# Patient Record
Sex: MALE | Race: OTHER | HISPANIC OR LATINO | Employment: OTHER | URBAN - METROPOLITAN AREA
[De-identification: names, ages, dates, MRNs, and addresses within clinical notes are randomized per-mention and may not be internally consistent; named-entity substitution may affect disease eponyms.]

---

## 2019-09-03 ENCOUNTER — HOSPITAL ENCOUNTER (OUTPATIENT)
Dept: RADIATION ONCOLOGY | Facility: MEDICAL CENTER | Age: 54
End: 2019-09-30
Attending: RADIOLOGY
Payer: COMMERCIAL

## 2019-09-03 VITALS
DIASTOLIC BLOOD PRESSURE: 60 MMHG | TEMPERATURE: 98.5 F | WEIGHT: 159.94 LBS | SYSTOLIC BLOOD PRESSURE: 116 MMHG | BODY MASS INDEX: 22.95 KG/M2 | OXYGEN SATURATION: 97 % | HEART RATE: 125 BPM

## 2019-09-03 DIAGNOSIS — C20 MALIGNANT NEOPLASM OF RECTUM (HCC): ICD-10-CM

## 2019-09-03 PROCEDURE — 99205 OFFICE O/P NEW HI 60 MIN: CPT | Performed by: RADIOLOGY

## 2019-09-03 PROCEDURE — 99214 OFFICE O/P EST MOD 30 MIN: CPT | Performed by: RADIOLOGY

## 2019-09-03 ASSESSMENT — PAIN SCALES - GENERAL: PAINLEVEL: NO PAIN

## 2019-09-04 NOTE — CONSULTS
RADIATION ONCOLOGY CONSULT    DATE OF SERVICE: 9/3/2019    IDENTIFICATION: A 54 y.o. male with locally advanced rectal cancer.  He is here at the kind request of Dr. Joaquín Trent.      HISTORY OF PRESENT ILLNESS: Patient's history dates back to 2013 when he presented with rectal bleeding and underwent a biopsy on 10/14/2013 revealing moderately differentiated adenocarcinoma.  He was Kincaid syndrome unlikely based on testing.  At that time he had a large ulcerated mass that was palpable up to 10 cm.  He elected to forego standard conventional treatment.  He was seen at [facility] in 2017 and underwent a diverting colostomy because of the pain with bowel movements.  He again decided to forego conventional therapy.  More recently he saw Dr. Arellano and underwent homeopathic therapy for 8 weeks but the tumor continued to grow and he was not feeling good under the treatment.  Now the tumor is massive and causing discomfort he is unable to sit.  PET CT scan was performed 2/6/2019 this revealed a large rectal/presacral mass with soft tissue extension bilateral inguinal masses.  Multiple surgeons last of which was Dr. Joaquín Trent who talked at length about options.  Mainly being neoadjuvant chemotherapy and radiation therapy followed by a large resection.  He is here to discuss the role of radiation therapy in his overall treatment plan.    PAST MEDICAL HISTORY:   Past Medical History:   Diagnosis Date   • Hemorrhoid        PAST SURGICAL HISTORY:  Past Surgical History:   Procedure Laterality Date   • COLONOSCOPY WITH BIOPSY  10/14/2013    Performed by Julio Cesar Sosa M.D. at ENDOSCOPY [facility]         CURRENT MEDICATIONS:  Current Outpatient Medications   Medication Sig Dispense Refill   • HOMEOPATHIC PRODUCTS PO Take  by mouth.     • cyanocobalamin (VITAMIN B-12) 500 MCG Tab Take 500 mcg by mouth every day.     • vitamin D (CHOLECALCIFEROL) 1000 UNIT Tab Take 1,000 Units by mouth every day. Pt takes 3,000 units  every other day     • Ascorbic Acid (VITAMIN C PO) Take 1,000 mg by mouth every day. Powder form       No current facility-administered medications for this encounter.        ALLERGIES:    Patient has no known allergies.    FAMILY HISTORY:    Father with unknown type of cancer.  Patient was adopted and knows no other family history of cancer.  Or other family history.        SOCIAL HISTORY:     reports that he has never smoked. He has never used smokeless tobacco. He reports that he drinks alcohol. He reports that he does not use drugs.  He lives with his wife and 2 children    REVIEW OF SYSTEMS: Pertinent positives consist of rectal discomfort, fatigue  The rest of the review of systems is negative and has been reviewed by me. It is in the nursing note dated 9/3/2019 in [facility]    Patient denies pain he has just discomfort in the rectum    PHYSICAL EXAM:    0= Fully active, able to carry on all pre-disease performance without restriction.  Vitals:    09/03/19 1521   BP: 116/60   BP Location: Left arm   Patient Position: Lying right side   BP Cuff Size: Adult   Pulse: (!) 125   Temp: 36.9 °C (98.5 °F)   TempSrc: Temporal   SpO2: 97%   Weight: 72.5 kg (159 lb 15 oz)   Pain Score: No pain        GENERAL: Well-appearing alert and oriented x3 standing because he is unable to lie flat.  Rectal exam: Unable to actually do a rectal exam because there was a huge mass extruding from the rectum that is exophytic oozing and malodorous.  There are also groin nodes left greater than right but no skin breakdown in the groins  HEENT:  Pupils are equal, round, and reactive to light.  Extraocular muscles   are intact. Sclerae nonicteric.  Conjunctivae pink.  Oral cavity, tongue   protrudes midline.   NECK:   No peripheral adenopathy of the neck, supraclavicular fossa or axillae   bilaterally.  LUNGS:  Clear to ascultation   HEART:  Regular rate and rhythm.  No murmur appreciated  ABDOMEN:  Soft. No evidence of hepatosplenomegaly.     EXTREMITIES:  Without Edema.  NEUROLOGIC:  Cranial nerves II through XII were intact. Normal stance and gait motor and sensory grossly within normal limits        IMPRESSION:    A 54 y.o. with locally advanced rectal carcinoma unresectable.      RECOMMENDATIONS:   I had a long discussion with the patient regarding treatment.  I said the only way to try and shrink this down to make him more comfortable would be a combination of radiation therapy and chemotherapy.  At this point I do not think he would be able to tolerate radiation therapy as he has such a large tumor I do not think he would be able to lie still for the treatment.  I would recommend that he do neoadjuvant chemotherapy followed by concurrent chemoradiation in a few months.  He has an appointment to see Dr Werner in a few weeks.  I have discussed with the patient and he is in agreement with the plan.  I've described the details of radiation along with the side effects both acute and chronic, including but not exclusive to fatigue, skin reaction, local soreness, swelling, damage to the bowel possible urinary symptoms ample time was allowed for questions, and patient understands.    Patient understands when he has completed some of the chemotherapy then he can proceed with concurrent chemoradiation.  He will give us a call once he has completed his chemotherapy.    Thank you for the opportunity to participate in his care.  If any questions or comments, please do not hesitate in calling.    Please note that this dictation was created using voice recognition software. I have made every reasonable attempt to correct obvious errors, but I expect that there are errors of grammar and possibly content that I did not discover before finalizing the note.

## 2019-09-09 ENCOUNTER — HOSPITAL ENCOUNTER (OUTPATIENT)
Dept: LAB | Facility: MEDICAL CENTER | Age: 54
End: 2019-09-09
Attending: INTERNAL MEDICINE
Payer: COMMERCIAL

## 2019-09-09 LAB
FORWARD REASON: SPWHY: NORMAL
FORWARDED TO LAB: SPWHR: NORMAL
SPECIMEN SENT: SPWT1: NORMAL

## 2019-09-27 ENCOUNTER — HOSPITAL ENCOUNTER (OUTPATIENT)
Dept: LAB | Facility: MEDICAL CENTER | Age: 54
End: 2019-09-27
Attending: INTERNAL MEDICINE
Payer: COMMERCIAL

## 2019-09-27 LAB
FORWARD REASON: SPWHY: NORMAL
FORWARDED TO LAB: SPWHR: NORMAL
SPECIMEN SENT: SPWT1: NORMAL

## 2019-10-16 ENCOUNTER — HOSPITAL ENCOUNTER (OUTPATIENT)
Dept: LAB | Facility: MEDICAL CENTER | Age: 54
End: 2019-10-16
Attending: INTERNAL MEDICINE
Payer: COMMERCIAL

## 2019-10-16 LAB
FORWARD REASON: SPWHY: NORMAL
FORWARDED TO LAB: SPWHR: NORMAL
SPECIMEN SENT: SPWT1: NORMAL

## 2020-02-14 ENCOUNTER — HOSPITAL ENCOUNTER (OUTPATIENT)
Facility: MEDICAL CENTER | Age: 55
End: 2020-02-14
Attending: INTERNAL MEDICINE
Payer: MEDICAID

## 2020-02-14 LAB
FORWARD REASON: SPWHY: NORMAL
FORWARDED TO LAB: SPWHR: NORMAL
SPECIMEN SENT: SPWT1: NORMAL

## 2020-02-19 ENCOUNTER — HOSPITAL ENCOUNTER (OUTPATIENT)
Dept: RADIOLOGY | Facility: MEDICAL CENTER | Age: 55
End: 2020-02-19
Payer: MEDICAID

## 2020-02-19 DIAGNOSIS — C20 MALIGNANT NEOPLASM OF RECTUM (HCC): ICD-10-CM

## 2020-02-24 ENCOUNTER — HOSPITAL ENCOUNTER (OUTPATIENT)
Dept: RADIATION ONCOLOGY | Facility: MEDICAL CENTER | Age: 55
End: 2020-02-29
Attending: RADIOLOGY
Payer: MEDICAID

## 2020-02-24 VITALS
SYSTOLIC BLOOD PRESSURE: 147 MMHG | HEART RATE: 82 BPM | OXYGEN SATURATION: 97 % | DIASTOLIC BLOOD PRESSURE: 98 MMHG | BODY MASS INDEX: 24.73 KG/M2 | TEMPERATURE: 98.2 F | HEIGHT: 70 IN | WEIGHT: 172.7 LBS

## 2020-02-24 PROCEDURE — 99215 OFFICE O/P EST HI 40 MIN: CPT | Performed by: RADIOLOGY

## 2020-02-24 PROCEDURE — 99212 OFFICE O/P EST SF 10 MIN: CPT | Performed by: RADIOLOGY

## 2020-02-24 ASSESSMENT — PAIN SCALES - GENERAL: PAINLEVEL: 3=SLIGHT PAIN

## 2020-02-24 NOTE — NON-PROVIDER
"Patient was seen today in clinic with Dr. Strange for follow up.  Vitals signs and weight were obtained and pain assessment was completed.  Allergies and medications were reviewed with the patient.  Review of systems completed.     Vitals/Pain:  Vitals:    02/24/20 1356   BP: 147/98   BP Location: Right arm   Patient Position: Sitting   BP Cuff Size: Adult   Pulse: 82   Temp: 36.8 °C (98.2 °F)   TempSrc: Temporal   SpO2: 97%   Weight: 78.3 kg (172 lb 11.2 oz)   Height: 1.778 m (5' 10\")   Pain Score: 3=Slight Pain        Allergies:   Patient has no known allergies.    Current Medications:  Current Outpatient Medications   Medication Sig Dispense Refill   • HOMEOPATHIC PRODUCTS PO Take  by mouth.     • cyanocobalamin (VITAMIN B-12) 500 MCG Tab Take 500 mcg by mouth every day.     • vitamin D (CHOLECALCIFEROL) 1000 UNIT Tab Take 1,000 Units by mouth every day. Pt takes 3,000 units every other day     • Ascorbic Acid (VITAMIN C PO) Take 1,000 mg by mouth every day. Powder form       No current facility-administered medications for this encounter.          PCP:  Adan Ball, Med Ass't    "

## 2020-02-24 NOTE — PROGRESS NOTES
RADIATION ONCOLOGY FOLLOW-UP    DATE OF SERVICE: 2/24/2020    IDENTIFICATION:   A 54 y.o. male with locally advanced rectal cancer now status post neoadjuvant Cape ox plus bevacizumab with an excellent response.      HISTORY OF PRESENT ILLNESS:   Patient's history dates back to 2013 when he presented with rectal bleeding underwent biopsy 10/14/2013 revealing moderately differentiated adenocarcinoma.  He was Kincaid syndrome unlikely.  At that time he had a large ulcerated mass that was palpable up to 10 cm.  He decided to forego conventional therapy.  He did undergo diverting colostomy in 2017 at [facility].  He continued with homeopathic therapy but the tumor continued to progress and he was unable to sit.  PET CT scan 2/6/2019 showed a large rectal presacral mouth with soft tissue extension of bilateral inguinal masses.  He then underwent neoadjuvant chemotherapy as mentioned consisted of Cape ox plus bevacizumab.  He has had an excellent response and is here to discuss concurrent chemotherapy and radiation therapy for further tumor response.  PET CT 1/22/2020 showed continued regression of disease.    CURRENT MEDICATIONS:  Current Outpatient Medications   Medication Sig Dispense Refill   • HOMEOPATHIC PRODUCTS PO Take  by mouth.     • cyanocobalamin (VITAMIN B-12) 500 MCG Tab Take 500 mcg by mouth every day.     • vitamin D (CHOLECALCIFEROL) 1000 UNIT Tab Take 1,000 Units by mouth every day. Pt takes 3,000 units every other day     • Ascorbic Acid (VITAMIN C PO) Take 1,000 mg by mouth every day. Powder form       No current facility-administered medications for this encounter.        ALLERGIES:  Patient has no known allergies.    FAMILY HISTORY:    History reviewed. No pertinent family history.[unfilled]        SOCIAL HISTORY:     reports that he has never smoked. He has never used smokeless tobacco. He reports current alcohol use. He reports that he does not use drugs.      REVIEW OF SYSTEMS: Is significant for  "weight gain of about 20 pounds improved appetite but still with fatigue.  He has some diarrhea and nausea from the chemotherapy.  He does also have some neuropathy.  The rest of the review of systems has been reviewed by me and is documented in the nursing note in [facility] dated 2/24/2020    PHYSICAL EXAM:     ECOG PERFORMANCE STATUS:  1= Restricted in physically strenuous activity, but ambulatory and able to carry out work of a light sedentary nature, e.g., light housework, office work.       Vitals:    02/24/20 1356   BP: 147/98   BP Location: Right arm   Patient Position: Sitting   BP Cuff Size: Adult   Pulse: 82   Temp: 36.8 °C (98.2 °F)   TempSrc: Temporal   SpO2: 97%   Weight: 78.3 kg (172 lb 11.2 oz)   Height: 1.778 m (5' 10\")   Pain Score: 3=Slight Pain        GENERAL: Well-appearing alert and oriented x3 in no apparent distress  Rectal: He has substantially regressed tumor compared to my previous examination.  On the right buttock he has scar tissue from where the original tumor was.  Actual rectal exam was not performed as the tumor is still around the anus.  He also has bilateral groin adenopathy left greater than right smaller than previously.  HEENT:  Pupils are equal, round, and reactive to light.  Extraocular muscles   are intact. Sclerae nonicteric.  Conjunctivae pink.  Oral cavity, tongue   protrudes midline.   NECK: No palpable nodes in the neck supraclavicular axillary regions  LUNGS:  Clear to ascultation   HEART:  Regular rate and rhythm.  No murmur appreciated  ABDOMEN:  Soft. No evidence of hepatosplenomegaly.  Positive bowel sounds.  EXTREMITIES:  Without Edema.  NEUROLOGIC:  Cranial nerves II through XII were intact. Normal stance and gait motor and sensory grossly within normal limits          IMPRESSION:    A 54 y.o. male with locally advanced rectal cancer now status post Cape ox and Avastin with excellent response but still persistent tumor.      RECOMMENDATIONS:   Again I am recommending " concurrent chemo rads to decrease the chance of local recurrence.  I discussed the details of the radiation along with the side effects both acute and chronic including but not exclusive to generalized fatigue damage to tissues within the treatment field including abdominal discomfort urinary discomfort possible bleeding because of the prior Avastin use.  And severe sunburn type of reaction in the perianal region.    Patient understands and we have him tentatively scheduled for simulation on Wednesday after he has an MRI scan tomorrow.      Thank you for the opportunity to participate in his care.  If any questions or comments, please do not hesitate in calling.      Please note that this dictation was created using voice recognition software. I have made every reasonable attempt to correct obvious errors, but I expect that there are errors of grammar and possibly content that I did not discover before finalizing the note.

## 2020-02-25 ENCOUNTER — HOSPITAL ENCOUNTER (OUTPATIENT)
Dept: RADIOLOGY | Facility: MEDICAL CENTER | Age: 55
End: 2020-02-25
Attending: RADIOLOGY
Payer: MEDICAID

## 2020-02-25 DIAGNOSIS — C20 MALIGNANT NEOPLASM OF RECTUM (HCC): ICD-10-CM

## 2020-02-25 PROCEDURE — A9576 INJ PROHANCE MULTIPACK: HCPCS | Performed by: RADIOLOGY

## 2020-02-25 PROCEDURE — 700111 HCHG RX REV CODE 636 W/ 250 OVERRIDE (IP): Performed by: RADIOLOGY

## 2020-02-25 PROCEDURE — 700117 HCHG RX CONTRAST REV CODE 255: Performed by: RADIOLOGY

## 2020-02-25 PROCEDURE — 72197 MRI PELVIS W/O & W/DYE: CPT

## 2020-02-25 RX ADMIN — GADOTERIDOL 15 ML: 279.3 INJECTION, SOLUTION INTRAVENOUS at 16:00

## 2020-02-25 RX ADMIN — GLUCAGON HYDROCHLORIDE 1 MG: KIT at 12:25

## 2020-02-25 RX ADMIN — HEPARIN 500 UNITS: 100 SYRINGE at 13:25

## 2020-03-03 ENCOUNTER — HOSPITAL ENCOUNTER (OUTPATIENT)
Dept: RADIATION ONCOLOGY | Facility: MEDICAL CENTER | Age: 55
End: 2020-03-31
Attending: RADIOLOGY

## 2020-03-10 ENCOUNTER — HOSPITAL ENCOUNTER (OUTPATIENT)
Dept: LAB | Facility: MEDICAL CENTER | Age: 55
End: 2020-03-10
Attending: NURSE PRACTITIONER
Payer: MEDICAID

## 2020-03-10 LAB
FORWARD REASON: SPWHY: NORMAL
FORWARDED TO LAB: SPWHR: NORMAL
SPECIMEN SENT: SPWT1: NORMAL

## 2020-03-30 ENCOUNTER — HOSPITAL ENCOUNTER (OUTPATIENT)
Dept: LAB | Facility: MEDICAL CENTER | Age: 55
End: 2020-03-30
Attending: INTERNAL MEDICINE
Payer: MEDICAID

## 2020-03-30 ENCOUNTER — HOSPITAL ENCOUNTER (OUTPATIENT)
Dept: RADIATION ONCOLOGY | Facility: MEDICAL CENTER | Age: 55
End: 2020-03-30
Payer: MEDICAID

## 2020-03-30 PROCEDURE — 77290 THER RAD SIMULAJ FIELD CPLX: CPT | Performed by: RADIOLOGY

## 2020-03-30 PROCEDURE — 77334 RADIATION TREATMENT AID(S): CPT | Performed by: RADIOLOGY

## 2020-03-30 PROCEDURE — 77263 THER RADIOLOGY TX PLNG CPLX: CPT | Performed by: RADIOLOGY

## 2020-03-30 PROCEDURE — 77470 SPECIAL RADIATION TREATMENT: CPT | Mod: 26 | Performed by: RADIOLOGY

## 2020-03-30 PROCEDURE — 77470 SPECIAL RADIATION TREATMENT: CPT | Performed by: RADIOLOGY

## 2020-03-30 PROCEDURE — 80053 COMPREHEN METABOLIC PANEL: CPT

## 2020-03-30 PROCEDURE — 77334 RADIATION TREATMENT AID(S): CPT | Mod: 26 | Performed by: RADIOLOGY

## 2020-03-31 LAB
ALBUMIN SERPL BCP-MCNC: 4 G/DL (ref 3.2–4.9)
ALBUMIN/GLOB SERPL: 1.7 G/DL
ALP SERPL-CCNC: 81 U/L (ref 30–99)
ALT SERPL-CCNC: 41 U/L (ref 2–50)
ANION GAP SERPL CALC-SCNC: 15 MMOL/L (ref 7–16)
AST SERPL-CCNC: 47 U/L (ref 12–45)
BILIRUB SERPL-MCNC: 0.7 MG/DL (ref 0.1–1.5)
BUN SERPL-MCNC: 15 MG/DL (ref 8–22)
CALCIUM SERPL-MCNC: 8.9 MG/DL (ref 8.5–10.5)
CHLORIDE SERPL-SCNC: 101 MMOL/L (ref 96–112)
CO2 SERPL-SCNC: 23 MMOL/L (ref 20–33)
CREAT SERPL-MCNC: 0.52 MG/DL (ref 0.5–1.4)
GLOBULIN SER CALC-MCNC: 2.4 G/DL (ref 1.9–3.5)
GLUCOSE SERPL-MCNC: 81 MG/DL (ref 65–99)
POTASSIUM SERPL-SCNC: 3.6 MMOL/L (ref 3.6–5.5)
PROT SERPL-MCNC: 6.4 G/DL (ref 6–8.2)
SODIUM SERPL-SCNC: 139 MMOL/L (ref 135–145)

## 2020-04-01 ENCOUNTER — HOSPITAL ENCOUNTER (OUTPATIENT)
Dept: RADIATION ONCOLOGY | Facility: MEDICAL CENTER | Age: 55
End: 2020-04-30
Attending: RADIOLOGY
Payer: MEDICAID

## 2020-04-03 PROCEDURE — 77300 RADIATION THERAPY DOSE PLAN: CPT | Performed by: RADIOLOGY

## 2020-04-03 PROCEDURE — 77338 DESIGN MLC DEVICE FOR IMRT: CPT | Performed by: RADIOLOGY

## 2020-04-03 PROCEDURE — 77300 RADIATION THERAPY DOSE PLAN: CPT | Mod: 26 | Performed by: RADIOLOGY

## 2020-04-03 PROCEDURE — 77301 RADIOTHERAPY DOSE PLAN IMRT: CPT | Mod: 26 | Performed by: RADIOLOGY

## 2020-04-03 PROCEDURE — 77301 RADIOTHERAPY DOSE PLAN IMRT: CPT | Performed by: RADIOLOGY

## 2020-04-03 PROCEDURE — 77338 DESIGN MLC DEVICE FOR IMRT: CPT | Mod: 26 | Performed by: RADIOLOGY

## 2020-04-06 ENCOUNTER — HOSPITAL ENCOUNTER (OUTPATIENT)
Dept: RADIATION ONCOLOGY | Facility: MEDICAL CENTER | Age: 55
End: 2020-04-06
Payer: MEDICAID

## 2020-04-06 ENCOUNTER — DOCUMENTATION (OUTPATIENT)
Dept: NUTRITION | Facility: MEDICAL CENTER | Age: 55
End: 2020-04-06

## 2020-04-06 LAB
CHEMOTHERAPY INFUSION START DATE: NORMAL
CHEMOTHERAPY RECORDS: 1.8
CHEMOTHERAPY RECORDS: 4500
CHEMOTHERAPY RECORDS: NORMAL
CHEMOTHERAPY RX CANCER: NORMAL
DATE 1ST CHEMO CANCER: NORMAL
RAD ONC ARIA COURSE LAST TREATMENT DATE: NORMAL
RAD ONC ARIA COURSE TREATMENT ELAPSED DAYS: NORMAL
RAD ONC ARIA REFERENCE POINT DOSAGE GIVEN TO DATE: 1.8
RAD ONC ARIA REFERENCE POINT DOSAGE GIVEN TO DATE: 1.84
RAD ONC ARIA REFERENCE POINT ID: NORMAL
RAD ONC ARIA REFERENCE POINT ID: NORMAL
RAD ONC ARIA REFERENCE POINT SESSION DOSAGE GIVEN: 1.8
RAD ONC ARIA REFERENCE POINT SESSION DOSAGE GIVEN: 1.84

## 2020-04-06 PROCEDURE — 77280 THER RAD SIMULAJ FIELD SMPL: CPT | Performed by: RADIOLOGY

## 2020-04-06 PROCEDURE — 77331 SPECIAL RADIATION DOSIMETRY: CPT | Mod: 26 | Performed by: RADIOLOGY

## 2020-04-06 PROCEDURE — 77331 SPECIAL RADIATION DOSIMETRY: CPT | Performed by: RADIOLOGY

## 2020-04-06 PROCEDURE — 77386 HCHG IMRT DELIVERY COMPLEX: CPT | Performed by: RADIOLOGY

## 2020-04-06 NOTE — PROGRESS NOTES
"Nutrition Services: RD Consultation/ New Radiation Start  54 year old male with diagnosis of rectal.       Past Medical History:   Diagnosis Date   • Hemorrhoid        RD met with pt to assess current intake, appetite, and nutritional status.  Pt presents to appointment alone.  Pt appears well nourished    Pt states typically eats 3 meals daily at home with an evening snacks of fruit or an occasional dessert when family makes treats. Pt reports usual weight of 168 lbs and is trying to maintain weight. Pt denies N/V/D/C and has had oral and infusion chemo treatment for past 6-7 months. Reports chemo causes tingling in fingers and toes while feeling cold. Wife makes smoothies for pt at home which include fruits, vegetables, and sometimes protein powder.     We discussed nutrition related goals during treatment as well and RD role. We discussed the potential side effects of treatment and their impacts on nutrition. We discussed the benefit of snacks focusing on high calorie/protein items as well as the benefits of maintaining weight and lean muscle mass throughout treatment. Provided and went over handouts/food lists regarding a general healthy diet, healthy snack ideas, foods high in protein and the benefits of a plant based diet limiting red and processed meats.  Pt did not express any further nutrition-related questions or concerns at this time.     Assessment:  • Pertinent Labs 3/30: AST 47  • Pertinent Meds: unknown at this time  • Weight: 172 lbs/78.2 kg on 2/24/20  • Height: 5'10\"  • BMI: 24.78    Weight History from Chart: Weight fluctuates from 220 lbs in 2013 to 189 lbs on 1/7/19, to lowest weight of 160 lbs on 9/3/2019 to current weight of 168 lbs per pt.    Weight Change/Malnutrition Risk: Pt reports weight loss when starting chemo and now has gained weight back to 168 lbs, which is 4 lbs (2%) down in ~6 weeks, which is insignificant.    Plan/Recommendations:  • RD introduced self and role as a resource " during treatment.  • Provided and discussed handout regarding general nutrition guidelines as well as nutritional recommendations for patient's with rectal cancer, including tips/tricks should side effects of tx occur.   • Focus on high calorie and protein foods, fruits and vegetables with all meals/snacks - handout provided.  • Continue protein smoothies made at home as tolerated.    Pt reports understanding and was receptive to information provided.   RD provided contact information. Encouraged pt to reach out as questions/concerns arise.   RD following and to make further recommendations as indicated.  [phone]

## 2020-04-07 ENCOUNTER — HOSPITAL ENCOUNTER (OUTPATIENT)
Dept: RADIATION ONCOLOGY | Facility: MEDICAL CENTER | Age: 55
End: 2020-04-07
Payer: MEDICAID

## 2020-04-07 LAB
CHEMOTHERAPY INFUSION START DATE: NORMAL
CHEMOTHERAPY RECORDS: 1.8
CHEMOTHERAPY RECORDS: 4500
CHEMOTHERAPY RECORDS: NORMAL
CHEMOTHERAPY RX CANCER: NORMAL
DATE 1ST CHEMO CANCER: NORMAL
RAD ONC ARIA COURSE LAST TREATMENT DATE: NORMAL
RAD ONC ARIA COURSE TREATMENT ELAPSED DAYS: NORMAL
RAD ONC ARIA REFERENCE POINT DOSAGE GIVEN TO DATE: 3.6
RAD ONC ARIA REFERENCE POINT DOSAGE GIVEN TO DATE: 3.68
RAD ONC ARIA REFERENCE POINT ID: NORMAL
RAD ONC ARIA REFERENCE POINT ID: NORMAL
RAD ONC ARIA REFERENCE POINT SESSION DOSAGE GIVEN: 1.8
RAD ONC ARIA REFERENCE POINT SESSION DOSAGE GIVEN: 1.84

## 2020-04-07 PROCEDURE — 77386 HCHG IMRT DELIVERY COMPLEX: CPT | Performed by: RADIOLOGY

## 2020-04-07 PROCEDURE — 77014 PR CT GUIDANCE PLACEMENT RAD THERAPY FIELDS: CPT | Mod: 26 | Performed by: RADIOLOGY

## 2020-04-08 ENCOUNTER — APPOINTMENT (OUTPATIENT)
Dept: RADIATION ONCOLOGY | Facility: MEDICAL CENTER | Age: 55
End: 2020-04-08
Payer: MEDICAID

## 2020-04-08 VITALS
SYSTOLIC BLOOD PRESSURE: 125 MMHG | OXYGEN SATURATION: 96 % | DIASTOLIC BLOOD PRESSURE: 69 MMHG | HEART RATE: 65 BPM | BODY MASS INDEX: 25.1 KG/M2 | TEMPERATURE: 97.8 F | WEIGHT: 174.9 LBS

## 2020-04-08 DIAGNOSIS — C20 RECTAL CARCINOMA (HCC): ICD-10-CM

## 2020-04-08 LAB
CHEMOTHERAPY INFUSION START DATE: NORMAL
CHEMOTHERAPY RECORDS: 1.8
CHEMOTHERAPY RECORDS: 4500
CHEMOTHERAPY RECORDS: NORMAL
CHEMOTHERAPY RX CANCER: NORMAL
DATE 1ST CHEMO CANCER: NORMAL
RAD ONC ARIA COURSE LAST TREATMENT DATE: NORMAL
RAD ONC ARIA COURSE TREATMENT ELAPSED DAYS: NORMAL
RAD ONC ARIA REFERENCE POINT DOSAGE GIVEN TO DATE: 5.4
RAD ONC ARIA REFERENCE POINT DOSAGE GIVEN TO DATE: 5.52
RAD ONC ARIA REFERENCE POINT ID: NORMAL
RAD ONC ARIA REFERENCE POINT ID: NORMAL
RAD ONC ARIA REFERENCE POINT SESSION DOSAGE GIVEN: 1.8
RAD ONC ARIA REFERENCE POINT SESSION DOSAGE GIVEN: 1.84

## 2020-04-08 PROCEDURE — 77336 RADIATION PHYSICS CONSULT: CPT | Performed by: RADIOLOGY

## 2020-04-08 PROCEDURE — 77386 HCHG IMRT DELIVERY COMPLEX: CPT | Performed by: RADIOLOGY

## 2020-04-08 PROCEDURE — 77014 PR CT GUIDANCE PLACEMENT RAD THERAPY FIELDS: CPT | Mod: 26 | Performed by: RADIOLOGY

## 2020-04-08 ASSESSMENT — FIBROSIS 4 INDEX: FIB4 SCORE: 0.52

## 2020-04-08 ASSESSMENT — PAIN SCALES - GENERAL: PAINLEVEL: NO PAIN

## 2020-04-08 NOTE — ON TREATMENT VISIT
ON TREATMENT  NOTE  RADIATION ONCOLOGY DEPARTMENT    Patient name:  Dre Eaton    Primary Physician:  Kirt Arellano M.D. MRN: [id number]  CSN: [id number]   Referring physician:  Joaquín Trent M.D. : 1965, 54 y.o.     ENCOUNTER DATE:  20    DIAGNOSIS:    Rectal carcinoma (HCC)  Staging form: Colon and Rectum, AJCC 8th Edition  - Clinical: Stage IIIC (cT4b, cN2, cM0) - Signed by Patti Strange M.D. on 2020  Laterality: Not applicable      TREATMENT SUMMARY:  [facility] Treatment Information        Some values may be hidden. Unless noted otherwise, only the newest values recorded on each date are displayed.         [facility] Treatment Summary 20   Course First Treatment Date 2020   Course Last Treatment Date 2020   Pelvis Plan from Course C1_rectum   Fraction 3 of 25   Elapsed Course Days 2 @    Prescribed Fraction Dose 180 cGy   Prescribed Total Dose 4,500 cGy   Pelvis Reference Point from Course C1_rectum   Elapsed Course Days 2 @    Session Dose 180 cGy   Total Dose 540 cGy   Pelvis CP Reference Point from Course C1_rectum   Elapsed Course Days 2 @    Session Dose 184 cGy   Total Dose 552 cGy             SUBJECTIVE:  Tolerated first few treatments without event        VITAL SIGNS:  KPS: 100, Fully active, able to carry on all pre-disease performed without restriction (ECOG equivalent 0)  Encounter Vitals  Temperature: 36.6 °C (97.8 °F)  Temp src: Temporal  Blood Pressure: 125/69  Pulse: 65  Pulse Oximetry: 96 %  Weight: 79.3 kg (174 lb 14.4 oz)  Pain Score: No pain  Pain Assessment 2020   Pain Assessment Denies Pain -   Pain Score 0 3   Pain Loc - Rectum   Some recent data might be hidden          PHYSICAL EXAM:  No change      Toxicity Assessment 2020 9/3/2019   Toxicity Assessment Male Pelvis Male Pelvis   Fatigue (lethargy, malaise, asthenia) None Severe (e.g., decrease in performance status by 2 or more ECOG levels or  40% Karnofsky) or loss of ability to perform some activities   Radiation Dermatitis None None   Anorexia None Oral intake significantly decreased   Colitis None Abdominal pain with mucus and/or blood in stool   Constipation None None   Dehydration None None   Diarrhea w/o Colostomy None -   Flatulence None None   Nausea None -   Proctitis None -   Vomiting None None   RT - Pain due to RT None None   Tumor Pain (onset or exacerbation of tumor pain due to treatment) None -   Dysuria (painful urination) None -   Urinary Frequency Normal -   Urinary Urgency None -   Bladder Spasms Absent -   Incontinence None -   Urinary Retention Normal -           IMPRESSION:  Cancer Staging  No matching staging information was found for the patient.    PLAN:  No change in treatment plan    Disposition:  Treatment plan reviewed. Questions answered. Continue therapy outlined.     Patti Straneg M.D.    Orders Placed This Encounter   • Rad Onc [facility] Session Summary

## 2020-04-09 ENCOUNTER — HOSPITAL ENCOUNTER (OUTPATIENT)
Dept: RADIATION ONCOLOGY | Facility: MEDICAL CENTER | Age: 55
End: 2020-04-09
Payer: MEDICAID

## 2020-04-09 LAB
CHEMOTHERAPY INFUSION START DATE: NORMAL
CHEMOTHERAPY RECORDS: 1.8
CHEMOTHERAPY RECORDS: 4500
CHEMOTHERAPY RECORDS: NORMAL
CHEMOTHERAPY RX CANCER: NORMAL
DATE 1ST CHEMO CANCER: NORMAL
RAD ONC ARIA COURSE LAST TREATMENT DATE: NORMAL
RAD ONC ARIA COURSE TREATMENT ELAPSED DAYS: NORMAL
RAD ONC ARIA REFERENCE POINT DOSAGE GIVEN TO DATE: 7.2
RAD ONC ARIA REFERENCE POINT DOSAGE GIVEN TO DATE: 7.37
RAD ONC ARIA REFERENCE POINT ID: NORMAL
RAD ONC ARIA REFERENCE POINT ID: NORMAL
RAD ONC ARIA REFERENCE POINT SESSION DOSAGE GIVEN: 1.8
RAD ONC ARIA REFERENCE POINT SESSION DOSAGE GIVEN: 1.84

## 2020-04-09 PROCEDURE — 77386 HCHG IMRT DELIVERY COMPLEX: CPT | Performed by: RADIOLOGY

## 2020-04-09 PROCEDURE — 77014 PR CT GUIDANCE PLACEMENT RAD THERAPY FIELDS: CPT | Mod: 26 | Performed by: RADIOLOGY

## 2020-04-10 ENCOUNTER — HOSPITAL ENCOUNTER (OUTPATIENT)
Dept: RADIATION ONCOLOGY | Facility: MEDICAL CENTER | Age: 55
End: 2020-04-10
Payer: MEDICAID

## 2020-04-10 LAB
CHEMOTHERAPY INFUSION START DATE: NORMAL
CHEMOTHERAPY RECORDS: 1.8
CHEMOTHERAPY RECORDS: 4500
CHEMOTHERAPY RECORDS: NORMAL
CHEMOTHERAPY RX CANCER: NORMAL
DATE 1ST CHEMO CANCER: NORMAL
RAD ONC ARIA COURSE LAST TREATMENT DATE: NORMAL
RAD ONC ARIA COURSE TREATMENT ELAPSED DAYS: NORMAL
RAD ONC ARIA REFERENCE POINT DOSAGE GIVEN TO DATE: 9
RAD ONC ARIA REFERENCE POINT DOSAGE GIVEN TO DATE: 9.21
RAD ONC ARIA REFERENCE POINT ID: NORMAL
RAD ONC ARIA REFERENCE POINT ID: NORMAL
RAD ONC ARIA REFERENCE POINT SESSION DOSAGE GIVEN: 1.8
RAD ONC ARIA REFERENCE POINT SESSION DOSAGE GIVEN: 1.84

## 2020-04-10 PROCEDURE — 77427 RADIATION TX MANAGEMENT X5: CPT | Performed by: RADIOLOGY

## 2020-04-10 PROCEDURE — 77014 PR CT GUIDANCE PLACEMENT RAD THERAPY FIELDS: CPT | Mod: 26 | Performed by: RADIOLOGY

## 2020-04-10 PROCEDURE — 77386 HCHG IMRT DELIVERY COMPLEX: CPT | Performed by: RADIOLOGY

## 2020-04-13 ENCOUNTER — HOSPITAL ENCOUNTER (OUTPATIENT)
Dept: RADIATION ONCOLOGY | Facility: MEDICAL CENTER | Age: 55
End: 2020-04-13
Payer: MEDICAID

## 2020-04-13 LAB
CHEMOTHERAPY INFUSION START DATE: NORMAL
CHEMOTHERAPY RECORDS: 1.8
CHEMOTHERAPY RECORDS: 4500
CHEMOTHERAPY RECORDS: NORMAL
CHEMOTHERAPY RX CANCER: NORMAL
DATE 1ST CHEMO CANCER: NORMAL
RAD ONC ARIA COURSE LAST TREATMENT DATE: NORMAL
RAD ONC ARIA COURSE TREATMENT ELAPSED DAYS: NORMAL
RAD ONC ARIA REFERENCE POINT DOSAGE GIVEN TO DATE: 10.8
RAD ONC ARIA REFERENCE POINT DOSAGE GIVEN TO DATE: 11.05
RAD ONC ARIA REFERENCE POINT ID: NORMAL
RAD ONC ARIA REFERENCE POINT ID: NORMAL
RAD ONC ARIA REFERENCE POINT SESSION DOSAGE GIVEN: 1.8
RAD ONC ARIA REFERENCE POINT SESSION DOSAGE GIVEN: 1.84

## 2020-04-13 PROCEDURE — 77386 HCHG IMRT DELIVERY COMPLEX: CPT | Performed by: RADIOLOGY

## 2020-04-13 PROCEDURE — 77014 PR CT GUIDANCE PLACEMENT RAD THERAPY FIELDS: CPT | Mod: 26 | Performed by: RADIOLOGY

## 2020-04-14 ENCOUNTER — HOSPITAL ENCOUNTER (OUTPATIENT)
Dept: RADIATION ONCOLOGY | Facility: MEDICAL CENTER | Age: 55
End: 2020-04-14
Payer: MEDICAID

## 2020-04-14 LAB
CHEMOTHERAPY INFUSION START DATE: NORMAL
CHEMOTHERAPY RECORDS: 1.8
CHEMOTHERAPY RECORDS: 4500
CHEMOTHERAPY RECORDS: NORMAL
CHEMOTHERAPY RX CANCER: NORMAL
DATE 1ST CHEMO CANCER: NORMAL
RAD ONC ARIA COURSE LAST TREATMENT DATE: NORMAL
RAD ONC ARIA COURSE TREATMENT ELAPSED DAYS: NORMAL
RAD ONC ARIA REFERENCE POINT DOSAGE GIVEN TO DATE: 12.6
RAD ONC ARIA REFERENCE POINT DOSAGE GIVEN TO DATE: 12.89
RAD ONC ARIA REFERENCE POINT ID: NORMAL
RAD ONC ARIA REFERENCE POINT ID: NORMAL
RAD ONC ARIA REFERENCE POINT SESSION DOSAGE GIVEN: 1.8
RAD ONC ARIA REFERENCE POINT SESSION DOSAGE GIVEN: 1.84

## 2020-04-14 PROCEDURE — 77014 PR CT GUIDANCE PLACEMENT RAD THERAPY FIELDS: CPT | Mod: 26 | Performed by: RADIOLOGY

## 2020-04-14 PROCEDURE — 77386 HCHG IMRT DELIVERY COMPLEX: CPT | Performed by: RADIOLOGY

## 2020-04-15 ENCOUNTER — APPOINTMENT (OUTPATIENT)
Dept: RADIATION ONCOLOGY | Facility: MEDICAL CENTER | Age: 55
End: 2020-04-15
Payer: MEDICAID

## 2020-04-15 VITALS
SYSTOLIC BLOOD PRESSURE: 119 MMHG | BODY MASS INDEX: 25.1 KG/M2 | DIASTOLIC BLOOD PRESSURE: 84 MMHG | OXYGEN SATURATION: 97 % | HEART RATE: 71 BPM | WEIGHT: 174.9 LBS | TEMPERATURE: 98.4 F

## 2020-04-15 LAB
CHEMOTHERAPY INFUSION START DATE: NORMAL
CHEMOTHERAPY RECORDS: 1.8
CHEMOTHERAPY RECORDS: 4500
CHEMOTHERAPY RECORDS: NORMAL
CHEMOTHERAPY RX CANCER: NORMAL
DATE 1ST CHEMO CANCER: NORMAL
RAD ONC ARIA COURSE LAST TREATMENT DATE: NORMAL
RAD ONC ARIA COURSE TREATMENT ELAPSED DAYS: NORMAL
RAD ONC ARIA REFERENCE POINT DOSAGE GIVEN TO DATE: 14.4
RAD ONC ARIA REFERENCE POINT DOSAGE GIVEN TO DATE: 14.73
RAD ONC ARIA REFERENCE POINT ID: NORMAL
RAD ONC ARIA REFERENCE POINT ID: NORMAL
RAD ONC ARIA REFERENCE POINT SESSION DOSAGE GIVEN: 1.8
RAD ONC ARIA REFERENCE POINT SESSION DOSAGE GIVEN: 1.84

## 2020-04-15 PROCEDURE — 77336 RADIATION PHYSICS CONSULT: CPT | Performed by: RADIOLOGY

## 2020-04-15 PROCEDURE — 77014 PR CT GUIDANCE PLACEMENT RAD THERAPY FIELDS: CPT | Mod: 26 | Performed by: RADIOLOGY

## 2020-04-15 PROCEDURE — 77386 HCHG IMRT DELIVERY COMPLEX: CPT | Performed by: RADIOLOGY

## 2020-04-15 ASSESSMENT — PAIN SCALES - GENERAL: PAINLEVEL: NO PAIN

## 2020-04-15 ASSESSMENT — FIBROSIS 4 INDEX: FIB4 SCORE: 0.52

## 2020-04-15 NOTE — ON TREATMENT VISIT
ON TREATMENT  NOTE  RADIATION ONCOLOGY DEPARTMENT    Patient name:  Dre Eaton    Primary Physician:  Kirt Arellano M.D. MRN: [id number]  CSN: [id number]   Referring physician:  Joaquín Trent M.D. : 1965, 54 y.o.     ENCOUNTER DATE:  04/15/20    DIAGNOSIS:    Rectal carcinoma (HCC)  Staging form: Colon and Rectum, AJCC 8th Edition  - Clinical: Stage IIIC (cT4b, cN2, cM0) - Signed by Patti Strange M.D. on 2020  Laterality: Not applicable      TREATMENT SUMMARY:  [facility] Treatment Information        Some values may be hidden. Unless noted otherwise, only the newest values recorded on each date are displayed.         [facility] Treatment Summary 4/15/20   Course First Treatment Date 2020   Course Last Treatment Date 04/15/2020   Pelvis Plan from Course C1_rectum   Fraction 8 of 25   Elapsed Course Days 9 @ [id number]   Prescribed Fraction Dose 180 cGy   Prescribed Total Dose 4,500 cGy   Pelvis Reference Point from Course C1_rectum   Elapsed Course Days 9 @ [id number]   Session Dose 180 cGy   Total Dose 1,440 cGy   Pelvis CP Reference Point from Course C1_rectum   Elapsed Course Days 9 @ [id number]   Session Dose 184 cGy   Total Dose 1,473 cGy             SUBJECTIVE:  Some rectal bleeding and mucus discharge a little more than he had previously        VITAL SIGNS:  KPS: 100, Fully active, able to carry on all pre-disease performed without restriction (ECOG equivalent 0)  Encounter Vitals  Temperature: 36.9 °C (98.4 °F)  Temp src: Temporal  Blood Pressure: 119/84  Pulse: 71  Pulse Oximetry: 97 %  Weight: 79.3 kg (174 lb 14.4 oz)  Pain Score: No pain  Pain Assessment 4/15/2020 2020 2020   Pain Assessment Denies Pain Denies Pain -   Pain Score 0 0 3   Pain Loc - - Rectum   Some recent data might be hidden          PHYSICAL EXAM:  No change      Toxicity Assessment 4/15/2020 2020 9/3/2019   Toxicity Assessment Male Pelvis Male Pelvis Male Pelvis   Fatigue (lethargy, malaise,  asthenia) None None Severe (e.g., decrease in performance status by 2 or more ECOG levels or 40% Karnofsky) or loss of ability to perform some activities   Radiation Dermatitis None None None   Anorexia None None Oral intake significantly decreased   Colitis Abdominal pain with mucus and/or blood in stool None Abdominal pain with mucus and/or blood in stool   Constipation None None None   Dehydration None None None   Diarrhea w/o Colostomy None None -   Flatulence None None None   Nausea None None -   Proctitis None None -   Vomiting None None None   RT - Pain due to RT None None None   Tumor Pain (onset or exacerbation of tumor pain due to treatment) None None -   Dysuria (painful urination) None None -   Urinary Frequency Normal Normal -   Urinary Urgency None None -   Bladder Spasms Absent Absent -   Incontinence None None -   Urinary Retention Normal Normal -           IMPRESSION:  Cancer Staging  Rectal carcinoma (HCC)  Staging form: Colon and Rectum, AJCC 8th Edition  - Clinical: Stage IIIC (cT4b, cN2, cM0) - Signed by Patti Strange M.D. on 4/8/2020      PLAN:  No change in treatment plan    Disposition:  Treatment plan reviewed. Questions answered. Continue therapy outlined.     Patti Strange M.D.    Orders Placed This Encounter   • Rad Onc [facility] Session Summary

## 2020-04-16 ENCOUNTER — HOSPITAL ENCOUNTER (OUTPATIENT)
Dept: RADIATION ONCOLOGY | Facility: MEDICAL CENTER | Age: 55
End: 2020-04-16
Payer: MEDICAID

## 2020-04-16 LAB
CHEMOTHERAPY INFUSION START DATE: NORMAL
CHEMOTHERAPY RECORDS: 1.8
CHEMOTHERAPY RECORDS: 4500
CHEMOTHERAPY RECORDS: NORMAL
CHEMOTHERAPY RX CANCER: NORMAL
DATE 1ST CHEMO CANCER: NORMAL
RAD ONC ARIA COURSE LAST TREATMENT DATE: NORMAL
RAD ONC ARIA COURSE TREATMENT ELAPSED DAYS: NORMAL
RAD ONC ARIA REFERENCE POINT DOSAGE GIVEN TO DATE: 16.2
RAD ONC ARIA REFERENCE POINT DOSAGE GIVEN TO DATE: 16.57
RAD ONC ARIA REFERENCE POINT ID: NORMAL
RAD ONC ARIA REFERENCE POINT ID: NORMAL
RAD ONC ARIA REFERENCE POINT SESSION DOSAGE GIVEN: 1.8
RAD ONC ARIA REFERENCE POINT SESSION DOSAGE GIVEN: 1.84

## 2020-04-16 PROCEDURE — 77014 PR CT GUIDANCE PLACEMENT RAD THERAPY FIELDS: CPT | Mod: 26 | Performed by: RADIOLOGY

## 2020-04-16 PROCEDURE — 77386 HCHG IMRT DELIVERY COMPLEX: CPT | Performed by: RADIOLOGY

## 2020-04-17 ENCOUNTER — HOSPITAL ENCOUNTER (OUTPATIENT)
Dept: RADIATION ONCOLOGY | Facility: MEDICAL CENTER | Age: 55
End: 2020-04-17
Payer: MEDICAID

## 2020-04-17 LAB
CHEMOTHERAPY INFUSION START DATE: NORMAL
CHEMOTHERAPY RECORDS: 1.8
CHEMOTHERAPY RECORDS: 4500
CHEMOTHERAPY RECORDS: NORMAL
CHEMOTHERAPY RX CANCER: NORMAL
DATE 1ST CHEMO CANCER: NORMAL
RAD ONC ARIA COURSE LAST TREATMENT DATE: NORMAL
RAD ONC ARIA COURSE TREATMENT ELAPSED DAYS: NORMAL
RAD ONC ARIA REFERENCE POINT DOSAGE GIVEN TO DATE: 18
RAD ONC ARIA REFERENCE POINT DOSAGE GIVEN TO DATE: 18.42
RAD ONC ARIA REFERENCE POINT ID: NORMAL
RAD ONC ARIA REFERENCE POINT ID: NORMAL
RAD ONC ARIA REFERENCE POINT SESSION DOSAGE GIVEN: 1.8
RAD ONC ARIA REFERENCE POINT SESSION DOSAGE GIVEN: 1.84

## 2020-04-17 PROCEDURE — 77427 RADIATION TX MANAGEMENT X5: CPT | Performed by: RADIOLOGY

## 2020-04-17 PROCEDURE — 77014 PR CT GUIDANCE PLACEMENT RAD THERAPY FIELDS: CPT | Mod: 26 | Performed by: RADIOLOGY

## 2020-04-17 PROCEDURE — 77386 HCHG IMRT DELIVERY COMPLEX: CPT | Performed by: RADIOLOGY

## 2020-04-20 ENCOUNTER — HOSPITAL ENCOUNTER (OUTPATIENT)
Dept: RADIATION ONCOLOGY | Facility: MEDICAL CENTER | Age: 55
End: 2020-04-20
Payer: MEDICAID

## 2020-04-20 LAB
CHEMOTHERAPY INFUSION START DATE: NORMAL
CHEMOTHERAPY RECORDS: 1.8
CHEMOTHERAPY RECORDS: 4500
CHEMOTHERAPY RECORDS: NORMAL
CHEMOTHERAPY RX CANCER: NORMAL
DATE 1ST CHEMO CANCER: NORMAL
RAD ONC ARIA COURSE LAST TREATMENT DATE: NORMAL
RAD ONC ARIA COURSE TREATMENT ELAPSED DAYS: NORMAL
RAD ONC ARIA REFERENCE POINT DOSAGE GIVEN TO DATE: 19.8
RAD ONC ARIA REFERENCE POINT DOSAGE GIVEN TO DATE: 20.26
RAD ONC ARIA REFERENCE POINT ID: NORMAL
RAD ONC ARIA REFERENCE POINT ID: NORMAL
RAD ONC ARIA REFERENCE POINT SESSION DOSAGE GIVEN: 1.8
RAD ONC ARIA REFERENCE POINT SESSION DOSAGE GIVEN: 1.84

## 2020-04-20 PROCEDURE — 77386 HCHG IMRT DELIVERY COMPLEX: CPT | Performed by: RADIOLOGY

## 2020-04-20 PROCEDURE — 77014 PR CT GUIDANCE PLACEMENT RAD THERAPY FIELDS: CPT | Mod: 26 | Performed by: RADIOLOGY

## 2020-04-21 ENCOUNTER — HOSPITAL ENCOUNTER (OUTPATIENT)
Dept: RADIATION ONCOLOGY | Facility: MEDICAL CENTER | Age: 55
End: 2020-04-21
Payer: MEDICAID

## 2020-04-21 LAB
CHEMOTHERAPY INFUSION START DATE: NORMAL
CHEMOTHERAPY RECORDS: 1.8
CHEMOTHERAPY RECORDS: 4500
CHEMOTHERAPY RECORDS: NORMAL
CHEMOTHERAPY RX CANCER: NORMAL
DATE 1ST CHEMO CANCER: NORMAL
RAD ONC ARIA COURSE LAST TREATMENT DATE: NORMAL
RAD ONC ARIA COURSE TREATMENT ELAPSED DAYS: NORMAL
RAD ONC ARIA REFERENCE POINT DOSAGE GIVEN TO DATE: 21.6
RAD ONC ARIA REFERENCE POINT DOSAGE GIVEN TO DATE: 22.1
RAD ONC ARIA REFERENCE POINT ID: NORMAL
RAD ONC ARIA REFERENCE POINT ID: NORMAL
RAD ONC ARIA REFERENCE POINT SESSION DOSAGE GIVEN: 1.8
RAD ONC ARIA REFERENCE POINT SESSION DOSAGE GIVEN: 1.84

## 2020-04-21 PROCEDURE — 77014 PR CT GUIDANCE PLACEMENT RAD THERAPY FIELDS: CPT | Mod: 26 | Performed by: RADIOLOGY

## 2020-04-21 PROCEDURE — 77386 HCHG IMRT DELIVERY COMPLEX: CPT | Performed by: RADIOLOGY

## 2020-04-22 ENCOUNTER — APPOINTMENT (OUTPATIENT)
Dept: RADIATION ONCOLOGY | Facility: MEDICAL CENTER | Age: 55
End: 2020-04-22
Payer: MEDICAID

## 2020-04-22 VITALS
BODY MASS INDEX: 25 KG/M2 | SYSTOLIC BLOOD PRESSURE: 117 MMHG | WEIGHT: 174.2 LBS | HEART RATE: 64 BPM | TEMPERATURE: 97.8 F | OXYGEN SATURATION: 98 % | DIASTOLIC BLOOD PRESSURE: 72 MMHG

## 2020-04-22 LAB
CHEMOTHERAPY INFUSION START DATE: NORMAL
CHEMOTHERAPY RECORDS: 1.8
CHEMOTHERAPY RECORDS: 4500
CHEMOTHERAPY RECORDS: NORMAL
CHEMOTHERAPY RX CANCER: NORMAL
DATE 1ST CHEMO CANCER: NORMAL
RAD ONC ARIA COURSE LAST TREATMENT DATE: NORMAL
RAD ONC ARIA COURSE TREATMENT ELAPSED DAYS: NORMAL
RAD ONC ARIA REFERENCE POINT DOSAGE GIVEN TO DATE: 23.4
RAD ONC ARIA REFERENCE POINT DOSAGE GIVEN TO DATE: 23.94
RAD ONC ARIA REFERENCE POINT ID: NORMAL
RAD ONC ARIA REFERENCE POINT ID: NORMAL
RAD ONC ARIA REFERENCE POINT SESSION DOSAGE GIVEN: 1.8
RAD ONC ARIA REFERENCE POINT SESSION DOSAGE GIVEN: 1.84

## 2020-04-22 PROCEDURE — 77336 RADIATION PHYSICS CONSULT: CPT | Performed by: RADIOLOGY

## 2020-04-22 PROCEDURE — 77386 HCHG IMRT DELIVERY COMPLEX: CPT | Performed by: RADIOLOGY

## 2020-04-22 PROCEDURE — 77014 PR CT GUIDANCE PLACEMENT RAD THERAPY FIELDS: CPT | Mod: 26 | Performed by: RADIOLOGY

## 2020-04-22 ASSESSMENT — PAIN SCALES - GENERAL: PAINLEVEL: NO PAIN

## 2020-04-22 ASSESSMENT — FIBROSIS 4 INDEX: FIB4 SCORE: 0.52

## 2020-04-22 NOTE — ON TREATMENT VISIT
ON TREATMENT  NOTE  RADIATION ONCOLOGY DEPARTMENT    Patient name:  Dre Eaton    Primary Physician:  Kirt Arellano M.D. MRN: [id number]  CSN: [id number]   Referring physician:  Joaquín Trent M.D. : 1965, 54 y.o.     ENCOUNTER DATE:  20    DIAGNOSIS:    Rectal carcinoma (HCC)  Staging form: Colon and Rectum, AJCC 8th Edition  - Clinical: Stage IIIC (cT4b, cN2, cM0) - Signed by Patti Strange M.D. on 2020  Laterality: Not applicable      TREATMENT SUMMARY:  [facility] Treatment Information        Some values may be hidden. Unless noted otherwise, only the newest values recorded on each date are displayed.         [facility] Treatment Summary 20   Course First Treatment Date 2020   Course Last Treatment Date 2020   Pelvis Plan from Course C1_rectum   Fraction 13 of 25   Elapsed Course Days 16 @    Prescribed Fraction Dose 180 cGy   Prescribed Total Dose 4,500 cGy   Pelvis Reference Point from Course C1_rectum   Elapsed Course Days  @ [id number]   Session Dose 180 cGy   Total Dose 2,340 cGy   Pelvis CP Reference Point from Course C1_rectum   Elapsed Course Days  @ [id number]   Session Dose 184 cGy   Total Dose 2,394 cGy             SUBJECTIVE:  Doing well no new complaints        VITAL SIGNS:  KPS: 100, Fully active, able to carry on all pre-disease performed without restriction (ECOG equivalent 0)  Encounter Vitals  Temperature: 36.6 °C (97.8 °F)  Temp src: Temporal  Blood Pressure: 117/72  Pulse: 64  Pulse Oximetry: 98 %  Weight: 79 kg (174 lb 3.2 oz)  Pain Score: No pain  Pain Assessment 2020 4/15/2020 2020   Pain Assessment Denies Pain Denies Pain Denies Pain   Pain Score 0 0 0   Some recent data might be hidden          PHYSICAL EXAM:  No change      Toxicity Assessment 2020 4/15/2020 2020 9/3/2019   Toxicity Assessment Male Pelvis Male Pelvis Male Pelvis Male Pelvis   Fatigue (lethargy, malaise, asthenia) Increased fatigue over  baseline, but not altering normal activities None None Severe (e.g., decrease in performance status by 2 or more ECOG levels or 40% Karnofsky) or loss of ability to perform some activities   Radiation Dermatitis Faint erythema or dry desquamation None None None   Anorexia None None None Oral intake significantly decreased   Colitis None Abdominal pain with mucus and/or blood in stool None Abdominal pain with mucus and/or blood in stool   Constipation None None None None   Dehydration None None None None   Diarrhea w/o Colostomy None None None -   Flatulence Mild None None None   Nausea None None None -   Proctitis None None None -   Vomiting None None None None   RT - Pain due to RT None None None None   Tumor Pain (onset or exacerbation of tumor pain due to treatment) None None None -   Dysuria (painful urination) - None None -   Urinary Frequency - Normal Normal -   Urinary Urgency - None None -   Bladder Spasms - Absent Absent -   Incontinence - None None -   Urinary Retention - Normal Normal -           IMPRESSION:  Cancer Staging  Rectal carcinoma (HCC)  Staging form: Colon and Rectum, AJCC 8th Edition  - Clinical: Stage IIIC (cT4b, cN2, cM0) - Signed by Patti Strange M.D. on 4/8/2020      PLAN:  No change in treatment plan    Disposition:  Treatment plan reviewed. Questions answered. Continue therapy outlined.     Patti Strange M.D.    Orders Placed This Encounter   • Rad Onc [facility] Session Summary

## 2020-04-23 ENCOUNTER — HOSPITAL ENCOUNTER (OUTPATIENT)
Dept: RADIATION ONCOLOGY | Facility: MEDICAL CENTER | Age: 55
End: 2020-04-23
Payer: MEDICAID

## 2020-04-23 ENCOUNTER — DOCUMENTATION (OUTPATIENT)
Dept: NUTRITION | Facility: MEDICAL CENTER | Age: 55
End: 2020-04-23

## 2020-04-23 LAB
CHEMOTHERAPY INFUSION START DATE: NORMAL
CHEMOTHERAPY RECORDS: 1.8
CHEMOTHERAPY RECORDS: 4500
CHEMOTHERAPY RECORDS: NORMAL
CHEMOTHERAPY RX CANCER: NORMAL
DATE 1ST CHEMO CANCER: NORMAL
RAD ONC ARIA COURSE LAST TREATMENT DATE: NORMAL
RAD ONC ARIA COURSE TREATMENT ELAPSED DAYS: NORMAL
RAD ONC ARIA REFERENCE POINT DOSAGE GIVEN TO DATE: 25.2
RAD ONC ARIA REFERENCE POINT DOSAGE GIVEN TO DATE: 25.78
RAD ONC ARIA REFERENCE POINT ID: NORMAL
RAD ONC ARIA REFERENCE POINT ID: NORMAL
RAD ONC ARIA REFERENCE POINT SESSION DOSAGE GIVEN: 1.8
RAD ONC ARIA REFERENCE POINT SESSION DOSAGE GIVEN: 1.84

## 2020-04-23 PROCEDURE — 77014 PR CT GUIDANCE PLACEMENT RAD THERAPY FIELDS: CPT | Mod: 26 | Performed by: RADIOLOGY

## 2020-04-23 PROCEDURE — 77386 HCHG IMRT DELIVERY COMPLEX: CPT | Performed by: RADIOLOGY

## 2020-04-23 NOTE — PROGRESS NOTES
Nutrition Services: Brief Update  Weight: 174 lbs yesterday at [facility]  Weight history:  174 lbs on 4/15/20  174 lbs on 4/8/20  172 lbs on 4/6/20    Weight Change: Weight is stable and has increased by 2 lbs over 2 weeks.    Pt seen at [facility]. Pt reports still has a good appetite and is eating a good-sized lunch and dinner and has a smaller breakfast before XRT. Pt reports is still drinking protein smoothies and cooking at home quite a bit. States has had a little constipation/diarrhea but resolves itself quickly. Pt continues with stable weight and had no further questions at this time.    Plan/Recommend:  • Continue with current eating pattern as able and include protein smoothies.  • Encouraged to discuss with MD if constipation or diarrhea goes on for more than a day or two.  • Encouraged adequate water intake for hydration.    RD to continue to monitor throughout treatment.  Please contact [phone]

## 2020-04-24 ENCOUNTER — HOSPITAL ENCOUNTER (OUTPATIENT)
Dept: RADIATION ONCOLOGY | Facility: MEDICAL CENTER | Age: 55
End: 2020-04-24
Payer: MEDICAID

## 2020-04-24 LAB
CHEMOTHERAPY INFUSION START DATE: NORMAL
CHEMOTHERAPY RECORDS: 1.8
CHEMOTHERAPY RECORDS: 4500
CHEMOTHERAPY RECORDS: NORMAL
CHEMOTHERAPY RX CANCER: NORMAL
DATE 1ST CHEMO CANCER: NORMAL
RAD ONC ARIA COURSE LAST TREATMENT DATE: NORMAL
RAD ONC ARIA COURSE TREATMENT ELAPSED DAYS: NORMAL
RAD ONC ARIA REFERENCE POINT DOSAGE GIVEN TO DATE: 27
RAD ONC ARIA REFERENCE POINT DOSAGE GIVEN TO DATE: 27.62
RAD ONC ARIA REFERENCE POINT ID: NORMAL
RAD ONC ARIA REFERENCE POINT ID: NORMAL
RAD ONC ARIA REFERENCE POINT SESSION DOSAGE GIVEN: 1.8
RAD ONC ARIA REFERENCE POINT SESSION DOSAGE GIVEN: 1.84

## 2020-04-24 PROCEDURE — 77014 PR CT GUIDANCE PLACEMENT RAD THERAPY FIELDS: CPT | Mod: 26 | Performed by: RADIOLOGY

## 2020-04-24 PROCEDURE — 77386 HCHG IMRT DELIVERY COMPLEX: CPT | Performed by: RADIOLOGY

## 2020-04-24 PROCEDURE — 77427 RADIATION TX MANAGEMENT X5: CPT | Performed by: RADIOLOGY

## 2020-04-27 ENCOUNTER — HOSPITAL ENCOUNTER (OUTPATIENT)
Dept: RADIATION ONCOLOGY | Facility: MEDICAL CENTER | Age: 55
End: 2020-04-27
Payer: MEDICAID

## 2020-04-27 LAB
CHEMOTHERAPY INFUSION START DATE: NORMAL
CHEMOTHERAPY RECORDS: 1.8
CHEMOTHERAPY RECORDS: 4500
CHEMOTHERAPY RECORDS: NORMAL
CHEMOTHERAPY RX CANCER: NORMAL
DATE 1ST CHEMO CANCER: NORMAL
RAD ONC ARIA COURSE LAST TREATMENT DATE: NORMAL
RAD ONC ARIA COURSE TREATMENT ELAPSED DAYS: NORMAL
RAD ONC ARIA REFERENCE POINT DOSAGE GIVEN TO DATE: 28.8
RAD ONC ARIA REFERENCE POINT DOSAGE GIVEN TO DATE: 29.47
RAD ONC ARIA REFERENCE POINT ID: NORMAL
RAD ONC ARIA REFERENCE POINT ID: NORMAL
RAD ONC ARIA REFERENCE POINT SESSION DOSAGE GIVEN: 1.8
RAD ONC ARIA REFERENCE POINT SESSION DOSAGE GIVEN: 1.84

## 2020-04-27 PROCEDURE — 77014 PR CT GUIDANCE PLACEMENT RAD THERAPY FIELDS: CPT | Mod: 26 | Performed by: RADIOLOGY

## 2020-04-27 PROCEDURE — 77386 HCHG IMRT DELIVERY COMPLEX: CPT | Performed by: RADIOLOGY

## 2020-04-28 ENCOUNTER — HOSPITAL ENCOUNTER (OUTPATIENT)
Dept: RADIATION ONCOLOGY | Facility: MEDICAL CENTER | Age: 55
End: 2020-04-28
Payer: MEDICAID

## 2020-04-28 LAB
CHEMOTHERAPY INFUSION START DATE: NORMAL
CHEMOTHERAPY RECORDS: 1.8
CHEMOTHERAPY RECORDS: 4500
CHEMOTHERAPY RECORDS: NORMAL
CHEMOTHERAPY RX CANCER: NORMAL
DATE 1ST CHEMO CANCER: NORMAL
RAD ONC ARIA COURSE LAST TREATMENT DATE: NORMAL
RAD ONC ARIA COURSE TREATMENT ELAPSED DAYS: NORMAL
RAD ONC ARIA REFERENCE POINT DOSAGE GIVEN TO DATE: 30.6
RAD ONC ARIA REFERENCE POINT DOSAGE GIVEN TO DATE: 31.31
RAD ONC ARIA REFERENCE POINT ID: NORMAL
RAD ONC ARIA REFERENCE POINT ID: NORMAL
RAD ONC ARIA REFERENCE POINT SESSION DOSAGE GIVEN: 1.8
RAD ONC ARIA REFERENCE POINT SESSION DOSAGE GIVEN: 1.84

## 2020-04-28 PROCEDURE — 77386 HCHG IMRT DELIVERY COMPLEX: CPT | Performed by: RADIOLOGY

## 2020-04-28 PROCEDURE — 77014 PR CT GUIDANCE PLACEMENT RAD THERAPY FIELDS: CPT | Mod: 26 | Performed by: RADIOLOGY

## 2020-04-29 ENCOUNTER — APPOINTMENT (OUTPATIENT)
Dept: RADIATION ONCOLOGY | Facility: MEDICAL CENTER | Age: 55
End: 2020-04-29
Payer: MEDICAID

## 2020-04-29 VITALS
TEMPERATURE: 98.2 F | SYSTOLIC BLOOD PRESSURE: 123 MMHG | WEIGHT: 172.7 LBS | HEART RATE: 62 BPM | OXYGEN SATURATION: 95 % | BODY MASS INDEX: 24.78 KG/M2 | DIASTOLIC BLOOD PRESSURE: 81 MMHG

## 2020-04-29 LAB
CHEMOTHERAPY INFUSION START DATE: NORMAL
CHEMOTHERAPY RECORDS: 1.8
CHEMOTHERAPY RECORDS: 4500
CHEMOTHERAPY RECORDS: NORMAL
CHEMOTHERAPY RX CANCER: NORMAL
DATE 1ST CHEMO CANCER: NORMAL
RAD ONC ARIA COURSE LAST TREATMENT DATE: NORMAL
RAD ONC ARIA COURSE TREATMENT ELAPSED DAYS: NORMAL
RAD ONC ARIA REFERENCE POINT DOSAGE GIVEN TO DATE: 32.4
RAD ONC ARIA REFERENCE POINT DOSAGE GIVEN TO DATE: 33.15
RAD ONC ARIA REFERENCE POINT ID: NORMAL
RAD ONC ARIA REFERENCE POINT ID: NORMAL
RAD ONC ARIA REFERENCE POINT SESSION DOSAGE GIVEN: 1.8
RAD ONC ARIA REFERENCE POINT SESSION DOSAGE GIVEN: 1.84

## 2020-04-29 PROCEDURE — 77014 PR CT GUIDANCE PLACEMENT RAD THERAPY FIELDS: CPT | Mod: 26 | Performed by: RADIOLOGY

## 2020-04-29 PROCEDURE — 77386 HCHG IMRT DELIVERY COMPLEX: CPT | Performed by: RADIOLOGY

## 2020-04-29 PROCEDURE — 77336 RADIATION PHYSICS CONSULT: CPT | Performed by: RADIOLOGY

## 2020-04-29 ASSESSMENT — FIBROSIS 4 INDEX: FIB4 SCORE: 0.52

## 2020-04-29 ASSESSMENT — PAIN SCALES - GENERAL: PAINLEVEL: 1=MINIMAL PAIN

## 2020-04-29 NOTE — ON TREATMENT VISIT
ON TREATMENT  NOTE  RADIATION ONCOLOGY DEPARTMENT    Patient name:  Dre Eaton    Primary Physician:  Kirt Arellano M.D. MRN: [id number]  CSN: [id number]   Referring physician:  Joaquín Trent M.D. : 1965, 54 y.o.     ENCOUNTER DATE:  20    DIAGNOSIS:    Rectal carcinoma (HCC)  Staging form: Colon and Rectum, AJCC 8th Edition  - Clinical: Stage IIIC (cT4b, cN2, cM0) - Signed by Patti Strange M.D. on 2020  Laterality: Not applicable      TREATMENT SUMMARY:  [facility] Treatment Information        Some values may be hidden. Unless noted otherwise, only the newest values recorded on each date are displayed.         [facility] Treatment Summary 20   Course First Treatment Date 2020   Course Last Treatment Date 2020   Pelvis Plan from Course C1_rectum   Fraction 18 of 25   Elapsed Course Days  @ [id number]   Prescribed Fraction Dose 180 cGy   Prescribed Total Dose 4,500 cGy   Pelvis Reference Point from Course C1_rectum   Elapsed Course Days  @ [id number]   Session Dose 180 cGy   Total Dose 3,240 cGy   Pelvis CP Reference Point from Course C1_rectum   Elapsed Course Days  @ [id number]   Session Dose 184 cGy   Total Dose 3,315 cGy             SUBJECTIVE:  Doing well without complaints noticing a little bit of discomfort in the treatment field.        VITAL SIGNS:  KPS: 100, Fully active, able to carry on all pre-disease performed without restriction (ECOG equivalent 0)  Encounter Vitals  Temperature: 36.8 °C (98.2 °F)  Temp src: Temporal  Blood Pressure: 123/81  Pulse: 62  Pulse Oximetry: 95 %  Weight: 78.3 kg (172 lb 11.2 oz)  Pain Score: 1=Minimal Pain  Pain Assessment 2020 2020 4/15/2020   Pain Assessment Acute Pain Denies Pain Denies Pain   Pain Score 1 0 0   Pain Loc Rectum - -   Some recent data might be hidden          PHYSICAL EXAM:  Slight erythema and skin changes in the treatment field      Toxicity Assessment 2020 2020 4/15/2020 2020  9/3/2019   Toxicity Assessment Male Pelvis Male Pelvis Male Pelvis Male Pelvis Male Pelvis   Fatigue (lethargy, malaise, asthenia) Increased fatigue over baseline, but not altering normal activities Increased fatigue over baseline, but not altering normal activities None None Severe (e.g., decrease in performance status by 2 or more ECOG levels or 40% Karnofsky) or loss of ability to perform some activities   Radiation Dermatitis Faint erythema or dry desquamation Faint erythema or dry desquamation None None None   Anorexia None None None None Oral intake significantly decreased   Colitis None None Abdominal pain with mucus and/or blood in stool None Abdominal pain with mucus and/or blood in stool   Constipation None None None None None   Dehydration None None None None None   Diarrhea w/o Colostomy None None None None -   Flatulence - Mild None None None   Nausea - None None None -   Proctitis - None None None -   Vomiting None None None None None   RT - Pain due to RT Mild pain not interfering with function None None None None   Tumor Pain (onset or exacerbation of tumor pain due to treatment) Mild pain not interfering with function None None None -   Dysuria (painful urination) None - None None -   Urinary Frequency Normal - Normal Normal -   Urinary Urgency None - None None -   Bladder Spasms Absent - Absent Absent -   Incontinence None - None None -   Urinary Retention Normal - Normal Normal -           IMPRESSION:  Cancer Staging  Rectal carcinoma (HCC)  Staging form: Colon and Rectum, AJCC 8th Edition  - Clinical: Stage IIIC (cT4b, cN2, cM0) - Signed by Patti Strange M.D. on 4/8/2020      PLAN:  No change in treatment plan    Disposition:  Treatment plan reviewed. Questions answered. Continue therapy outlined.     Patti Strange M.D.    Orders Placed This Encounter   • Rad Onc [facility] Session Summary

## 2020-04-30 ENCOUNTER — HOSPITAL ENCOUNTER (OUTPATIENT)
Dept: RADIATION ONCOLOGY | Facility: MEDICAL CENTER | Age: 55
End: 2020-04-30
Payer: MEDICAID

## 2020-04-30 LAB
CHEMOTHERAPY INFUSION START DATE: NORMAL
CHEMOTHERAPY RECORDS: 1.8
CHEMOTHERAPY RECORDS: 4500
CHEMOTHERAPY RECORDS: NORMAL
CHEMOTHERAPY RX CANCER: NORMAL
DATE 1ST CHEMO CANCER: NORMAL
RAD ONC ARIA COURSE LAST TREATMENT DATE: NORMAL
RAD ONC ARIA COURSE TREATMENT ELAPSED DAYS: NORMAL
RAD ONC ARIA REFERENCE POINT DOSAGE GIVEN TO DATE: 34.2
RAD ONC ARIA REFERENCE POINT DOSAGE GIVEN TO DATE: 34.99
RAD ONC ARIA REFERENCE POINT ID: NORMAL
RAD ONC ARIA REFERENCE POINT ID: NORMAL
RAD ONC ARIA REFERENCE POINT SESSION DOSAGE GIVEN: 1.8
RAD ONC ARIA REFERENCE POINT SESSION DOSAGE GIVEN: 1.84

## 2020-04-30 PROCEDURE — 77014 PR CT GUIDANCE PLACEMENT RAD THERAPY FIELDS: CPT | Mod: 26 | Performed by: RADIOLOGY

## 2020-04-30 PROCEDURE — 77386 HCHG IMRT DELIVERY COMPLEX: CPT | Performed by: RADIOLOGY

## 2020-05-01 ENCOUNTER — HOSPITAL ENCOUNTER (OUTPATIENT)
Dept: RADIATION ONCOLOGY | Facility: MEDICAL CENTER | Age: 55
End: 2020-05-31
Attending: RADIOLOGY
Payer: MEDICAID

## 2020-05-01 ENCOUNTER — HOSPITAL ENCOUNTER (OUTPATIENT)
Dept: RADIATION ONCOLOGY | Facility: MEDICAL CENTER | Age: 55
End: 2020-05-01
Payer: MEDICAID

## 2020-05-01 LAB
CHEMOTHERAPY INFUSION START DATE: NORMAL
CHEMOTHERAPY RECORDS: 1.8
CHEMOTHERAPY RECORDS: 4500
CHEMOTHERAPY RECORDS: NORMAL
CHEMOTHERAPY RX CANCER: NORMAL
DATE 1ST CHEMO CANCER: NORMAL
RAD ONC ARIA COURSE LAST TREATMENT DATE: NORMAL
RAD ONC ARIA COURSE TREATMENT ELAPSED DAYS: NORMAL
RAD ONC ARIA REFERENCE POINT DOSAGE GIVEN TO DATE: 36
RAD ONC ARIA REFERENCE POINT DOSAGE GIVEN TO DATE: 36.83
RAD ONC ARIA REFERENCE POINT ID: NORMAL
RAD ONC ARIA REFERENCE POINT ID: NORMAL
RAD ONC ARIA REFERENCE POINT SESSION DOSAGE GIVEN: 1.8
RAD ONC ARIA REFERENCE POINT SESSION DOSAGE GIVEN: 1.84

## 2020-05-01 PROCEDURE — 77427 RADIATION TX MANAGEMENT X5: CPT | Performed by: RADIOLOGY

## 2020-05-01 PROCEDURE — 77386 HCHG IMRT DELIVERY COMPLEX: CPT | Performed by: RADIOLOGY

## 2020-05-01 PROCEDURE — 77014 PR CT GUIDANCE PLACEMENT RAD THERAPY FIELDS: CPT | Mod: 26 | Performed by: RADIOLOGY

## 2020-05-04 ENCOUNTER — HOSPITAL ENCOUNTER (OUTPATIENT)
Dept: RADIATION ONCOLOGY | Facility: MEDICAL CENTER | Age: 55
End: 2020-05-04
Payer: MEDICAID

## 2020-05-04 LAB
CHEMOTHERAPY INFUSION START DATE: NORMAL
CHEMOTHERAPY RECORDS: 1.8
CHEMOTHERAPY RECORDS: 4500
CHEMOTHERAPY RECORDS: NORMAL
CHEMOTHERAPY RX CANCER: NORMAL
DATE 1ST CHEMO CANCER: NORMAL
RAD ONC ARIA COURSE LAST TREATMENT DATE: NORMAL
RAD ONC ARIA COURSE TREATMENT ELAPSED DAYS: NORMAL
RAD ONC ARIA REFERENCE POINT DOSAGE GIVEN TO DATE: 37.8
RAD ONC ARIA REFERENCE POINT DOSAGE GIVEN TO DATE: 38.67
RAD ONC ARIA REFERENCE POINT ID: NORMAL
RAD ONC ARIA REFERENCE POINT ID: NORMAL
RAD ONC ARIA REFERENCE POINT SESSION DOSAGE GIVEN: 1.8
RAD ONC ARIA REFERENCE POINT SESSION DOSAGE GIVEN: 1.84

## 2020-05-04 PROCEDURE — 77014 PR CT GUIDANCE PLACEMENT RAD THERAPY FIELDS: CPT | Mod: 26 | Performed by: RADIOLOGY

## 2020-05-04 PROCEDURE — 77386 HCHG IMRT DELIVERY COMPLEX: CPT | Performed by: RADIOLOGY

## 2020-05-05 ENCOUNTER — HOSPITAL ENCOUNTER (OUTPATIENT)
Dept: RADIATION ONCOLOGY | Facility: MEDICAL CENTER | Age: 55
End: 2020-05-05
Payer: MEDICAID

## 2020-05-05 LAB
CHEMOTHERAPY INFUSION START DATE: NORMAL
CHEMOTHERAPY RECORDS: 1.8
CHEMOTHERAPY RECORDS: 4500
CHEMOTHERAPY RECORDS: NORMAL
CHEMOTHERAPY RX CANCER: NORMAL
DATE 1ST CHEMO CANCER: NORMAL
RAD ONC ARIA COURSE LAST TREATMENT DATE: NORMAL
RAD ONC ARIA COURSE TREATMENT ELAPSED DAYS: NORMAL
RAD ONC ARIA REFERENCE POINT DOSAGE GIVEN TO DATE: 39.6
RAD ONC ARIA REFERENCE POINT DOSAGE GIVEN TO DATE: 40.51
RAD ONC ARIA REFERENCE POINT ID: NORMAL
RAD ONC ARIA REFERENCE POINT ID: NORMAL
RAD ONC ARIA REFERENCE POINT SESSION DOSAGE GIVEN: 1.8
RAD ONC ARIA REFERENCE POINT SESSION DOSAGE GIVEN: 1.84

## 2020-05-05 PROCEDURE — 77014 PR CT GUIDANCE PLACEMENT RAD THERAPY FIELDS: CPT | Mod: 26 | Performed by: RADIOLOGY

## 2020-05-05 PROCEDURE — 77386 HCHG IMRT DELIVERY COMPLEX: CPT | Performed by: RADIOLOGY

## 2020-05-06 ENCOUNTER — APPOINTMENT (OUTPATIENT)
Dept: RADIATION ONCOLOGY | Facility: MEDICAL CENTER | Age: 55
End: 2020-05-06
Payer: MEDICAID

## 2020-05-06 VITALS
BODY MASS INDEX: 24.71 KG/M2 | HEART RATE: 72 BPM | OXYGEN SATURATION: 98 % | SYSTOLIC BLOOD PRESSURE: 127 MMHG | DIASTOLIC BLOOD PRESSURE: 69 MMHG | TEMPERATURE: 98.3 F | WEIGHT: 172.2 LBS

## 2020-05-06 LAB
CHEMOTHERAPY INFUSION START DATE: NORMAL
CHEMOTHERAPY RECORDS: 1.8
CHEMOTHERAPY RECORDS: 4500
CHEMOTHERAPY RECORDS: NORMAL
CHEMOTHERAPY RX CANCER: NORMAL
DATE 1ST CHEMO CANCER: NORMAL
RAD ONC ARIA COURSE LAST TREATMENT DATE: NORMAL
RAD ONC ARIA COURSE TREATMENT ELAPSED DAYS: NORMAL
RAD ONC ARIA REFERENCE POINT DOSAGE GIVEN TO DATE: 41.4
RAD ONC ARIA REFERENCE POINT DOSAGE GIVEN TO DATE: 42.36
RAD ONC ARIA REFERENCE POINT ID: NORMAL
RAD ONC ARIA REFERENCE POINT ID: NORMAL
RAD ONC ARIA REFERENCE POINT SESSION DOSAGE GIVEN: 1.8
RAD ONC ARIA REFERENCE POINT SESSION DOSAGE GIVEN: 1.84

## 2020-05-06 PROCEDURE — 77334 RADIATION TREATMENT AID(S): CPT | Mod: 26 | Performed by: RADIOLOGY

## 2020-05-06 PROCEDURE — 77333 RADIATION TREATMENT AID(S): CPT | Mod: XU | Performed by: RADIOLOGY

## 2020-05-06 PROCEDURE — 77386 HCHG IMRT DELIVERY COMPLEX: CPT | Performed by: RADIOLOGY

## 2020-05-06 PROCEDURE — 77336 RADIATION PHYSICS CONSULT: CPT | Performed by: RADIOLOGY

## 2020-05-06 PROCEDURE — 77290 THER RAD SIMULAJ FIELD CPLX: CPT | Performed by: RADIOLOGY

## 2020-05-06 ASSESSMENT — FIBROSIS 4 INDEX: FIB4 SCORE: 0.52

## 2020-05-06 ASSESSMENT — PAIN SCALES - GENERAL: PAINLEVEL: 1=MINIMAL PAIN

## 2020-05-06 NOTE — ON TREATMENT VISIT
ON TREATMENT  NOTE  RADIATION ONCOLOGY DEPARTMENT    Patient name:  Dre Eaton    Primary Physician:  Kirt Arellano M.D. MRN: [id number]  CSN: [id number]   Referring physician:  Joaquín Trent M.D. : 1965, 54 y.o.     ENCOUNTER DATE:  20    DIAGNOSIS:    Rectal carcinoma (HCC)  Staging form: Colon and Rectum, AJCC 8th Edition  - Clinical: Stage IIIC (cT4b, cN2, cM0) - Signed by Patti Strange M.D. on 2020  Laterality: Not applicable      TREATMENT SUMMARY:  [facility] Treatment Information        Some values may be hidden. Unless noted otherwise, only the newest values recorded on each date are displayed.         [facility] Treatment Summary 20   Course First Treatment Date 2020   Course Last Treatment Date 2020   Pelvis Plan from Course C1_rectum   Fraction    Elapsed Course Days  @    Prescribed Fraction Dose 180 cGy   Prescribed Total Dose 4,500 cGy   Pelvis Reference Point from Course C1_rectum   Elapsed Course Days    Session Dose 180 cGy   Total Dose 3,960 cGy   Pelvis CP Reference Point from Course C1_rectum   Elapsed Course Days  @    Session Dose 184 cGy   Total Dose 4,051 cGy             SUBJECTIVE:  Doing fairly well medical oncologist stopped Xeloda.  He is got some changes on his hands and feet that are bothersome.  From our standpoint he is doing well just a little tired and a little bit of soreness in the perianal region        VITAL SIGNS:  KPS: 80, Normal activity with effort; some signs or symptoms of disease (ECOG equivalent 1)  Encounter Vitals  Temperature: 36.8 °C (98.3 °F)  Temp src: Temporal  Blood Pressure: 127/69  Pulse: 72  Pulse Oximetry: 98 %  Weight: 78.1 kg (172 lb 3.2 oz)  Pain Score: 1=Minimal Pain  Pain Assessment 2020   Pain Assessment Acute Pain Acute Pain   Pain Score 1 1   Pain Loc Rectum Rectum   Some recent data might be hidden          PHYSICAL EXAM:  Moist desquamation  in the treatment field with continued regression of tumor.  Large left groin lymph node unchanged in size      Toxicity Assessment 5/6/2020 4/29/2020 4/22/2020 4/15/2020 4/8/2020 9/3/2019   Toxicity Assessment Male Pelvis Male Pelvis Male Pelvis Male Pelvis Male Pelvis Male Pelvis   Fatigue (lethargy, malaise, asthenia) Increased fatigue over baseline, but not altering normal activities Increased fatigue over baseline, but not altering normal activities Increased fatigue over baseline, but not altering normal activities None None Severe (e.g., decrease in performance status by 2 or more ECOG levels or 40% Karnofsky) or loss of ability to perform some activities   Radiation Dermatitis Faint erythema or dry desquamation Faint erythema or dry desquamation Faint erythema or dry desquamation None None None   Anorexia None None None None None Oral intake significantly decreased   Colitis None None None Abdominal pain with mucus and/or blood in stool None Abdominal pain with mucus and/or blood in stool   Constipation None None None None None None   Dehydration None None None None None None   Diarrhea w/o Colostomy None None None None None -   Flatulence None - Mild None None None   Nausea None - None None None -   Proctitis None - None None None -   Vomiting None None None None None None   RT - Pain due to RT Mild pain not interfering with function Mild pain not interfering with function None None None None   Tumor Pain (onset or exacerbation of tumor pain due to treatment) Mild pain not interfering with function Mild pain not interfering with function None None None -   Dysuria (painful urination) None None - None None -   Urinary Frequency Normal Normal - Normal Normal -   Urinary Urgency None None - None None -   Bladder Spasms Absent Absent - Absent Absent -   Incontinence None None - None None -   Urinary Retention Normal Normal - Normal Normal -           IMPRESSION:  Cancer Staging  Rectal carcinoma (HCC)  Staging  form: Colon and Rectum, AJCC 8th Edition  - Clinical: Stage IIIC (cT4b, cN2, cM0) - Signed by Patti Strange M.D. on 4/8/2020      PLAN:  Change in treatment plan.  I decided to do an extra boost and so organ to simulate him today to treat just the areas of gross disease.    Disposition:  Treatment plan reviewed. Questions answered. Continue therapy outlined.     Patti Strange M.D.    No orders of the defined types were placed in this encounter.

## 2020-05-07 ENCOUNTER — DOCUMENTATION (OUTPATIENT)
Dept: NUTRITION | Facility: MEDICAL CENTER | Age: 55
End: 2020-05-07

## 2020-05-07 ENCOUNTER — HOSPITAL ENCOUNTER (OUTPATIENT)
Dept: RADIATION ONCOLOGY | Facility: MEDICAL CENTER | Age: 55
End: 2020-05-07
Payer: MEDICAID

## 2020-05-07 LAB
CHEMOTHERAPY INFUSION START DATE: NORMAL
CHEMOTHERAPY RECORDS: 1.8
CHEMOTHERAPY RECORDS: 4500
CHEMOTHERAPY RECORDS: NORMAL
CHEMOTHERAPY RX CANCER: NORMAL
DATE 1ST CHEMO CANCER: NORMAL
RAD ONC ARIA COURSE LAST TREATMENT DATE: NORMAL
RAD ONC ARIA COURSE TREATMENT ELAPSED DAYS: NORMAL
RAD ONC ARIA REFERENCE POINT DOSAGE GIVEN TO DATE: 43.2
RAD ONC ARIA REFERENCE POINT DOSAGE GIVEN TO DATE: 44.2
RAD ONC ARIA REFERENCE POINT ID: NORMAL
RAD ONC ARIA REFERENCE POINT ID: NORMAL
RAD ONC ARIA REFERENCE POINT SESSION DOSAGE GIVEN: 1.8
RAD ONC ARIA REFERENCE POINT SESSION DOSAGE GIVEN: 1.84

## 2020-05-07 PROCEDURE — 77014 PR CT GUIDANCE PLACEMENT RAD THERAPY FIELDS: CPT | Mod: 26 | Performed by: RADIOLOGY

## 2020-05-07 PROCEDURE — 77386 HCHG IMRT DELIVERY COMPLEX: CPT | Performed by: RADIOLOGY

## 2020-05-08 ENCOUNTER — HOSPITAL ENCOUNTER (OUTPATIENT)
Dept: RADIATION ONCOLOGY | Facility: MEDICAL CENTER | Age: 55
End: 2020-05-08
Payer: MEDICAID

## 2020-05-08 LAB
CHEMOTHERAPY INFUSION START DATE: NORMAL
CHEMOTHERAPY RECORDS: 1.8
CHEMOTHERAPY RECORDS: 4500
CHEMOTHERAPY RECORDS: NORMAL
CHEMOTHERAPY RX CANCER: NORMAL
DATE 1ST CHEMO CANCER: NORMAL
RAD ONC ARIA COURSE LAST TREATMENT DATE: NORMAL
RAD ONC ARIA COURSE TREATMENT ELAPSED DAYS: NORMAL
RAD ONC ARIA REFERENCE POINT DOSAGE GIVEN TO DATE: 45
RAD ONC ARIA REFERENCE POINT DOSAGE GIVEN TO DATE: 46.04
RAD ONC ARIA REFERENCE POINT ID: NORMAL
RAD ONC ARIA REFERENCE POINT ID: NORMAL
RAD ONC ARIA REFERENCE POINT SESSION DOSAGE GIVEN: 1.8
RAD ONC ARIA REFERENCE POINT SESSION DOSAGE GIVEN: 1.84

## 2020-05-08 PROCEDURE — 77300 RADIATION THERAPY DOSE PLAN: CPT | Performed by: RADIOLOGY

## 2020-05-08 PROCEDURE — 77386 HCHG IMRT DELIVERY COMPLEX: CPT | Performed by: RADIOLOGY

## 2020-05-08 PROCEDURE — 77300 RADIATION THERAPY DOSE PLAN: CPT | Mod: 26 | Performed by: RADIOLOGY

## 2020-05-08 PROCEDURE — 77338 DESIGN MLC DEVICE FOR IMRT: CPT | Mod: 26 | Performed by: RADIOLOGY

## 2020-05-08 PROCEDURE — 77427 RADIATION TX MANAGEMENT X5: CPT | Performed by: RADIOLOGY

## 2020-05-08 PROCEDURE — 77014 PR CT GUIDANCE PLACEMENT RAD THERAPY FIELDS: CPT | Mod: 26 | Performed by: RADIOLOGY

## 2020-05-08 PROCEDURE — 77338 DESIGN MLC DEVICE FOR IMRT: CPT | Mod: XU | Performed by: RADIOLOGY

## 2020-05-11 ENCOUNTER — HOSPITAL ENCOUNTER (OUTPATIENT)
Dept: RADIATION ONCOLOGY | Facility: MEDICAL CENTER | Age: 55
End: 2020-05-11
Payer: MEDICAID

## 2020-05-11 LAB
CHEMOTHERAPY INFUSION START DATE: NORMAL
CHEMOTHERAPY RECORDS: 1.8
CHEMOTHERAPY RECORDS: 540
CHEMOTHERAPY RECORDS: NORMAL
CHEMOTHERAPY RX CANCER: NORMAL
DATE 1ST CHEMO CANCER: NORMAL
RAD ONC ARIA COURSE LAST TREATMENT DATE: NORMAL
RAD ONC ARIA COURSE TREATMENT ELAPSED DAYS: NORMAL
RAD ONC ARIA REFERENCE POINT DOSAGE GIVEN TO DATE: 1.8
RAD ONC ARIA REFERENCE POINT DOSAGE GIVEN TO DATE: 1.85
RAD ONC ARIA REFERENCE POINT ID: NORMAL
RAD ONC ARIA REFERENCE POINT ID: NORMAL
RAD ONC ARIA REFERENCE POINT SESSION DOSAGE GIVEN: 1.8
RAD ONC ARIA REFERENCE POINT SESSION DOSAGE GIVEN: 1.85

## 2020-05-11 PROCEDURE — 77280 THER RAD SIMULAJ FIELD SMPL: CPT | Performed by: RADIOLOGY

## 2020-05-11 PROCEDURE — 77386 HCHG IMRT DELIVERY COMPLEX: CPT | Performed by: RADIOLOGY

## 2020-05-12 ENCOUNTER — HOSPITAL ENCOUNTER (OUTPATIENT)
Dept: RADIATION ONCOLOGY | Facility: MEDICAL CENTER | Age: 55
End: 2020-05-12
Payer: MEDICAID

## 2020-05-12 LAB
CHEMOTHERAPY INFUSION START DATE: NORMAL
CHEMOTHERAPY RECORDS: 1.8
CHEMOTHERAPY RECORDS: 540
CHEMOTHERAPY RECORDS: NORMAL
CHEMOTHERAPY RX CANCER: NORMAL
DATE 1ST CHEMO CANCER: NORMAL
RAD ONC ARIA COURSE LAST TREATMENT DATE: NORMAL
RAD ONC ARIA COURSE TREATMENT ELAPSED DAYS: NORMAL
RAD ONC ARIA REFERENCE POINT DOSAGE GIVEN TO DATE: 3.6
RAD ONC ARIA REFERENCE POINT DOSAGE GIVEN TO DATE: 3.7
RAD ONC ARIA REFERENCE POINT ID: NORMAL
RAD ONC ARIA REFERENCE POINT ID: NORMAL
RAD ONC ARIA REFERENCE POINT SESSION DOSAGE GIVEN: 1.8
RAD ONC ARIA REFERENCE POINT SESSION DOSAGE GIVEN: 1.85

## 2020-05-12 PROCEDURE — 77338 DESIGN MLC DEVICE FOR IMRT: CPT | Mod: 26 | Performed by: RADIOLOGY

## 2020-05-12 PROCEDURE — 77014 PR CT GUIDANCE PLACEMENT RAD THERAPY FIELDS: CPT | Mod: 26 | Performed by: RADIOLOGY

## 2020-05-12 PROCEDURE — 77386 HCHG IMRT DELIVERY COMPLEX: CPT | Performed by: RADIOLOGY

## 2020-05-12 PROCEDURE — 77338 DESIGN MLC DEVICE FOR IMRT: CPT | Mod: XU | Performed by: RADIOLOGY

## 2020-05-12 PROCEDURE — 77300 RADIATION THERAPY DOSE PLAN: CPT | Mod: 26 | Performed by: RADIOLOGY

## 2020-05-12 PROCEDURE — 77300 RADIATION THERAPY DOSE PLAN: CPT | Performed by: RADIOLOGY

## 2020-05-13 ENCOUNTER — HOSPITAL ENCOUNTER (OUTPATIENT)
Dept: RADIATION ONCOLOGY | Facility: MEDICAL CENTER | Age: 55
End: 2020-05-13
Payer: MEDICAID

## 2020-05-13 VITALS
WEIGHT: 172.9 LBS | BODY MASS INDEX: 24.81 KG/M2 | OXYGEN SATURATION: 98 % | SYSTOLIC BLOOD PRESSURE: 129 MMHG | HEART RATE: 69 BPM | TEMPERATURE: 98.3 F | DIASTOLIC BLOOD PRESSURE: 86 MMHG

## 2020-05-13 DIAGNOSIS — C20 RECTAL CARCINOMA (HCC): ICD-10-CM

## 2020-05-13 LAB
CHEMOTHERAPY INFUSION START DATE: NORMAL
CHEMOTHERAPY RECORDS: 1.8
CHEMOTHERAPY RECORDS: 540
CHEMOTHERAPY RECORDS: NORMAL
CHEMOTHERAPY RX CANCER: NORMAL
DATE 1ST CHEMO CANCER: NORMAL
RAD ONC ARIA COURSE LAST TREATMENT DATE: NORMAL
RAD ONC ARIA COURSE TREATMENT ELAPSED DAYS: NORMAL
RAD ONC ARIA REFERENCE POINT DOSAGE GIVEN TO DATE: 5.4
RAD ONC ARIA REFERENCE POINT DOSAGE GIVEN TO DATE: 5.55
RAD ONC ARIA REFERENCE POINT ID: NORMAL
RAD ONC ARIA REFERENCE POINT ID: NORMAL
RAD ONC ARIA REFERENCE POINT SESSION DOSAGE GIVEN: 1.8
RAD ONC ARIA REFERENCE POINT SESSION DOSAGE GIVEN: 1.85

## 2020-05-13 PROCEDURE — 77386 HCHG IMRT DELIVERY COMPLEX: CPT | Performed by: RADIOLOGY

## 2020-05-13 PROCEDURE — 77336 RADIATION PHYSICS CONSULT: CPT | Performed by: RADIOLOGY

## 2020-05-13 PROCEDURE — 77014 PR CT GUIDANCE PLACEMENT RAD THERAPY FIELDS: CPT | Mod: 26 | Performed by: RADIOLOGY

## 2020-05-13 ASSESSMENT — PAIN SCALES - GENERAL: PAINLEVEL: 1=MINIMAL PAIN

## 2020-05-13 ASSESSMENT — FIBROSIS 4 INDEX: FIB4 SCORE: 0.52

## 2020-05-13 NOTE — ON TREATMENT VISIT
ON TREATMENT  NOTE  RADIATION ONCOLOGY DEPARTMENT    Patient name:  Dre Eaton    Primary Physician:  Kirt Arellano M.D. MRN: [id number]  CSN: [id number]   Referring physician:  Joaquín Trent M.D. : 1965, 54 y.o.     ENCOUNTER DATE:  20    DIAGNOSIS:    Rectal carcinoma (HCC)  Staging form: Colon and Rectum, AJCC 8th Edition  - Clinical: Stage IIIC (cT4b, cN2, cM0) - Signed by Patti Strange M.D. on 2020  Laterality: Not applicable      TREATMENT SUMMARY:  [facility] Treatment Information        Some values may be hidden. Unless noted otherwise, only the newest values recorded on each date are displayed.         [facility] Treatment Summary 20   Course First Treatment Date 2020   Course Last Treatment Date 2020   Pelvis Plan from Course C1_rectum   Rectum_Bst1 Plan from Course C1_rectum   Fraction 3 of 3   Elapsed Course Days 37 @    Prescribed Fraction Dose 180 cGy   Prescribed Total Dose 540 cGy   Pelvis Reference Point from Course C1_rectum   Pelvis CP Reference Point from Course C1_rectum   Rectum_Bst1 Reference Point from Course C1_rectum   Elapsed Course Days 37 @    Session Dose 180 cGy   Total Dose 540 cGy   Rectum_Bst1 CP Reference Point from Course C1_rectum   Elapsed Course Days 37 @    Session Dose 185 cGy   Total Dose 555 cGy             SUBJECTIVE:  Doing well is developing a little bit of dry desquamation in the groin and some moist desquamation in the perianal region.        VITAL SIGNS:  KPS: 90, Able to carry on normal activity; minor signs or symptoms of disease (ECOG equivalent 0)  Encounter Vitals  Temperature: 36.8 °C (98.3 °F)  Temp src: Temporal  Blood Pressure: 129/86  Pulse: 69  Pulse Oximetry: 98 %  Weight: 78.4 kg (172 lb 14.4 oz)  Pain Score: 1=Minimal Pain  Pain Assessment 2020   Pain Assessment Acute Pain Acute Pain   Pain Score 1 1   Pain Loc - Rectum   Describe pain Burning -   Some recent data  might be hidden          PHYSICAL EXAM:  Moist desquamation in the perianal region, continuing flattening of tumor.  Some dry desquamation bilaterally in the groin area.      Toxicity Assessment 5/13/2020 5/6/2020 4/29/2020 4/22/2020 4/15/2020 4/8/2020 9/3/2019   Toxicity Assessment Male Pelvis Male Pelvis Male Pelvis Male Pelvis Male Pelvis Male Pelvis Male Pelvis   Fatigue (lethargy, malaise, asthenia) Increased fatigue over baseline, but not altering normal activities Increased fatigue over baseline, but not altering normal activities Increased fatigue over baseline, but not altering normal activities Increased fatigue over baseline, but not altering normal activities None None Severe (e.g., decrease in performance status by 2 or more ECOG levels or 40% Karnofsky) or loss of ability to perform some activities   Radiation Dermatitis Moderate to brisk erythema or a patchy moist desquamation, mostly confined to skin folds and creases, with/without moderate edema Faint erythema or dry desquamation Faint erythema or dry desquamation Faint erythema or dry desquamation None None None   Anorexia None None None None None None Oral intake significantly decreased   Colitis - None None None Abdominal pain with mucus and/or blood in stool None Abdominal pain with mucus and/or blood in stool   Constipation None None None None None None None   Dehydration None None None None None None None   Diarrhea w/o Colostomy None None None None None None -   Flatulence None None - Mild None None None   Nausea None None - None None None -   Proctitis None None - None None None -   Vomiting None None None None None None None   RT - Pain due to RT Mild pain not interfering with function Mild pain not interfering with function Mild pain not interfering with function None None None None   Tumor Pain (onset or exacerbation of tumor pain due to treatment) Mild pain not interfering with function Mild pain not interfering with function Mild pain  not interfering with function None None None -   Dysuria (painful urination) None None None - None None -   Urinary Frequency Normal Normal Normal - Normal Normal -   Urinary Urgency None None None - None None -   Bladder Spasms Absent Absent Absent - Absent Absent -   Incontinence None None None - None None -   Urinary Retention Normal Normal Normal - Normal Normal -           IMPRESSION:  Cancer Staging  Rectal carcinoma (HCC)  Staging form: Colon and Rectum, AJCC 8th Edition  - Clinical: Stage IIIC (cT4b, cN2, cM0) - Signed by Patti Strange M.D. on 4/8/2020      PLAN:  No change in treatment plan .  Have prescribed Silvadene cream.    Disposition:  Treatment plan reviewed. Questions answered. Continue therapy outlined.     Patti Strange M.D.    Orders Placed This Encounter   • silver sulfADIAZINE (SILVADENE) 1 % Cream

## 2020-05-14 ENCOUNTER — HOSPITAL ENCOUNTER (OUTPATIENT)
Dept: RADIATION ONCOLOGY | Facility: MEDICAL CENTER | Age: 55
End: 2020-05-14
Payer: MEDICAID

## 2020-05-14 LAB
CHEMOTHERAPY INFUSION START DATE: NORMAL
CHEMOTHERAPY RECORDS: 1.8
CHEMOTHERAPY RECORDS: 900
CHEMOTHERAPY RECORDS: NORMAL
CHEMOTHERAPY RX CANCER: NORMAL
DATE 1ST CHEMO CANCER: NORMAL
RAD ONC ARIA COURSE LAST TREATMENT DATE: NORMAL
RAD ONC ARIA COURSE TREATMENT ELAPSED DAYS: NORMAL
RAD ONC ARIA REFERENCE POINT DOSAGE GIVEN TO DATE: 1.8
RAD ONC ARIA REFERENCE POINT DOSAGE GIVEN TO DATE: 1.86
RAD ONC ARIA REFERENCE POINT ID: NORMAL
RAD ONC ARIA REFERENCE POINT ID: NORMAL
RAD ONC ARIA REFERENCE POINT SESSION DOSAGE GIVEN: 1.8
RAD ONC ARIA REFERENCE POINT SESSION DOSAGE GIVEN: 1.86

## 2020-05-14 PROCEDURE — 77014 PR CT GUIDANCE PLACEMENT RAD THERAPY FIELDS: CPT | Mod: 26 | Performed by: RADIOLOGY

## 2020-05-14 PROCEDURE — 77386 HCHG IMRT DELIVERY COMPLEX: CPT | Performed by: RADIOLOGY

## 2020-05-15 ENCOUNTER — HOSPITAL ENCOUNTER (OUTPATIENT)
Dept: RADIATION ONCOLOGY | Facility: MEDICAL CENTER | Age: 55
End: 2020-05-15
Payer: MEDICAID

## 2020-05-15 LAB
CHEMOTHERAPY INFUSION START DATE: NORMAL
CHEMOTHERAPY RECORDS: 1.8
CHEMOTHERAPY RECORDS: 900
CHEMOTHERAPY RECORDS: NORMAL
CHEMOTHERAPY RX CANCER: NORMAL
DATE 1ST CHEMO CANCER: NORMAL
RAD ONC ARIA COURSE LAST TREATMENT DATE: NORMAL
RAD ONC ARIA COURSE TREATMENT ELAPSED DAYS: NORMAL
RAD ONC ARIA REFERENCE POINT DOSAGE GIVEN TO DATE: 3.6
RAD ONC ARIA REFERENCE POINT DOSAGE GIVEN TO DATE: 3.72
RAD ONC ARIA REFERENCE POINT ID: NORMAL
RAD ONC ARIA REFERENCE POINT ID: NORMAL
RAD ONC ARIA REFERENCE POINT SESSION DOSAGE GIVEN: 1.8
RAD ONC ARIA REFERENCE POINT SESSION DOSAGE GIVEN: 1.86

## 2020-05-15 PROCEDURE — 77427 RADIATION TX MANAGEMENT X5: CPT | Performed by: RADIOLOGY

## 2020-05-15 PROCEDURE — 77386 HCHG IMRT DELIVERY COMPLEX: CPT | Performed by: RADIOLOGY

## 2020-05-15 PROCEDURE — 77014 PR CT GUIDANCE PLACEMENT RAD THERAPY FIELDS: CPT | Mod: 26 | Performed by: RADIOLOGY

## 2020-05-18 ENCOUNTER — HOSPITAL ENCOUNTER (OUTPATIENT)
Dept: RADIATION ONCOLOGY | Facility: MEDICAL CENTER | Age: 55
End: 2020-05-18
Payer: MEDICAID

## 2020-05-18 LAB
CHEMOTHERAPY INFUSION START DATE: NORMAL
CHEMOTHERAPY RECORDS: 1.8
CHEMOTHERAPY RECORDS: 900
CHEMOTHERAPY RECORDS: NORMAL
CHEMOTHERAPY RX CANCER: NORMAL
DATE 1ST CHEMO CANCER: NORMAL
RAD ONC ARIA COURSE LAST TREATMENT DATE: NORMAL
RAD ONC ARIA COURSE TREATMENT ELAPSED DAYS: NORMAL
RAD ONC ARIA REFERENCE POINT DOSAGE GIVEN TO DATE: 5.4
RAD ONC ARIA REFERENCE POINT DOSAGE GIVEN TO DATE: 5.58
RAD ONC ARIA REFERENCE POINT ID: NORMAL
RAD ONC ARIA REFERENCE POINT ID: NORMAL
RAD ONC ARIA REFERENCE POINT SESSION DOSAGE GIVEN: 1.8
RAD ONC ARIA REFERENCE POINT SESSION DOSAGE GIVEN: 1.86

## 2020-05-18 PROCEDURE — 77386 HCHG IMRT DELIVERY COMPLEX: CPT | Performed by: RADIOLOGY

## 2020-05-18 PROCEDURE — 77014 PR CT GUIDANCE PLACEMENT RAD THERAPY FIELDS: CPT | Mod: 26 | Performed by: RADIOLOGY

## 2020-05-19 ENCOUNTER — HOSPITAL ENCOUNTER (OUTPATIENT)
Dept: RADIATION ONCOLOGY | Facility: MEDICAL CENTER | Age: 55
End: 2020-05-19
Payer: MEDICAID

## 2020-05-19 ENCOUNTER — DOCUMENTATION (OUTPATIENT)
Dept: NUTRITION | Facility: MEDICAL CENTER | Age: 55
End: 2020-05-19

## 2020-05-19 LAB
CHEMOTHERAPY INFUSION START DATE: NORMAL
CHEMOTHERAPY RECORDS: 1.8
CHEMOTHERAPY RECORDS: 900
CHEMOTHERAPY RECORDS: NORMAL
CHEMOTHERAPY RX CANCER: NORMAL
DATE 1ST CHEMO CANCER: NORMAL
RAD ONC ARIA COURSE LAST TREATMENT DATE: NORMAL
RAD ONC ARIA COURSE TREATMENT ELAPSED DAYS: NORMAL
RAD ONC ARIA REFERENCE POINT DOSAGE GIVEN TO DATE: 7.2
RAD ONC ARIA REFERENCE POINT DOSAGE GIVEN TO DATE: 7.43
RAD ONC ARIA REFERENCE POINT ID: NORMAL
RAD ONC ARIA REFERENCE POINT ID: NORMAL
RAD ONC ARIA REFERENCE POINT SESSION DOSAGE GIVEN: 1.8
RAD ONC ARIA REFERENCE POINT SESSION DOSAGE GIVEN: 1.86

## 2020-05-19 PROCEDURE — 77014 PR CT GUIDANCE PLACEMENT RAD THERAPY FIELDS: CPT | Mod: 26 | Performed by: RADIOLOGY

## 2020-05-19 PROCEDURE — 77386 HCHG IMRT DELIVERY COMPLEX: CPT | Performed by: RADIOLOGY

## 2020-05-19 NOTE — PROGRESS NOTES
Nutrition Services: Brief Update  Weight: 172 lbs, weighed on 5/13/20  Weight History:  172 lbs on 5/7/20  174 lbs on 4/22/20  174 lbs on 4/15/20  174 lbs on 4/8/20  172 lbs on 4/6/20    Weight Change: Wt has remained stable throughout treatment.     RD briefly visited pt following [facility]. Pt states is managing well. Denies any questions or concerns for RD, relays doing fine and happy to be finished tomorrow. Confirms has RD contact information and will reach out as needed.    RD to sign off at this time. Please consult as needed.  [phone]

## 2020-05-20 ENCOUNTER — APPOINTMENT (OUTPATIENT)
Dept: RADIATION ONCOLOGY | Facility: MEDICAL CENTER | Age: 55
End: 2020-05-20
Payer: MEDICAID

## 2020-05-20 VITALS
BODY MASS INDEX: 24.89 KG/M2 | OXYGEN SATURATION: 97 % | TEMPERATURE: 98.3 F | WEIGHT: 173.5 LBS | DIASTOLIC BLOOD PRESSURE: 94 MMHG | HEART RATE: 72 BPM | SYSTOLIC BLOOD PRESSURE: 138 MMHG

## 2020-05-20 LAB
CHEMOTHERAPY INFUSION START DATE: NORMAL
CHEMOTHERAPY RECORDS: 1.8
CHEMOTHERAPY RECORDS: 900
CHEMOTHERAPY RECORDS: NORMAL
CHEMOTHERAPY RX CANCER: NORMAL
DATE 1ST CHEMO CANCER: NORMAL
RAD ONC ARIA COURSE LAST TREATMENT DATE: NORMAL
RAD ONC ARIA COURSE TREATMENT ELAPSED DAYS: NORMAL
RAD ONC ARIA REFERENCE POINT DOSAGE GIVEN TO DATE: 9
RAD ONC ARIA REFERENCE POINT DOSAGE GIVEN TO DATE: 9.29
RAD ONC ARIA REFERENCE POINT ID: NORMAL
RAD ONC ARIA REFERENCE POINT ID: NORMAL
RAD ONC ARIA REFERENCE POINT SESSION DOSAGE GIVEN: 1.8
RAD ONC ARIA REFERENCE POINT SESSION DOSAGE GIVEN: 1.86

## 2020-05-20 PROCEDURE — 77336 RADIATION PHYSICS CONSULT: CPT | Performed by: RADIOLOGY

## 2020-05-20 PROCEDURE — 77386 HCHG IMRT DELIVERY COMPLEX: CPT | Performed by: RADIOLOGY

## 2020-05-20 PROCEDURE — 77014 PR CT GUIDANCE PLACEMENT RAD THERAPY FIELDS: CPT | Mod: 26 | Performed by: RADIOLOGY

## 2020-05-20 ASSESSMENT — FIBROSIS 4 INDEX: FIB4 SCORE: 0.52

## 2020-05-20 ASSESSMENT — PAIN SCALES - GENERAL: PAINLEVEL: 1=MINIMAL PAIN

## 2020-05-20 NOTE — ON TREATMENT VISIT
ON TREATMENT  NOTE  RADIATION ONCOLOGY DEPARTMENT    Patient name:  Dre Eaton    Primary Physician:  Kirt Arellano M.D. MRN: [id number]  CSN: [id number]   Referring physician:  Joaquín Trent M.D. : 1965, 54 y.o.     ENCOUNTER DATE:  20    DIAGNOSIS:    Rectal carcinoma (HCC)  Staging form: Colon and Rectum, AJCC 8th Edition  - Clinical: Stage IIIC (cT4b, cN2, cM0) - Signed by Patti Strange M.D. on 2020  Laterality: Not applicable      TREATMENT SUMMARY:  [facility] Treatment Information        Some values may be hidden. Unless noted otherwise, only the newest values recorded on each date are displayed.         [facility] Treatment Summary 20   Course First Treatment Date 2020   Course Last Treatment Date 2020   Pelvis Plan from Course C1_rectum   Rectum_Bst1 Plan from Course C1_rectum   Rectum_Bst2 Plan from Course C1_rectum   Fraction 5 of 5   Elapsed Course Days 44 @    Prescribed Fraction Dose 180 cGy   Prescribed Total Dose 900 cGy   Pelvis Reference Point from Course C1_rectum   Pelvis CP Reference Point from Course C1_rectum   Rectum_Bst1 Reference Point from Course C1_rectum   Rectum_Bst1 CP Reference Point from Course C1_rectum   Rectum_Bst2 Reference Point from Course C1_rectum   Elapsed Course Days 44 @    Session Dose 180 cGy   Total Dose 900 cGy   Rectum_Bst2 CP Reference Point from Course C1_rectum   Elapsed Course Days 44 @    Session Dose 186 cGy   Total Dose 929 cGy             SUBJECTIVE:  Doing well sitting more comfortably.  No new complaints.        VITAL SIGNS:  KPS: 90, Able to carry on normal activity; minor signs or symptoms of disease (ECOG equivalent 0)  Encounter Vitals  Temperature: 36.8 °C (98.3 °F)  Temp src: Temporal  Blood Pressure: 138/94  Pulse: 72  Pulse Oximetry: 97 %  Weight: 78.7 kg (173 lb 8 oz)  Pain Score: 1=Minimal Pain  Pain Assessment 2020   Pain Assessment Acute Pain Acute Pain    Pain Score 1 1   Pain Loc Rectum -   Describe pain - Burning   Some recent data might be hidden          PHYSICAL EXAM:  Continued regression of tumor still moist desquamation.      Toxicity Assessment 5/20/2020 5/13/2020 5/6/2020 4/29/2020 4/22/2020 4/15/2020 4/8/2020   Toxicity Assessment Male Pelvis Male Pelvis Male Pelvis Male Pelvis Male Pelvis Male Pelvis Male Pelvis   Fatigue (lethargy, malaise, asthenia) None Increased fatigue over baseline, but not altering normal activities Increased fatigue over baseline, but not altering normal activities Increased fatigue over baseline, but not altering normal activities Increased fatigue over baseline, but not altering normal activities None None   Radiation Dermatitis Moderate to brisk erythema or a patchy moist desquamation, mostly confined to skin folds and creases, with/without moderate edema Moderate to brisk erythema or a patchy moist desquamation, mostly confined to skin folds and creases, with/without moderate edema Faint erythema or dry desquamation Faint erythema or dry desquamation Faint erythema or dry desquamation None None   Anorexia None None None None None None None   Colitis None - None None None Abdominal pain with mucus and/or blood in stool None   Constipation None None None None None None None   Dehydration None None None None None None None   Diarrhea w/o Colostomy None None None None None None None   Flatulence None None None - Mild None None   Nausea None None None - None None None   Proctitis None None None - None None None   Vomiting None None None None None None None   RT - Pain due to RT Mild pain not interfering with function Mild pain not interfering with function Mild pain not interfering with function Mild pain not interfering with function None None None   Tumor Pain (onset or exacerbation of tumor pain due to treatment) - Mild pain not interfering with function Mild pain not interfering with function Mild pain not interfering with  function None None None   Dysuria (painful urination) None None None None - None None   Urinary Frequency Normal Normal Normal Normal - Normal Normal   Urinary Urgency None None None None - None None   Bladder Spasms Absent Absent Absent Absent - Absent Absent   Incontinence None None None None - None None   Urinary Retention Normal Normal Normal Normal - Normal Normal           IMPRESSION:  Cancer Staging  Rectal carcinoma (HCC)  Staging form: Colon and Rectum, AJCC 8th Edition  - Clinical: Stage IIIC (cT4b, cN2, cM0) - Signed by Patti Strange M.D. on 4/8/2020      PLAN:  No change in treatment plan    Disposition:  Treatment plan reviewed. Questions answered. Continue therapy outlined.     Patti Strange M.D.    Orders Placed This Encounter   • Rad Onc [facility] Session Summary

## 2020-05-21 LAB
CHEMOTHERAPY INFUSION START DATE: NORMAL
CHEMOTHERAPY INFUSION STOP DATE: NORMAL
CHEMOTHERAPY RECORDS: 1.8
CHEMOTHERAPY RECORDS: 4500
CHEMOTHERAPY RECORDS: 540
CHEMOTHERAPY RECORDS: 900
CHEMOTHERAPY RECORDS: NORMAL
CHEMOTHERAPY RX CANCER: NORMAL
DATE 1ST CHEMO CANCER: NORMAL
RAD ONC ARIA COURSE LAST TREATMENT DATE: NORMAL
RAD ONC ARIA COURSE TREATMENT ELAPSED DAYS: NORMAL
RAD ONC ARIA REFERENCE POINT DOSAGE GIVEN TO DATE: 45
RAD ONC ARIA REFERENCE POINT DOSAGE GIVEN TO DATE: 46.04
RAD ONC ARIA REFERENCE POINT DOSAGE GIVEN TO DATE: 5.4
RAD ONC ARIA REFERENCE POINT DOSAGE GIVEN TO DATE: 5.55
RAD ONC ARIA REFERENCE POINT DOSAGE GIVEN TO DATE: 9
RAD ONC ARIA REFERENCE POINT DOSAGE GIVEN TO DATE: 9.29
RAD ONC ARIA REFERENCE POINT ID: NORMAL

## 2020-07-20 ENCOUNTER — HOSPITAL ENCOUNTER (OUTPATIENT)
Dept: RADIATION ONCOLOGY | Facility: MEDICAL CENTER | Age: 55
End: 2020-07-31
Attending: RADIOLOGY
Payer: MEDICAID

## 2020-07-20 VITALS
OXYGEN SATURATION: 97 % | SYSTOLIC BLOOD PRESSURE: 120 MMHG | HEART RATE: 58 BPM | DIASTOLIC BLOOD PRESSURE: 70 MMHG | WEIGHT: 177.69 LBS | BODY MASS INDEX: 25.5 KG/M2 | TEMPERATURE: 97.8 F

## 2020-07-20 PROCEDURE — 99212 OFFICE O/P EST SF 10 MIN: CPT | Performed by: RADIOLOGY

## 2020-07-20 ASSESSMENT — FIBROSIS 4 INDEX: FIB4 SCORE: 0.52

## 2020-07-20 ASSESSMENT — PAIN SCALES - GENERAL: PAINLEVEL: 1=MINIMAL PAIN

## 2020-07-20 NOTE — PROGRESS NOTES
RADIATION ONCOLOGY FOLLOW-UP    DATE OF SERVICE: 7/20/2020    IDENTIFICATION:   A 54 y.o. male with locally advanced rectal cancer now status post neoadjuvant Cape ox plus bevacizumab with an excellent response.  Now status post Concurrent radiation therapy and Xeloda 5/20/2020.    HISTORY OF PRESENT ILLNESS:   Since last seen patient has been doing well he is been able to sit down he is recovered he is has less neuropathy he says he has no soreness in the rectal area and he is able to sit.  He says he has had complete healing in the back and has no use for the Silvadene cream at this time.    CURRENT MEDICATIONS:  Current Outpatient Medications   Medication Sig Dispense Refill   • HOMEOPATHIC PRODUCTS PO Take  by mouth.     • cyanocobalamin (VITAMIN B-12) 500 MCG Tab Take 500 mcg by mouth every day.     • vitamin D (CHOLECALCIFEROL) 1000 UNIT Tab Take 1,000 Units by mouth every day. Pt takes 3,000 units every other day     • Ascorbic Acid (VITAMIN C PO) Take 1,000 mg by mouth every day. Powder form     • silver sulfADIAZINE (SILVADENE) 1 % Cream Apply 1/8 inch layer to affected area BID (Patient not taking: Reported on 7/20/2020) 400 g 2     No current facility-administered medications for this encounter.        ALLERGIES:  Patient has no known allergies.    FAMILY HISTORY:    No family history on file.[unfilled]        SOCIAL HISTORY:     reports that he has never smoked. He has never used smokeless tobacco. He reports current alcohol use. He reports that he does not use drugs.      REVIEW OF SYSTEMS: Is significant for Nothing new  The rest of the review of systems has been reviewed by me and is documented in the nursing note in [facility] dated 7/20/2020    PHYSICAL EXAM:     ECOG PERFORMANCE STATUS:  0= Fully active, able to carry on all pre-disease performance without restriction.       Vitals:    07/20/20 1028   BP: 120/70   BP Location: Left arm   Patient Position: Sitting   BP Cuff Size: Adult   Pulse: (!) 58    Temp: 36.6 °C (97.8 °F)   TempSrc: Temporal   SpO2: 97%   Weight: 80.6 kg (177 lb 11.1 oz)   Pain Score: 1=Minimal Pain        GENERAL: Appearing alert and oriented x3 in no apparent distress  Rectal area: Continued regression of tumor there still is a hard nodule on the outside of the anus in the buttock subcutaneous tissue.  Rectal exam was not performed.  He also still has a palpable groin nodein the left  HEENT:  Pupils are equal, round, and reactive to light.  Extraocular muscles   are intact. Sclerae nonicteric.  Conjunctivae pink.  Oral cavity, tongue   protrudes midline.   EXTREMITIES:  Without Edema.  NEUROLOGIC:  Cranial nerves II through XII were intact. Normal stance and gait motor and sensory grossly within normal limits          IMPRESSION:    A 54 y.o. male with locally advanced rectal cancer now status post neoadjuvant Cape ox plus bevacizumab with an excellent response.  Status post radiation therapy and Xeloda complete 5/20/2020 with continued regression of tumor.    RECOMMENDATIONS:   Patient is going to be evaluated by a surgeon about surgical resection.  I am not sure that he is a candidate at this point.  He may be a candidate for local control issues however.  In any event he will continue his follow-up with Dr. Aguila happy to see him on an as-needed basis.      Thank you for the opportunity to participate in his care.  If any questions or comments, please do not hesitate in calling.      Please note that this dictation was created using voice recognition software. I have made every reasonable attempt to correct obvious errors, but I expect that there are errors of grammar and possibly content that I did not discover before finalizing the note.

## 2020-07-20 NOTE — NON-PROVIDER
Patient was seen today in clinic with Dr. Strange for follow up.  Vitals signs and weight were obtained and pain assessment was completed.  Allergies and medications were reviewed with the patient.  Review of systems completed.     Vitals/Pain:  Vitals:    07/20/20 1028   BP: 120/70   BP Location: Left arm   Patient Position: Sitting   BP Cuff Size: Adult   Pulse: (!) 58   Temp: 36.6 °C (97.8 °F)   TempSrc: Temporal   SpO2: 97%   Weight: 80.6 kg (177 lb 11.1 oz)   Pain Score: 1=Minimal Pain        Allergies:   Patient has no known allergies.    Current Medications:  Current Outpatient Medications   Medication Sig Dispense Refill   • HOMEOPATHIC PRODUCTS PO Take  by mouth.     • cyanocobalamin (VITAMIN B-12) 500 MCG Tab Take 500 mcg by mouth every day.     • vitamin D (CHOLECALCIFEROL) 1000 UNIT Tab Take 1,000 Units by mouth every day. Pt takes 3,000 units every other day     • Ascorbic Acid (VITAMIN C PO) Take 1,000 mg by mouth every day. Powder form     • silver sulfADIAZINE (SILVADENE) 1 % Cream Apply 1/8 inch layer to affected area BID (Patient not taking: Reported on 7/20/2020) 400 g 2     No current facility-administered medications for this encounter.          PCP:  Adan Ball, Med Ass't

## 2020-09-02 ENCOUNTER — APPOINTMENT (OUTPATIENT)
Dept: ONCOLOGY | Facility: MEDICAL CENTER | Age: 55
End: 2020-09-02
Attending: INTERNAL MEDICINE
Payer: MEDICAID

## 2020-09-15 ENCOUNTER — OUTPATIENT INFUSION SERVICES (OUTPATIENT)
Dept: ONCOLOGY | Facility: MEDICAL CENTER | Age: 55
End: 2020-09-15
Attending: INTERNAL MEDICINE
Payer: MEDICAID

## 2020-09-15 VITALS
HEIGHT: 69 IN | OXYGEN SATURATION: 100 % | RESPIRATION RATE: 18 BRPM | SYSTOLIC BLOOD PRESSURE: 135 MMHG | HEART RATE: 69 BPM | DIASTOLIC BLOOD PRESSURE: 75 MMHG | BODY MASS INDEX: 27.4 KG/M2 | WEIGHT: 184.97 LBS | TEMPERATURE: 97.8 F

## 2020-09-15 DIAGNOSIS — C20 RECTAL CARCINOMA (HCC): ICD-10-CM

## 2020-09-15 LAB
ALBUMIN SERPL BCP-MCNC: 4 G/DL (ref 3.2–4.9)
ALBUMIN/GLOB SERPL: 1.5 G/DL
ALP SERPL-CCNC: 67 U/L (ref 30–99)
ALT SERPL-CCNC: 24 U/L (ref 2–50)
ANION GAP SERPL CALC-SCNC: 12 MMOL/L (ref 7–16)
APPEARANCE UR: CLEAR
AST SERPL-CCNC: 17 U/L (ref 12–45)
BILIRUB SERPL-MCNC: 0.7 MG/DL (ref 0.1–1.5)
BUN SERPL-MCNC: 18 MG/DL (ref 8–22)
CALCIUM SERPL-MCNC: 9.1 MG/DL (ref 8.5–10.5)
CHLORIDE SERPL-SCNC: 100 MMOL/L (ref 96–112)
CO2 SERPL-SCNC: 26 MMOL/L (ref 20–33)
COLOR UR AUTO: ABNORMAL
CREAT SERPL-MCNC: 0.63 MG/DL (ref 0.5–1.4)
GLOBULIN SER CALC-MCNC: 2.7 G/DL (ref 1.9–3.5)
GLUCOSE SERPL-MCNC: 95 MG/DL (ref 65–99)
GLUCOSE UR QL STRIP.AUTO: NEGATIVE MG/DL
KETONES UR QL STRIP.AUTO: NEGATIVE MG/DL
LEUKOCYTE ESTERASE UR QL STRIP.AUTO: NEGATIVE
NITRITE UR QL STRIP.AUTO: NEGATIVE
PH UR STRIP.AUTO: 6 [PH] (ref 5–8)
POTASSIUM SERPL-SCNC: 3.9 MMOL/L (ref 3.6–5.5)
PROT SERPL-MCNC: 6.7 G/DL (ref 6–8.2)
PROT UR QL STRIP: NEGATIVE MG/DL
RBC UR QL AUTO: ABNORMAL
SODIUM SERPL-SCNC: 138 MMOL/L (ref 135–145)
SP GR UR STRIP.AUTO: 1.02 (ref 1–1.03)

## 2020-09-15 PROCEDURE — 96413 CHEMO IV INFUSION 1 HR: CPT

## 2020-09-15 PROCEDURE — 700105 HCHG RX REV CODE 258: Performed by: INTERNAL MEDICINE

## 2020-09-15 PROCEDURE — A4212 NON CORING NEEDLE OR STYLET: HCPCS

## 2020-09-15 PROCEDURE — 80053 COMPREHEN METABOLIC PANEL: CPT

## 2020-09-15 PROCEDURE — 700111 HCHG RX REV CODE 636 W/ 250 OVERRIDE (IP): Performed by: INTERNAL MEDICINE

## 2020-09-15 PROCEDURE — 81002 URINALYSIS NONAUTO W/O SCOPE: CPT

## 2020-09-15 PROCEDURE — 700111 HCHG RX REV CODE 636 W/ 250 OVERRIDE (IP)

## 2020-09-15 RX ORDER — HEPARIN SODIUM (PORCINE) LOCK FLUSH IV SOLN 100 UNIT/ML 100 UNIT/ML
500 SOLUTION INTRAVENOUS PRN
Status: CANCELLED | OUTPATIENT
Start: 2020-09-15

## 2020-09-15 RX ORDER — METHYLPREDNISOLONE SODIUM SUCCINATE 125 MG/2ML
125 INJECTION, POWDER, LYOPHILIZED, FOR SOLUTION INTRAMUSCULAR; INTRAVENOUS PRN
Status: CANCELLED | OUTPATIENT
Start: 2020-09-15

## 2020-09-15 RX ORDER — EPINEPHRINE 1 MG/ML(1)
0.5 AMPUL (ML) INJECTION PRN
Status: CANCELLED | OUTPATIENT
Start: 2020-09-15

## 2020-09-15 RX ORDER — ONDANSETRON 8 MG/1
8 TABLET, ORALLY DISINTEGRATING ORAL PRN
Status: CANCELLED | OUTPATIENT
Start: 2020-09-15

## 2020-09-15 RX ORDER — ONDANSETRON 2 MG/ML
4 INJECTION INTRAMUSCULAR; INTRAVENOUS PRN
Status: CANCELLED | OUTPATIENT
Start: 2020-09-15

## 2020-09-15 RX ORDER — 0.9 % SODIUM CHLORIDE 0.9 %
3 VIAL (ML) INJECTION PRN
Status: CANCELLED | OUTPATIENT
Start: 2020-09-15

## 2020-09-15 RX ORDER — 0.9 % SODIUM CHLORIDE 0.9 %
10 VIAL (ML) INJECTION PRN
Status: CANCELLED | OUTPATIENT
Start: 2020-09-15

## 2020-09-15 RX ORDER — CAPECITABINE 500 MG/1
500 TABLET, FILM COATED ORAL 2 TIMES DAILY
COMMUNITY
Start: 2020-08-28 | End: 2022-03-22

## 2020-09-15 RX ORDER — 0.9 % SODIUM CHLORIDE 0.9 %
VIAL (ML) INJECTION PRN
Status: CANCELLED | OUTPATIENT
Start: 2020-09-15

## 2020-09-15 RX ORDER — PROCHLORPERAZINE MALEATE 10 MG
10 TABLET ORAL EVERY 6 HOURS PRN
Status: CANCELLED | OUTPATIENT
Start: 2020-09-15

## 2020-09-15 RX ORDER — SODIUM CHLORIDE 9 MG/ML
INJECTION, SOLUTION INTRAVENOUS CONTINUOUS
Status: CANCELLED | OUTPATIENT
Start: 2020-09-15

## 2020-09-15 RX ORDER — HEPARIN SODIUM (PORCINE) LOCK FLUSH IV SOLN 100 UNIT/ML 100 UNIT/ML
SOLUTION INTRAVENOUS
Status: COMPLETED
Start: 2020-09-15 | End: 2020-09-15

## 2020-09-15 RX ORDER — DIPHENHYDRAMINE HYDROCHLORIDE 50 MG/ML
50 INJECTION INTRAMUSCULAR; INTRAVENOUS PRN
Status: CANCELLED | OUTPATIENT
Start: 2020-09-15

## 2020-09-15 RX ORDER — DEXTROSE MONOHYDRATE 50 MG/ML
INJECTION, SOLUTION INTRAVENOUS CONTINUOUS
Status: CANCELLED | OUTPATIENT
Start: 2020-09-15

## 2020-09-15 RX ADMIN — HEPARIN 500 UNITS: 100 SYRINGE at 16:11

## 2020-09-15 RX ADMIN — BEVACIZUMAB 600 MG: 400 INJECTION, SOLUTION INTRAVENOUS at 14:30

## 2020-09-15 ASSESSMENT — FIBROSIS 4 INDEX: FIB4 SCORE: 0.53

## 2020-09-15 NOTE — PROGRESS NOTES
"Pharmacy Chemotherapy Calculation    Patient Name: Dre Etaon   Dx:Metastatic Rectal Adenocarcinoa   Protocol: Capecitabine + Bevacizumab     *Dosing Reference*  Bevacizumab 7.5 mg/kg IV on Day 1  Capecitabine 850 - 1250 mg/m2 PO twice daily on Days 1-14   21 day cycle until disease progression or unacceptable toxicity   NCCN Guidelines for Rectal Cancer V.2.2020  Herman EVANS et al. Lancet Oncol. 2013;14(11):2088-4095  Van Emiliano E, et al. Briseida Oncol. 2009;20(11):1842-7    Allergies:  Patient has no known allergies.     /75   Pulse 69   Temp 36.6 °C (97.8 °F) (Temporal)   Resp 18   Ht 1.75 m (5' 8.9\")   Wt 83.9 kg (184 lb 15.5 oz)   SpO2 100%   BMI 27.40 kg/m²  Body surface area is 2.02 meters squared.  Vitals 9/15/20  BP = 135/75  Labs 9/14/20 ([facility]) :  ANC~ 4400 Plt = 205k   Hgb = 13.5     Labs 9/15/20 ([facility]):  SCr = 0.63mg/dL CrCl > 125 mL/min   LFT's = WNL TBili = 0.7   Urine Protein = negative     Drug Order   (Drug name, dose, route, IV Fluid & volume, frequency, number of doses) Cycle 1      Previous treatment: s/p 7 cycles of CAPOX with Avastin 3/10/2020     Medication = Bevacizumab (Avastin)  Base Dose= 7.5 mg/kg  Calc Dose: Base Dose x 83.9 kg = 629.25 mg  Final Dose = 600 mg  Route = IV  Fluid & Volume =   mL  Admin Duration = Over 90 minutes          Rounded to nearest vial size (within 10%) per rounding protocol, okay to treat with final dose     By my signature below, I confirm this process was performed independently with the BSA and all final chemotherapy dosing calculations congruent. I have reviewed the above chemotherapy order and that my calculation of the final dose and BSA (when applicable) corroborate those calculations of the  pharmacist. Discrepancies of 10% or greater in the written dose have been addressed and documented within the EPIC Progress notes.    Angelina Garay, PharmD      "

## 2020-09-15 NOTE — PROGRESS NOTES
Pt arrives to [facility] for C1D1 of Avastin for rectal cancer.  Pt reports he was previously on Avastin at [facility] office and last dose was in March 2020.  Discussed plan of care with pt.  Pt reports he started Capecitabine this morning.  CBC was drawn yesterday at [facility].  [facility] port accessed with sterile technique, flushed with NS and brisk blood return observed.  CMP drawn via port.  UA was collected.  All labs reviewed and pt meets parameters for treatment.  BP is WNL.  Avastin infused over 90 minutes.  Pt tolerated infusion well.  Port flushed with NS and heparin locked.  Joaquin needle removed intact.  Site covered with gauze.  Gave pt a copy of schedule.  Pt dc home to self care.

## 2020-09-15 NOTE — PROGRESS NOTES
"Pharmacy Chemotherapy calculation:    DX: colorectal cancer    Cycle 1  Previous treatment:  -CapeOx/Bevacizumab At [facility] Sept/2019- March/2020  - Xrt + capecitabine - 4/2020    Regimen: Capecitabine + bevacizumab  bevacizumab 7.5mg/kg IV on day 1  Capecitabine 850-1250mg/m2 PO BID on days 1-14- home prescription  Q21 days  Until DP/UT  NCCN Guidelines for Colon Cancer V.2.2020  Van Ayaz E, et al - Briseida Oncol 2009 Nov;20(11):1842-7.   Tra EVANS et al - Lancet Oncol 2013 Oct;14(11):4906-7338.        /75   Pulse 69   Temp 36.6 °C (97.8 °F) (Temporal)   Resp 18   Ht 1.75 m (5' 8.9\")   Wt 83.9 kg (184 lb 15.5 oz)   SpO2 100%   BMI 27.40 kg/m²   Body surface area is 2.02 meters squared.     All lab results, BP and urine protein 9/15/20 within treatment parameters.       bevacizumab 7.5mg/kg x 83.9kg = 629.3mg    Rounded to vial size(within 10%) per dose rounding protocol.    ok to treat with final dose = 600mg IV        Natalia MirelesD.      "

## 2020-09-15 NOTE — PROGRESS NOTES
Chemotherapy Verification - PRIMARY RN    C1D1    Height = 175 cm  Weight = 83.9 kg  BSA = 2.02 m2       Medication: Avastin  Dose: 7.5 mg/kg  Calculated Dose: 629.25 mg                             (In mg/m2, AUC, mg/kg)     I confirm this process was performed independently with the BSA and all final chemotherapy dosing calculations congruent.  Any discrepancies of 10% or greater have been addressed with the chemotherapy pharmacist. The resolution of the discrepancy has been documented in the EPIC progress notes.

## 2020-09-15 NOTE — PROGRESS NOTES
Chemotherapy Verification - SECONDARY RN       Height = 175 cm  Weight = 83.9 kg  BSA = 2.02 m2       Medication: Avastin  Dose: 7.5 mg/kg  Calculated Dose: 629.25 mg                             (In mg/m2, AUC, mg/kg)     I confirm that this process was performed independently.

## 2020-09-16 ENCOUNTER — PATIENT OUTREACH (OUTPATIENT)
Dept: OTHER | Facility: MEDICAL CENTER | Age: 55
End: 2020-09-16

## 2020-09-16 NOTE — PROGRESS NOTES
On 9/16/2020 at 0959 this ONN called patient. No answer. This ONN left voicemail explaining this ONN was calling in follow-up to patient's reported distress of 3/10. Left call back number.

## 2020-10-06 ENCOUNTER — OUTPATIENT INFUSION SERVICES (OUTPATIENT)
Dept: ONCOLOGY | Facility: MEDICAL CENTER | Age: 55
End: 2020-10-06
Attending: INTERNAL MEDICINE
Payer: MEDICAID

## 2020-10-06 VITALS
BODY MASS INDEX: 26.94 KG/M2 | TEMPERATURE: 97.2 F | HEART RATE: 58 BPM | OXYGEN SATURATION: 98 % | WEIGHT: 181.88 LBS | HEIGHT: 69 IN | RESPIRATION RATE: 18 BRPM | SYSTOLIC BLOOD PRESSURE: 143 MMHG | DIASTOLIC BLOOD PRESSURE: 77 MMHG

## 2020-10-06 DIAGNOSIS — C20 RECTAL CARCINOMA (HCC): ICD-10-CM

## 2020-10-06 LAB
APPEARANCE UR: CLEAR
COLOR UR AUTO: ABNORMAL
GLUCOSE UR QL STRIP.AUTO: NEGATIVE MG/DL
KETONES UR QL STRIP.AUTO: NEGATIVE MG/DL
LEUKOCYTE ESTERASE UR QL STRIP.AUTO: NEGATIVE
NITRITE UR QL STRIP.AUTO: NEGATIVE
PH UR STRIP.AUTO: 6 [PH] (ref 5–8)
PROT UR QL STRIP: ABNORMAL MG/DL
RBC UR QL AUTO: ABNORMAL
SP GR UR STRIP.AUTO: >=1.03 (ref 1–1.03)

## 2020-10-06 PROCEDURE — A4212 NON CORING NEEDLE OR STYLET: HCPCS

## 2020-10-06 PROCEDURE — 96413 CHEMO IV INFUSION 1 HR: CPT

## 2020-10-06 PROCEDURE — 700105 HCHG RX REV CODE 258: Performed by: INTERNAL MEDICINE

## 2020-10-06 PROCEDURE — 700111 HCHG RX REV CODE 636 W/ 250 OVERRIDE (IP): Performed by: INTERNAL MEDICINE

## 2020-10-06 PROCEDURE — 81002 URINALYSIS NONAUTO W/O SCOPE: CPT

## 2020-10-06 PROCEDURE — 700101 HCHG RX REV CODE 250: Performed by: INTERNAL MEDICINE

## 2020-10-06 RX ORDER — 0.9 % SODIUM CHLORIDE 0.9 %
VIAL (ML) INJECTION PRN
Status: DISCONTINUED | OUTPATIENT
Start: 2020-10-06 | End: 2020-10-06 | Stop reason: HOSPADM

## 2020-10-06 RX ORDER — 0.9 % SODIUM CHLORIDE 0.9 %
3 VIAL (ML) INJECTION PRN
Status: CANCELLED | OUTPATIENT
Start: 2020-10-27

## 2020-10-06 RX ORDER — HEPARIN SODIUM (PORCINE) LOCK FLUSH IV SOLN 100 UNIT/ML 100 UNIT/ML
500 SOLUTION INTRAVENOUS PRN
Status: CANCELLED | OUTPATIENT
Start: 2020-10-26

## 2020-10-06 RX ORDER — ONDANSETRON 2 MG/ML
4 INJECTION INTRAMUSCULAR; INTRAVENOUS PRN
Status: CANCELLED | OUTPATIENT
Start: 2020-10-06

## 2020-10-06 RX ORDER — 0.9 % SODIUM CHLORIDE 0.9 %
VIAL (ML) INJECTION PRN
Status: CANCELLED | OUTPATIENT
Start: 2020-10-27

## 2020-10-06 RX ORDER — 0.9 % SODIUM CHLORIDE 0.9 %
3 VIAL (ML) INJECTION PRN
Status: CANCELLED | OUTPATIENT
Start: 2020-10-06

## 2020-10-06 RX ORDER — 0.9 % SODIUM CHLORIDE 0.9 %
10 VIAL (ML) INJECTION PRN
Status: CANCELLED | OUTPATIENT
Start: 2020-10-06

## 2020-10-06 RX ORDER — HEPARIN SODIUM (PORCINE) LOCK FLUSH IV SOLN 100 UNIT/ML 100 UNIT/ML
500 SOLUTION INTRAVENOUS PRN
Status: CANCELLED | OUTPATIENT
Start: 2020-10-27

## 2020-10-06 RX ORDER — ONDANSETRON 2 MG/ML
4 INJECTION INTRAMUSCULAR; INTRAVENOUS PRN
Status: CANCELLED | OUTPATIENT
Start: 2020-10-27

## 2020-10-06 RX ORDER — 0.9 % SODIUM CHLORIDE 0.9 %
10 VIAL (ML) INJECTION PRN
Status: CANCELLED | OUTPATIENT
Start: 2020-10-27

## 2020-10-06 RX ORDER — DIPHENHYDRAMINE HYDROCHLORIDE 50 MG/ML
50 INJECTION INTRAMUSCULAR; INTRAVENOUS PRN
Status: CANCELLED | OUTPATIENT
Start: 2020-10-06

## 2020-10-06 RX ORDER — HEPARIN SODIUM (PORCINE) LOCK FLUSH IV SOLN 100 UNIT/ML 100 UNIT/ML
500 SOLUTION INTRAVENOUS PRN
Status: CANCELLED | OUTPATIENT
Start: 2020-10-06

## 2020-10-06 RX ORDER — METHYLPREDNISOLONE SODIUM SUCCINATE 125 MG/2ML
125 INJECTION, POWDER, LYOPHILIZED, FOR SOLUTION INTRAMUSCULAR; INTRAVENOUS PRN
Status: CANCELLED | OUTPATIENT
Start: 2020-10-06

## 2020-10-06 RX ORDER — HEPARIN SODIUM (PORCINE) LOCK FLUSH IV SOLN 100 UNIT/ML 100 UNIT/ML
500 SOLUTION INTRAVENOUS PRN
Status: DISCONTINUED | OUTPATIENT
Start: 2020-10-06 | End: 2020-10-06 | Stop reason: HOSPADM

## 2020-10-06 RX ORDER — PROCHLORPERAZINE MALEATE 10 MG
10 TABLET ORAL EVERY 6 HOURS PRN
Status: CANCELLED | OUTPATIENT
Start: 2020-10-06

## 2020-10-06 RX ORDER — METHYLPREDNISOLONE SODIUM SUCCINATE 125 MG/2ML
125 INJECTION, POWDER, LYOPHILIZED, FOR SOLUTION INTRAMUSCULAR; INTRAVENOUS PRN
Status: CANCELLED | OUTPATIENT
Start: 2020-10-27

## 2020-10-06 RX ORDER — 0.9 % SODIUM CHLORIDE 0.9 %
3 VIAL (ML) INJECTION PRN
Status: CANCELLED | OUTPATIENT
Start: 2020-10-26

## 2020-10-06 RX ORDER — SODIUM CHLORIDE 9 MG/ML
INJECTION, SOLUTION INTRAVENOUS CONTINUOUS
Status: CANCELLED | OUTPATIENT
Start: 2020-10-06

## 2020-10-06 RX ORDER — SODIUM CHLORIDE 9 MG/ML
INJECTION, SOLUTION INTRAVENOUS CONTINUOUS
Status: CANCELLED | OUTPATIENT
Start: 2020-10-27

## 2020-10-06 RX ORDER — 0.9 % SODIUM CHLORIDE 0.9 %
VIAL (ML) INJECTION PRN
Status: CANCELLED | OUTPATIENT
Start: 2020-10-06

## 2020-10-06 RX ORDER — EPINEPHRINE 1 MG/ML(1)
0.5 AMPUL (ML) INJECTION PRN
Status: CANCELLED | OUTPATIENT
Start: 2020-10-27

## 2020-10-06 RX ORDER — 0.9 % SODIUM CHLORIDE 0.9 %
10 VIAL (ML) INJECTION PRN
Status: CANCELLED | OUTPATIENT
Start: 2020-10-26

## 2020-10-06 RX ORDER — ONDANSETRON 8 MG/1
8 TABLET, ORALLY DISINTEGRATING ORAL PRN
Status: CANCELLED | OUTPATIENT
Start: 2020-10-06

## 2020-10-06 RX ORDER — ONDANSETRON 8 MG/1
8 TABLET, ORALLY DISINTEGRATING ORAL PRN
Status: CANCELLED | OUTPATIENT
Start: 2020-10-27

## 2020-10-06 RX ORDER — PROCHLORPERAZINE MALEATE 10 MG
10 TABLET ORAL EVERY 6 HOURS PRN
Status: CANCELLED | OUTPATIENT
Start: 2020-10-27

## 2020-10-06 RX ORDER — DIPHENHYDRAMINE HYDROCHLORIDE 50 MG/ML
50 INJECTION INTRAMUSCULAR; INTRAVENOUS PRN
Status: CANCELLED | OUTPATIENT
Start: 2020-10-27

## 2020-10-06 RX ORDER — 0.9 % SODIUM CHLORIDE 0.9 %
VIAL (ML) INJECTION PRN
Status: CANCELLED | OUTPATIENT
Start: 2020-10-26

## 2020-10-06 RX ORDER — EPINEPHRINE 1 MG/ML(1)
0.5 AMPUL (ML) INJECTION PRN
Status: CANCELLED | OUTPATIENT
Start: 2020-10-06

## 2020-10-06 RX ADMIN — HEPARIN 500 UNITS: 100 SYRINGE at 17:30

## 2020-10-06 RX ADMIN — Medication 20 ML: at 17:29

## 2020-10-06 RX ADMIN — BEVACIZUMAB 600 MG: 400 INJECTION, SOLUTION INTRAVENOUS at 16:19

## 2020-10-06 ASSESSMENT — FIBROSIS 4 INDEX: FIB4 SCORE: 0.25

## 2020-10-06 ASSESSMENT — PAIN SCALES - GENERAL: PAINLEVEL: NO PAIN

## 2020-10-06 NOTE — PROGRESS NOTES
Chemotherapy Verification - PRIMARY RN      Height = 175 cm  Weight = 82.5 kg  BSA = 2 m^2       Medication: Bevacizumab (AVASTIN)  Dose: 7.5 mg/kg  Calculated Dose: 618.75 mg                             (In mg/m2, AUC, mg/kg)     I confirm this process was performed independently with the BSA and all final chemotherapy dosing calculations congruent.  Any discrepancies of 10% or greater have been addressed with the chemotherapy pharmacist. The resolution of the discrepancy has been documented in the EPIC progress notes.

## 2020-10-06 NOTE — PROGRESS NOTES
Patient here for here for becacizumab (AVASTIN). Right upper chest port accessed using sterile technique; brisk blood return noted. Confirmed that patient has prescription filled for capecitabine. UA collected. UA results and blood pressure within parameters. Bevacizumab (AVASTIN) given per MAR over 60 minutes. Heparin instilled prior to de-accessing port. Port de-accessed; gauze/coban applied to site.

## 2020-10-06 NOTE — PROGRESS NOTES
"Pharmacy Chemotherapy Calculation    Patient Name: Dre Eaton   Dx:Metastatic Rectal Adenocarcinoa   Protocol: Capecitabine + Bevacizumab     *Dosing Reference*  Bevacizumab 7.5 mg/kg IV on Day 1  Capecitabine 850 - 1250 mg/m2 PO twice daily on Days 1-14   21 day cycle until disease progression or unacceptable toxicity   NCCN Guidelines for Rectal Cancer V.2.2020  Herman EVANS et al. Lancet Oncol. 2013;14(11):6762-3583  Van Emiliano E, et al. Briseida Oncol. 2009;20(11):1842-7    Allergies:  Patient has no known allergies.     /77   Pulse (!) 58   Temp 36.2 °C (97.2 °F) (Temporal)   Resp 18   Ht 1.75 m (5' 8.9\")   Wt 82.5 kg (181 lb 14.1 oz)   SpO2 98%   BMI 26.94 kg/m²  Body surface area is 2 meters squared.  Vitals 10/6/20:  BP = 143/77  Labs 10/6/20:  Urine Protein = trace     Drug Order   (Drug name, dose, route, IV Fluid & volume, frequency, number of doses) Cycle 2  Previous treatment: C1 on 9/15/20   Medication = Bevacizumab (Avastin)  Base Dose= 7.5 mg/kg  Calc Dose: Base Dose x 82.5 kg = 618.75 mg  Final Dose = 600 mg  Route = IV  Fluid & Volume =   mL  Admin Duration = Over 60 minutes          Rounded to nearest vial size (within 10%) per rounding protocol, okay to treat with final dose     By my signature below, I confirm this process was performed independently with the BSA and all final chemotherapy dosing calculations congruent. I have reviewed the above chemotherapy order and that my calculation of the final dose and BSA (when applicable) corroborate those calculations of the  pharmacist. Discrepancies of 10% or greater in the written dose have been addressed and documented within the [facility] Progress notes.    Angelina Garay, PharmD      "

## 2020-10-06 NOTE — PROGRESS NOTES
Chemotherapy Verification - SECONDARY RN       Height = 175 cm  Weight = 82.5 kg  BSA = 2 m2       Medication: Avastin  Dose: 7.5 mg/lg  Calculated Dose: 618.75 mg                             (In mg/m2, AUC, mg/kg)     I confirm that this process was performed independently.

## 2020-10-27 ENCOUNTER — OUTPATIENT INFUSION SERVICES (OUTPATIENT)
Dept: ONCOLOGY | Facility: MEDICAL CENTER | Age: 55
End: 2020-10-27
Attending: INTERNAL MEDICINE
Payer: MEDICAID

## 2020-10-27 VITALS
HEIGHT: 69 IN | WEIGHT: 182.32 LBS | HEART RATE: 68 BPM | BODY MASS INDEX: 27 KG/M2 | TEMPERATURE: 97.1 F | DIASTOLIC BLOOD PRESSURE: 82 MMHG | OXYGEN SATURATION: 96 % | RESPIRATION RATE: 18 BRPM | SYSTOLIC BLOOD PRESSURE: 124 MMHG

## 2020-10-27 DIAGNOSIS — C20 RECTAL CARCINOMA (HCC): ICD-10-CM

## 2020-10-27 LAB
APPEARANCE UR: CLEAR
COLOR UR AUTO: YELLOW
GLUCOSE UR QL STRIP.AUTO: NEGATIVE MG/DL
KETONES UR QL STRIP.AUTO: NEGATIVE MG/DL
LEUKOCYTE ESTERASE UR QL STRIP.AUTO: NEGATIVE
NITRITE UR QL STRIP.AUTO: NEGATIVE
PH UR STRIP.AUTO: 7 [PH] (ref 5–8)
PROT UR QL STRIP: ABNORMAL MG/DL
RBC UR QL AUTO: ABNORMAL
SP GR UR STRIP.AUTO: 1.02 (ref 1–1.03)

## 2020-10-27 PROCEDURE — 700105 HCHG RX REV CODE 258: Performed by: INTERNAL MEDICINE

## 2020-10-27 PROCEDURE — 96413 CHEMO IV INFUSION 1 HR: CPT

## 2020-10-27 PROCEDURE — 700111 HCHG RX REV CODE 636 W/ 250 OVERRIDE (IP): Performed by: INTERNAL MEDICINE

## 2020-10-27 PROCEDURE — 81002 URINALYSIS NONAUTO W/O SCOPE: CPT

## 2020-10-27 PROCEDURE — A4212 NON CORING NEEDLE OR STYLET: HCPCS

## 2020-10-27 RX ORDER — HEPARIN SODIUM (PORCINE) LOCK FLUSH IV SOLN 100 UNIT/ML 100 UNIT/ML
500 SOLUTION INTRAVENOUS PRN
Status: DISCONTINUED | OUTPATIENT
Start: 2020-10-27 | End: 2020-10-27 | Stop reason: HOSPADM

## 2020-10-27 RX ADMIN — HEPARIN 500 UNITS: 100 SYRINGE at 13:20

## 2020-10-27 RX ADMIN — BEVACIZUMAB 600 MG: 400 INJECTION, SOLUTION INTRAVENOUS at 12:37

## 2020-10-27 ASSESSMENT — FIBROSIS 4 INDEX: FIB4 SCORE: 0.25

## 2020-10-27 NOTE — PROGRESS NOTES
Patient arrived ambulatory to [facility] for C3 Avastin.  He denies any open wounds, s/s of infection, or fevers.  Port accessed using sterile technique with brisk blood return noted.  Urine collected and within parameters to treat.  Avastin infused per order, pt tolerated well.  Port flushed, heparin instilled, and de-accessed per protocol.  Gauze/ tape applied.  Confirmed next appointment and he ambulated out of clinic in no apparent distress.

## 2020-10-27 NOTE — PROGRESS NOTES
"Pharmacy Chemotherapy Calculation    Patient Name: Dre Eaton   Dx:Metastatic Rectal Adenocarcinoa   Protocol: Capecitabine + Bevacizumab     *Dosing Reference*  Bevacizumab 7.5 mg/kg IV on Day 1  Capecitabine 850 - 1250 mg/m2 PO twice daily on Days 1-14   21 day cycle until disease progression or unacceptable toxicity   NCCN Guidelines for Rectal Cancer V.2.2020  Herman EVANS et al. Lancet Oncol. 2013;14(11):6180-2452  Van Emiliano E, et al. Briseida Oncol. 2009;20(11):1842-7    Allergies:  Patient has no known allergies.     /82   Pulse 68   Temp 36.2 °C (97.1 °F) (Temporal)   Resp 18   Ht 1.745 m (5' 8.7\")   Wt 82.7 kg (182 lb 5.1 oz)   SpO2 96%   BMI 27.16 kg/m²  Body surface area is 2 meters squared.    BP and urine protein 10/27/20 within treatment parameters.        Drug Order   (Drug name, dose, route, IV Fluid & volume, frequency, number of doses) Cycle 3  Previous treatment: C2 on 10/6/20   Medication = Bevacizumab (Avastin)  Base Dose= 7.5 mg/kg  Calc Dose: Base Dose x 82.7kg = 620.2mg  Final Dose = 600 mg  Route = IV  Fluid & Volume =   mL  Admin Duration = Over 60 minutes          Rounded to nearest vial size (within 10%) per rounding protocol, okay to treat with final dose     By my signature below, I confirm this process was performed independently with the BSA and all final chemotherapy dosing calculations congruent. I have reviewed the above chemotherapy order and that my calculation of the final dose and BSA (when applicable) corroborate those calculations of the  pharmacist. Discrepancies of 10% or greater in the written dose have been addressed and documented within the [facility] Progress notes.    Bal Grande, PharmD      "

## 2020-10-27 NOTE — PROGRESS NOTES
Chemotherapy Verification - PRIMARY RN      Height = 174.5 cm  Weight = 82.7 kg  BSA = 2 m2       Medication: Avastin  Dose: 7.5 mg/kg    Calculated Dose: 620 mg                                I confirm this process was performed independently with the BSA and all final chemotherapy dosing calculations congruent.  Any discrepancies of 10% or greater have been addressed with the chemotherapy pharmacist. The resolution of the discrepancy has been documented in the EPIC progress notes.

## 2020-10-27 NOTE — PROGRESS NOTES
"Pharmacy Chemotherapy Verification    DX: Colorectal cancer    Cycle 3  Previous treatment: 10/6/20  -CapeOx/Bevacizumab At [facility] Sept/2019- March/2020  -XRT + capecitabine - 4/2020    Regimen: Capecitabine + bevacizumab  Bevacizumab 7.5 mg/kg IV on day 1  Capecitabine 850-1250 mg/m2 PO BID on days 1-14- home prescription  Q21 days until progression / toxicity  NCCN Guidelines for Colon Cancer V.2.2020  Van Ayaz E, et al - Briseida Oncol 2009 Nov;20(11):1842-7.   Tra EVANS et al - Lancet Oncol 2013 Oct;14(11):8229-5658.     Allergies:Patient has no known allergies.  /82   Pulse 68   Temp 36.2 °C (97.1 °F) (Temporal)   Resp 18   Ht 1.745 m (5' 8.7\")   Wt 82.7 kg (182 lb 5.1 oz)   SpO2 96%   BMI 27.16 kg/m²   Body surface area is 2 meters squared.     Labs 10/26/20 ([facility])  ANC 4300 Hgb 13.9 Plt 210k     10/27/2020 11:50   POC Protein Trace (A)     Bevacizumab 7.5 mg/kg x 82.7 kg = 620.25 mg    Rounded to vial size (within 10%) per dose rounding protocol.    ok to treat with final dose = 600 mg IV    Jessy Solorio, PharmD, BCOP          "

## 2020-10-27 NOTE — PROGRESS NOTES
Chemotherapy Verification - SECONDARY RN       Height = 68.7 inches  Weight = 82.7 kg  BSA = 2 m2       Medication: Bevacizumab (Avastin)  Dose: 7.5 mg/kg  Calculated Dose: 620.25 mg                             (In mg/m2, AUC, mg/kg)         I confirm that this process was performed independently.

## 2020-11-17 ENCOUNTER — OUTPATIENT INFUSION SERVICES (OUTPATIENT)
Dept: ONCOLOGY | Facility: MEDICAL CENTER | Age: 55
End: 2020-11-17
Attending: INTERNAL MEDICINE
Payer: MEDICAID

## 2020-11-17 VITALS
RESPIRATION RATE: 18 BRPM | TEMPERATURE: 98.6 F | SYSTOLIC BLOOD PRESSURE: 135 MMHG | OXYGEN SATURATION: 98 % | HEART RATE: 77 BPM | HEIGHT: 69 IN | BODY MASS INDEX: 26.81 KG/M2 | WEIGHT: 181 LBS | DIASTOLIC BLOOD PRESSURE: 80 MMHG

## 2020-11-17 DIAGNOSIS — C20 RECTAL CARCINOMA (HCC): ICD-10-CM

## 2020-11-17 LAB
APPEARANCE UR: CLEAR
COLOR UR AUTO: YELLOW
GLUCOSE UR QL STRIP.AUTO: NEGATIVE MG/DL
KETONES UR QL STRIP.AUTO: NEGATIVE MG/DL
LEUKOCYTE ESTERASE UR QL STRIP.AUTO: NEGATIVE
NITRITE UR QL STRIP.AUTO: NEGATIVE
PH UR STRIP.AUTO: 6 [PH] (ref 5–8)
PROT UR QL STRIP: 30 MG/DL
RBC UR QL AUTO: ABNORMAL
SP GR UR STRIP.AUTO: >=1.03 (ref 1–1.03)

## 2020-11-17 PROCEDURE — 96413 CHEMO IV INFUSION 1 HR: CPT

## 2020-11-17 PROCEDURE — 700111 HCHG RX REV CODE 636 W/ 250 OVERRIDE (IP): Performed by: INTERNAL MEDICINE

## 2020-11-17 PROCEDURE — 700105 HCHG RX REV CODE 258: Performed by: INTERNAL MEDICINE

## 2020-11-17 PROCEDURE — A4212 NON CORING NEEDLE OR STYLET: HCPCS

## 2020-11-17 PROCEDURE — 81002 URINALYSIS NONAUTO W/O SCOPE: CPT

## 2020-11-17 RX ORDER — HEPARIN SODIUM (PORCINE) LOCK FLUSH IV SOLN 100 UNIT/ML 100 UNIT/ML
500 SOLUTION INTRAVENOUS PRN
Status: CANCELLED | OUTPATIENT
Start: 2020-11-17

## 2020-11-17 RX ORDER — 0.9 % SODIUM CHLORIDE 0.9 %
VIAL (ML) INJECTION PRN
Status: CANCELLED | OUTPATIENT
Start: 2020-11-17

## 2020-11-17 RX ORDER — 0.9 % SODIUM CHLORIDE 0.9 %
10 VIAL (ML) INJECTION PRN
Status: CANCELLED | OUTPATIENT
Start: 2020-11-17

## 2020-11-17 RX ORDER — ONDANSETRON 8 MG/1
8 TABLET, ORALLY DISINTEGRATING ORAL PRN
Status: CANCELLED | OUTPATIENT
Start: 2020-11-17

## 2020-11-17 RX ORDER — HEPARIN SODIUM (PORCINE) LOCK FLUSH IV SOLN 100 UNIT/ML 100 UNIT/ML
500 SOLUTION INTRAVENOUS PRN
Status: DISCONTINUED | OUTPATIENT
Start: 2020-11-17 | End: 2020-11-17 | Stop reason: HOSPADM

## 2020-11-17 RX ORDER — PROCHLORPERAZINE MALEATE 10 MG
10 TABLET ORAL EVERY 6 HOURS PRN
Status: CANCELLED | OUTPATIENT
Start: 2020-11-17

## 2020-11-17 RX ORDER — DIPHENHYDRAMINE HYDROCHLORIDE 50 MG/ML
50 INJECTION INTRAMUSCULAR; INTRAVENOUS PRN
Status: CANCELLED | OUTPATIENT
Start: 2020-11-17

## 2020-11-17 RX ORDER — SODIUM CHLORIDE 9 MG/ML
INJECTION, SOLUTION INTRAVENOUS CONTINUOUS
Status: CANCELLED | OUTPATIENT
Start: 2020-11-17

## 2020-11-17 RX ORDER — ONDANSETRON 2 MG/ML
4 INJECTION INTRAMUSCULAR; INTRAVENOUS PRN
Status: CANCELLED | OUTPATIENT
Start: 2020-11-17

## 2020-11-17 RX ORDER — EPINEPHRINE 1 MG/ML(1)
0.5 AMPUL (ML) INJECTION PRN
Status: CANCELLED | OUTPATIENT
Start: 2020-11-17

## 2020-11-17 RX ORDER — 0.9 % SODIUM CHLORIDE 0.9 %
3 VIAL (ML) INJECTION PRN
Status: CANCELLED | OUTPATIENT
Start: 2020-11-17

## 2020-11-17 RX ORDER — METHYLPREDNISOLONE SODIUM SUCCINATE 125 MG/2ML
125 INJECTION, POWDER, LYOPHILIZED, FOR SOLUTION INTRAMUSCULAR; INTRAVENOUS PRN
Status: CANCELLED | OUTPATIENT
Start: 2020-11-17

## 2020-11-17 RX ADMIN — BEVACIZUMAB 600 MG: 400 INJECTION, SOLUTION INTRAVENOUS at 12:26

## 2020-11-17 RX ADMIN — HEPARIN 500 UNITS: 100 SYRINGE at 13:04

## 2020-11-17 ASSESSMENT — FIBROSIS 4 INDEX: FIB4 SCORE: 0.25

## 2020-11-17 NOTE — PROGRESS NOTES
"Pharmacy Chemotherapy Verification    DX: Colorectal cancer    Cycle 4  Previous treatment: 10/27/20  -CapeOx/Bevacizumab At [facility] Sept/2019- March/2020  -XRT + capecitabine - 4/2020    Regimen: Capecitabine + bevacizumab  Bevacizumab 7.5 mg/kg IV on day 1  Capecitabine 850-1250 mg/m2 PO BID on days 1-14- home prescription  Q21 days until progression / toxicity  NCCN Guidelines for Colon Cancer V.2.2020  Van Ayaz E, et al - Briseida Oncol 2009 Nov;20(11):1842-7.   Tra EVANS et al - Lancet Oncol 2013 Oct;14(11):2913-5164.     Allergies:Patient has no known allergies.  /80   Pulse 77   Temp 37 °C (98.6 °F) (Temporal) Comment: Simultaneous filing. User may not have seen previous data. Comment (Src): Simultaneous filing. User may not have seen previous data.  Resp 18 Comment: Simultaneous filing. User may not have seen previous data.  Ht 1.75 m (5' 8.9\") Comment: Simultaneous filing. User may not have seen previous data.  Wt 82.1 kg (181 lb) Comment: Simultaneous filing. User may not have seen previous data.  SpO2 98%   BMI 26.81 kg/m²   Body surface area is 2 meters squared.     UA from 11/17/20 and BP within parameters.    Bevacizumab 7.5 mg/kg x 82.1 kg = 615.75 mg    Rounded to vial size (within 10%) per dose rounding protocol.    ok to treat with final dose = 600 mg IV    Thuy Ch, PharmD, BCOP          "

## 2020-11-17 NOTE — PROGRESS NOTES
"Pharmacy Chemotherapy Calculation    Patient Name: Dre Eaton   Dx:Metastatic Rectal Adenocarcinoa   Protocol: Capecitabine + Bevacizumab     *Dosing Reference*  Bevacizumab 7.5 mg/kg IV on Day 1  Capecitabine 850 - 1250 mg/m2 PO twice daily on Days 1-14   21 day cycle until disease progression or unacceptable toxicity   NCCN Guidelines for Rectal Cancer V.2.2020  Herman EVANS et al. Lancet Oncol. 2013;14(11):9989-7377  Van Emiliano E, et al. Briseida Oncol. 2009;20(11):1842-7    Allergies:  Patient has no known allergies.     /80   Pulse 77   Temp 37 °C (98.6 °F) (Temporal) Comment: Simultaneous filing. User may not have seen previous data. Comment (Src): Simultaneous filing. User may not have seen previous data.  Resp 18 Comment: Simultaneous filing. User may not have seen previous data.  Ht 1.75 m (5' 8.9\") Comment: Simultaneous filing. User may not have seen previous data.  Wt 82.1 kg (181 lb) Comment: Simultaneous filing. User may not have seen previous data.  SpO2 98%   BMI 26.81 kg/m²  Body surface area is 2 meters squared.    BP and urine protein 11/17/20 within treatment parameters.        Drug Order   (Drug name, dose, route, IV Fluid & volume, frequency, number of doses) Cycle 4  Previous treatment: C3 on 10/27/20   Medication = Bevacizumab (Avastin)  Base Dose= 7.5 mg/kg  Calc Dose: Base Dose x 82.1kg = 615.8mg  Final Dose = 600 mg  Route = IV  Fluid & Volume =   mL  Admin Duration = Over 60 minutes          Rounded to nearest vial size (within 10%) per rounding protocol, okay to treat with final dose     By my signature below, I confirm this process was performed independently with the BSA and all final chemotherapy dosing calculations congruent. I have reviewed the above chemotherapy order and that my calculation of the final dose and BSA (when applicable) corroborate those calculations of the  pharmacist. Discrepancies of 10% or greater in the written dose have been " addressed and documented within the [facility] Progress notes.    Bal Grande, ElleD

## 2020-11-17 NOTE — PROGRESS NOTES
Chemotherapy Verification - SECONDARY RN       Height = 175 cm  Weight = 82.1 kg  BSA = 2 m2       Medication: Avastin  Dose: 7.5 mg/kg  Calculated Dose: 615.75 mg                             (In mg/m2, AUC, mg/kg)     I confirm that this process was performed independently.

## 2020-11-17 NOTE — PROGRESS NOTES
Pt arrived for C4 Avastin today. Denies s/sx of infections. UA obtained. Pt in parameters for tx today. Port accessed, blood return noted. Avastin infused over 30 minutes without issues. Port flushed, blood return noted and hep locked. Port deaccessed. Confirmed next appointment. Pt left infusion center in stable condition.

## 2020-11-17 NOTE — PROGRESS NOTES
Chemotherapy Verification - PRIMARY RN      Height = 175 cm  Weight = 82.1kg  BSA = 2 m2       Medication: Avastin  Dose: 7.5mg/kg  Calculated Dose: 615mg         I confirm this process was performed independently with the BSA and all final chemotherapy dosing calculations congruent.  Any discrepancies of 10% or greater have been addressed with the chemotherapy pharmacist. The resolution of the discrepancy has been documented in the EPIC progress notes.

## 2020-12-07 ENCOUNTER — TELEPHONE (OUTPATIENT)
Dept: ONCOLOGY | Facility: MEDICAL CENTER | Age: 55
End: 2020-12-07

## 2020-12-08 ENCOUNTER — OUTPATIENT INFUSION SERVICES (OUTPATIENT)
Dept: ONCOLOGY | Facility: MEDICAL CENTER | Age: 55
End: 2020-12-08
Attending: INTERNAL MEDICINE
Payer: MEDICAID

## 2020-12-08 VITALS
TEMPERATURE: 97.6 F | SYSTOLIC BLOOD PRESSURE: 144 MMHG | HEART RATE: 62 BPM | OXYGEN SATURATION: 98 % | BODY MASS INDEX: 27.33 KG/M2 | RESPIRATION RATE: 18 BRPM | HEIGHT: 69 IN | DIASTOLIC BLOOD PRESSURE: 86 MMHG | WEIGHT: 184.53 LBS

## 2020-12-08 DIAGNOSIS — C20 RECTAL CARCINOMA (HCC): ICD-10-CM

## 2020-12-08 LAB
APPEARANCE UR: CLEAR
COLOR UR AUTO: YELLOW
GLUCOSE UR QL STRIP.AUTO: NEGATIVE MG/DL
KETONES UR QL STRIP.AUTO: NEGATIVE MG/DL
LEUKOCYTE ESTERASE UR QL STRIP.AUTO: NEGATIVE
NITRITE UR QL STRIP.AUTO: NEGATIVE
PH UR STRIP.AUTO: 6.5 [PH] (ref 5–8)
PROT UR QL STRIP: NEGATIVE MG/DL
RBC UR QL AUTO: ABNORMAL
SP GR UR STRIP.AUTO: 1.02 (ref 1–1.03)

## 2020-12-08 PROCEDURE — 81002 URINALYSIS NONAUTO W/O SCOPE: CPT

## 2020-12-08 PROCEDURE — A4212 NON CORING NEEDLE OR STYLET: HCPCS

## 2020-12-08 PROCEDURE — 96413 CHEMO IV INFUSION 1 HR: CPT

## 2020-12-08 PROCEDURE — 700111 HCHG RX REV CODE 636 W/ 250 OVERRIDE (IP)

## 2020-12-08 PROCEDURE — 700105 HCHG RX REV CODE 258: Performed by: INTERNAL MEDICINE

## 2020-12-08 PROCEDURE — 700111 HCHG RX REV CODE 636 W/ 250 OVERRIDE (IP): Performed by: INTERNAL MEDICINE

## 2020-12-08 RX ORDER — DIPHENHYDRAMINE HYDROCHLORIDE 50 MG/ML
50 INJECTION INTRAMUSCULAR; INTRAVENOUS PRN
Status: CANCELLED | OUTPATIENT
Start: 2020-12-08

## 2020-12-08 RX ORDER — 0.9 % SODIUM CHLORIDE 0.9 %
10 VIAL (ML) INJECTION PRN
Status: CANCELLED | OUTPATIENT
Start: 2020-12-08

## 2020-12-08 RX ORDER — HEPARIN SODIUM (PORCINE) LOCK FLUSH IV SOLN 100 UNIT/ML 100 UNIT/ML
500 SOLUTION INTRAVENOUS PRN
Status: DISCONTINUED | OUTPATIENT
Start: 2020-12-08 | End: 2020-12-08 | Stop reason: HOSPADM

## 2020-12-08 RX ORDER — 0.9 % SODIUM CHLORIDE 0.9 %
VIAL (ML) INJECTION PRN
Status: CANCELLED | OUTPATIENT
Start: 2020-12-08

## 2020-12-08 RX ORDER — ONDANSETRON 2 MG/ML
4 INJECTION INTRAMUSCULAR; INTRAVENOUS PRN
Status: CANCELLED | OUTPATIENT
Start: 2020-12-08

## 2020-12-08 RX ORDER — SODIUM CHLORIDE 9 MG/ML
INJECTION, SOLUTION INTRAVENOUS CONTINUOUS
Status: DISCONTINUED | OUTPATIENT
Start: 2020-12-08 | End: 2020-12-08 | Stop reason: HOSPADM

## 2020-12-08 RX ORDER — SODIUM CHLORIDE 9 MG/ML
INJECTION, SOLUTION INTRAVENOUS CONTINUOUS
Status: CANCELLED | OUTPATIENT
Start: 2020-12-08

## 2020-12-08 RX ORDER — PROCHLORPERAZINE MALEATE 10 MG
10 TABLET ORAL EVERY 6 HOURS PRN
Status: CANCELLED | OUTPATIENT
Start: 2020-12-08

## 2020-12-08 RX ORDER — HEPARIN SODIUM (PORCINE) LOCK FLUSH IV SOLN 100 UNIT/ML 100 UNIT/ML
500 SOLUTION INTRAVENOUS PRN
Status: CANCELLED | OUTPATIENT
Start: 2020-12-08

## 2020-12-08 RX ORDER — METHYLPREDNISOLONE SODIUM SUCCINATE 125 MG/2ML
125 INJECTION, POWDER, LYOPHILIZED, FOR SOLUTION INTRAMUSCULAR; INTRAVENOUS PRN
Status: CANCELLED | OUTPATIENT
Start: 2020-12-08

## 2020-12-08 RX ORDER — HEPARIN SODIUM (PORCINE) LOCK FLUSH IV SOLN 100 UNIT/ML 100 UNIT/ML
SOLUTION INTRAVENOUS
Status: COMPLETED
Start: 2020-12-08 | End: 2020-12-08

## 2020-12-08 RX ORDER — ONDANSETRON 8 MG/1
8 TABLET, ORALLY DISINTEGRATING ORAL PRN
Status: CANCELLED | OUTPATIENT
Start: 2020-12-08

## 2020-12-08 RX ORDER — 0.9 % SODIUM CHLORIDE 0.9 %
3 VIAL (ML) INJECTION PRN
Status: CANCELLED | OUTPATIENT
Start: 2020-12-08

## 2020-12-08 RX ORDER — EPINEPHRINE 1 MG/ML(1)
0.5 AMPUL (ML) INJECTION PRN
Status: CANCELLED | OUTPATIENT
Start: 2020-12-08

## 2020-12-08 RX ADMIN — HEPARIN 500 UNITS: 100 SYRINGE at 13:09

## 2020-12-08 RX ADMIN — HEPARIN SODIUM (PORCINE) LOCK FLUSH IV SOLN 100 UNIT/ML 500 UNITS: 100 SOLUTION at 13:09

## 2020-12-08 RX ADMIN — BEVACIZUMAB 600 MG: 400 INJECTION, SOLUTION INTRAVENOUS at 12:19

## 2020-12-08 RX ADMIN — SODIUM CHLORIDE: 9 INJECTION, SOLUTION INTRAVENOUS at 10:42

## 2020-12-08 ASSESSMENT — FIBROSIS 4 INDEX: FIB4 SCORE: 0.25

## 2020-12-08 NOTE — PROGRESS NOTES
"Pharmacy Chemotherapy Calculation    Patient Name: Dre Eaton   Dx:Metastatic Rectal Adenocarcinoa     Protocol: Capecitabine + Bevacizumab     Bevacizumab 7.5 mg/kg IV on Day 1  Capecitabine 850 - 1250 mg/m2 PO twice daily on Days 1-14   21 day cycle until disease progression or unacceptable toxicity   NCCN Guidelines for Rectal Cancer V.2.2020  Herman EVANS, et al. Lancet Oncol. 2013;14(11):4042-8757  Van Emiliano E, et al. Briseida Oncol. 2009;20(11):1842-7    Allergies:  Patient has no known allergies.     /86   Pulse 62   Temp 36.4 °C (97.6 °F) (Temporal)   Resp 18   Ht 1.75 m (5' 8.9\")   Wt 83.7 kg (184 lb 8.4 oz)   SpO2 98%   BMI 27.33 kg/m²  Body surface area is 2.02 meters squared.    UA and BP 12/8/20 within treatment parameters.     Drug Order   (Drug name, dose, route, IV Fluid & volume, frequency, number of doses) Cycle 5  Previous treatment: 11/17/20   Medication = Bevacizumab (Avastin)  Base Dose= 7.5 mg/kg  Calc Dose: Base Dose x 83.7 kg = 627.8 mg  Final Dose = 600 mg  Route = IV  Fluid & Volume =   mL  Admin Duration = Over 30 minutes          Rounded to vial size (within 10%) per rounding protocol, okay to treat with final dose     By my signature below, I confirm this process was performed independently with the BSA and all final chemotherapy dosing calculations congruent. I have reviewed the above chemotherapy order and that my calculation of the final dose and BSA (when applicable) corroborate those calculations of the  pharmacist. Discrepancies of 10% or greater in the written dose have been addressed and documented within the [facility] Progress notes.    Thuy Ch, PharmD, BCOP      "

## 2020-12-08 NOTE — PROGRESS NOTES
Chemotherapy Verification - PRIMARY RN      Height = 68.9 inches  Weight = 83.7 kg  BSA = 2.01 m2       Medication: Bevacizumab (Avastin)  Dose: 7.5 mg/kg  Calculated Dose: 627.75 mg                             (In mg/m2, AUC, mg/kg)           I confirm this process was performed independently with the BSA and all final chemotherapy dosing calculations congruent.  Any discrepancies of 10% or greater have been addressed with the chemotherapy pharmacist. The resolution of the discrepancy has been documented in the EPIC progress notes.

## 2020-12-08 NOTE — PROGRESS NOTES
"Pharmacy Chemotherapy Verification    DX: Colorectal cancer    Cycle 5  Previous treatment: 11/17/20  -CapeOx/Bevacizumab At [facility] Sept/2019- March/2020  -XRT + capecitabine - 4/2020    Regimen: Capecitabine + bevacizumab  Bevacizumab 7.5 mg/kg IV on day 1  Capecitabine 850-1250 mg/m2 PO BID on days 1-14- home prescription  Q21 days until progression / toxicity  NCCN Guidelines for Colon Cancer V.2.2020  Van Ayaz E, et al - Briseida Oncol 2009 Nov;20(11):1842-7.   Tra EVANS et al - Lancet Oncol 2013 Oct;14(11):6286-4382.     Allergies:Patient has no known allergies.  /86   Pulse 62   Temp 36.4 °C (97.6 °F) (Temporal)   Resp 18   Ht 1.75 m (5' 8.9\")   Wt 83.7 kg (184 lb 8.4 oz)   SpO2 98%   BMI 27.33 kg/m²   Body surface area is 2.02 meters squared.     Labs 12/8/20 12/8/2020 11:16   POC Protein Negative     Bevacizumab 7.5 mg/kg x 83.7 kg = 627.75 mg    Rounded to vial size (within 10%) per dose rounding protocol.    ok to treat with final dose = 600 mg IV    Jessy Solorio, PharmD, BCOP          "

## 2020-12-08 NOTE — PROGRESS NOTES
Chemotherapy Verification - SECONDARY RN     D1C5    Height = 175 cm  Weight = 83.7 kg  BSA = 2.02 m2       Medication: Bevacizumab (Avastin)  Dose: 7.5 kg/kg  Calculated Dose: 627.75 mg - dose rounded to 600 mg per [facility] protocol                             I confirm that this process was performed independently.

## 2020-12-08 NOTE — PROGRESS NOTES
Urine sample collected and POC performed per MD orders.  Urine POC results within parameters established by MD for treatment.  Avastin administered per MD orders.  Avastin infusion completed without incident.  No adverse effects observed or expressed.  Line flushed clear with normal saline.  Port flushed with normal saline and heparin per policy; continued + blood return verified.  Port de-accessed; needle intact.  Pressure dressing applied.  Pt left infusion services in no apparent distress after completion of treatment, ambulatory.  Next appointment scheduled,

## 2020-12-08 NOTE — PROGRESS NOTES
Pt to infusion services ambulatory per self.  Pt here for scheduled immunotherapy, day 1, cycle 5 of Avastin.  Plan of care reviewed.  Pt verbalizes understanding.  Pt reports no issues or concerns.  Pt denies any s/sx of infection today.  Pt with R chest port.  Port site cleansed and port accessed in sterile fashion.  Port flushes easily; + blood return verified.  POC urine ordered.  Pt aware and will provide urine sample.  Awaiting urine sample.

## 2020-12-15 ENCOUNTER — HOSPITAL ENCOUNTER (OUTPATIENT)
Dept: RADIOLOGY | Facility: MEDICAL CENTER | Age: 55
End: 2020-12-15
Attending: INTERNAL MEDICINE
Payer: MEDICAID

## 2020-12-15 DIAGNOSIS — C20 MALIGNANT NEOPLASM OF RECTUM (HCC): ICD-10-CM

## 2020-12-15 PROCEDURE — 700117 HCHG RX CONTRAST REV CODE 255: Performed by: INTERNAL MEDICINE

## 2020-12-15 PROCEDURE — 74177 CT ABD & PELVIS W/CONTRAST: CPT

## 2020-12-15 RX ADMIN — IOHEXOL 100 ML: 350 INJECTION, SOLUTION INTRAVENOUS at 14:16

## 2020-12-15 RX ADMIN — IOHEXOL 25 ML: 240 INJECTION, SOLUTION INTRATHECAL; INTRAVASCULAR; INTRAVENOUS; ORAL at 13:50

## 2020-12-23 ENCOUNTER — HOSPITAL ENCOUNTER (OUTPATIENT)
Facility: MEDICAL CENTER | Age: 55
End: 2020-12-23
Attending: INTERNAL MEDICINE
Payer: MEDICAID

## 2020-12-23 LAB
ALBUMIN SERPL BCP-MCNC: 4.4 G/DL (ref 3.2–4.9)
ALBUMIN/GLOB SERPL: 1.9 G/DL
ALP SERPL-CCNC: 74 U/L (ref 30–99)
ALT SERPL-CCNC: 19 U/L (ref 2–50)
ANION GAP SERPL CALC-SCNC: 12 MMOL/L (ref 7–16)
ANISOCYTOSIS BLD QL SMEAR: ABNORMAL
AST SERPL-CCNC: 26 U/L (ref 12–45)
BASOPHILS # BLD AUTO: 1.1 % (ref 0–1.8)
BASOPHILS # BLD: 0.06 K/UL (ref 0–0.12)
BILIRUB SERPL-MCNC: 0.7 MG/DL (ref 0.1–1.5)
BUN SERPL-MCNC: 17 MG/DL (ref 8–22)
CALCIUM SERPL-MCNC: 9.6 MG/DL (ref 8.5–10.5)
CEA SERPL-MCNC: 3 NG/ML (ref 0–3)
CHLORIDE SERPL-SCNC: 99 MMOL/L (ref 96–112)
CO2 SERPL-SCNC: 28 MMOL/L (ref 20–33)
COMMENT 1642: NORMAL
CREAT SERPL-MCNC: 0.74 MG/DL (ref 0.5–1.4)
EOSINOPHIL # BLD AUTO: 0.15 K/UL (ref 0–0.51)
EOSINOPHIL NFR BLD: 2.7 % (ref 0–6.9)
ERYTHROCYTE [DISTWIDTH] IN BLOOD BY AUTOMATED COUNT: 67.5 FL (ref 35.9–50)
GLOBULIN SER CALC-MCNC: 2.3 G/DL (ref 1.9–3.5)
GLUCOSE SERPL-MCNC: 79 MG/DL (ref 65–99)
HCT VFR BLD AUTO: 45.6 % (ref 42–52)
HGB BLD-MCNC: 15.2 G/DL (ref 14–18)
IMM GRANULOCYTES # BLD AUTO: 0.01 K/UL (ref 0–0.11)
IMM GRANULOCYTES NFR BLD AUTO: 0.2 % (ref 0–0.9)
LYMPHOCYTES # BLD AUTO: 0.65 K/UL (ref 1–4.8)
LYMPHOCYTES NFR BLD: 11.6 % (ref 22–41)
MACROCYTES BLD QL SMEAR: ABNORMAL
MCH RBC QN AUTO: 32.5 PG (ref 27–33)
MCHC RBC AUTO-ENTMCNC: 33.3 G/DL (ref 33.7–35.3)
MCV RBC AUTO: 97.4 FL (ref 81.4–97.8)
MONOCYTES # BLD AUTO: 0.55 K/UL (ref 0–0.85)
MONOCYTES NFR BLD AUTO: 9.8 % (ref 0–13.4)
MORPHOLOGY BLD-IMP: NORMAL
NEUTROPHILS # BLD AUTO: 4.19 K/UL (ref 1.82–7.42)
NEUTROPHILS NFR BLD: 74.6 % (ref 44–72)
NRBC # BLD AUTO: 0 K/UL
NRBC BLD-RTO: 0 /100 WBC
PLATELET # BLD AUTO: 216 K/UL (ref 164–446)
PLATELET BLD QL SMEAR: NORMAL
PMV BLD AUTO: 10.9 FL (ref 9–12.9)
POTASSIUM SERPL-SCNC: 3.7 MMOL/L (ref 3.6–5.5)
PROT SERPL-MCNC: 6.7 G/DL (ref 6–8.2)
RBC # BLD AUTO: 4.68 M/UL (ref 4.7–6.1)
RBC BLD AUTO: PRESENT
SODIUM SERPL-SCNC: 139 MMOL/L (ref 135–145)
WBC # BLD AUTO: 5.6 K/UL (ref 4.8–10.8)

## 2020-12-23 PROCEDURE — 80053 COMPREHEN METABOLIC PANEL: CPT

## 2020-12-23 PROCEDURE — 82378 CARCINOEMBRYONIC ANTIGEN: CPT

## 2020-12-23 PROCEDURE — 85025 COMPLETE CBC W/AUTO DIFF WBC: CPT

## 2020-12-28 ENCOUNTER — TELEPHONE (OUTPATIENT)
Dept: ONCOLOGY | Facility: MEDICAL CENTER | Age: 55
End: 2020-12-28

## 2020-12-29 ENCOUNTER — OUTPATIENT INFUSION SERVICES (OUTPATIENT)
Dept: ONCOLOGY | Facility: MEDICAL CENTER | Age: 55
End: 2020-12-29
Attending: INTERNAL MEDICINE
Payer: MEDICAID

## 2020-12-29 VITALS
BODY MASS INDEX: 27.49 KG/M2 | DIASTOLIC BLOOD PRESSURE: 77 MMHG | WEIGHT: 185.63 LBS | HEART RATE: 72 BPM | SYSTOLIC BLOOD PRESSURE: 133 MMHG | OXYGEN SATURATION: 98 % | TEMPERATURE: 97.2 F | HEIGHT: 69 IN | RESPIRATION RATE: 18 BRPM

## 2020-12-29 DIAGNOSIS — C20 RECTAL CARCINOMA (HCC): ICD-10-CM

## 2020-12-29 LAB
APPEARANCE UR: CLEAR
COLOR UR AUTO: YELLOW
GLUCOSE UR QL STRIP.AUTO: NEGATIVE MG/DL
KETONES UR QL STRIP.AUTO: NEGATIVE MG/DL
LEUKOCYTE ESTERASE UR QL STRIP.AUTO: NEGATIVE
NITRITE UR QL STRIP.AUTO: NEGATIVE
PH UR STRIP.AUTO: 6 [PH] (ref 5–8)
PROT UR QL STRIP: ABNORMAL MG/DL
RBC UR QL AUTO: ABNORMAL
SP GR UR STRIP.AUTO: >=1.03 (ref 1–1.03)

## 2020-12-29 PROCEDURE — 700111 HCHG RX REV CODE 636 W/ 250 OVERRIDE (IP): Performed by: INTERNAL MEDICINE

## 2020-12-29 PROCEDURE — A4212 NON CORING NEEDLE OR STYLET: HCPCS

## 2020-12-29 PROCEDURE — 87086 URINE CULTURE/COLONY COUNT: CPT

## 2020-12-29 PROCEDURE — 700105 HCHG RX REV CODE 258: Performed by: INTERNAL MEDICINE

## 2020-12-29 PROCEDURE — 96413 CHEMO IV INFUSION 1 HR: CPT

## 2020-12-29 PROCEDURE — 81002 URINALYSIS NONAUTO W/O SCOPE: CPT

## 2020-12-29 RX ORDER — EPINEPHRINE 1 MG/ML(1)
0.5 AMPUL (ML) INJECTION PRN
Status: CANCELLED | OUTPATIENT
Start: 2021-01-19

## 2020-12-29 RX ORDER — 0.9 % SODIUM CHLORIDE 0.9 %
10 VIAL (ML) INJECTION PRN
Status: CANCELLED | OUTPATIENT
Start: 2020-12-29

## 2020-12-29 RX ORDER — 0.9 % SODIUM CHLORIDE 0.9 %
VIAL (ML) INJECTION PRN
Status: CANCELLED | OUTPATIENT
Start: 2020-12-29

## 2020-12-29 RX ORDER — SODIUM CHLORIDE 9 MG/ML
INJECTION, SOLUTION INTRAVENOUS CONTINUOUS
Status: CANCELLED | OUTPATIENT
Start: 2020-12-29

## 2020-12-29 RX ORDER — HEPARIN SODIUM (PORCINE) LOCK FLUSH IV SOLN 100 UNIT/ML 100 UNIT/ML
500 SOLUTION INTRAVENOUS PRN
Status: CANCELLED | OUTPATIENT
Start: 2020-12-29

## 2020-12-29 RX ORDER — DIPHENHYDRAMINE HYDROCHLORIDE 50 MG/ML
50 INJECTION INTRAMUSCULAR; INTRAVENOUS PRN
Status: CANCELLED | OUTPATIENT
Start: 2020-12-29

## 2020-12-29 RX ORDER — ONDANSETRON 2 MG/ML
4 INJECTION INTRAMUSCULAR; INTRAVENOUS PRN
Status: CANCELLED | OUTPATIENT
Start: 2021-01-19

## 2020-12-29 RX ORDER — 0.9 % SODIUM CHLORIDE 0.9 %
10 VIAL (ML) INJECTION PRN
Status: CANCELLED | OUTPATIENT
Start: 2021-01-18

## 2020-12-29 RX ORDER — ONDANSETRON 8 MG/1
8 TABLET, ORALLY DISINTEGRATING ORAL PRN
Status: CANCELLED | OUTPATIENT
Start: 2021-01-19

## 2020-12-29 RX ORDER — 0.9 % SODIUM CHLORIDE 0.9 %
3 VIAL (ML) INJECTION PRN
Status: CANCELLED | OUTPATIENT
Start: 2020-12-29

## 2020-12-29 RX ORDER — SODIUM CHLORIDE 9 MG/ML
INJECTION, SOLUTION INTRAVENOUS CONTINUOUS
Status: CANCELLED | OUTPATIENT
Start: 2021-01-19

## 2020-12-29 RX ORDER — METHYLPREDNISOLONE SODIUM SUCCINATE 125 MG/2ML
125 INJECTION, POWDER, LYOPHILIZED, FOR SOLUTION INTRAMUSCULAR; INTRAVENOUS PRN
Status: CANCELLED | OUTPATIENT
Start: 2020-12-29

## 2020-12-29 RX ORDER — 0.9 % SODIUM CHLORIDE 0.9 %
3 VIAL (ML) INJECTION PRN
Status: CANCELLED | OUTPATIENT
Start: 2021-01-18

## 2020-12-29 RX ORDER — 0.9 % SODIUM CHLORIDE 0.9 %
VIAL (ML) INJECTION PRN
Status: CANCELLED | OUTPATIENT
Start: 2021-01-19

## 2020-12-29 RX ORDER — PROCHLORPERAZINE MALEATE 10 MG
10 TABLET ORAL EVERY 6 HOURS PRN
Status: CANCELLED | OUTPATIENT
Start: 2020-12-29

## 2020-12-29 RX ORDER — SODIUM CHLORIDE 9 MG/ML
INJECTION, SOLUTION INTRAVENOUS CONTINUOUS
Status: DISCONTINUED | OUTPATIENT
Start: 2020-12-29 | End: 2020-12-29 | Stop reason: HOSPADM

## 2020-12-29 RX ORDER — HEPARIN SODIUM (PORCINE) LOCK FLUSH IV SOLN 100 UNIT/ML 100 UNIT/ML
500 SOLUTION INTRAVENOUS PRN
Status: CANCELLED | OUTPATIENT
Start: 2021-01-18

## 2020-12-29 RX ORDER — 0.9 % SODIUM CHLORIDE 0.9 %
3 VIAL (ML) INJECTION PRN
Status: CANCELLED | OUTPATIENT
Start: 2021-01-19

## 2020-12-29 RX ORDER — ONDANSETRON 8 MG/1
8 TABLET, ORALLY DISINTEGRATING ORAL PRN
Status: CANCELLED | OUTPATIENT
Start: 2020-12-29

## 2020-12-29 RX ORDER — HEPARIN SODIUM (PORCINE) LOCK FLUSH IV SOLN 100 UNIT/ML 100 UNIT/ML
500 SOLUTION INTRAVENOUS PRN
Status: CANCELLED | OUTPATIENT
Start: 2021-01-19

## 2020-12-29 RX ORDER — PROCHLORPERAZINE MALEATE 10 MG
10 TABLET ORAL EVERY 6 HOURS PRN
Status: CANCELLED | OUTPATIENT
Start: 2021-01-19

## 2020-12-29 RX ORDER — HEPARIN SODIUM (PORCINE) LOCK FLUSH IV SOLN 100 UNIT/ML 100 UNIT/ML
500 SOLUTION INTRAVENOUS PRN
Status: DISCONTINUED | OUTPATIENT
Start: 2020-12-29 | End: 2020-12-29 | Stop reason: HOSPADM

## 2020-12-29 RX ORDER — ONDANSETRON 2 MG/ML
4 INJECTION INTRAMUSCULAR; INTRAVENOUS PRN
Status: CANCELLED | OUTPATIENT
Start: 2020-12-29

## 2020-12-29 RX ORDER — 0.9 % SODIUM CHLORIDE 0.9 %
10 VIAL (ML) INJECTION PRN
Status: CANCELLED | OUTPATIENT
Start: 2021-01-19

## 2020-12-29 RX ORDER — METHYLPREDNISOLONE SODIUM SUCCINATE 125 MG/2ML
125 INJECTION, POWDER, LYOPHILIZED, FOR SOLUTION INTRAMUSCULAR; INTRAVENOUS PRN
Status: CANCELLED | OUTPATIENT
Start: 2021-01-19

## 2020-12-29 RX ORDER — DIPHENHYDRAMINE HYDROCHLORIDE 50 MG/ML
50 INJECTION INTRAMUSCULAR; INTRAVENOUS PRN
Status: CANCELLED | OUTPATIENT
Start: 2021-01-19

## 2020-12-29 RX ORDER — EPINEPHRINE 1 MG/ML(1)
0.5 AMPUL (ML) INJECTION PRN
Status: CANCELLED | OUTPATIENT
Start: 2020-12-29

## 2020-12-29 RX ORDER — 0.9 % SODIUM CHLORIDE 0.9 %
VIAL (ML) INJECTION PRN
Status: CANCELLED | OUTPATIENT
Start: 2021-01-18

## 2020-12-29 RX ADMIN — SODIUM CHLORIDE: 9 INJECTION, SOLUTION INTRAVENOUS at 11:50

## 2020-12-29 RX ADMIN — BEVACIZUMAB 600 MG: 400 INJECTION, SOLUTION INTRAVENOUS at 11:50

## 2020-12-29 RX ADMIN — HEPARIN 500 UNITS: 100 SYRINGE at 12:38

## 2020-12-29 ASSESSMENT — FIBROSIS 4 INDEX: FIB4 SCORE: 1.46

## 2020-12-29 NOTE — PROGRESS NOTES
Chemotherapy Verification - SECONDARY RN       Height = 1.745m  Weight = 84.2kg  BSA = 2.02m2       Medication: bevacizumab  Dose: 7.5mg/kg  Calculated Dose: 631.5mg                            (In mg/m2, AUC, mg/kg)       I confirm that this process was performed independently.

## 2020-12-29 NOTE — PROGRESS NOTES
"Pharmacy Chemotherapy Calculation    Patient Name: Dre Eaton   Dx:Metastatic Rectal Adenocarcinoa     Protocol: Capecitabine + Bevacizumab     Bevacizumab 7.5 mg/kg IV on Day 1  Capecitabine 850 - 1250 mg/m2 PO twice daily on Days 1-14   21 day cycle until disease progression or unacceptable toxicity   NCCN Guidelines for Rectal Cancer V.2.2020  Herman EAVNS et al. Lancet Oncol. 2013;14(11):6683-6003  Van Emiliano E, et al. Briseida Oncol. 2009;20(11):1842-7    Allergies:  Patient has no known allergies.     /77   Pulse 72   Temp 36.2 °C (97.2 °F) (Temporal)   Resp 18   Ht 1.745 m (5' 8.7\")   Wt 84.2 kg (185 lb 10 oz)   SpO2 98%   BMI 27.65 kg/m²  Body surface area is 2.02 meters squared.    Labs 12/29/20:  Urine Protein = trace  BP = 133/77     Drug Order   (Drug name, dose, route, IV Fluid & volume, frequency, number of doses) Cycle 6  Previous treatment: 12/8/20   Medication = Bevacizumab (Avastin)  Base Dose= 7.5 mg/kg  Calc Dose: Base Dose x 84.2 kg = 631.5 mg  Final Dose = 600 mg  Route = IV  Fluid & Volume =   mL  Admin Duration = Over 30 minutes          Rounded to vial size (within 10%) per rounding protocol, okay to treat with final dose     By my signature below, I confirm this process was performed independently with the BSA and all final chemotherapy dosing calculations congruent. I have reviewed the above chemotherapy order and that my calculation of the final dose and BSA (when applicable) corroborate those calculations of the  pharmacist. Discrepancies of 10% or greater in the written dose have been addressed and documented within the [facility] Progress notes.    Angelina Garay, PharmD      "

## 2020-12-29 NOTE — PROGRESS NOTES
Pt presented to [facility] for C6D1 Avastin  Pt denied changes in condition. Port accessed in sterile fashion. Flushed easily, jenny briskly. Sample for UA requested from patient, who had difficulty providing specimen. Eventually sample obtained. Pt noted to be positive for large blood. Pt denied s/sx of UTI. Dr Hickey made aware. Okay to proceed with treatment today, ordered for urine C&S. Avastin infused over 30 minutes,  tolerated well. Port flushed with NS and Heparin per policy, then de-accessed. Joaquin needle tip remained intact. Gauze dressing to site. Copy of appointment schedule given to patient. Urine cup and cleansing wipes given to patient who will obtain specimen at first void in the morning on the day of his next treatment.  Pt left [facility] in NAD. Next appointment confirmed prior to departure.

## 2020-12-29 NOTE — PROGRESS NOTES
Chemotherapy Verification - PRIMARY RN      Height = 174.5 cm  Weight = 84.2 kg  BSA = 2.02 m2       Medication: Bevacizumab  Dose: 7.5 mg/kg    Calculated Dose: 632  mg                             (In mg/m2, AUC, mg/kg)         I confirm this process was performed independently with the BSA and all final chemotherapy dosing calculations congruent.  Any discrepancies of 10% or greater have been addressed with the chemotherapy pharmacist. The resolution of the discrepancy has been documented in the EPIC progress notes.

## 2020-12-29 NOTE — PROGRESS NOTES
"Pharmacy Chemotherapy Verification    DX: Colorectal cancer    Cycle 6  Previous treatment: 12/8/20  -CapeOx/Bevacizumab At [facility] Sept/2019- March/2020  -XRT + capecitabine - 4/2020    Regimen: Capecitabine + bevacizumab  Bevacizumab 7.5 mg/kg IV on day 1  Capecitabine 850-1250 mg/m2 PO BID on days 1-14- home prescription   Q21 days until progression / toxicity  NCCN Guidelines for Colon Cancer V.2.2020  Van Ayaz E, et al - Briseida Oncol 2009 Nov;20(11):1842-7.   Tra EVANS et al - Lancet Oncol 2013 Oct;14(11):3944-6851.     Allergies:Patient has no known allergies.  /77   Pulse 72   Temp 36.2 °C (97.2 °F) (Temporal)   Resp 18   Ht 1.745 m (5' 8.7\")   Wt 84.2 kg (185 lb 10 oz)   SpO2 98%   BMI 27.65 kg/m²   Body surface area is 2.02 meters squared.     Labs 12/29/20      POC Protein Negative     Bevacizumab 7.5 mg/kg x 84.7 kg = 635.25 mg    Rounded to vial size (within 10%) per dose rounding protocol.    ok to treat with final dose = 600 mg IV          "

## 2020-12-31 LAB
BACTERIA UR CULT: NORMAL
SIGNIFICANT IND 70042: NORMAL
SITE SITE: NORMAL
SOURCE SOURCE: NORMAL

## 2021-01-13 ENCOUNTER — HOSPITAL ENCOUNTER (OUTPATIENT)
Facility: MEDICAL CENTER | Age: 56
End: 2021-01-13
Attending: INTERNAL MEDICINE
Payer: MEDICAID

## 2021-01-13 LAB
ALBUMIN SERPL BCP-MCNC: 4.3 G/DL (ref 3.2–4.9)
ALBUMIN/GLOB SERPL: 1.9 G/DL
ALP SERPL-CCNC: 73 U/L (ref 30–99)
ALT SERPL-CCNC: 15 U/L (ref 2–50)
ANION GAP SERPL CALC-SCNC: 12 MMOL/L (ref 7–16)
ANISOCYTOSIS BLD QL SMEAR: ABNORMAL
AST SERPL-CCNC: 28 U/L (ref 12–45)
BASOPHILS # BLD AUTO: 0.8 % (ref 0–1.8)
BASOPHILS # BLD: 0.04 K/UL (ref 0–0.12)
BILIRUB SERPL-MCNC: 0.9 MG/DL (ref 0.1–1.5)
BUN SERPL-MCNC: 19 MG/DL (ref 8–22)
CALCIUM SERPL-MCNC: 9.4 MG/DL (ref 8.5–10.5)
CHLORIDE SERPL-SCNC: 101 MMOL/L (ref 96–112)
CO2 SERPL-SCNC: 27 MMOL/L (ref 20–33)
COMMENT 1642: NORMAL
CREAT SERPL-MCNC: 0.65 MG/DL (ref 0.5–1.4)
EOSINOPHIL # BLD AUTO: 0.08 K/UL (ref 0–0.51)
EOSINOPHIL NFR BLD: 1.6 % (ref 0–6.9)
ERYTHROCYTE [DISTWIDTH] IN BLOOD BY AUTOMATED COUNT: 65.9 FL (ref 35.9–50)
GLOBULIN SER CALC-MCNC: 2.3 G/DL (ref 1.9–3.5)
GLUCOSE SERPL-MCNC: 107 MG/DL (ref 65–99)
HCT VFR BLD AUTO: 45 % (ref 42–52)
HGB BLD-MCNC: 14.5 G/DL (ref 14–18)
IMM GRANULOCYTES # BLD AUTO: 0.02 K/UL (ref 0–0.11)
IMM GRANULOCYTES NFR BLD AUTO: 0.4 % (ref 0–0.9)
LYMPHOCYTES # BLD AUTO: 0.5 K/UL (ref 1–4.8)
LYMPHOCYTES NFR BLD: 10 % (ref 22–41)
MACROCYTES BLD QL SMEAR: ABNORMAL
MCH RBC QN AUTO: 32.2 PG (ref 27–33)
MCHC RBC AUTO-ENTMCNC: 32.2 G/DL (ref 33.7–35.3)
MCV RBC AUTO: 99.8 FL (ref 81.4–97.8)
MONOCYTES # BLD AUTO: 0.33 K/UL (ref 0–0.85)
MONOCYTES NFR BLD AUTO: 6.6 % (ref 0–13.4)
MORPHOLOGY BLD-IMP: NORMAL
NEUTROPHILS # BLD AUTO: 4.03 K/UL (ref 1.82–7.42)
NEUTROPHILS NFR BLD: 80.6 % (ref 44–72)
NRBC # BLD AUTO: 0 K/UL
NRBC BLD-RTO: 0 /100 WBC
PLATELET # BLD AUTO: 196 K/UL (ref 164–446)
PLATELET BLD QL SMEAR: NORMAL
PMV BLD AUTO: 11.4 FL (ref 9–12.9)
POLYCHROMASIA BLD QL SMEAR: NORMAL
POTASSIUM SERPL-SCNC: 3.5 MMOL/L (ref 3.6–5.5)
PROT SERPL-MCNC: 6.6 G/DL (ref 6–8.2)
RBC # BLD AUTO: 4.51 M/UL (ref 4.7–6.1)
RBC BLD AUTO: PRESENT
SODIUM SERPL-SCNC: 140 MMOL/L (ref 135–145)
WBC # BLD AUTO: 5 K/UL (ref 4.8–10.8)

## 2021-01-13 PROCEDURE — 85025 COMPLETE CBC W/AUTO DIFF WBC: CPT

## 2021-01-13 PROCEDURE — 80053 COMPREHEN METABOLIC PANEL: CPT

## 2021-01-18 RX ORDER — 0.9 % SODIUM CHLORIDE 0.9 %
10 VIAL (ML) INJECTION PRN
Status: CANCELLED | OUTPATIENT
Start: 2021-02-08

## 2021-01-18 RX ORDER — 0.9 % SODIUM CHLORIDE 0.9 %
VIAL (ML) INJECTION PRN
Status: CANCELLED | OUTPATIENT
Start: 2021-02-08

## 2021-01-18 RX ORDER — HEPARIN SODIUM (PORCINE) LOCK FLUSH IV SOLN 100 UNIT/ML 100 UNIT/ML
500 SOLUTION INTRAVENOUS PRN
Status: CANCELLED | OUTPATIENT
Start: 2021-02-09

## 2021-01-18 RX ORDER — 0.9 % SODIUM CHLORIDE 0.9 %
10 VIAL (ML) INJECTION PRN
Status: CANCELLED | OUTPATIENT
Start: 2021-02-09

## 2021-01-18 RX ORDER — EPINEPHRINE 1 MG/ML(1)
0.5 AMPUL (ML) INJECTION PRN
Status: CANCELLED | OUTPATIENT
Start: 2021-02-09

## 2021-01-18 RX ORDER — METHYLPREDNISOLONE SODIUM SUCCINATE 125 MG/2ML
125 INJECTION, POWDER, LYOPHILIZED, FOR SOLUTION INTRAMUSCULAR; INTRAVENOUS PRN
Status: CANCELLED | OUTPATIENT
Start: 2021-02-09

## 2021-01-18 RX ORDER — 0.9 % SODIUM CHLORIDE 0.9 %
3 VIAL (ML) INJECTION PRN
Status: CANCELLED | OUTPATIENT
Start: 2021-02-09

## 2021-01-18 RX ORDER — 0.9 % SODIUM CHLORIDE 0.9 %
3 VIAL (ML) INJECTION PRN
Status: CANCELLED | OUTPATIENT
Start: 2021-02-08

## 2021-01-18 RX ORDER — HEPARIN SODIUM (PORCINE) LOCK FLUSH IV SOLN 100 UNIT/ML 100 UNIT/ML
500 SOLUTION INTRAVENOUS PRN
Status: CANCELLED | OUTPATIENT
Start: 2021-02-08

## 2021-01-18 RX ORDER — DIPHENHYDRAMINE HYDROCHLORIDE 50 MG/ML
50 INJECTION INTRAMUSCULAR; INTRAVENOUS PRN
Status: CANCELLED | OUTPATIENT
Start: 2021-02-09

## 2021-01-18 RX ORDER — PROCHLORPERAZINE MALEATE 10 MG
10 TABLET ORAL EVERY 6 HOURS PRN
Status: CANCELLED | OUTPATIENT
Start: 2021-02-09

## 2021-01-18 RX ORDER — SODIUM CHLORIDE 9 MG/ML
INJECTION, SOLUTION INTRAVENOUS CONTINUOUS
Status: CANCELLED | OUTPATIENT
Start: 2021-02-09

## 2021-01-18 RX ORDER — ONDANSETRON 8 MG/1
8 TABLET, ORALLY DISINTEGRATING ORAL PRN
Status: CANCELLED | OUTPATIENT
Start: 2021-02-09

## 2021-01-18 RX ORDER — 0.9 % SODIUM CHLORIDE 0.9 %
VIAL (ML) INJECTION PRN
Status: CANCELLED | OUTPATIENT
Start: 2021-02-09

## 2021-01-18 RX ORDER — ONDANSETRON 2 MG/ML
4 INJECTION INTRAMUSCULAR; INTRAVENOUS PRN
Status: CANCELLED | OUTPATIENT
Start: 2021-02-09

## 2021-01-18 NOTE — PROGRESS NOTES
"Pharmacy Chemotherapy Verification    DX: Colorectal cancer    Cycle 7  Previous treatment: 12/29/20  -CapeOx/Bevacizumab At [facility] Sept/2019- March/2020  -XRT + capecitabine - 4/2020    Regimen: Capecitabine + bevacizumab  Bevacizumab 7.5 mg/kg IV on day 1  Capecitabine 850-1250 mg/m2 PO BID on days 1-14- home prescription   Q21 days until progression / toxicity  NCCN Guidelines for Colon Cancer V.2.2020  Van Ayaz E, et al - Briseida Oncol 2009 Nov;20(11):1842-7.   Tra EVANS et al - Lancet Oncol 2013 Oct;14(11):5302-7620.     Allergies:Patient has no known allergies.  /89   Pulse 63   Temp 36.2 °C (97.2 °F) (Temporal)   Resp 18   Ht 1.73 m (5' 8.11\")   Wt 84.5 kg (186 lb 4.6 oz)   SpO2 95%   BMI 28.23 kg/m²   Body surface area is 2.02 meters squared.     Labs 1/19/21  Urine protein = 30 mg/dL acceptable    Bevacizumab 7.5 mg/kg x 84.5 kg = 634 mg    Rounded to vial size (within 10%) per dose rounding protocol.    ok to treat with final dose = 600 mg IV    Wong Gates, PharmD    "

## 2021-01-19 ENCOUNTER — OUTPATIENT INFUSION SERVICES (OUTPATIENT)
Dept: ONCOLOGY | Facility: MEDICAL CENTER | Age: 56
End: 2021-01-19
Attending: INTERNAL MEDICINE
Payer: MEDICAID

## 2021-01-19 VITALS
HEIGHT: 68 IN | DIASTOLIC BLOOD PRESSURE: 89 MMHG | WEIGHT: 186.29 LBS | TEMPERATURE: 97.2 F | SYSTOLIC BLOOD PRESSURE: 140 MMHG | RESPIRATION RATE: 18 BRPM | OXYGEN SATURATION: 95 % | HEART RATE: 63 BPM | BODY MASS INDEX: 28.23 KG/M2

## 2021-01-19 DIAGNOSIS — C20 RECTAL CARCINOMA (HCC): ICD-10-CM

## 2021-01-19 PROCEDURE — 81002 URINALYSIS NONAUTO W/O SCOPE: CPT

## 2021-01-19 PROCEDURE — 700111 HCHG RX REV CODE 636 W/ 250 OVERRIDE (IP): Performed by: INTERNAL MEDICINE

## 2021-01-19 PROCEDURE — A4212 NON CORING NEEDLE OR STYLET: HCPCS

## 2021-01-19 PROCEDURE — 96413 CHEMO IV INFUSION 1 HR: CPT

## 2021-01-19 PROCEDURE — 700105 HCHG RX REV CODE 258: Performed by: INTERNAL MEDICINE

## 2021-01-19 RX ORDER — HEPARIN SODIUM (PORCINE) LOCK FLUSH IV SOLN 100 UNIT/ML 100 UNIT/ML
500 SOLUTION INTRAVENOUS PRN
Status: DISCONTINUED | OUTPATIENT
Start: 2021-01-19 | End: 2021-01-19 | Stop reason: HOSPADM

## 2021-01-19 RX ADMIN — HEPARIN 500 UNITS: 100 SYRINGE at 12:25

## 2021-01-19 RX ADMIN — BEVACIZUMAB 600 MG: 400 INJECTION, SOLUTION INTRAVENOUS at 11:47

## 2021-01-19 ASSESSMENT — FIBROSIS 4 INDEX: FIB4 SCORE: 1.95

## 2021-01-19 NOTE — PROGRESS NOTES
Chemotherapy Verification - SECONDARY RN       Height = 173 cm  Weight = 84.5 kg  BSA = 2.02 m2       Medication: Avastin  Dose: 7.5 mg/kg  Calculated Dose: 633.75 mg                             (In mg/m2, AUC, mg/kg)     I confirm that this process was performed independently.

## 2021-01-19 NOTE — PROGRESS NOTES
Chemotherapy Verification - SECONDARY RN       Height = 173 cm  Weight = 84.5 kg  BSA = 2.02 m2       Medication: Bevacizumab  Dose: 7.5 mg/kg  Calculated Dose: 633.75 mg                             (In mg/m2, AUC, mg/kg)       I confirm that this process was performed independently.

## 2021-01-19 NOTE — PROGRESS NOTES
Pt arrived ambulatory to [facility] for q 3 week Avastin.  POC discussed.  POC UA collected, results reviewed.  Parameters met for treatment today.  R chest port in place, accessed in sterile field, brisk blood return noted.  Avastin infused without adverse reaction.  Port flushed, de-accessed, and site covered with gauze and tape.  Next appointment confirmed.  Pt discharged from [facility] in NAD under self care.

## 2021-01-19 NOTE — PROGRESS NOTES
"Pharmacy Chemotherapy Calculation    Patient Name: Dre Eaton   Dx:Metastatic Rectal Adenocarcinoa     Protocol: Capecitabine + Bevacizumab     Bevacizumab 7.5 mg/kg IV on Day 1  Capecitabine 850 - 1250 mg/m2 PO twice daily on Days 1-14   21 day cycle until disease progression or unacceptable toxicity   NCCN Guidelines for Rectal Cancer V.2.2020  Herman EVANS, et al. Lancet Oncol. 2013;14(11):3227-2908  Van Emiliano E, et al. Briseida Oncol. 2009;20(11):1842-7    Allergies:  Patient has no known allergies.     /89   Pulse 63   Temp 36.2 °C (97.2 °F) (Temporal)   Resp 18   Ht 1.73 m (5' 8.11\")   Wt 84.5 kg (186 lb 4.6 oz)   SpO2 95%   BMI 28.23 kg/m²  Body surface area is 2.02 meters squared.    Labs 1/19/21  Urine Protein = 1+  BP = ok     Drug Order   (Drug name, dose, route, IV Fluid & volume, frequency, number of doses) Cycle 7  Previous treatment: 12/29/20   Medication = Bevacizumab (Avastin)  Base Dose= 7.5 mg/kg  Calc Dose: Base Dose x 84.5 kg = 633.75 mg  Final Dose = 600 mg  Route = IV  Fluid & Volume =   mL  Admin Duration = Over 30 minutes          Rounded to vial size (within 10%) per rounding protocol, okay to treat with final dose               "

## 2021-02-03 ENCOUNTER — HOSPITAL ENCOUNTER (OUTPATIENT)
Facility: MEDICAL CENTER | Age: 56
End: 2021-02-03
Attending: INTERNAL MEDICINE
Payer: MEDICAID

## 2021-02-03 PROCEDURE — 80053 COMPREHEN METABOLIC PANEL: CPT

## 2021-02-03 PROCEDURE — 82378 CARCINOEMBRYONIC ANTIGEN: CPT

## 2021-02-03 PROCEDURE — 85025 COMPLETE CBC W/AUTO DIFF WBC: CPT

## 2021-02-04 LAB
ALBUMIN SERPL BCP-MCNC: 4.6 G/DL (ref 3.2–4.9)
ALBUMIN/GLOB SERPL: 2 G/DL
ALP SERPL-CCNC: 64 U/L (ref 30–99)
ALT SERPL-CCNC: 18 U/L (ref 2–50)
ANION GAP SERPL CALC-SCNC: 10 MMOL/L (ref 7–16)
ANISOCYTOSIS BLD QL SMEAR: ABNORMAL
AST SERPL-CCNC: 27 U/L (ref 12–45)
BASOPHILS # BLD AUTO: 0.7 % (ref 0–1.8)
BASOPHILS # BLD: 0.04 K/UL (ref 0–0.12)
BILIRUB SERPL-MCNC: 0.9 MG/DL (ref 0.1–1.5)
BUN SERPL-MCNC: 19 MG/DL (ref 8–22)
CALCIUM SERPL-MCNC: 8.9 MG/DL (ref 8.5–10.5)
CEA SERPL-MCNC: 2.4 NG/ML (ref 0–3)
CHLORIDE SERPL-SCNC: 101 MMOL/L (ref 96–112)
CO2 SERPL-SCNC: 26 MMOL/L (ref 20–33)
COMMENT 1642: NORMAL
CREAT SERPL-MCNC: 0.61 MG/DL (ref 0.5–1.4)
EOSINOPHIL # BLD AUTO: 0.27 K/UL (ref 0–0.51)
EOSINOPHIL NFR BLD: 4.6 % (ref 0–6.9)
ERYTHROCYTE [DISTWIDTH] IN BLOOD BY AUTOMATED COUNT: 66.2 FL (ref 35.9–50)
GLOBULIN SER CALC-MCNC: 2.3 G/DL (ref 1.9–3.5)
GLUCOSE SERPL-MCNC: 81 MG/DL (ref 65–99)
HCT VFR BLD AUTO: 46 % (ref 42–52)
HGB BLD-MCNC: 15.1 G/DL (ref 14–18)
IMM GRANULOCYTES # BLD AUTO: 0.01 K/UL (ref 0–0.11)
IMM GRANULOCYTES NFR BLD AUTO: 0.2 % (ref 0–0.9)
LYMPHOCYTES # BLD AUTO: 0.64 K/UL (ref 1–4.8)
LYMPHOCYTES NFR BLD: 10.9 % (ref 22–41)
MACROCYTES BLD QL SMEAR: ABNORMAL
MCH RBC QN AUTO: 32.6 PG (ref 27–33)
MCHC RBC AUTO-ENTMCNC: 32.8 G/DL (ref 33.7–35.3)
MCV RBC AUTO: 99.4 FL (ref 81.4–97.8)
MICROCYTES BLD QL SMEAR: ABNORMAL
MONOCYTES # BLD AUTO: 0.47 K/UL (ref 0–0.85)
MONOCYTES NFR BLD AUTO: 8 % (ref 0–13.4)
MORPHOLOGY BLD-IMP: NORMAL
NEUTROPHILS # BLD AUTO: 4.43 K/UL (ref 1.82–7.42)
NEUTROPHILS NFR BLD: 75.6 % (ref 44–72)
NRBC # BLD AUTO: 0 K/UL
NRBC BLD-RTO: 0 /100 WBC
PLATELET # BLD AUTO: 212 K/UL (ref 164–446)
PLATELET BLD QL SMEAR: NORMAL
PMV BLD AUTO: 11.6 FL (ref 9–12.9)
POTASSIUM SERPL-SCNC: 3.8 MMOL/L (ref 3.6–5.5)
PROT SERPL-MCNC: 6.9 G/DL (ref 6–8.2)
RBC # BLD AUTO: 4.63 M/UL (ref 4.7–6.1)
RBC BLD AUTO: PRESENT
SODIUM SERPL-SCNC: 137 MMOL/L (ref 135–145)
WBC # BLD AUTO: 5.9 K/UL (ref 4.8–10.8)

## 2021-02-09 ENCOUNTER — OUTPATIENT INFUSION SERVICES (OUTPATIENT)
Dept: ONCOLOGY | Facility: MEDICAL CENTER | Age: 56
End: 2021-02-09
Attending: INTERNAL MEDICINE
Payer: MEDICAID

## 2021-02-09 VITALS
TEMPERATURE: 98 F | OXYGEN SATURATION: 92 % | SYSTOLIC BLOOD PRESSURE: 148 MMHG | DIASTOLIC BLOOD PRESSURE: 84 MMHG | RESPIRATION RATE: 18 BRPM | HEART RATE: 67 BPM | HEIGHT: 69 IN | BODY MASS INDEX: 27.76 KG/M2 | WEIGHT: 187.39 LBS

## 2021-02-09 DIAGNOSIS — C20 RECTAL CARCINOMA (HCC): ICD-10-CM

## 2021-02-09 LAB
APPEARANCE UR: CLEAR
COLOR UR AUTO: ABNORMAL
GLUCOSE UR QL STRIP.AUTO: NEGATIVE MG/DL
KETONES UR QL STRIP.AUTO: NEGATIVE MG/DL
LEUKOCYTE ESTERASE UR QL STRIP.AUTO: NEGATIVE
NITRITE UR QL STRIP.AUTO: NEGATIVE
PH UR STRIP.AUTO: 5.5 [PH] (ref 5–8)
PROT UR QL STRIP: 30 MG/DL
RBC UR QL AUTO: ABNORMAL
SP GR UR STRIP.AUTO: >=1.03 (ref 1–1.03)

## 2021-02-09 PROCEDURE — 700111 HCHG RX REV CODE 636 W/ 250 OVERRIDE (IP)

## 2021-02-09 PROCEDURE — 700111 HCHG RX REV CODE 636 W/ 250 OVERRIDE (IP): Performed by: INTERNAL MEDICINE

## 2021-02-09 PROCEDURE — 81002 URINALYSIS NONAUTO W/O SCOPE: CPT

## 2021-02-09 PROCEDURE — 96413 CHEMO IV INFUSION 1 HR: CPT | Performed by: CLINICAL NURSE SPECIALIST

## 2021-02-09 PROCEDURE — A4212 NON CORING NEEDLE OR STYLET: HCPCS

## 2021-02-09 PROCEDURE — 700105 HCHG RX REV CODE 258: Performed by: INTERNAL MEDICINE

## 2021-02-09 RX ORDER — HEPARIN SODIUM (PORCINE) LOCK FLUSH IV SOLN 100 UNIT/ML 100 UNIT/ML
SOLUTION INTRAVENOUS
Status: COMPLETED
Start: 2021-02-09 | End: 2021-02-09

## 2021-02-09 RX ADMIN — BEVACIZUMAB 600 MG: 400 INJECTION, SOLUTION INTRAVENOUS at 17:00

## 2021-02-09 RX ADMIN — HEPARIN 500 UNITS: 100 SYRINGE at 17:44

## 2021-02-09 ASSESSMENT — FIBROSIS 4 INDEX: FIB4 SCORE: 1.59099025766973193

## 2021-02-09 NOTE — PROGRESS NOTES
"Pharmacy Chemotherapy Verification    DX: Colorectal cancer    Cycle 8  Previous treatment: 1/19/21  -CapeOx/Bevacizumab At [facility] Sept/2019- March/2020  -XRT + capecitabine - 4/2020    Regimen: Capecitabine + bevacizumab  Bevacizumab 7.5 mg/kg IV on day 1  Capecitabine 850-1250 mg/m2 PO BID on days 1-14- home prescription  Q21 days until progression / toxicity  NCCN Guidelines for Colon Cancer V.2.2020  Van Ayaz E, et al - Briseida Oncol 2009 Nov;20(11):1842-7.   Tra EVANS et al - Lancet Oncol 2013 Oct;14(11):1412-4732.     Allergies:Patient has no known allergies.  /84   Pulse 67   Temp 36.7 °C (98 °F) (Temporal)   Resp 18   Ht 1.74 m (5' 8.5\")   Wt 85 kg (187 lb 6.3 oz)   SpO2 92%   BMI 28.08 kg/m²   Body surface area is 2.03 meters squared.     All lab results 2/9/21, BP and urine protein within treatment parameters.       Bevacizumab 7.5 mg/kg x 85 kg = 637.5mg    Rounded to vial size (within 10%) per dose rounding protocol.    ok to treat with final dose = 600 mg IV    Bal Grande, PharmD    "

## 2021-02-10 NOTE — PROGRESS NOTES
Pt to infusion for Avastin.  No new concerns/complaints.  Urinalysis complete and within parameters to treat. RBC's moderate in urine. Per pt MD aware.  He denies any dysuria.  Avastin copmpleted without issue.  Port flushed, heparin locked and deaccessed. Pt discharged ambulatory to home in stable condition.

## 2021-02-10 NOTE — PROGRESS NOTES
Chemotherapy Verification - PRIMARY RN      Height = 1.74m  Weight = 85kg  BSA = 2.03m2       Medication: Avastin  Dose: 7.5mg/kg  Calculated Dose: 637.5mg                             (In mg/m2, AUC, mg/kg)       I confirm this process was performed independently with the BSA and all final chemotherapy dosing calculations congruent.  Any discrepancies of 10% or greater have been addressed with the chemotherapy pharmacist. The resolution of the discrepancy has been documented in the EPIC progress notes.

## 2021-02-10 NOTE — PROGRESS NOTES
"Pharmacy Chemotherapy Calculation    Patient Name: Dre Eaton   Dx: Metastatic Rectal Adenocarcinoa     Protocol: Capecitabine + Bevacizumab     Bevacizumab 7.5 mg/kg IV on Day 1  Capecitabine 850 - 1250 mg/m2 PO twice daily on Days 1-14   21 day cycle until disease progression or unacceptable toxicity   NCCN Guidelines for Rectal Cancer V.2.2020  Herman EVANS, et al. Lancet Oncol. 2013;14(11):8363-2306  Van Emiliano E, et al. Briseida Oncol. 2009;20(11):1842-7    Allergies:  Patient has no known allergies.     /84   Pulse 67   Temp 36.7 °C (98 °F) (Temporal)   Resp 18   Ht 1.74 m (5' 8.5\")   Wt 85 kg (187 lb 6.3 oz)   SpO2 92%   BMI 28.08 kg/m²  Body surface area is 2.03 meters squared.    Labs 2/9/21  Urine Protein = 1+  BP = ok     Drug Order   (Drug name, dose, route, IV Fluid & volume, frequency, number of doses) Cycle 8  Previous treatment: 1/19/21   Medication = Bevacizumab (Avastin)  Base Dose= 7.5 mg/kg  Calc Dose: Base Dose x 85 kg = 637.5 mg  Final Dose = 600 mg  Route = IV  Fluid & Volume =   mL  Admin Duration = Over 30 minutes          Rounded to vial size (within 10%) per rounding protocol, okay to treat with final dose               "

## 2021-02-10 NOTE — PROGRESS NOTES
Chemotherapy Verification - SECONDARY RN       Height = 174 cm  Weight = 85 kg  BSA = 2.03 m2       Medication: Avastin  Dose: 7.5 mg/kg  Calculated Dose: 637.5 mg                               I confirm that this process was performed independently.

## 2021-03-02 ENCOUNTER — OUTPATIENT INFUSION SERVICES (OUTPATIENT)
Dept: ONCOLOGY | Facility: MEDICAL CENTER | Age: 56
End: 2021-03-02
Attending: INTERNAL MEDICINE
Payer: MEDICAID

## 2021-03-02 VITALS
WEIGHT: 188.27 LBS | BODY MASS INDEX: 27.89 KG/M2 | DIASTOLIC BLOOD PRESSURE: 89 MMHG | OXYGEN SATURATION: 97 % | TEMPERATURE: 97.1 F | HEIGHT: 69 IN | HEART RATE: 63 BPM | SYSTOLIC BLOOD PRESSURE: 143 MMHG | RESPIRATION RATE: 18 BRPM

## 2021-03-02 DIAGNOSIS — C20 RECTAL CARCINOMA (HCC): ICD-10-CM

## 2021-03-02 LAB
APPEARANCE UR: CLEAR
COLOR UR AUTO: ABNORMAL
GLUCOSE UR QL STRIP.AUTO: NEGATIVE MG/DL
KETONES UR QL STRIP.AUTO: NEGATIVE MG/DL
LEUKOCYTE ESTERASE UR QL STRIP.AUTO: NEGATIVE
NITRITE UR QL STRIP.AUTO: NEGATIVE
PH UR STRIP.AUTO: 7 [PH] (ref 5–8)
PROT UR QL STRIP: NEGATIVE MG/DL
RBC UR QL AUTO: ABNORMAL
SP GR UR STRIP.AUTO: 1.02 (ref 1–1.03)

## 2021-03-02 PROCEDURE — 81002 URINALYSIS NONAUTO W/O SCOPE: CPT

## 2021-03-02 PROCEDURE — 700111 HCHG RX REV CODE 636 W/ 250 OVERRIDE (IP): Performed by: INTERNAL MEDICINE

## 2021-03-02 PROCEDURE — 700101 HCHG RX REV CODE 250: Performed by: INTERNAL MEDICINE

## 2021-03-02 PROCEDURE — 96413 CHEMO IV INFUSION 1 HR: CPT

## 2021-03-02 PROCEDURE — A4212 NON CORING NEEDLE OR STYLET: HCPCS

## 2021-03-02 PROCEDURE — 700105 HCHG RX REV CODE 258: Performed by: INTERNAL MEDICINE

## 2021-03-02 RX ORDER — 0.9 % SODIUM CHLORIDE 0.9 %
10 VIAL (ML) INJECTION PRN
Status: CANCELLED | OUTPATIENT
Start: 2021-03-02

## 2021-03-02 RX ORDER — HEPARIN SODIUM (PORCINE) LOCK FLUSH IV SOLN 100 UNIT/ML 100 UNIT/ML
500 SOLUTION INTRAVENOUS PRN
Status: CANCELLED | OUTPATIENT
Start: 2021-03-22

## 2021-03-02 RX ORDER — 0.9 % SODIUM CHLORIDE 0.9 %
VIAL (ML) INJECTION PRN
Status: CANCELLED | OUTPATIENT
Start: 2021-03-22

## 2021-03-02 RX ORDER — 0.9 % SODIUM CHLORIDE 0.9 %
VIAL (ML) INJECTION PRN
Status: CANCELLED | OUTPATIENT
Start: 2021-03-02

## 2021-03-02 RX ORDER — DIPHENHYDRAMINE HYDROCHLORIDE 50 MG/ML
50 INJECTION INTRAMUSCULAR; INTRAVENOUS PRN
Status: CANCELLED | OUTPATIENT
Start: 2021-03-02

## 2021-03-02 RX ORDER — ONDANSETRON 2 MG/ML
4 INJECTION INTRAMUSCULAR; INTRAVENOUS PRN
Status: CANCELLED | OUTPATIENT
Start: 2021-03-22

## 2021-03-02 RX ORDER — METHYLPREDNISOLONE SODIUM SUCCINATE 125 MG/2ML
125 INJECTION, POWDER, LYOPHILIZED, FOR SOLUTION INTRAMUSCULAR; INTRAVENOUS PRN
Status: CANCELLED | OUTPATIENT
Start: 2021-03-22

## 2021-03-02 RX ORDER — 0.9 % SODIUM CHLORIDE 0.9 %
10 VIAL (ML) INJECTION PRN
Status: CANCELLED | OUTPATIENT
Start: 2021-03-22

## 2021-03-02 RX ORDER — ONDANSETRON 8 MG/1
8 TABLET, ORALLY DISINTEGRATING ORAL PRN
Status: CANCELLED | OUTPATIENT
Start: 2021-03-22

## 2021-03-02 RX ORDER — 0.9 % SODIUM CHLORIDE 0.9 %
VIAL (ML) INJECTION PRN
Status: DISCONTINUED | OUTPATIENT
Start: 2021-03-02 | End: 2021-03-02 | Stop reason: HOSPADM

## 2021-03-02 RX ORDER — PROCHLORPERAZINE MALEATE 10 MG
10 TABLET ORAL EVERY 6 HOURS PRN
Status: CANCELLED | OUTPATIENT
Start: 2021-03-02

## 2021-03-02 RX ORDER — ONDANSETRON 2 MG/ML
4 INJECTION INTRAMUSCULAR; INTRAVENOUS PRN
Status: CANCELLED | OUTPATIENT
Start: 2021-03-02

## 2021-03-02 RX ORDER — HEPARIN SODIUM (PORCINE) LOCK FLUSH IV SOLN 100 UNIT/ML 100 UNIT/ML
500 SOLUTION INTRAVENOUS PRN
Status: CANCELLED | OUTPATIENT
Start: 2021-03-02

## 2021-03-02 RX ORDER — SODIUM CHLORIDE 9 MG/ML
INJECTION, SOLUTION INTRAVENOUS CONTINUOUS
Status: CANCELLED | OUTPATIENT
Start: 2021-03-22

## 2021-03-02 RX ORDER — 0.9 % SODIUM CHLORIDE 0.9 %
3 VIAL (ML) INJECTION PRN
Status: CANCELLED | OUTPATIENT
Start: 2021-03-22

## 2021-03-02 RX ORDER — ONDANSETRON 8 MG/1
8 TABLET, ORALLY DISINTEGRATING ORAL PRN
Status: CANCELLED | OUTPATIENT
Start: 2021-03-02

## 2021-03-02 RX ORDER — 0.9 % SODIUM CHLORIDE 0.9 %
3 VIAL (ML) INJECTION PRN
Status: CANCELLED | OUTPATIENT
Start: 2021-03-02

## 2021-03-02 RX ORDER — DIPHENHYDRAMINE HYDROCHLORIDE 50 MG/ML
50 INJECTION INTRAMUSCULAR; INTRAVENOUS PRN
Status: CANCELLED | OUTPATIENT
Start: 2021-03-22

## 2021-03-02 RX ORDER — PROCHLORPERAZINE MALEATE 10 MG
10 TABLET ORAL EVERY 6 HOURS PRN
Status: CANCELLED | OUTPATIENT
Start: 2021-03-22

## 2021-03-02 RX ORDER — EPINEPHRINE 1 MG/ML(1)
0.5 AMPUL (ML) INJECTION PRN
Status: CANCELLED | OUTPATIENT
Start: 2021-03-02

## 2021-03-02 RX ORDER — HEPARIN SODIUM (PORCINE) LOCK FLUSH IV SOLN 100 UNIT/ML 100 UNIT/ML
500 SOLUTION INTRAVENOUS PRN
Status: DISCONTINUED | OUTPATIENT
Start: 2021-03-02 | End: 2021-03-02 | Stop reason: HOSPADM

## 2021-03-02 RX ORDER — EPINEPHRINE 1 MG/ML(1)
0.5 AMPUL (ML) INJECTION PRN
Status: CANCELLED | OUTPATIENT
Start: 2021-03-22

## 2021-03-02 RX ORDER — SODIUM CHLORIDE 9 MG/ML
INJECTION, SOLUTION INTRAVENOUS CONTINUOUS
Status: CANCELLED | OUTPATIENT
Start: 2021-03-02

## 2021-03-02 RX ORDER — METHYLPREDNISOLONE SODIUM SUCCINATE 125 MG/2ML
125 INJECTION, POWDER, LYOPHILIZED, FOR SOLUTION INTRAMUSCULAR; INTRAVENOUS PRN
Status: CANCELLED | OUTPATIENT
Start: 2021-03-02

## 2021-03-02 RX ADMIN — HEPARIN 500 UNITS: 100 SYRINGE at 12:49

## 2021-03-02 RX ADMIN — BEVACIZUMAB 600 MG: 400 INJECTION, SOLUTION INTRAVENOUS at 12:05

## 2021-03-02 RX ADMIN — Medication 20 ML: at 12:49

## 2021-03-02 ASSESSMENT — FIBROSIS 4 INDEX: FIB4 SCORE: 1.59099025766973193

## 2021-03-02 NOTE — PROGRESS NOTES
Patient here for here for becacizumab (AVASTIN). Right upper chest port accessed using sterile technique; brisk blood return noted. UA collected. UA results and blood pressure within parameters. Denies any open/unhealed wounds. Bevacizumab (AVASTIN) given per MAR. Heparin instilled prior to de-accessing port. Port de-accessed; gauze/coban applied to site. Next appointment scheduled. Discharged to self care; no apparent distress noted.

## 2021-03-02 NOTE — PROGRESS NOTES
"Pharmacy Chemotherapy Verification    DX: Colorectal cancer    Cycle 9  Previous treatment: 2/9/21  -CapeOx/Bevacizumab At [facility] Sept/2019- March/2020  -XRT + capecitabine - 4/2020    Regimen: Capecitabine + bevacizumab  Bevacizumab 7.5 mg/kg IV on day 1  Capecitabine 850-1250 mg/m2 PO BID on days 1-14- home prescription  Q21 days until progression / toxicity  NCCN Guidelines for Colon Cancer V.2.2020  Van Ayaz E, et al - Briseida Oncol 2009 Nov;20(11):1842-7.   Tra EVANS et al - Lancet Oncol 2013 Oct;14(11):9100-1872.     Allergies:Patient has no known allergies.  /89   Pulse 63   Temp 36.2 °C (97.1 °F) (Temporal)   Resp 18   Ht 1.75 m (5' 8.9\")   Wt 85.4 kg (188 lb 4.4 oz)   SpO2 97%   BMI 27.89 kg/m²   Body surface area is 2.04 meters squared.     3/2/21 , BP and urine protein within treatment parameters.       Bevacizumab 7.5 mgkg x 85.4kg = 640.5mg    Rounded to vial size (within 10%) per dose rounding protocol.    ok to treat with final dose = 600mg IV    Bal Grande, PharmD    "

## 2021-03-02 NOTE — PROGRESS NOTES
"Pharmacy Chemotherapy Calculation    Patient Name: Dre Eaton   Dx: Metastatic Rectal Adenocarcinoa     Protocol: Capecitabine + Bevacizumab     Bevacizumab 7.5 mg/kg IV on Day 1  Capecitabine 850 - 1250 mg/m2 PO twice daily on Days 1-14   21 day cycle until disease progression or unacceptable toxicity   NCCN Guidelines for Rectal Cancer V.2.2020  Herman EVANS et al. Lancet Oncol. 2013;14(11):8057-3227  Van Emiliano E, et al. Briseida Oncol. 2009;20(11):1842-7    Allergies:  Patient has no known allergies.     /89   Pulse 63   Temp 36.2 °C (97.1 °F) (Temporal)   Resp 18   Ht 1.75 m (5' 8.9\")   Wt 85.4 kg (188 lb 4.4 oz)   SpO2 97%   BMI 27.89 kg/m²  Body surface area is 2.04 meters squared.    Labs 2/9/21  Urine Protein = neg  BP = ok     Drug Order   (Drug name, dose, route, IV Fluid & volume, frequency, number of doses) Cycle 9  Previous treatment: 2/9/21   Medication = Bevacizumab (Avastin)  Base Dose= 7.5 mg/kg  Calc Dose: Base Dose x 85.4 kg = 640.5 mg  Final Dose = 600 mg  Route = IV  Fluid & Volume =   mL  Admin Duration = Over 30 minutes          Rounded to vial size (within 10%) per rounding protocol, okay to treat with final dose               "

## 2021-03-02 NOTE — PROGRESS NOTES
Chemotherapy Verification - PRIMARY RN      Height = 175 cm  Weight = 85.4 kg  BSA = 2.04 m^2       Medication: bevacizumab  Dose: 7.5 mg/kg  Calculated Dose:  640.5 mg                             (In mg/m2, AUC, mg/kg)     I confirm this process was performed independently with the BSA and all final chemotherapy dosing calculations congruent.  Any discrepancies of 10% or greater have been addressed with the chemotherapy pharmacist. The resolution of the discrepancy has been documented in the EPIC progress notes.

## 2021-03-02 NOTE — PROGRESS NOTES
Chemotherapy Verification - SECONDARY RN       Height = 175 cm  Weight = 85.4 kg  BSA = 2.04 m2       Medication: Avastin  Dose: 7.5 mg/m2  Calculated Dose: 640.5 mg round to vial                             (In mg/m2, AUC, mg/kg)       I confirm that this process was performed independently.

## 2021-03-08 ENCOUNTER — PATIENT MESSAGE (OUTPATIENT)
Dept: HEALTH INFORMATION MANAGEMENT | Facility: OTHER | Age: 56
End: 2021-03-08

## 2021-03-21 ENCOUNTER — OUTPATIENT INFUSION SERVICES (OUTPATIENT)
Dept: ONCOLOGY | Facility: MEDICAL CENTER | Age: 56
End: 2021-03-21
Attending: INTERNAL MEDICINE
Payer: MEDICAID

## 2021-03-21 VITALS
OXYGEN SATURATION: 96 % | TEMPERATURE: 98 F | SYSTOLIC BLOOD PRESSURE: 122 MMHG | RESPIRATION RATE: 18 BRPM | BODY MASS INDEX: 27.36 KG/M2 | DIASTOLIC BLOOD PRESSURE: 81 MMHG | HEIGHT: 69 IN | WEIGHT: 184.75 LBS | HEART RATE: 65 BPM

## 2021-03-21 DIAGNOSIS — C20 RECTAL CARCINOMA (HCC): ICD-10-CM

## 2021-03-21 LAB
APPEARANCE UR: CLEAR
COLOR UR AUTO: YELLOW
GLUCOSE UR QL STRIP.AUTO: NEGATIVE MG/DL
KETONES UR QL STRIP.AUTO: NEGATIVE MG/DL
LEUKOCYTE ESTERASE UR QL STRIP.AUTO: NEGATIVE
NITRITE UR QL STRIP.AUTO: NEGATIVE
PH UR STRIP.AUTO: 5.5 [PH] (ref 5–8)
PROT UR QL STRIP: NEGATIVE MG/DL
RBC UR QL AUTO: ABNORMAL
SP GR UR STRIP.AUTO: 1.02 (ref 1–1.03)

## 2021-03-21 PROCEDURE — 81002 URINALYSIS NONAUTO W/O SCOPE: CPT

## 2021-03-21 PROCEDURE — A4212 NON CORING NEEDLE OR STYLET: HCPCS

## 2021-03-21 PROCEDURE — 96413 CHEMO IV INFUSION 1 HR: CPT

## 2021-03-21 PROCEDURE — 700105 HCHG RX REV CODE 258: Performed by: INTERNAL MEDICINE

## 2021-03-21 PROCEDURE — 700111 HCHG RX REV CODE 636 W/ 250 OVERRIDE (IP)

## 2021-03-21 PROCEDURE — 700111 HCHG RX REV CODE 636 W/ 250 OVERRIDE (IP): Performed by: INTERNAL MEDICINE

## 2021-03-21 RX ORDER — HEPARIN SODIUM (PORCINE) LOCK FLUSH IV SOLN 100 UNIT/ML 100 UNIT/ML
SOLUTION INTRAVENOUS
Status: COMPLETED
Start: 2021-03-21 | End: 2021-03-21

## 2021-03-21 RX ADMIN — BEVACIZUMAB 600 MG: 400 INJECTION, SOLUTION INTRAVENOUS at 14:56

## 2021-03-21 RX ADMIN — HEPARIN 500 UNITS: 100 SYRINGE at 15:40

## 2021-03-21 ASSESSMENT — FIBROSIS 4 INDEX: FIB4 SCORE: 1.59099025766973193

## 2021-03-21 NOTE — PROGRESS NOTES
Chemotherapy Verification - PRIMARY RN      Height = 1.76m  Weight = 83.8kg  BSA = 2.02m2       Medication: bevacizumab  Dose: 7.5mg/kg  Calculated Dose: 628.5mg                             (In mg/m2, AUC, mg/kg)     I confirm this process was performed independently with the BSA and all final chemotherapy dosing calculations congruent.  Any discrepancies of 10% or greater have been addressed with the chemotherapy pharmacist. The resolution of the discrepancy has been documented in the EPIC progress notes.

## 2021-03-21 NOTE — PROGRESS NOTES
"Pharmacy Chemotherapy Calculation    Patient Name: Dre Eaton     Diagnosis: Metastatic Rectal Adenocarcinoa     Regimen: Capecitabine + Bevacizumab     Bevacizumab 7.5 mg/kg IV on Day 1  Capecitabine 850 - 1250 mg/m2 PO twice daily on Days 1-14  21 day cycle until disease progression or unacceptable toxicity     Dosing References:   NCCN Guidelines for Rectal Cancer V.2.2020  Herman EVANS et al. Lancet Oncol. 2013;14(11):2977-7898  Van Emiliano E, et al. Briseida Oncol. 2009;20(11):1842-7    Allergies: Patient has no known allergies.       /81   Pulse 65   Temp 36.7 °C (98 °F) (Temporal)   Resp 18   Ht 1.76 m (5' 9.29\")   Wt 83.8 kg (184 lb 11.9 oz)   SpO2 96%   BMI 27.05 kg/m²  Body surface area is 2.02 meters squared.    Urine Protein Negative 3/21/21     Drug Order   (Drug name, dose, route, IV Fluid & volume, frequency, number of doses) Cycle 10  Previous treatment: Cycle 9 on 3/2/21   Medication = Bevacizumab (Avastin)  Base Dose= 7.5 mg/kg  Calc Dose: Base Dose x 83.8 kg = 628.5 mg  Final Dose = 600 mg  Route = IV  Fluid & Volume =   mL  Admin Duration = Over 30 minutes          Rounded to vial size (within 10%) per rounding protocol, okay to treat with final dose                           "

## 2021-03-21 NOTE — PROGRESS NOTES
Patient to [facility] for Avastin infusion. PORT accessed with sterile field, flushed with + blood return. POC UA performed. Patient meets parameters for Avastin. Avastin infused with no s/s of infusion reaction. PORT flushed with + blood return. SASH performed and port de-accessed. Gauze and paper tape applied as a dressing. Patient has future appointment. Patient to home in care of self.

## 2021-03-21 NOTE — PROGRESS NOTES
"Pharmacy Chemotherapy Calculation    Patient Name: Dre Eaton   Dx: Metastatic Rectal Adenocarcinoa   Cycle 10  Previous treatment: 3/2/21    Protocol: Capecitabine + Bevacizumab     Bevacizumab 7.5 mg/kg IV on Day 1  Capecitabine 850 - 1250 mg/m2 PO twice daily on Days 1-14   21 day cycle until disease progression or unacceptable toxicity   NCCN Guidelines for Rectal Cancer V.2.2020  Herman EVANS, et al. Lancet Oncol. 2013;14(11):5221-5485  Van Emiliano E, et al. Briseida Oncol. 2009;20(11):1842-7    Allergies:  Patient has no known allergies.     BP (P) 122/81   Pulse (P) 65   Temp (P) 36.7 °C (98 °F) (Temporal)   Resp (P) 18   Ht 1.76 m (5' 9.29\")   Wt 83.8 kg (184 lb 11.9 oz)   SpO2 (P) 96%   BMI 27.05 kg/m²  Body surface area is 2.02 meters squared.    Labs 3/21/21  Urine Protein = neg  BP = ok    Bevacizumab 7.5 mgkg x 83.8 kg = 628.5 mg               Rounded to vial size (within 10%) per dose rounding protocol.               ok to treat with final dose = 600 mg IV      "

## 2021-03-21 NOTE — PROGRESS NOTES
Chemotherapy Verification - SECONDARY RN       Height = 69.29in  Weight = 83.8kg  BSA = 2.02m2       Medication: Avastin  Dose: 7.5mg/kg  Calculated Dose: 628.5mg                             (In mg/m2, AUC, mg/kg)     Urine protein negative    I confirm that this process was performed independently.

## 2021-03-31 ENCOUNTER — IMMUNIZATION (OUTPATIENT)
Dept: FAMILY PLANNING/WOMEN'S HEALTH CLINIC | Facility: IMMUNIZATION CENTER | Age: 56
End: 2021-03-31
Payer: MEDICAID

## 2021-03-31 DIAGNOSIS — Z23 ENCOUNTER FOR VACCINATION: Primary | ICD-10-CM

## 2021-04-02 PROCEDURE — 0001A PFIZER SARS-COV-2 VACCINE: CPT

## 2021-04-02 PROCEDURE — 91300 PFIZER SARS-COV-2 VACCINE: CPT

## 2021-04-12 NOTE — PROGRESS NOTES
"Pharmacy Chemotherapy Verification    Patient Name: Dre Eaton     Diagnosis: Metastatic rectal adenocarcinoa     Regimen: Capecitabine + Bevacizumab     Bevacizumab 7.5 mg/kg IV on Day 1  Capecitabine 850 - 1250 mg/m2 PO twice daily on Days 1-14  21 day cycle until disease progression or unacceptable toxicity     NCCN Guidelines for Rectal Cancer V.2.2020  Herman EVANS et al. Lancet Oncol. 2013;14(11):5706-5518  Van Emiliano E, et al. Briseida Oncol. 2009;20(11):1842-7    Allergies: Patient has no known allergies.     /89   Pulse 83   Temp 36.7 °C (98 °F) (Temporal)   Resp 18   Ht 1.76 m (5' 9.29\")   Wt 85.6 kg (188 lb 11.4 oz)   SpO2 97%   BMI 27.63 kg/m²  Body surface area is 2.05 meters squared.     4/13/2021 11:23   POC Protein 30 (A)     Drug Order   (Drug name, dose, route, IV Fluid & volume, frequency, number of doses) Cycle 11  Previous treatment: Cycle 10 3/21/21   Medication = Bevacizumab (Avastin)  Base Dose= 7.5 mg/kg  Calc Dose: Base Dose x 85.6 kg = 642 mg  Final Dose = 600 mg  Route = IV  Fluid & Volume =   mL  Admin Duration = Over 30 minutes          Rounded to vial size (within 10%) per rounding protocol, okay to treat with final dose     Jessy Solorio, PharmD, BCOP                    "

## 2021-04-13 ENCOUNTER — OUTPATIENT INFUSION SERVICES (OUTPATIENT)
Dept: ONCOLOGY | Facility: MEDICAL CENTER | Age: 56
End: 2021-04-13
Attending: INTERNAL MEDICINE
Payer: MEDICAID

## 2021-04-13 VITALS
HEIGHT: 69 IN | SYSTOLIC BLOOD PRESSURE: 136 MMHG | RESPIRATION RATE: 18 BRPM | TEMPERATURE: 98 F | DIASTOLIC BLOOD PRESSURE: 89 MMHG | BODY MASS INDEX: 27.95 KG/M2 | WEIGHT: 188.71 LBS | OXYGEN SATURATION: 97 % | HEART RATE: 83 BPM

## 2021-04-13 DIAGNOSIS — C20 RECTAL CARCINOMA (HCC): ICD-10-CM

## 2021-04-13 LAB
APPEARANCE UR: CLEAR
COLOR UR AUTO: ABNORMAL
GLUCOSE UR QL STRIP.AUTO: NEGATIVE MG/DL
KETONES UR QL STRIP.AUTO: NEGATIVE MG/DL
LEUKOCYTE ESTERASE UR QL STRIP.AUTO: NEGATIVE
NITRITE UR QL STRIP.AUTO: NEGATIVE
PH UR STRIP.AUTO: 6 [PH] (ref 5–8)
PROT UR QL STRIP: 30 MG/DL
RBC UR QL AUTO: ABNORMAL
SP GR UR STRIP.AUTO: >=1.03 (ref 1–1.03)

## 2021-04-13 PROCEDURE — 700111 HCHG RX REV CODE 636 W/ 250 OVERRIDE (IP): Performed by: INTERNAL MEDICINE

## 2021-04-13 PROCEDURE — 81002 URINALYSIS NONAUTO W/O SCOPE: CPT

## 2021-04-13 PROCEDURE — 700111 HCHG RX REV CODE 636 W/ 250 OVERRIDE (IP)

## 2021-04-13 PROCEDURE — 700105 HCHG RX REV CODE 258: Performed by: INTERNAL MEDICINE

## 2021-04-13 PROCEDURE — A4212 NON CORING NEEDLE OR STYLET: HCPCS

## 2021-04-13 PROCEDURE — 96413 CHEMO IV INFUSION 1 HR: CPT

## 2021-04-13 RX ORDER — 0.9 % SODIUM CHLORIDE 0.9 %
VIAL (ML) INJECTION PRN
Status: CANCELLED | OUTPATIENT
Start: 2021-04-13

## 2021-04-13 RX ORDER — METHYLPREDNISOLONE SODIUM SUCCINATE 125 MG/2ML
125 INJECTION, POWDER, LYOPHILIZED, FOR SOLUTION INTRAMUSCULAR; INTRAVENOUS PRN
Status: CANCELLED | OUTPATIENT
Start: 2021-04-13

## 2021-04-13 RX ORDER — HEPARIN SODIUM (PORCINE) LOCK FLUSH IV SOLN 100 UNIT/ML 100 UNIT/ML
500 SOLUTION INTRAVENOUS PRN
Status: CANCELLED | OUTPATIENT
Start: 2021-04-13

## 2021-04-13 RX ORDER — 0.9 % SODIUM CHLORIDE 0.9 %
10 VIAL (ML) INJECTION PRN
Status: CANCELLED | OUTPATIENT
Start: 2021-04-13

## 2021-04-13 RX ORDER — ONDANSETRON 8 MG/1
8 TABLET, ORALLY DISINTEGRATING ORAL PRN
Status: CANCELLED | OUTPATIENT
Start: 2021-04-13

## 2021-04-13 RX ORDER — HEPARIN SODIUM (PORCINE) LOCK FLUSH IV SOLN 100 UNIT/ML 100 UNIT/ML
SOLUTION INTRAVENOUS
Status: COMPLETED
Start: 2021-04-13 | End: 2021-04-13

## 2021-04-13 RX ORDER — 0.9 % SODIUM CHLORIDE 0.9 %
3 VIAL (ML) INJECTION PRN
Status: CANCELLED | OUTPATIENT
Start: 2021-04-13

## 2021-04-13 RX ORDER — ONDANSETRON 2 MG/ML
4 INJECTION INTRAMUSCULAR; INTRAVENOUS PRN
Status: CANCELLED | OUTPATIENT
Start: 2021-04-13

## 2021-04-13 RX ORDER — DIPHENHYDRAMINE HYDROCHLORIDE 50 MG/ML
50 INJECTION INTRAMUSCULAR; INTRAVENOUS PRN
Status: CANCELLED | OUTPATIENT
Start: 2021-04-13

## 2021-04-13 RX ORDER — PROCHLORPERAZINE MALEATE 10 MG
10 TABLET ORAL EVERY 6 HOURS PRN
Status: CANCELLED | OUTPATIENT
Start: 2021-04-13

## 2021-04-13 RX ORDER — EPINEPHRINE 1 MG/ML(1)
0.5 AMPUL (ML) INJECTION PRN
Status: CANCELLED | OUTPATIENT
Start: 2021-04-13

## 2021-04-13 RX ORDER — SODIUM CHLORIDE 9 MG/ML
INJECTION, SOLUTION INTRAVENOUS CONTINUOUS
Status: CANCELLED | OUTPATIENT
Start: 2021-04-13

## 2021-04-13 RX ADMIN — BEVACIZUMAB 600 MG: 400 INJECTION, SOLUTION INTRAVENOUS at 12:01

## 2021-04-13 RX ADMIN — HEPARIN 500 UNITS: 100 SYRINGE at 12:50

## 2021-04-13 ASSESSMENT — FIBROSIS 4 INDEX: FIB4 SCORE: 1.59099025766973193

## 2021-04-13 NOTE — PROGRESS NOTES
"Pharmacy Chemotherapy Calculation    Patient Name: Dre Eaton   Dx: Metastatic Rectal Adenocarcinoma    Cycle 11  Previous treatment: 3/21/21    Protocol: Capecitabine + Bevacizumab     Bevacizumab 7.5 mg/kg IV on Day 1  Capecitabine 850 - 1250 mg/m2 PO twice daily on Days 1-14  21 day cycle until disease progression or unacceptable toxicity   NCCN Guidelines for Rectal Cancer V.2.2020  Herman EVANS, et al. Lancet Oncol. 2013;14(11):2220-2202  Van Emiliano E, et al. Briseida Oncol. 2009;20(11):1842-7    Allergies:  Patient has no known allergies.     /89   Pulse 83   Temp 36.7 °C (98 °F) (Temporal)   Resp 18   Ht 1.76 m (5' 9.29\")   Wt 85.6 kg (188 lb 11.4 oz)   SpO2 97%   BMI 27.63 kg/m²  Body surface area is 2.05 meters squared.    Labs 4/13/21   Urine Protein = 30(1+)  BP = 136/89    Bevacizumab 7.5 mgkg x 85.6kg = 642mg               Rounded to vial size (within 10%) per dose rounding protocol.               ok to treat with final dose = 600 mg IV    HEATHER Grande Pharm.D.    "

## 2021-04-13 NOTE — PROGRESS NOTES
Pt ambulatory to [facility] for C11 D1 of Avastin for rectal cancer.  Pt w/ no s/s of infection, pt has no complaints at this time.  Pt PORT accessed using sterile technique, brisk blood return noted, flushed per protocol.  Pt provided this RN w/ urine specimen.  Pt UA results and BP w/n parameters for tx today.  Avastin infused w/ no AE.  PORT w/ brisk blood return post-chemo, flushed and heparinized per protocol, de-accessed,m gauze and tape bandage applied.  Pt left on foot in NAD.  Confirmed pt's next appt.

## 2021-04-13 NOTE — PROGRESS NOTES
Chemotherapy Verification - PRIMARY RN  C11 D1      Height = 1.76 m  Weight = 85.6 kg  BSA = 2.05 m2       Medication: bevacizumab  Dose: 7.5 mg/kg  Calculated Dose: 642 mg rounded to 600 mg for vial size                            (In mg/m2, AUC, mg/kg)       I confirm this process was performed independently with the BSA and all final chemotherapy dosing calculations congruent.  Any discrepancies of 10% or greater have been addressed with the chemotherapy pharmacist. The resolution of the discrepancy has been documented in the EPIC progress notes.

## 2021-04-13 NOTE — PROGRESS NOTES
Chemotherapy Verification - SECONDARY RN       Height = 176 cm  Weight = 85.6 kg  BSA = 2.045 m2       Medication: Avastin  Dose: 7.5 mg/kg  Calculated Dose: 642 mg round to vial                             (In mg/m2, AUC, mg/kg)       I confirm that this process was performed independently.

## 2021-04-23 ENCOUNTER — IMMUNIZATION (OUTPATIENT)
Dept: FAMILY PLANNING/WOMEN'S HEALTH CLINIC | Facility: IMMUNIZATION CENTER | Age: 56
End: 2021-04-23
Payer: MEDICAID

## 2021-04-23 DIAGNOSIS — Z23 ENCOUNTER FOR VACCINATION: Primary | ICD-10-CM

## 2021-04-23 PROCEDURE — 0002A PFIZER SARS-COV-2 VACCINE: CPT

## 2021-04-23 PROCEDURE — 91300 PFIZER SARS-COV-2 VACCINE: CPT

## 2021-04-30 ENCOUNTER — HOSPITAL ENCOUNTER (OUTPATIENT)
Dept: RADIOLOGY | Facility: MEDICAL CENTER | Age: 56
End: 2021-04-30
Attending: INTERNAL MEDICINE
Payer: MEDICAID

## 2021-04-30 DIAGNOSIS — C20 MALIGNANT NEOPLASM OF RECTUM (HCC): ICD-10-CM

## 2021-04-30 PROCEDURE — 71260 CT THORAX DX C+: CPT

## 2021-04-30 PROCEDURE — 700111 HCHG RX REV CODE 636 W/ 250 OVERRIDE (IP)

## 2021-04-30 PROCEDURE — 700117 HCHG RX CONTRAST REV CODE 255: Performed by: INTERNAL MEDICINE

## 2021-04-30 RX ORDER — HEPARIN SODIUM (PORCINE) LOCK FLUSH IV SOLN 100 UNIT/ML 100 UNIT/ML
SOLUTION INTRAVENOUS
Status: COMPLETED
Start: 2021-04-30 | End: 2021-04-30

## 2021-04-30 RX ADMIN — IOHEXOL 25 ML: 240 INJECTION, SOLUTION INTRATHECAL; INTRAVASCULAR; INTRAVENOUS; ORAL at 13:31

## 2021-04-30 RX ADMIN — HEPARIN 500 UNITS: 100 SYRINGE at 13:15

## 2021-04-30 RX ADMIN — IOHEXOL 100 ML: 350 INJECTION, SOLUTION INTRAVENOUS at 13:30

## 2021-05-04 ENCOUNTER — OUTPATIENT INFUSION SERVICES (OUTPATIENT)
Dept: ONCOLOGY | Facility: MEDICAL CENTER | Age: 56
End: 2021-05-04
Attending: INTERNAL MEDICINE
Payer: MEDICAID

## 2021-05-04 VITALS
RESPIRATION RATE: 18 BRPM | DIASTOLIC BLOOD PRESSURE: 84 MMHG | HEIGHT: 69 IN | SYSTOLIC BLOOD PRESSURE: 135 MMHG | BODY MASS INDEX: 27.85 KG/M2 | HEART RATE: 62 BPM | TEMPERATURE: 97.2 F | OXYGEN SATURATION: 98 % | WEIGHT: 188.05 LBS

## 2021-05-04 DIAGNOSIS — C20 RECTAL CARCINOMA (HCC): ICD-10-CM

## 2021-05-04 PROCEDURE — 96413 CHEMO IV INFUSION 1 HR: CPT | Performed by: CLINICAL NURSE SPECIALIST

## 2021-05-04 PROCEDURE — 81002 URINALYSIS NONAUTO W/O SCOPE: CPT

## 2021-05-04 PROCEDURE — 700105 HCHG RX REV CODE 258: Performed by: INTERNAL MEDICINE

## 2021-05-04 PROCEDURE — 700111 HCHG RX REV CODE 636 W/ 250 OVERRIDE (IP): Performed by: INTERNAL MEDICINE

## 2021-05-04 PROCEDURE — A4212 NON CORING NEEDLE OR STYLET: HCPCS | Performed by: CLINICAL NURSE SPECIALIST

## 2021-05-04 PROCEDURE — 700111 HCHG RX REV CODE 636 W/ 250 OVERRIDE (IP)

## 2021-05-04 RX ORDER — 0.9 % SODIUM CHLORIDE 0.9 %
VIAL (ML) INJECTION PRN
Status: CANCELLED | OUTPATIENT
Start: 2021-05-04

## 2021-05-04 RX ORDER — 0.9 % SODIUM CHLORIDE 0.9 %
10 VIAL (ML) INJECTION PRN
Status: CANCELLED | OUTPATIENT
Start: 2021-05-04

## 2021-05-04 RX ORDER — ONDANSETRON 2 MG/ML
4 INJECTION INTRAMUSCULAR; INTRAVENOUS PRN
Status: CANCELLED | OUTPATIENT
Start: 2021-05-04

## 2021-05-04 RX ORDER — 0.9 % SODIUM CHLORIDE 0.9 %
3 VIAL (ML) INJECTION PRN
Status: CANCELLED | OUTPATIENT
Start: 2021-05-04

## 2021-05-04 RX ORDER — HEPARIN SODIUM (PORCINE) LOCK FLUSH IV SOLN 100 UNIT/ML 100 UNIT/ML
SOLUTION INTRAVENOUS
Status: COMPLETED
Start: 2021-05-04 | End: 2021-05-04

## 2021-05-04 RX ORDER — HEPARIN SODIUM (PORCINE) LOCK FLUSH IV SOLN 100 UNIT/ML 100 UNIT/ML
500 SOLUTION INTRAVENOUS PRN
Status: CANCELLED | OUTPATIENT
Start: 2021-05-04

## 2021-05-04 RX ORDER — SODIUM CHLORIDE 9 MG/ML
INJECTION, SOLUTION INTRAVENOUS CONTINUOUS
Status: CANCELLED | OUTPATIENT
Start: 2021-05-04

## 2021-05-04 RX ORDER — METHYLPREDNISOLONE SODIUM SUCCINATE 125 MG/2ML
125 INJECTION, POWDER, LYOPHILIZED, FOR SOLUTION INTRAMUSCULAR; INTRAVENOUS PRN
Status: CANCELLED | OUTPATIENT
Start: 2021-05-04

## 2021-05-04 RX ORDER — PROCHLORPERAZINE MALEATE 10 MG
10 TABLET ORAL EVERY 6 HOURS PRN
Status: CANCELLED | OUTPATIENT
Start: 2021-05-04

## 2021-05-04 RX ORDER — ONDANSETRON 8 MG/1
8 TABLET, ORALLY DISINTEGRATING ORAL PRN
Status: CANCELLED | OUTPATIENT
Start: 2021-05-04

## 2021-05-04 RX ORDER — DIPHENHYDRAMINE HYDROCHLORIDE 50 MG/ML
50 INJECTION INTRAMUSCULAR; INTRAVENOUS PRN
Status: CANCELLED | OUTPATIENT
Start: 2021-05-04

## 2021-05-04 RX ORDER — HEPARIN SODIUM (PORCINE) LOCK FLUSH IV SOLN 100 UNIT/ML 100 UNIT/ML
500 SOLUTION INTRAVENOUS PRN
Status: DISCONTINUED | OUTPATIENT
Start: 2021-05-04 | End: 2021-05-04 | Stop reason: HOSPADM

## 2021-05-04 RX ORDER — EPINEPHRINE 1 MG/ML(1)
0.5 AMPUL (ML) INJECTION PRN
Status: CANCELLED | OUTPATIENT
Start: 2021-05-04

## 2021-05-04 RX ADMIN — HEPARIN 500 UNITS: 100 SYRINGE at 12:28

## 2021-05-04 RX ADMIN — SODIUM CHLORIDE 600 MG: 9 INJECTION, SOLUTION INTRAVENOUS at 11:56

## 2021-05-04 RX ADMIN — HEPARIN SODIUM (PORCINE) LOCK FLUSH IV SOLN 100 UNIT/ML 500 UNITS: 100 SOLUTION at 12:28

## 2021-05-04 ASSESSMENT — FIBROSIS 4 INDEX: FIB4 SCORE: 1.59099025766973193

## 2021-05-04 NOTE — PROGRESS NOTES
Chemotherapy Verification - SECONDARY RN       Height = 174.5 cm Weight = 85.3 kg  BSA = 2.03 m2       Medication: Bevacizumab-bvzr (Zirabev)   Dose: 7.5 mg/kg Calculated Dose: 639.75 mg round to vial                             (In mg/m2, AUC, mg/kg)       I confirm that this process was performed independently.

## 2021-05-04 NOTE — PROGRESS NOTES
"Pharmacy Chemotherapy Verification    Patient Name: Dre Eaton     Diagnosis: Metastatic rectal adenocarcinoa     Regimen: Capecitabine + Bevacizumab     Bevacizumab 7.5 mg/kg IV on Day 1  Capecitabine 850 - 1250 mg/m2 PO twice daily on Days 1-14  21 day cycle until disease progression or unacceptable toxicity     NCCN Guidelines for Rectal Cancer V.2.2020  Herman EVANS et al. Lancet Oncol. 2013;14(11):6284-2946  Van Emiliano E, et al. Briseida Oncol. 2009;20(11):1842-7    Allergies: Patient has no known allergies.     /84   Pulse 62   Temp 36.2 °C (97.2 °F) (Temporal)   Resp 18   Ht 1.745 m (5' 8.7\")   Wt 85.3 kg (188 lb 0.8 oz)   SpO2 98%   BMI 28.01 kg/m²  Body surface area is 2.03 meters squared.    Labs 5/4/21:  Urine Protein = negative  BP = 135/84    Drug Order   (Drug name, dose, route, IV Fluid & volume, frequency, number of doses) Cycle 12  Previous treatment: C11 4/13/21   Medication = Bevacizumab-bvzr (Zirabev)  Base Dose= 7.5 mg/kg  Calc Dose: Base Dose x 85.3 kg = 639.75 mg  Final Dose = 600 mg  Route = IV  Fluid & Volume =   mL  Admin Duration = Over 30 minutes          Rounded to vial size (within 10%) per rounding protocol, okay to treat with final dose     By my signature below, I confirm this process was performed independently with the BSA and all final chemotherapy dosing calculations congruent. I have reviewed the above chemotherapy order and that my calculation of the final dose and BSA (when applicable) corroborate those calculations of the  pharmacist. Discrepancies of 10% or greater in the written dose have been addressed and documented within the [facility] Progress notes.    Angelina Garay, PharmD                        "

## 2021-05-04 NOTE — PROGRESS NOTES
Chemotherapy Verification - PRIMARY RN      Height = 1.745m  Weight = 85.3kg  BSA = 2.03m2       Medication: Zirabev  Dose: 7.5mg/kg  Calculated Dose: 639.75mg                             (In mg/m2, AUC, mg/kg)         I confirm this process was performed independently with the BSA and all final chemotherapy dosing calculations congruent.  Any discrepancies of 10% or greater have been addressed with the chemotherapy pharmacist. The resolution of the discrepancy has been documented in the EPIC progress notes.

## 2021-05-04 NOTE — PROGRESS NOTES
Mr. Eaton is in infusion for C12D1 Zirabev for rectal cancer.  He is taking his Xeloda.  UA collected and resulted within parameters to treat.  /84.  Port accessed w brisk blood return.  Zirabev infused without issue.  Next appointment reviewed. Port flushed, heparin locked and deaccessed. Mr. Eaton was discharged ambulatory to home in stable condition.

## 2021-05-04 NOTE — PROGRESS NOTES
"Pharmacy Chemotherapy Calculation    Patient Name: Dre Eaton   Dx: Metastatic Rectal Adenocarcinoma    Cycle 12  Previous treatment: 4/13/21    Protocol: Capecitabine + Bevacizumab     Bevacizumab 7.5 mg/kg IV on Day 1  Capecitabine 850 - 1250 mg/m2 PO twice daily on Days 1-14  21 day cycle until disease progression or unacceptable toxicity   NCCN Guidelines for Rectal Cancer V.2.2020  Herman EVANS, et al. Lancet Oncol. 2013;14(11):1735-8271  Van Emiliano E, et al. Briseida Oncol. 2009;20(11):1842-7    Allergies:  Patient has no known allergies.     /84   Pulse 62   Temp 36.2 °C (97.2 °F) (Temporal)   Resp 18   Ht 1.745 m (5' 8.7\")   Wt 85.3 kg (188 lb 0.8 oz)   SpO2 98%   BMI 28.01 kg/m²  Body surface area is 2.03 meters squared.    Labs 5/4/21   Urine Protein neg  BP meets parameter    Bevacizumab 7.5 mgkg x 85.3 kg = 639.75mg               Rounded to vial size (within 10%) per dose rounding protocol.               ok to treat with final dose = 600 mg IV        "

## 2021-05-18 RX ORDER — SODIUM CHLORIDE 9 MG/ML
INJECTION, SOLUTION INTRAVENOUS CONTINUOUS
Status: CANCELLED | OUTPATIENT
Start: 2021-05-25

## 2021-05-18 RX ORDER — METHYLPREDNISOLONE SODIUM SUCCINATE 125 MG/2ML
125 INJECTION, POWDER, LYOPHILIZED, FOR SOLUTION INTRAMUSCULAR; INTRAVENOUS PRN
Status: CANCELLED | OUTPATIENT
Start: 2021-05-25

## 2021-05-18 RX ORDER — 0.9 % SODIUM CHLORIDE 0.9 %
3 VIAL (ML) INJECTION PRN
Status: CANCELLED | OUTPATIENT
Start: 2021-05-25

## 2021-05-18 RX ORDER — 0.9 % SODIUM CHLORIDE 0.9 %
10 VIAL (ML) INJECTION PRN
Status: CANCELLED | OUTPATIENT
Start: 2021-05-25

## 2021-05-18 RX ORDER — PROCHLORPERAZINE MALEATE 10 MG
10 TABLET ORAL EVERY 6 HOURS PRN
Status: CANCELLED | OUTPATIENT
Start: 2021-05-25

## 2021-05-18 RX ORDER — ONDANSETRON 8 MG/1
8 TABLET, ORALLY DISINTEGRATING ORAL PRN
Status: CANCELLED | OUTPATIENT
Start: 2021-05-25

## 2021-05-18 RX ORDER — HEPARIN SODIUM (PORCINE) LOCK FLUSH IV SOLN 100 UNIT/ML 100 UNIT/ML
500 SOLUTION INTRAVENOUS PRN
Status: CANCELLED | OUTPATIENT
Start: 2021-05-25

## 2021-05-18 RX ORDER — 0.9 % SODIUM CHLORIDE 0.9 %
VIAL (ML) INJECTION PRN
Status: CANCELLED | OUTPATIENT
Start: 2021-05-25

## 2021-05-18 RX ORDER — DIPHENHYDRAMINE HYDROCHLORIDE 50 MG/ML
50 INJECTION INTRAMUSCULAR; INTRAVENOUS PRN
Status: CANCELLED | OUTPATIENT
Start: 2021-05-25

## 2021-05-18 RX ORDER — EPINEPHRINE 1 MG/ML(1)
0.5 AMPUL (ML) INJECTION PRN
Status: CANCELLED | OUTPATIENT
Start: 2021-05-25

## 2021-05-18 RX ORDER — ONDANSETRON 2 MG/ML
4 INJECTION INTRAMUSCULAR; INTRAVENOUS PRN
Status: CANCELLED | OUTPATIENT
Start: 2021-05-25

## 2021-05-25 ENCOUNTER — OUTPATIENT INFUSION SERVICES (OUTPATIENT)
Dept: ONCOLOGY | Facility: MEDICAL CENTER | Age: 56
End: 2021-05-25
Attending: INTERNAL MEDICINE
Payer: MEDICAID

## 2021-05-25 VITALS
RESPIRATION RATE: 18 BRPM | WEIGHT: 192.02 LBS | SYSTOLIC BLOOD PRESSURE: 144 MMHG | HEIGHT: 69 IN | TEMPERATURE: 98 F | DIASTOLIC BLOOD PRESSURE: 88 MMHG | BODY MASS INDEX: 28.44 KG/M2 | HEART RATE: 64 BPM | OXYGEN SATURATION: 98 %

## 2021-05-25 DIAGNOSIS — C20 RECTAL CARCINOMA (HCC): ICD-10-CM

## 2021-05-25 PROCEDURE — 96413 CHEMO IV INFUSION 1 HR: CPT

## 2021-05-25 PROCEDURE — 700105 HCHG RX REV CODE 258: Performed by: INTERNAL MEDICINE

## 2021-05-25 PROCEDURE — 700111 HCHG RX REV CODE 636 W/ 250 OVERRIDE (IP): Performed by: INTERNAL MEDICINE

## 2021-05-25 PROCEDURE — 700101 HCHG RX REV CODE 250: Performed by: INTERNAL MEDICINE

## 2021-05-25 PROCEDURE — 81002 URINALYSIS NONAUTO W/O SCOPE: CPT

## 2021-05-25 PROCEDURE — A4212 NON CORING NEEDLE OR STYLET: HCPCS

## 2021-05-25 RX ORDER — HEPARIN SODIUM (PORCINE) LOCK FLUSH IV SOLN 100 UNIT/ML 100 UNIT/ML
500 SOLUTION INTRAVENOUS PRN
Status: DISCONTINUED | OUTPATIENT
Start: 2021-05-25 | End: 2021-05-25 | Stop reason: HOSPADM

## 2021-05-25 RX ORDER — 0.9 % SODIUM CHLORIDE 0.9 %
VIAL (ML) INJECTION PRN
Status: DISCONTINUED | OUTPATIENT
Start: 2021-05-25 | End: 2021-05-25 | Stop reason: HOSPADM

## 2021-05-25 RX ADMIN — HEPARIN 500 UNITS: 100 SYRINGE at 16:21

## 2021-05-25 RX ADMIN — Medication 20 ML: at 16:21

## 2021-05-25 RX ADMIN — SODIUM CHLORIDE 600 MG: 9 INJECTION, SOLUTION INTRAVENOUS at 15:32

## 2021-05-25 ASSESSMENT — FIBROSIS 4 INDEX: FIB4 SCORE: 1.59099025766973193

## 2021-05-25 NOTE — PROGRESS NOTES
Chemotherapy Verification - SECONDARY RN       Height = 174 cm  Weight = 87.1 kg  BSA = 2.05 m2       Medication: Bevacizumab-bvzr  Dose: 7.5 mg/kg  Calculated Dose: 653.25 mg                             (In mg/m2, AUC, mg/kg)     I confirm that this process was performed independently.

## 2021-05-25 NOTE — PROGRESS NOTES
"Pharmacy Chemotherapy Calculations Note:    Patient Name: Dre Eaton        Dx: Metastatic Rectal Adenocarcinoma  Cycle:  13 Previous treatment: C12 = 5/4/21     Protocol: Capecitabine + Bevacizumab     Bevacizumab 7.5 mg/kg IV on Day 1  Capecitabine 850 - 1250 mg/m2 PO twice daily on Days 1-14  21 day cycle until disease progression or unacceptable toxicity      NCCN Guidelines for Rectal Cancer V.2.2020  Herman EVANS, et al. Lancet Oncol. 2013;14(11):6270-1453  Van Emiliano E, et al. Briseida Oncol. 2009;20(11):1842-7             /88   Pulse 64   Temp 36.7 °C (98 °F) (Temporal)   Resp 18   Ht 1.74 m (5' 8.5\")   Wt 87.1 kg (192 lb 0.3 oz)   SpO2 98%   BMI 28.77 kg/m²  Body surface area is 2.05 meters squared.  Urine protein = 30 mg/dL     Bevacizumab-bvzr 7.5 mg/kg x 87.1 kg  = 653.25 mg   <10% difference, rounded to vial size per catrina, ok to treat with final written dose = 600 mg    Satish Matias, PharmD, PharmD, BCPS             "

## 2021-05-25 NOTE — PROGRESS NOTES
Chemotherapy Verification - PRIMARY RN      Height = 174 cm  Weight = 87.1 kg  BSA = 2.05 m^2       Medication: bevacizumab-bvzr (Zirabev)  Dose: 7.5 mg/kg  Calculated Dose: 653.25 mg                             (In mg/m2, AUC, mg/kg)     I confirm this process was performed independently with the BSA and all final chemotherapy dosing calculations congruent.  Any discrepancies of 10% or greater have been addressed with the chemotherapy pharmacist. The resolution of the discrepancy has been documented in the EPIC progress notes.

## 2021-05-25 NOTE — PROGRESS NOTES
"Pharmacy Chemotherapy Verification    Patient Name: Dre Eaton     Diagnosis: Metastatic rectal adenocarcinoa     Regimen: Capecitabine + Bevacizumab     Bevacizumab 7.5 mg/kg IV on Day 1  Capecitabine 850 - 1250 mg/m2 PO twice daily on Days 1-14  21 day cycle until disease progression or unacceptable toxicity     NCCN Guidelines for Rectal Cancer V.2.2020  Herman EVANS et al. Lancet Oncol. 2013;14(11):9548-3769  Van Emiliano E, et al. Briseida Oncol. 2009;20(11):1842-7    Allergies: Patient has no known allergies.     /88   Pulse 64   Temp 36.7 °C (98 °F) (Temporal)   Resp 18   Ht 1.74 m (5' 8.5\")   Wt 87.1 kg (192 lb 0.3 oz)   SpO2 98%   BMI 28.77 kg/m²  Body surface area is 2.05 meters squared.    Labs 5/25/21 5/25/2021 14:52   POC Protein 30 (A)     Drug Order   (Drug name, dose, route, IV Fluid & volume, frequency, number of doses) Cycle 13  Previous treatment: C12 5/4/21   Medication = Bevacizumab-bvzr (Zirabev)  Base Dose= 7.5 mg/kg  Calc Dose: Base Dose x 87.1 kg = 653.25 mg  Final Dose = 600 mg  Route = IV  Fluid & Volume =   mL  Admin Duration = Over 30 minutes          Rounded to vial size (within 10%) per rounding protocol, okay to treat with final dose     By my signature below, I confirm this process was performed independently with the BSA and all final chemotherapy dosing calculations congruent. I have reviewed the above chemotherapy order and that my calculation of the final dose and BSA (when applicable) corroborate those calculations of the  pharmacist. Discrepancies of 10% or greater in the written dose have been addressed and documented within the [facility] Progress notes.    Jessy Solorio, PharmD, BCOP                        "

## 2021-06-15 ENCOUNTER — OUTPATIENT INFUSION SERVICES (OUTPATIENT)
Dept: ONCOLOGY | Facility: MEDICAL CENTER | Age: 56
End: 2021-06-15
Attending: INTERNAL MEDICINE
Payer: MEDICAID

## 2021-06-15 VITALS
TEMPERATURE: 98 F | BODY MASS INDEX: 28.6 KG/M2 | WEIGHT: 193.12 LBS | HEART RATE: 78 BPM | DIASTOLIC BLOOD PRESSURE: 92 MMHG | RESPIRATION RATE: 18 BRPM | OXYGEN SATURATION: 98 % | SYSTOLIC BLOOD PRESSURE: 136 MMHG | HEIGHT: 69 IN

## 2021-06-15 DIAGNOSIS — C20 RECTAL CARCINOMA (HCC): ICD-10-CM

## 2021-06-15 LAB
APPEARANCE UR: ABNORMAL
COLOR UR AUTO: ABNORMAL
GLUCOSE UR QL STRIP.AUTO: NEGATIVE MG/DL
KETONES UR QL STRIP.AUTO: NEGATIVE MG/DL
LEUKOCYTE ESTERASE UR QL STRIP.AUTO: NEGATIVE
NITRITE UR QL STRIP.AUTO: NEGATIVE
PH UR STRIP.AUTO: 6.5 [PH] (ref 5–8)
PROT UR QL STRIP: ABNORMAL MG/DL
RBC UR QL AUTO: ABNORMAL
SP GR UR STRIP.AUTO: >=1.03 (ref 1–1.03)

## 2021-06-15 PROCEDURE — 700111 HCHG RX REV CODE 636 W/ 250 OVERRIDE (IP)

## 2021-06-15 PROCEDURE — 700111 HCHG RX REV CODE 636 W/ 250 OVERRIDE (IP): Performed by: INTERNAL MEDICINE

## 2021-06-15 PROCEDURE — 700105 HCHG RX REV CODE 258: Performed by: INTERNAL MEDICINE

## 2021-06-15 PROCEDURE — 96413 CHEMO IV INFUSION 1 HR: CPT

## 2021-06-15 PROCEDURE — A4212 NON CORING NEEDLE OR STYLET: HCPCS

## 2021-06-15 PROCEDURE — 81002 URINALYSIS NONAUTO W/O SCOPE: CPT

## 2021-06-15 RX ORDER — METHYLPREDNISOLONE SODIUM SUCCINATE 125 MG/2ML
125 INJECTION, POWDER, LYOPHILIZED, FOR SOLUTION INTRAMUSCULAR; INTRAVENOUS PRN
Status: CANCELLED | OUTPATIENT
Start: 2021-06-15

## 2021-06-15 RX ORDER — ONDANSETRON 8 MG/1
8 TABLET, ORALLY DISINTEGRATING ORAL PRN
Status: CANCELLED | OUTPATIENT
Start: 2021-06-15

## 2021-06-15 RX ORDER — 0.9 % SODIUM CHLORIDE 0.9 %
10 VIAL (ML) INJECTION PRN
Status: CANCELLED | OUTPATIENT
Start: 2021-07-10

## 2021-06-15 RX ORDER — 0.9 % SODIUM CHLORIDE 0.9 %
3 VIAL (ML) INJECTION PRN
Status: CANCELLED | OUTPATIENT
Start: 2021-07-10

## 2021-06-15 RX ORDER — 0.9 % SODIUM CHLORIDE 0.9 %
VIAL (ML) INJECTION PRN
Status: CANCELLED | OUTPATIENT
Start: 2021-07-10

## 2021-06-15 RX ORDER — 0.9 % SODIUM CHLORIDE 0.9 %
VIAL (ML) INJECTION PRN
Status: CANCELLED | OUTPATIENT
Start: 2021-06-15

## 2021-06-15 RX ORDER — DIPHENHYDRAMINE HYDROCHLORIDE 50 MG/ML
50 INJECTION INTRAMUSCULAR; INTRAVENOUS PRN
Status: CANCELLED | OUTPATIENT
Start: 2021-06-15

## 2021-06-15 RX ORDER — 0.9 % SODIUM CHLORIDE 0.9 %
10 VIAL (ML) INJECTION PRN
Status: CANCELLED | OUTPATIENT
Start: 2021-06-15

## 2021-06-15 RX ORDER — 0.9 % SODIUM CHLORIDE 0.9 %
VIAL (ML) INJECTION PRN
Status: CANCELLED | OUTPATIENT
Start: 2021-07-08

## 2021-06-15 RX ORDER — SODIUM CHLORIDE 9 MG/ML
INJECTION, SOLUTION INTRAVENOUS CONTINUOUS
Status: CANCELLED | OUTPATIENT
Start: 2021-07-10

## 2021-06-15 RX ORDER — 0.9 % SODIUM CHLORIDE 0.9 %
10 VIAL (ML) INJECTION PRN
Status: CANCELLED | OUTPATIENT
Start: 2021-07-08

## 2021-06-15 RX ORDER — ONDANSETRON 2 MG/ML
4 INJECTION INTRAMUSCULAR; INTRAVENOUS PRN
Status: CANCELLED | OUTPATIENT
Start: 2021-07-10

## 2021-06-15 RX ORDER — 0.9 % SODIUM CHLORIDE 0.9 %
3 VIAL (ML) INJECTION PRN
Status: CANCELLED | OUTPATIENT
Start: 2021-07-08

## 2021-06-15 RX ORDER — ONDANSETRON 2 MG/ML
4 INJECTION INTRAMUSCULAR; INTRAVENOUS PRN
Status: CANCELLED | OUTPATIENT
Start: 2021-06-15

## 2021-06-15 RX ORDER — HEPARIN SODIUM (PORCINE) LOCK FLUSH IV SOLN 100 UNIT/ML 100 UNIT/ML
500 SOLUTION INTRAVENOUS PRN
Status: CANCELLED | OUTPATIENT
Start: 2021-07-08

## 2021-06-15 RX ORDER — DIPHENHYDRAMINE HYDROCHLORIDE 50 MG/ML
50 INJECTION INTRAMUSCULAR; INTRAVENOUS PRN
Status: CANCELLED | OUTPATIENT
Start: 2021-07-10

## 2021-06-15 RX ORDER — METHYLPREDNISOLONE SODIUM SUCCINATE 125 MG/2ML
125 INJECTION, POWDER, LYOPHILIZED, FOR SOLUTION INTRAMUSCULAR; INTRAVENOUS PRN
Status: CANCELLED | OUTPATIENT
Start: 2021-07-10

## 2021-06-15 RX ORDER — 0.9 % SODIUM CHLORIDE 0.9 %
3 VIAL (ML) INJECTION PRN
Status: CANCELLED | OUTPATIENT
Start: 2021-06-15

## 2021-06-15 RX ORDER — EPINEPHRINE 1 MG/ML(1)
0.5 AMPUL (ML) INJECTION PRN
Status: CANCELLED | OUTPATIENT
Start: 2021-06-15

## 2021-06-15 RX ORDER — PROCHLORPERAZINE MALEATE 10 MG
10 TABLET ORAL EVERY 6 HOURS PRN
Status: CANCELLED | OUTPATIENT
Start: 2021-06-15

## 2021-06-15 RX ORDER — HEPARIN SODIUM (PORCINE) LOCK FLUSH IV SOLN 100 UNIT/ML 100 UNIT/ML
500 SOLUTION INTRAVENOUS PRN
Status: DISCONTINUED | OUTPATIENT
Start: 2021-06-15 | End: 2021-06-15 | Stop reason: HOSPADM

## 2021-06-15 RX ORDER — HEPARIN SODIUM (PORCINE) LOCK FLUSH IV SOLN 100 UNIT/ML 100 UNIT/ML
500 SOLUTION INTRAVENOUS PRN
Status: CANCELLED | OUTPATIENT
Start: 2021-06-15

## 2021-06-15 RX ORDER — HEPARIN SODIUM (PORCINE) LOCK FLUSH IV SOLN 100 UNIT/ML 100 UNIT/ML
SOLUTION INTRAVENOUS
Status: COMPLETED
Start: 2021-06-15 | End: 2021-06-15

## 2021-06-15 RX ORDER — SODIUM CHLORIDE 9 MG/ML
INJECTION, SOLUTION INTRAVENOUS CONTINUOUS
Status: CANCELLED | OUTPATIENT
Start: 2021-06-15

## 2021-06-15 RX ORDER — HEPARIN SODIUM (PORCINE) LOCK FLUSH IV SOLN 100 UNIT/ML 100 UNIT/ML
500 SOLUTION INTRAVENOUS PRN
Status: CANCELLED | OUTPATIENT
Start: 2021-07-10

## 2021-06-15 RX ORDER — ONDANSETRON 8 MG/1
8 TABLET, ORALLY DISINTEGRATING ORAL PRN
Status: CANCELLED | OUTPATIENT
Start: 2021-07-10

## 2021-06-15 RX ORDER — PROCHLORPERAZINE MALEATE 10 MG
10 TABLET ORAL EVERY 6 HOURS PRN
Status: CANCELLED | OUTPATIENT
Start: 2021-07-10

## 2021-06-15 RX ORDER — EPINEPHRINE 1 MG/ML(1)
0.5 AMPUL (ML) INJECTION PRN
Status: CANCELLED | OUTPATIENT
Start: 2021-07-10

## 2021-06-15 RX ADMIN — SODIUM CHLORIDE 600 MG: 9 INJECTION, SOLUTION INTRAVENOUS at 12:59

## 2021-06-15 RX ADMIN — HEPARIN 500 UNITS: 100 SYRINGE at 13:41

## 2021-06-15 RX ADMIN — HEPARIN SODIUM (PORCINE) LOCK FLUSH IV SOLN 100 UNIT/ML 500 UNITS: 100 SOLUTION at 13:41

## 2021-06-15 ASSESSMENT — FIBROSIS 4 INDEX: FIB4 SCORE: 1.59099025766973193

## 2021-06-15 NOTE — PROGRESS NOTES
Chemotherapy Verification - PRIMARY RN      Height = 174 cm  Weight = 87.6 kg  BSA = 2.06 m2       Medication: Avastin  Dose: 7.5 mg/kg  Calculated Dose: 657 mg                                I confirm this process was performed independently with the BSA and all final chemotherapy dosing calculations congruent.  Any discrepancies of 10% or greater have been addressed with the chemotherapy pharmacist. The resolution of the discrepancy has been documented in the EPIC progress notes.

## 2021-06-15 NOTE — PROGRESS NOTES
"Pharmacy Chemotherapy Calculations Note:    Patient Name: Dre Eaton      Dx: Metastatic Rectal Adenocarcinoma    Cycle:  14   Previous treatment: C13 = 5/25/21     Protocol: Capecitabine + Bevacizumab     Bevacizumab 7.5 mg/kg IV on Day 1  Capecitabine 850 - 1250 mg/m2 PO twice daily on Days 1-14  21 day cycle until disease progression or unacceptable toxicity   NCCN Guidelines for Rectal Cancer V.2.2020  Herman EVANS, et al. Lancet Oncol. 2013;14(11):1829-0332  Van Emiliano E, et al. Briseida Oncol. 2009;20(11):1842-7             /90   Pulse 78   Temp 36.7 °C (98 °F) (Temporal)   Resp 18   Ht 1.74 m (5' 8.5\")   Wt 87.6 kg (193 lb 2 oz)   SpO2 98%   BMI 28.93 kg/m²  Body surface area is 2.06 meters squared.    Urine protein - trace,  /90  MD aware of elevated BP. Orders received to proceed with treatment. Pt to f/u with PCP for blood pressure management.     Bevacizumab-bvzr 7.5 mg/kg x 87.6kg  = 657 mg   <10% difference, rounded to vial size per екатерина fritz to treat with final written dose = 600 mg    HEATHER Grande PharmRohiniD.               "

## 2021-06-15 NOTE — PROGRESS NOTES
"Pharmacy Chemotherapy Verification  Patient Name: Dre Eaton   Dx: Metastatic rectal adenocarcinoma     Regimen: Capecitabine + Bevacizumab     Bevacizumab 7.5 mg/kg IV on Day 1  Capecitabine 850 - 1250 mg/m2 PO twice daily on Days 1-14   Q21 day cycle until disease progression or unacceptable toxicity   NCCN Guidelines for Rectal Cancer V.2.2020  Herman EVANS, et al. Lancet Oncol. 2013;14(11):1713-1521  Van Emiliano E, et al. Briseida Oncol. 2009;20(11):1842-7    Allergies: Patient has no known allergies.     /92   Pulse 78   Temp 36.7 °C (98 °F) (Temporal)   Resp 18   Ht 1.74 m (5' 8.5\")   Wt 87.6 kg (193 lb 2 oz)   SpO2 98%   BMI 28.93 kg/m²  Body surface area is 2.06 meters squared.    Labs 6/15/21   BP *elevated* OK to treat per Dr. Aguila  Trace urine protein    Drug Order   (Drug name, dose, route, IV Fluid & volume, frequency, number of doses) Cycle 14  Previous treatment: 5/25/21   Medication = Bevacizumab-bvzr (Zirabev)  Base Dose= 7.5 mg/kg  Calc Dose: Base Dose x 87.6 kg = 657 mg  Final Dose = 600 mg  Route = IV  Fluid & Volume =   mL  Admin Duration = Over 30 minutes          Rounded to vial size (within 10%) per rounding protocol, okay to treat with final dose       "

## 2021-06-15 NOTE — PROGRESS NOTES
Chemotherapy Verification - SECONDARY RN       Height = 174 cm  Weight = 87.6 kg  BSA = 2.06 m2       Medication: Zirabev  Dose: 7.5 mg/kg  Calculated Dose: 657 mg                             (In mg/m2, AUC, mg/kg)       I confirm that this process was performed independently.

## 2021-06-15 NOTE — PROGRESS NOTES
Dre presents today to [facility]  for Q 21 day Avastin.  Dre denies s/s active infections.  UA completed, results within parameters to receive treatment.  BP on arrival 141/90.  BP retaken and diastolic remains above 90.  Dre denies headaches or feeling light headed.  Call placed to Dr Aguila, order received to proceed with treatment today.  Dre instructed to obtain daily BP's and keep diary for next appointment with Dr Aguila.      Right upper chest port accessed in sterile fashion, + blood return observed.  Bevacizumab administered per MAR, Dre tolerated well. Port flushed with NS, heparin instilled and portde-accessed, gauze and tape to site.  Dre discharged in no apparent distress, next appointment confirmed.

## 2021-07-09 ENCOUNTER — OUTPATIENT INFUSION SERVICES (OUTPATIENT)
Dept: ONCOLOGY | Facility: MEDICAL CENTER | Age: 56
End: 2021-07-09
Attending: INTERNAL MEDICINE
Payer: MEDICAID

## 2021-07-09 VITALS
RESPIRATION RATE: 18 BRPM | WEIGHT: 193.12 LBS | DIASTOLIC BLOOD PRESSURE: 84 MMHG | HEIGHT: 69 IN | SYSTOLIC BLOOD PRESSURE: 146 MMHG | HEART RATE: 97 BPM | TEMPERATURE: 98.4 F | BODY MASS INDEX: 28.6 KG/M2 | OXYGEN SATURATION: 98 %

## 2021-07-09 DIAGNOSIS — C20 RECTAL CARCINOMA (HCC): ICD-10-CM

## 2021-07-09 LAB
APPEARANCE UR: CLEAR
COLOR UR AUTO: YELLOW
GLUCOSE UR QL STRIP.AUTO: NEGATIVE MG/DL
KETONES UR QL STRIP.AUTO: NEGATIVE MG/DL
LEUKOCYTE ESTERASE UR QL STRIP.AUTO: NEGATIVE
NITRITE UR QL STRIP.AUTO: NEGATIVE
PH UR STRIP.AUTO: 6 [PH] (ref 5–8)
PROT UR QL STRIP: NEGATIVE MG/DL
RBC UR QL AUTO: ABNORMAL
SP GR UR STRIP.AUTO: 1.02 (ref 1–1.03)

## 2021-07-09 PROCEDURE — 96413 CHEMO IV INFUSION 1 HR: CPT

## 2021-07-09 PROCEDURE — 700105 HCHG RX REV CODE 258: Performed by: INTERNAL MEDICINE

## 2021-07-09 PROCEDURE — A4212 NON CORING NEEDLE OR STYLET: HCPCS

## 2021-07-09 PROCEDURE — 700111 HCHG RX REV CODE 636 W/ 250 OVERRIDE (IP): Performed by: INTERNAL MEDICINE

## 2021-07-09 PROCEDURE — 81002 URINALYSIS NONAUTO W/O SCOPE: CPT

## 2021-07-09 RX ORDER — HEPARIN SODIUM (PORCINE) LOCK FLUSH IV SOLN 100 UNIT/ML 100 UNIT/ML
500 SOLUTION INTRAVENOUS PRN
Status: DISCONTINUED | OUTPATIENT
Start: 2021-07-09 | End: 2021-07-09 | Stop reason: HOSPADM

## 2021-07-09 RX ADMIN — HEPARIN 500 UNITS: 100 SYRINGE at 13:00

## 2021-07-09 RX ADMIN — SODIUM CHLORIDE 600 MG: 9 INJECTION, SOLUTION INTRAVENOUS at 12:24

## 2021-07-09 ASSESSMENT — FIBROSIS 4 INDEX: FIB4 SCORE: 1.59099025766973193

## 2021-07-09 NOTE — PROGRESS NOTES
Chemotherapy Verification - PRIMARY RN      Height = 174 cm  Weight = 87.6 kg  BSA = 2.06 m2       Medication: bevacizumab-bvzr  Dose: 7.5 mg/kg    Calculated Dose: 657 mg                               I confirm this process was performed independently with the BSA and all final chemotherapy dosing calculations congruent.  Any discrepancies of 10% or greater have been addressed with the chemotherapy pharmacist. The resolution of the discrepancy has been documented in the EPIC progress notes.

## 2021-07-09 NOTE — PROGRESS NOTES
"Pharmacy Chemotherapy Calculations Note:    Patient Name: Dre Eaton      Dx: Metastatic Rectal Adenocarcinoma    Cycle 15  Previous treatment: C14 = 6/15/21     Protocol: Capecitabine + Bevacizumab     Bevacizumab 7.5 mg/kg IV on Day 1  Capecitabine 850 - 1250 mg/m2 PO twice daily on Days 1-14   21 day cycle until disease progression or unacceptable toxicity   NCCN Guidelines for Rectal Cancer V.2.2020  Herman EVANS, et al. Lancet Oncol. 2013;14(11):6777-1307  Van Emiliano E, et al. Briseida Oncol. 2009;20(11):1842-7         /84   Pulse 97   Temp 36.9 °C (98.4 °F) (Temporal)   Resp 18   Ht 1.74 m (5' 8.5\")   Wt 87.6 kg (193 lb 2 oz)   SpO2 98%   BMI 28.93 kg/m²  Body surface area is 2.06 meters squared.    Labs 7/9/21:  Urine Protein =  negative BP = 146/84     Bevacizumab-bvzr (Zirabev) 7.5 mg/kg x 87.6 kg  = 657 mg   Rounded to nearest vial size (within 10%) per rounding protocol   Okay to treat with final dose = 600 mg IV    Angelina Garay, PharmD               "

## 2021-07-09 NOTE — PROGRESS NOTES
Chemotherapy Verification - SECONDARY RN     D15C1    Height = 174 cm  Weight = 87.6 kg  BSA = 2.06 m2       Medication: Bevacizumab-bvr (Zirabev)  Dose: 7.5 mg/kg  Calculated Dose: 657 mg - dose rounded to 600 mg per MAB protocol                              I confirm that this process was performed independently.

## 2021-07-09 NOTE — PROGRESS NOTES
"Pharmacy Chemotherapy Verification    Patient Name: Dre Eaton     Diagnosis: Metastatic rectal adenocarcinoa     Regimen: Capecitabine + Bevacizumab     Bevacizumab 7.5 mg/kg IV on Day 1  Capecitabine 850 - 1250 mg/m2 PO twice daily on Days 1-14  21 day cycle until disease progression or unacceptable toxicity   NCCN Guidelines for Rectal Cancer V.2.2020  Herman EVANS et al. Lancet Oncol. 2013;14(11):9403-6045  Van Emiliano E, et al. Briseida Oncol. 2009;20(11):1842-7    Allergies: Patient has no known allergies.     /84   Pulse 97   Temp 36.9 °C (98.4 °F) (Temporal)   Resp 18   Ht 1.74 m (5' 8.5\")   Wt 87.6 kg (193 lb 2 oz)   SpO2 98%   BMI 28.93 kg/m²  Body surface area is 2.06 meters squared.    Labs 7/9/21  Urine protein = Negative  BP = 146/84    Drug Order   (Drug name, dose, route, IV Fluid & volume, frequency, number of doses) Cycle 15  Previous treatment: C14 6/15/21   Medication = Bevacizumab-bvzr (Zirabev)  Base Dose= 7.5 mg/kg  Calc Dose: Base Dose x 87.6 kg = 657 mg  Final Dose = 600 mg  Route = IV  Fluid & Volume =   mL  Admin Duration = Over 30 minutes          Rounded to vial size (within 10%) per rounding protocol, okay to treat with final dose   By my signature below, I confirm this process was performed independently with the BSA and all final chemotherapy dosing calculations congruent. I have reviewed the above chemotherapy order and that my calculation of the final dose and BSA (when applicable) corroborate those calculations of the  pharmacist. Discrepancies of 10% or greater in the written dose have been addressed and documented within the [facility] Progress notes.    Karsten Cannon, PharmD                      "

## 2021-07-14 RX ORDER — 0.9 % SODIUM CHLORIDE 0.9 %
10 VIAL (ML) INJECTION PRN
Status: CANCELLED | OUTPATIENT
Start: 2021-07-27

## 2021-07-14 RX ORDER — 0.9 % SODIUM CHLORIDE 0.9 %
VIAL (ML) INJECTION PRN
Status: CANCELLED | OUTPATIENT
Start: 2021-07-27

## 2021-07-14 RX ORDER — ONDANSETRON 2 MG/ML
4 INJECTION INTRAMUSCULAR; INTRAVENOUS PRN
Status: CANCELLED | OUTPATIENT
Start: 2021-07-27

## 2021-07-14 RX ORDER — METHYLPREDNISOLONE SODIUM SUCCINATE 125 MG/2ML
125 INJECTION, POWDER, LYOPHILIZED, FOR SOLUTION INTRAMUSCULAR; INTRAVENOUS PRN
Status: CANCELLED | OUTPATIENT
Start: 2021-07-27

## 2021-07-14 RX ORDER — SODIUM CHLORIDE 9 MG/ML
INJECTION, SOLUTION INTRAVENOUS CONTINUOUS
Status: CANCELLED | OUTPATIENT
Start: 2021-07-27

## 2021-07-14 RX ORDER — EPINEPHRINE 1 MG/ML(1)
0.5 AMPUL (ML) INJECTION PRN
Status: CANCELLED | OUTPATIENT
Start: 2021-07-27

## 2021-07-14 RX ORDER — HEPARIN SODIUM (PORCINE) LOCK FLUSH IV SOLN 100 UNIT/ML 100 UNIT/ML
500 SOLUTION INTRAVENOUS PRN
Status: CANCELLED | OUTPATIENT
Start: 2021-07-27

## 2021-07-14 RX ORDER — DIPHENHYDRAMINE HYDROCHLORIDE 50 MG/ML
50 INJECTION INTRAMUSCULAR; INTRAVENOUS PRN
Status: CANCELLED | OUTPATIENT
Start: 2021-07-27

## 2021-07-14 RX ORDER — ONDANSETRON 8 MG/1
8 TABLET, ORALLY DISINTEGRATING ORAL PRN
Status: CANCELLED | OUTPATIENT
Start: 2021-07-27

## 2021-07-14 RX ORDER — PROCHLORPERAZINE MALEATE 10 MG
10 TABLET ORAL EVERY 6 HOURS PRN
Status: CANCELLED | OUTPATIENT
Start: 2021-07-27

## 2021-07-14 RX ORDER — 0.9 % SODIUM CHLORIDE 0.9 %
3 VIAL (ML) INJECTION PRN
Status: CANCELLED | OUTPATIENT
Start: 2021-07-27

## 2021-07-27 ENCOUNTER — OUTPATIENT INFUSION SERVICES (OUTPATIENT)
Dept: ONCOLOGY | Facility: MEDICAL CENTER | Age: 56
End: 2021-07-27
Attending: INTERNAL MEDICINE
Payer: MEDICAID

## 2021-07-27 VITALS
WEIGHT: 192.9 LBS | DIASTOLIC BLOOD PRESSURE: 82 MMHG | SYSTOLIC BLOOD PRESSURE: 136 MMHG | OXYGEN SATURATION: 100 % | RESPIRATION RATE: 18 BRPM | HEART RATE: 69 BPM | HEIGHT: 69 IN | BODY MASS INDEX: 28.57 KG/M2 | TEMPERATURE: 98 F

## 2021-07-27 DIAGNOSIS — C20 RECTAL CARCINOMA (HCC): ICD-10-CM

## 2021-07-27 LAB
APPEARANCE UR: CLEAR
COLOR UR AUTO: YELLOW
GLUCOSE UR QL STRIP.AUTO: NEGATIVE MG/DL
KETONES UR QL STRIP.AUTO: NEGATIVE MG/DL
LEUKOCYTE ESTERASE UR QL STRIP.AUTO: NEGATIVE
NITRITE UR QL STRIP.AUTO: NEGATIVE
PH UR STRIP.AUTO: 5.5 [PH] (ref 5–8)
PROT UR QL STRIP: NEGATIVE MG/DL
RBC UR QL AUTO: ABNORMAL
SP GR UR STRIP.AUTO: >=1.03 (ref 1–1.03)

## 2021-07-27 PROCEDURE — A4212 NON CORING NEEDLE OR STYLET: HCPCS

## 2021-07-27 PROCEDURE — 700111 HCHG RX REV CODE 636 W/ 250 OVERRIDE (IP): Performed by: INTERNAL MEDICINE

## 2021-07-27 PROCEDURE — 96413 CHEMO IV INFUSION 1 HR: CPT

## 2021-07-27 PROCEDURE — 81002 URINALYSIS NONAUTO W/O SCOPE: CPT

## 2021-07-27 PROCEDURE — 700105 HCHG RX REV CODE 258: Performed by: INTERNAL MEDICINE

## 2021-07-27 RX ORDER — 0.9 % SODIUM CHLORIDE 0.9 %
VIAL (ML) INJECTION PRN
Status: DISCONTINUED | OUTPATIENT
Start: 2021-07-27 | End: 2021-07-27 | Stop reason: HOSPADM

## 2021-07-27 RX ORDER — HEPARIN SODIUM (PORCINE) LOCK FLUSH IV SOLN 100 UNIT/ML 100 UNIT/ML
500 SOLUTION INTRAVENOUS PRN
Status: DISCONTINUED | OUTPATIENT
Start: 2021-07-27 | End: 2021-07-27 | Stop reason: HOSPADM

## 2021-07-27 RX ADMIN — HEPARIN 500 UNITS: 100 SYRINGE at 13:25

## 2021-07-27 RX ADMIN — SODIUM CHLORIDE 600 MG: 9 INJECTION, SOLUTION INTRAVENOUS at 12:38

## 2021-07-27 ASSESSMENT — FIBROSIS 4 INDEX: FIB4 SCORE: 1.59099025766973193

## 2021-07-27 NOTE — PROGRESS NOTES
Patient here for here for becacizumab-bvzr (Zirabev). Right upper chest port accessed using sterile technique; brisk blood return noted. UA collected. UA results and blood pressure within parameters. Denies any open/unhealed wounds. Bevacizumab-bvzr (Zirabev) given per MAR. Heparin instilled prior to de-accessing port. Port de-accessed; gauze/coban applied to site. Next appointment scheduled. Discharged to self care; no apparent distress noted.

## 2021-07-27 NOTE — PROGRESS NOTES
Chemotherapy Verification - SECONDARY RN       Height = 174 cm  Weight = 87.5 kg  BSA = 2.06 m^2       Medication: bevacizumab-bvzr  Dose: 7.5 mg/kg  Calculated Dose: 656.25 mg                             (In mg/m2, AUC, mg/kg)         I confirm that this process was performed independently.

## 2021-07-27 NOTE — PROGRESS NOTES
"Pharmacy Chemotherapy Calculations Note:    Patient Name: Dre Eaton      Dx: Metastatic Rectal Adenocarcinoma    Cycle 16  Previous treatment: C15 = 7/9/21     Protocol: Capecitabine + Bevacizumab     Bevacizumab 7.5 mg/kg IV on Day 1  Capecitabine 850 - 1250 mg/m2 PO twice daily on Days 1-14   21 day cycle until disease progression or unacceptable toxicity   NCCN Guidelines for Rectal Cancer V.2.2020  Herman EVANS, et al. Lancet Oncol. 2013;14(11):8421-8758  Van Emiliano E, et al. Briseida Oncol. 2009;20(11):1842-7         /90   Pulse 69   Temp 36.7 °C (98 °F) (Temporal)   Resp 18   Ht 1.74 m (5' 8.5\")   Wt 87.5 kg (192 lb 14.4 oz)   SpO2 100%   BMI 28.90 kg/m²  Body surface area is 2.06 meters squared.    Labs 7/27/21:  Urine Protein =  Negative BP = 136/82     Bevacizumab-bvzr (Zirabev) 7.5 mg/kg x 87.5 kg  = 656mg   Rounded to nearest vial size (within 10%) per rounding protocol   Okay to treat with final dose = 600 mg IV    Bal Grande, PharmD               "

## 2021-07-27 NOTE — PROGRESS NOTES
Chemotherapy Verification - PRIMARY RN      Height = 174 cm  Weight = 87.5 kg  BSA = 2.06 m^2       Medication: bevacizumab-bvzr (Zirabev)  Dose: 7.5 mg/kg  Calculated Dose: 656.25 mg                             (In mg/m2, AUC, mg/kg)     I confirm this process was performed independently with the BSA and all final chemotherapy dosing calculations congruent.  Any discrepancies of 10% or greater have been addressed with the chemotherapy pharmacist. The resolution of the discrepancy has been documented in the EPIC progress notes.

## 2021-07-30 ENCOUNTER — HOSPITAL ENCOUNTER (OUTPATIENT)
Dept: RADIOLOGY | Facility: MEDICAL CENTER | Age: 56
End: 2021-07-30
Attending: INTERNAL MEDICINE
Payer: MEDICAID

## 2021-07-30 DIAGNOSIS — C20 MALIGNANT NEOPLASM OF RECTUM (HCC): ICD-10-CM

## 2021-07-30 PROCEDURE — 700117 HCHG RX CONTRAST REV CODE 255: Performed by: INTERNAL MEDICINE

## 2021-07-30 PROCEDURE — 71260 CT THORAX DX C+: CPT

## 2021-07-30 RX ADMIN — IOHEXOL 100 ML: 350 INJECTION, SOLUTION INTRAVENOUS at 14:25

## 2021-07-30 RX ADMIN — IOHEXOL 25 ML: 240 INJECTION, SOLUTION INTRATHECAL; INTRAVASCULAR; INTRAVENOUS; ORAL at 13:10

## 2021-11-09 ENCOUNTER — APPOINTMENT (OUTPATIENT)
Dept: ONCOLOGY | Facility: MEDICAL CENTER | Age: 56
End: 2021-11-09
Attending: INTERNAL MEDICINE
Payer: MEDICAID

## 2022-02-10 ENCOUNTER — HOSPITAL ENCOUNTER (OUTPATIENT)
Dept: RADIOLOGY | Facility: MEDICAL CENTER | Age: 57
End: 2022-02-10
Attending: INTERNAL MEDICINE
Payer: MEDICARE

## 2022-02-10 DIAGNOSIS — Z79.899 NEED FOR PROPHYLACTIC CHEMOTHERAPY: ICD-10-CM

## 2022-02-10 DIAGNOSIS — Z51.11 ENCOUNTER FOR ANTINEOPLASTIC CHEMOTHERAPY: ICD-10-CM

## 2022-02-10 DIAGNOSIS — D50.0 IRON DEFICIENCY ANEMIA SECONDARY TO BLOOD LOSS (CHRONIC): ICD-10-CM

## 2022-02-10 DIAGNOSIS — Z45.2 FITTING AND ADJUSTMENT OF VASCULAR CATHETER: ICD-10-CM

## 2022-02-10 DIAGNOSIS — C20 MALIGNANT NEOPLASM OF RECTUM (HCC): ICD-10-CM

## 2022-02-10 DIAGNOSIS — Z51.12 ENCOUNTER FOR ANTINEOPLASTIC IMMUNOTHERAPY: ICD-10-CM

## 2022-02-10 PROCEDURE — 71260 CT THORAX DX C+: CPT

## 2022-02-10 PROCEDURE — 700117 HCHG RX CONTRAST REV CODE 255: Performed by: INTERNAL MEDICINE

## 2022-02-10 PROCEDURE — 700111 HCHG RX REV CODE 636 W/ 250 OVERRIDE (IP): Performed by: RADIOLOGY

## 2022-02-10 RX ORDER — HEPARIN SODIUM (PORCINE) LOCK FLUSH IV SOLN 100 UNIT/ML 100 UNIT/ML
SOLUTION INTRAVENOUS
Status: DISPENSED
Start: 2022-02-10 | End: 2022-02-10

## 2022-02-10 RX ORDER — HEPARIN SODIUM (PORCINE) LOCK FLUSH IV SOLN 100 UNIT/ML 100 UNIT/ML
300-500 SOLUTION INTRAVENOUS ONCE
Status: COMPLETED | OUTPATIENT
Start: 2022-02-10 | End: 2022-02-10

## 2022-02-10 RX ADMIN — IOHEXOL 100 ML: 350 INJECTION, SOLUTION INTRAVENOUS at 10:16

## 2022-02-10 RX ADMIN — HEPARIN 500 UNITS: 100 SYRINGE at 10:21

## 2022-02-10 RX ADMIN — IOHEXOL 25 ML: 240 INJECTION, SOLUTION INTRATHECAL; INTRAVASCULAR; INTRAVENOUS; ORAL at 10:16

## 2022-02-11 ENCOUNTER — PATIENT MESSAGE (OUTPATIENT)
Dept: HEALTH INFORMATION MANAGEMENT | Facility: OTHER | Age: 57
End: 2022-02-11
Payer: MEDICARE

## 2022-02-11 RX ORDER — 0.9 % SODIUM CHLORIDE 0.9 %
10 VIAL (ML) INJECTION PRN
Status: CANCELLED | OUTPATIENT
Start: 2022-03-21

## 2022-02-11 RX ORDER — 0.9 % SODIUM CHLORIDE 0.9 %
20 VIAL (ML) INJECTION PRN
Status: CANCELLED | OUTPATIENT
Start: 2022-03-24

## 2022-02-11 RX ORDER — HEPARIN SODIUM (PORCINE) LOCK FLUSH IV SOLN 100 UNIT/ML 100 UNIT/ML
500 SOLUTION INTRAVENOUS PRN
Status: CANCELLED | OUTPATIENT
Start: 2022-03-24

## 2022-02-11 RX ORDER — 0.9 % SODIUM CHLORIDE 0.9 %
10 VIAL (ML) INJECTION PRN
Status: CANCELLED | OUTPATIENT
Start: 2022-03-22

## 2022-02-11 RX ORDER — DEXTROSE MONOHYDRATE 50 MG/ML
INJECTION, SOLUTION INTRAVENOUS CONTINUOUS
Status: CANCELLED | OUTPATIENT
Start: 2022-03-22

## 2022-02-11 RX ORDER — 0.9 % SODIUM CHLORIDE 0.9 %
VIAL (ML) INJECTION PRN
Status: CANCELLED | OUTPATIENT
Start: 2022-03-22

## 2022-02-11 RX ORDER — PROCHLORPERAZINE MALEATE 10 MG
10 TABLET ORAL EVERY 6 HOURS PRN
Status: CANCELLED | OUTPATIENT
Start: 2022-03-22

## 2022-02-11 RX ORDER — LOPERAMIDE HYDROCHLORIDE 2 MG/1
4 CAPSULE ORAL PRN
Status: CANCELLED | OUTPATIENT
Start: 2022-03-22

## 2022-02-11 RX ORDER — ONDANSETRON 2 MG/ML
4 INJECTION INTRAMUSCULAR; INTRAVENOUS PRN
Status: CANCELLED | OUTPATIENT
Start: 2022-03-22

## 2022-02-11 RX ORDER — HEPARIN SODIUM (PORCINE) LOCK FLUSH IV SOLN 100 UNIT/ML 100 UNIT/ML
500 SOLUTION INTRAVENOUS PRN
Status: CANCELLED | OUTPATIENT
Start: 2022-03-21

## 2022-02-11 RX ORDER — EPINEPHRINE 1 MG/ML(1)
0.5 AMPUL (ML) INJECTION PRN
Status: CANCELLED | OUTPATIENT
Start: 2022-03-22

## 2022-02-11 RX ORDER — DIPHENHYDRAMINE HYDROCHLORIDE 50 MG/ML
50 INJECTION INTRAMUSCULAR; INTRAVENOUS PRN
Status: CANCELLED | OUTPATIENT
Start: 2022-03-22

## 2022-02-11 RX ORDER — 0.9 % SODIUM CHLORIDE 0.9 %
3 VIAL (ML) INJECTION PRN
Status: CANCELLED | OUTPATIENT
Start: 2022-03-21

## 2022-02-11 RX ORDER — HEPARIN SODIUM (PORCINE) LOCK FLUSH IV SOLN 100 UNIT/ML 100 UNIT/ML
500 SOLUTION INTRAVENOUS PRN
Status: CANCELLED | OUTPATIENT
Start: 2022-03-22

## 2022-02-11 RX ORDER — LOPERAMIDE HYDROCHLORIDE 2 MG/1
2 CAPSULE ORAL
Status: CANCELLED | OUTPATIENT
Start: 2022-03-22

## 2022-02-11 RX ORDER — METHYLPREDNISOLONE SODIUM SUCCINATE 125 MG/2ML
125 INJECTION, POWDER, LYOPHILIZED, FOR SOLUTION INTRAMUSCULAR; INTRAVENOUS PRN
Status: CANCELLED | OUTPATIENT
Start: 2022-03-22

## 2022-02-11 RX ORDER — SODIUM CHLORIDE 9 MG/ML
INJECTION, SOLUTION INTRAVENOUS CONTINUOUS
Status: CANCELLED | OUTPATIENT
Start: 2022-03-22

## 2022-02-11 RX ORDER — 0.9 % SODIUM CHLORIDE 0.9 %
3 VIAL (ML) INJECTION PRN
Status: CANCELLED | OUTPATIENT
Start: 2022-03-22

## 2022-02-11 RX ORDER — 0.9 % SODIUM CHLORIDE 0.9 %
VIAL (ML) INJECTION PRN
Status: CANCELLED | OUTPATIENT
Start: 2022-03-21

## 2022-02-11 RX ORDER — ONDANSETRON 8 MG/1
8 TABLET, ORALLY DISINTEGRATING ORAL PRN
Status: CANCELLED | OUTPATIENT
Start: 2022-03-22

## 2022-02-14 ENCOUNTER — HOSPITAL ENCOUNTER (OUTPATIENT)
Dept: RADIATION ONCOLOGY | Facility: MEDICAL CENTER | Age: 57
End: 2022-02-28
Attending: RADIOLOGY
Payer: MEDICARE

## 2022-02-14 VITALS
SYSTOLIC BLOOD PRESSURE: 120 MMHG | HEART RATE: 95 BPM | OXYGEN SATURATION: 98 % | HEIGHT: 70 IN | BODY MASS INDEX: 28.49 KG/M2 | WEIGHT: 199 LBS | DIASTOLIC BLOOD PRESSURE: 77 MMHG | TEMPERATURE: 98.5 F

## 2022-02-14 DIAGNOSIS — C20 RECTAL CARCINOMA (HCC): ICD-10-CM

## 2022-02-14 PROBLEM — Z93.9 HISTORY OF CREATION OF OSTOMY (HCC): Status: ACTIVE | Noted: 2017-05-01

## 2022-02-14 PROCEDURE — 99215 OFFICE O/P EST HI 40 MIN: CPT | Performed by: RADIOLOGY

## 2022-02-14 PROCEDURE — 99214 OFFICE O/P EST MOD 30 MIN: CPT | Performed by: RADIOLOGY

## 2022-02-14 RX ORDER — LISINOPRIL 10 MG/1
TABLET ORAL
COMMUNITY
Start: 2022-01-26 | End: 2022-03-22

## 2022-02-14 ASSESSMENT — PAIN SCALES - GENERAL: PAINLEVEL: NO PAIN

## 2022-02-14 ASSESSMENT — FIBROSIS 4 INDEX: FIB4 SCORE: 1.62

## 2022-02-14 NOTE — CONSULTS
RADIATION ONCOLOGY CONSULT    DATE OF SERVICE: 2/14/2022    IDENTIFICATION: A 56 y.o. male with recurrent rectal cancer now with multiple skin nodules just above the penis..  He is here at the kind request of Dr. Aguila.      HISTORY OF PRESENT ILLNESS: Patient's history dates back to 2013 when he  presented with rectal bleeding and underwent a biopsy on 10/14/2013  revealing moderately differentiated adenocarcinoma. 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 At that time he had a large ulcerated mass  that was palpable up to 10 cm. He elected to forego standard conventional  treatment. He was seen at [facility] in 2017 and underwent a diverting  colostomy because of the pain with bowel movements. He again decided to  forego conventional therapy. More recently he saw Dr. Arellano and  underwent homeopathic therapy for 8 weeks but the tumor continued to grow  and he was not feeling good under the treatment. Now the tumor is massive  and causing discomfort he is unable to sit. PET CT scan was performed  2/6/2019 this revealed a large rectal/presacral mass with soft tissue  extension bilateral inguinal masses. He received [place] ox with avastin starting 9/27/2019 and then received concurrent chemotherapy and radiation therapy with Xeloda completing therapy on 5/20/2020. He went on a treatment break 8/20/2021.Restaging scans had shown stable disease.His neuropathy was also improving.  Over the last 2 to 3 weeks he has noticed some swelling just superior to his penis and then multiple nodules have surfacedJust above the penile bulb skin. CT scan 2/10/2022 showed conglomerate mass surrounding the rectum measures 5.9 x 4.5 previously 5.2 x 4.6.  There is a new left perineal mass adjacent to the base of the penis measuring 3.5 x 3.4 consistent with local disease.  No other evidence of malignancy within the chest abdomen pelvis.  Presence of left lower quadrant colostomy.  Changes the pelvis and bladder consistent  with radiation therapy.  Today he is complaining of discomfort in this area.  He seen in consultation today about the role of radiation therapy.  PAST MEDICAL HISTORY:   Past Medical History:   Diagnosis Date   • Hemorrhoid    • Malignant neoplasm of rectum (HCC)        PAST SURGICAL HISTORY:  Past Surgical History:   Procedure Laterality Date   • COLONOSCOPY WITH BIOPSY  10/14/2013    Performed by Julio Cesar Sosa M.D. at ENDOSCOPY [facility]         CURRENT MEDICATIONS:  Current Outpatient Medications   Medication Sig Dispense Refill   • lisinopril (PRINIVIL) 10 MG Tab      • capecitabine (XELODA) 500 MG tablet Take 500 mg by mouth 2 times a day. Takes 3 tablets (1,500mg) PO bid     • HOMEOPATHIC PRODUCTS PO Take  by mouth.     • cyanocobalamin (VITAMIN B-12) 500 MCG Tab Take 500 mcg by mouth every day.     • vitamin D (CHOLECALCIFEROL) 1000 UNIT Tab Take 1,000 Units by mouth every day. Pt takes 3,000 units every other day     • Ascorbic Acid (VITAMIN C PO) Take 1,000 mg by mouth every day. Powder form       No current facility-administered medications for this encounter.       ALLERGIES:    Patient has no known allergies.    FAMILY HISTORY:    Family History   Problem Relation Age of Onset   • Cancer Father         Unk    [unfilled]        SOCIAL HISTORY:     reports that he has never smoked. He has never used smokeless tobacco. He reports previous alcohol use. He reports that he does not use drugs.  Patient is a 56 year old male who resides in [place] with his spouse, they have 3 children. He works as an .    REVIEW OF SYSTEMS: Pertinent positives consist of  Rectal mass pubic mass and neuropathy from chemo  The rest of the review of systems is negative and has been reviewed.     PAIN:   Patient reports no acute/chronic pain      PHYSICAL EXAM:    0= Fully active, able to carry on all pre-disease performance without restriction.  Vitals:    02/14/22 1300   BP: 120/77   BP Location: Right arm  "  Patient Position: Sitting   BP Cuff Size: Adult   Pulse: 95   Temp: 36.9 °C (98.5 °F)   TempSrc: Temporal   SpO2: 98%   Weight: 90.3 kg (199 lb)   Height: 1.778 m (5' 10\")   Pain Score: No pain        GENERAL: Alert oriented x3 in no apparent distress  Rectal: there is  a mass present in the rectum protruding from the anus with  serosanguineous liquid expressed when patient bends over.  Also just above the penis are multiple nodules which are erythematous measuring upwards of 1 cm.  None have broken over at this point.  No inguinal femoral lymph nodes palpated  HEENT:  Pupils are equal, round, and reactive to light.  Extraocular muscles   are intact. Sclerae nonicteric.  Conjunctivae pink.  Oral cavity, tongue   protrudes midline.   NECK:   No peripheral adenopathy of the neck, supraclavicular fossa or axillae   bilaterally.  LUNGS:  Clear to ascultation   HEART:  Regular rate and rhythm.  No murmur appreciated  ABDOMEN:  Soft. No evidence of hepatosplenomegaly.    EXTREMITIES:  Without Edema.  NEUROLOGIC:  Cranial nerves II through XII were intact. Normal stance and gait motor and sensory grossly within normal limits          IMPRESSION:    A 56 y.o. with  recurrent disease in the peritoneum and penile region causing discomfort..      RECOMMENDATIONS:   I had a long discussion with the patient and Dr Werner regarding treatment.  He understands it will not be curative but we could give a short palliative course of radiation therapy somewhere on the order of 5-10 treatments to this area.  We believe after fusing images that this received about 3500 rad.  I've described the details of radiation along with the side effects both acute and chronic, including but not exclusive to fatigue, skin reaction, local soreness, swelling, delayed healing, scarring fibrosis discoloration. Ample time was allowed for questions, and patient understands.    We are also ordering a stat PET CT scan to further delineate this area so that " I can really target my field.There will be a simulation to follow.  Patient wants to go to [facility] for spring break with his kids and I told him we will get this done before he goes.    Thank you for the opportunity to participate in his care.  If any questions or comments, please do not hesitate in calling.    Please note that this dictation was created using voice recognition software. I have made every reasonable attempt to correct obvious errors, but I expect that there are errors of grammar and possibly content that I did not discover before finalizing the note.

## 2022-02-14 NOTE — NON-PROVIDER
"Patient was seen today in clinic with Dr. Strange for consultation.  Vitals signs and weight were obtained and pain assessment was completed.  Allergies and medications were reviewed with the patient.     Vitals/Pain:  Vitals:    02/14/22 1300   BP: 120/77   BP Location: Right arm   Patient Position: Sitting   BP Cuff Size: Adult   Pulse: 95   Temp: 36.9 °C (98.5 °F)   TempSrc: Temporal   SpO2: 98%   Weight: 90.3 kg (199 lb)   Height: 1.778 m (5' 10\")   Pain Score: No pain        Allergies:   Patient has no known allergies.    Current Medications:  Current Outpatient Medications   Medication Sig Dispense Refill   • lisinopril (PRINIVIL) 10 MG Tab      • capecitabine (XELODA) 500 MG tablet Take 500 mg by mouth 2 times a day. Takes 3 tablets (1,500mg) PO bid     • HOMEOPATHIC PRODUCTS PO Take  by mouth.     • cyanocobalamin (VITAMIN B-12) 500 MCG Tab Take 500 mcg by mouth every day.     • vitamin D (CHOLECALCIFEROL) 1000 UNIT Tab Take 1,000 Units by mouth every day. Pt takes 3,000 units every other day     • Ascorbic Acid (VITAMIN C PO) Take 1,000 mg by mouth every day. Powder form       No current facility-administered medications for this encounter.         PCP:  Ayla Galicia R.N.  "

## 2022-02-14 NOTE — CT SIMULATION
PATIENT NAME Dre Eaton   PRIMARY PHYSICIAN Jerry Aguila [id number]   REFERRING PHYSICIAN Jerry Aguila M.D. 1965     Rectal carcinoma (HCC)  Staging form: Colon and Rectum, AJCC 8th Edition  - Clinical: Stage IIIC (cT4b, cN2, cM0) - Signed by Patti Strange M.D. on 4/8/2020  Laterality: Not applicable         Treatment Planning CT Simulation      Order Questions     Question Answer Comment    Is this for a new course of treatment? Yes     Is this an Addendum? No     Simulation Status Initial     Treatment Technique Electron Beam     Other Technique(s) Electron     Treatment Pattern/Frequency Daily     Slice Thickness 3mm     Scan Extent Pelvis     Bowel Preparation No     Treatment Device(s) Vac Adryan      Bolus 0.5 cm     Patient Position Supine frog leg    Treatment Machine No preference     In Vivo Dosimetry TLD     Other Orders Weekly Physics Check

## 2022-02-15 ENCOUNTER — PATIENT MESSAGE (OUTPATIENT)
Dept: HEALTH INFORMATION MANAGEMENT | Facility: OTHER | Age: 57
End: 2022-02-15
Payer: MEDICARE

## 2022-02-15 ENCOUNTER — HOSPITAL ENCOUNTER (OUTPATIENT)
Dept: RADIOLOGY | Facility: MEDICAL CENTER | Age: 57
End: 2022-02-15
Attending: RADIOLOGY
Payer: MEDICARE

## 2022-02-15 DIAGNOSIS — C20 RECTAL CARCINOMA (HCC): ICD-10-CM

## 2022-02-15 PROCEDURE — A9552 F18 FDG: HCPCS

## 2022-02-16 ENCOUNTER — HOSPITAL ENCOUNTER (OUTPATIENT)
Dept: RADIATION ONCOLOGY | Facility: MEDICAL CENTER | Age: 57
End: 2022-02-16
Payer: MEDICARE

## 2022-02-16 PROCEDURE — 77334 RADIATION TREATMENT AID(S): CPT | Performed by: RADIOLOGY

## 2022-02-16 PROCEDURE — 77334 RADIATION TREATMENT AID(S): CPT | Mod: 26 | Performed by: RADIOLOGY

## 2022-02-16 PROCEDURE — 77290 THER RAD SIMULAJ FIELD CPLX: CPT | Mod: 26 | Performed by: RADIOLOGY

## 2022-02-16 PROCEDURE — 77290 THER RAD SIMULAJ FIELD CPLX: CPT | Performed by: RADIOLOGY

## 2022-02-16 PROCEDURE — 77263 THER RADIOLOGY TX PLNG CPLX: CPT | Performed by: RADIOLOGY

## 2022-02-18 ENCOUNTER — PATIENT OUTREACH (OUTPATIENT)
Dept: HEALTH INFORMATION MANAGEMENT | Facility: OTHER | Age: 57
End: 2022-02-18
Payer: MEDICARE

## 2022-02-18 PROCEDURE — 77332 RADIATION TREATMENT AID(S): CPT | Mod: 26,XU | Performed by: RADIOLOGY

## 2022-02-18 PROCEDURE — 77307 TELETHX ISODOSE PLAN CPLX: CPT | Performed by: RADIOLOGY

## 2022-02-18 PROCEDURE — 77307 TELETHX ISODOSE PLAN CPLX: CPT | Mod: 26 | Performed by: RADIOLOGY

## 2022-02-18 PROCEDURE — 77334 RADIATION TREATMENT AID(S): CPT | Performed by: RADIOLOGY

## 2022-02-18 PROCEDURE — 77334 RADIATION TREATMENT AID(S): CPT | Mod: 26 | Performed by: RADIOLOGY

## 2022-02-18 PROCEDURE — 77332 RADIATION TREATMENT AID(S): CPT | Mod: XU | Performed by: RADIOLOGY

## 2022-02-18 NOTE — NON-PROVIDER
"[facility] Intake    • Diagnosis Type: Rectal Cancer    • Preferred Language:English   • Insurance: Medicare and Medicaid   • Dental Insurance: Medicaid; Last Appointment Date:  • Primary Care Provider Reviewed: yes  Last Appointment Date: \"unsure\"  • Introduced Dre Eaton to Oncology Support Services and provided contact information on 2/18/2022 .  • Patient Care Team:Dr. Aguila [facility]   • Current Barriers Identified Include: None at this time.  • Consensus [employer] Status: Activated, uses frequently.  • Communication Preferences: [employer] messages;Best Contact Number: [phone]  • Patient Contact's Reviewed: yes     Inbound call from pt. To complete intake. Pt. States he has not had a PCP in years, he would be interested in establishing care with provider at [facility]'s [facility] medical office, and would like to call back to schedule in the future. Informed pt. That he can call [phone] number to schedule or call me at direct line to have connected with schedulers. Pt. States he has family as his support system. Provided pt. With Support Group information and educated on [email or web address] website page.    Outcome:  Pt. Has no further needs at this time.          "

## 2022-02-23 ENCOUNTER — HOSPITAL ENCOUNTER (OUTPATIENT)
Dept: RADIATION ONCOLOGY | Facility: MEDICAL CENTER | Age: 57
End: 2022-02-23
Payer: MEDICARE

## 2022-02-23 LAB
CHEMOTHERAPY INFUSION START DATE: NORMAL
CHEMOTHERAPY RECORDS: 3000
CHEMOTHERAPY RECORDS: 6
CHEMOTHERAPY RECORDS: NORMAL
CHEMOTHERAPY RX CANCER: NORMAL
DATE 1ST CHEMO CANCER: NORMAL
RAD ONC ARIA COURSE LAST TREATMENT DATE: NORMAL
RAD ONC ARIA COURSE TREATMENT ELAPSED DAYS: NORMAL
RAD ONC ARIA REFERENCE POINT DOSAGE GIVEN TO DATE: 6
RAD ONC ARIA REFERENCE POINT DOSAGE GIVEN TO DATE: 6.41
RAD ONC ARIA REFERENCE POINT ID: NORMAL
RAD ONC ARIA REFERENCE POINT ID: NORMAL
RAD ONC ARIA REFERENCE POINT SESSION DOSAGE GIVEN: 6
RAD ONC ARIA REFERENCE POINT SESSION DOSAGE GIVEN: 6.41

## 2022-02-23 PROCEDURE — 77280 THER RAD SIMULAJ FIELD SMPL: CPT | Performed by: RADIOLOGY

## 2022-02-23 PROCEDURE — 77331 SPECIAL RADIATION DOSIMETRY: CPT | Mod: 26 | Performed by: RADIOLOGY

## 2022-02-23 PROCEDURE — 77331 SPECIAL RADIATION DOSIMETRY: CPT | Performed by: RADIOLOGY

## 2022-02-23 PROCEDURE — 77412 RADIATION TX DELIVERY LVL 3: CPT | Performed by: RADIOLOGY

## 2022-02-23 NOTE — ON TREATMENT VISIT
ON TREATMENT  NOTE  RADIATION ONCOLOGY DEPARTMENT    Patient name:  Dre Eaton    Primary Physician:  Jerry Aguila M.D. MRN: [id number]  CSN: [id number]   Referring physician:  Jerry Aguila M.D. : 1965, 56 y.o.     ENCOUNTER DATE:  22    DIAGNOSIS:    Rectal carcinoma (HCC)  Staging form: Colon and Rectum, AJCC 8th Edition  - Clinical: Stage IIIC (cT4b, cN2, cM0) - Signed by Patti Strange M.D. on 2020  Laterality: Not applicable      TREATMENT SUMMARY:  Aria Treatment Information        Some values may be hidden. Unless noted otherwise, only the newest values recorded on each date are displayed.         Aria Treatment Summary 22   Course First Treatment Date 2022   Course Last Treatment Date 2022   Groin Plan from Course C2_penile/perine   Fraction 1 of 5   Elapsed Course Days 0 @ [id number]   Prescribed Fraction Dose 600 cGy   Prescribed Total Dose 3,000 cGy   Groin Reference Point from Course C2_penile/perine   Elapsed Course Days 0 @ [id number]   Session Dose 600 cGy   Total Dose 600 cGy   Groin CP Reference Point from Course C2_penile/perine   Elapsed Course Days 0 @ [id number]   Session Dose 641 cGy   Total Dose 641 cGy   Pelvis Plan from Course C1_rectum   Rectum_Bst1 Plan from Course C1_rectum   Rectum_Bst2 Plan from Course C1_rectum   Pelvis Reference Point from Course C1_rectum   Pelvis CP Reference Point from Course C1_rectum   Rectum_Bst1 Reference Point from Course C1_rectum   Rectum_Bst1 CP Reference Point from Course C1_rectum   Rectum_Bst2 Reference Point from Course C1_rectum   Rectum_Bst2 CP Reference Point from Course C1_rectum             SUBJECTIVE:  Tolerated first treatment without event        VITAL SIGNS:  KPS: 100, Fully active, able to carry on all pre-disease performed without restriction (ECOG equivalent 0)     Pain Assessment 2022   Pain Score 0   Some recent data might be hidden          PHYSICAL EXAM:  No  change      Toxicity Assessment 5/20/2020 5/13/2020 5/6/2020 4/29/2020 4/22/2020 4/15/2020 4/8/2020   Toxicity Assessment Male Pelvis Male Pelvis Male Pelvis Male Pelvis Male Pelvis Male Pelvis Male Pelvis   Fatigue (lethargy, malaise, asthenia) None Increased fatigue over baseline, but not altering normal activities Increased fatigue over baseline, but not altering normal activities Increased fatigue over baseline, but not altering normal activities Increased fatigue over baseline, but not altering normal activities None None   Radiation Dermatitis Moderate to brisk erythema or a patchy moist desquamation, mostly confined to skin folds and creases, with/without moderate edema Moderate to brisk erythema or a patchy moist desquamation, mostly confined to skin folds and creases, with/without moderate edema Faint erythema or dry desquamation Faint erythema or dry desquamation Faint erythema or dry desquamation None None   Anorexia None None None None None None None   Colitis None - None None None Abdominal pain with mucus and/or blood in stool None   Constipation None None None None None None None   Dehydration None None None None None None None   Diarrhea w/o Colostomy None None None None None None None   Flatulence None None None - Mild None None   Nausea None None None - None None None   Proctitis None None None - None None None   Vomiting None None None None None None None   RT - Pain due to RT Mild pain not interfering with function Mild pain not interfering with function Mild pain not interfering with function Mild pain not interfering with function None None None   Tumor Pain (onset or exacerbation of tumor pain due to treatment) - Mild pain not interfering with function Mild pain not interfering with function Mild pain not interfering with function None None None   Dysuria (painful urination) None None None None - None None   Urinary Frequency Normal Normal Normal Normal - Normal Normal   Urinary Urgency None  None None None - None None   Bladder Spasms Absent Absent Absent Absent - Absent Absent   Incontinence None None None None - None None   Urinary Retention Normal Normal Normal Normal - Normal Normal           IMPRESSION:  Cancer Staging  Rectal carcinoma (HCC)  Staging form: Colon and Rectum, AJCC 8th Edition  - Clinical: Stage IIIC (cT4b, cN2, cM0) - Signed by Patti Strange M.D. on 4/8/2020      PLAN:  No change in treatment plan    Disposition:  Treatment plan reviewed. Questions answered. Continue therapy outlined.     Patti Strange M.D.    No orders of the defined types were placed in this encounter.

## 2022-02-25 ENCOUNTER — HOSPITAL ENCOUNTER (OUTPATIENT)
Dept: RADIATION ONCOLOGY | Facility: MEDICAL CENTER | Age: 57
End: 2022-02-25
Payer: MEDICARE

## 2022-02-25 LAB
CHEMOTHERAPY INFUSION START DATE: NORMAL
CHEMOTHERAPY RECORDS: 3000
CHEMOTHERAPY RECORDS: 6
CHEMOTHERAPY RECORDS: NORMAL
CHEMOTHERAPY RX CANCER: NORMAL
DATE 1ST CHEMO CANCER: NORMAL
RAD ONC ARIA COURSE LAST TREATMENT DATE: NORMAL
RAD ONC ARIA COURSE TREATMENT ELAPSED DAYS: NORMAL
RAD ONC ARIA REFERENCE POINT DOSAGE GIVEN TO DATE: 12
RAD ONC ARIA REFERENCE POINT DOSAGE GIVEN TO DATE: 12.82
RAD ONC ARIA REFERENCE POINT ID: NORMAL
RAD ONC ARIA REFERENCE POINT ID: NORMAL
RAD ONC ARIA REFERENCE POINT SESSION DOSAGE GIVEN: 6
RAD ONC ARIA REFERENCE POINT SESSION DOSAGE GIVEN: 6.41

## 2022-02-25 PROCEDURE — 77412 RADIATION TX DELIVERY LVL 3: CPT | Performed by: RADIOLOGY

## 2022-02-28 ENCOUNTER — HOSPITAL ENCOUNTER (OUTPATIENT)
Dept: RADIATION ONCOLOGY | Facility: MEDICAL CENTER | Age: 57
End: 2022-02-28
Payer: MEDICARE

## 2022-02-28 LAB
CHEMOTHERAPY INFUSION START DATE: NORMAL
CHEMOTHERAPY RECORDS: 3000
CHEMOTHERAPY RECORDS: 6
CHEMOTHERAPY RECORDS: NORMAL
CHEMOTHERAPY RX CANCER: NORMAL
DATE 1ST CHEMO CANCER: NORMAL
RAD ONC ARIA COURSE LAST TREATMENT DATE: NORMAL
RAD ONC ARIA COURSE TREATMENT ELAPSED DAYS: NORMAL
RAD ONC ARIA REFERENCE POINT DOSAGE GIVEN TO DATE: 18
RAD ONC ARIA REFERENCE POINT DOSAGE GIVEN TO DATE: 19.23
RAD ONC ARIA REFERENCE POINT ID: NORMAL
RAD ONC ARIA REFERENCE POINT ID: NORMAL
RAD ONC ARIA REFERENCE POINT SESSION DOSAGE GIVEN: 6
RAD ONC ARIA REFERENCE POINT SESSION DOSAGE GIVEN: 6.41

## 2022-02-28 PROCEDURE — 77336 RADIATION PHYSICS CONSULT: CPT | Performed by: RADIOLOGY

## 2022-02-28 PROCEDURE — 77412 RADIATION TX DELIVERY LVL 3: CPT | Performed by: RADIOLOGY

## 2022-03-01 ENCOUNTER — HOSPITAL ENCOUNTER (OUTPATIENT)
Dept: RADIATION ONCOLOGY | Facility: MEDICAL CENTER | Age: 57
End: 2022-03-31
Attending: RADIOLOGY
Payer: MEDICARE

## 2022-03-02 ENCOUNTER — HOSPITAL ENCOUNTER (OUTPATIENT)
Dept: RADIATION ONCOLOGY | Facility: MEDICAL CENTER | Age: 57
End: 2022-03-02
Payer: MEDICARE

## 2022-03-02 LAB
CHEMOTHERAPY INFUSION START DATE: NORMAL
CHEMOTHERAPY RECORDS: 3000
CHEMOTHERAPY RECORDS: 6
CHEMOTHERAPY RECORDS: NORMAL
CHEMOTHERAPY RX CANCER: NORMAL
DATE 1ST CHEMO CANCER: NORMAL
RAD ONC ARIA COURSE LAST TREATMENT DATE: NORMAL
RAD ONC ARIA COURSE TREATMENT ELAPSED DAYS: NORMAL
RAD ONC ARIA REFERENCE POINT DOSAGE GIVEN TO DATE: 24
RAD ONC ARIA REFERENCE POINT DOSAGE GIVEN TO DATE: 25.64
RAD ONC ARIA REFERENCE POINT ID: NORMAL
RAD ONC ARIA REFERENCE POINT ID: NORMAL
RAD ONC ARIA REFERENCE POINT SESSION DOSAGE GIVEN: 6
RAD ONC ARIA REFERENCE POINT SESSION DOSAGE GIVEN: 6.41

## 2022-03-02 PROCEDURE — 77412 RADIATION TX DELIVERY LVL 3: CPT | Performed by: RADIOLOGY

## 2022-03-02 NOTE — ON TREATMENT VISIT
ON TREATMENT  NOTE  RADIATION ONCOLOGY DEPARTMENT    Patient name:  Dre Eaton    Primary Physician:  Jerry Aguila M.D. MRN: [id number]  CSN: [id number]   Referring physician:  Jerry Aguila M.D. : 1965, 56 y.o.     ENCOUNTER DATE:  22    DIAGNOSIS:    Rectal carcinoma (HCC)  Staging form: Colon and Rectum, AJCC 8th Edition  - Clinical: Stage IIIC (cT4b, cN2, cM0) - Signed by Patti Strange M.D. on 2020  Laterality: Not applicable      TREATMENT SUMMARY:  Aria Treatment Information        Some values may be hidden. Unless noted otherwise, only the newest values recorded on each date are displayed.         Aria Treatment Summary 3/2/22   Course First Treatment Date 2022   Course Last Treatment Date 2022   Groin Plan from Course C2_penile/perine   Fraction 4 of 5   Elapsed Course Days 7 @ [id number]   Prescribed Fraction Dose 600 cGy   Prescribed Total Dose 3,000 cGy   Groin Reference Point from Course C2_penile/perine   Elapsed Course Days 7 @ [id number]   Session Dose 600 cGy   Total Dose 2,400 cGy   Groin CP Reference Point from Course C2_penile/perine   Elapsed Course Days 7 @ [id number]   Session Dose 641 cGy   Total Dose 2,564 cGy   Pelvis Plan from Course C1_rectum   Rectum_Bst1 Plan from Course C1_rectum   Rectum_Bst2 Plan from Course C1_rectum   Pelvis Reference Point from Course C1_rectum   Pelvis CP Reference Point from Course C1_rectum   Rectum_Bst1 Reference Point from Course C1_rectum   Rectum_Bst1 CP Reference Point from Course C1_rectum   Rectum_Bst2 Reference Point from Course C1_rectum   Rectum_Bst2 CP Reference Point from Course C1_rectum             SUBJECTIVE:  Feeling well feels like the skin tumors are less oozing        VITAL SIGNS:  KPS: 100, Fully active, able to carry on all pre-disease performed without restriction (ECOG equivalent 0)     Pain Assessment 2022   Pain Score 0   Some recent data might be hidden          PHYSICAL  EXAM:  Skin lesions appear flatter      Toxicity Assessment 5/20/2020 5/13/2020 5/6/2020 4/29/2020 4/22/2020 4/15/2020 4/8/2020   Toxicity Assessment Male Pelvis Male Pelvis Male Pelvis Male Pelvis Male Pelvis Male Pelvis Male Pelvis   Fatigue (lethargy, malaise, asthenia) None Increased fatigue over baseline, but not altering normal activities Increased fatigue over baseline, but not altering normal activities Increased fatigue over baseline, but not altering normal activities Increased fatigue over baseline, but not altering normal activities None None   Radiation Dermatitis Moderate to brisk erythema or a patchy moist desquamation, mostly confined to skin folds and creases, with/without moderate edema Moderate to brisk erythema or a patchy moist desquamation, mostly confined to skin folds and creases, with/without moderate edema Faint erythema or dry desquamation Faint erythema or dry desquamation Faint erythema or dry desquamation None None   Anorexia None None None None None None None   Colitis None - None None None Abdominal pain with mucus and/or blood in stool None   Constipation None None None None None None None   Dehydration None None None None None None None   Diarrhea w/o Colostomy None None None None None None None   Flatulence None None None - Mild None None   Nausea None None None - None None None   Proctitis None None None - None None None   Vomiting None None None None None None None   RT - Pain due to RT Mild pain not interfering with function Mild pain not interfering with function Mild pain not interfering with function Mild pain not interfering with function None None None   Tumor Pain (onset or exacerbation of tumor pain due to treatment) - Mild pain not interfering with function Mild pain not interfering with function Mild pain not interfering with function None None None   Dysuria (painful urination) None None None None - None None   Urinary Frequency Normal Normal Normal Normal - Normal  Normal   Urinary Urgency None None None None - None None   Bladder Spasms Absent Absent Absent Absent - Absent Absent   Incontinence None None None None - None None   Urinary Retention Normal Normal Normal Normal - Normal Normal           IMPRESSION:  Cancer Staging  Rectal carcinoma (HCC)  Staging form: Colon and Rectum, AJCC 8th Edition  - Clinical: Stage IIIC (cT4b, cN2, cM0) - Signed by Patti Strange M.D. on 4/8/2020      PLAN:  No change in treatment plan    Disposition:  Treatment plan reviewed. Questions answered. Continue therapy outlined.     Patti Strange M.D.    No orders of the defined types were placed in this encounter.

## 2022-03-04 ENCOUNTER — HOSPITAL ENCOUNTER (OUTPATIENT)
Dept: RADIATION ONCOLOGY | Facility: MEDICAL CENTER | Age: 57
End: 2022-03-04
Payer: MEDICARE

## 2022-03-04 LAB
CHEMOTHERAPY INFUSION START DATE: NORMAL
CHEMOTHERAPY INFUSION START DATE: NORMAL
CHEMOTHERAPY INFUSION STOP DATE: NORMAL
CHEMOTHERAPY RECORDS: 3000
CHEMOTHERAPY RECORDS: 3000
CHEMOTHERAPY RECORDS: 6
CHEMOTHERAPY RECORDS: 6
CHEMOTHERAPY RECORDS: NORMAL
CHEMOTHERAPY RX CANCER: NORMAL
CHEMOTHERAPY RX CANCER: NORMAL
DATE 1ST CHEMO CANCER: NORMAL
DATE 1ST CHEMO CANCER: NORMAL
RAD ONC ARIA COURSE LAST TREATMENT DATE: NORMAL
RAD ONC ARIA COURSE LAST TREATMENT DATE: NORMAL
RAD ONC ARIA COURSE TREATMENT ELAPSED DAYS: NORMAL
RAD ONC ARIA COURSE TREATMENT ELAPSED DAYS: NORMAL
RAD ONC ARIA REFERENCE POINT DOSAGE GIVEN TO DATE: 30
RAD ONC ARIA REFERENCE POINT DOSAGE GIVEN TO DATE: 30
RAD ONC ARIA REFERENCE POINT DOSAGE GIVEN TO DATE: 32.05
RAD ONC ARIA REFERENCE POINT DOSAGE GIVEN TO DATE: 32.05
RAD ONC ARIA REFERENCE POINT ID: NORMAL
RAD ONC ARIA REFERENCE POINT SESSION DOSAGE GIVEN: 6
RAD ONC ARIA REFERENCE POINT SESSION DOSAGE GIVEN: 6.41

## 2022-03-04 PROCEDURE — 77427 RADIATION TX MANAGEMENT X5: CPT | Performed by: RADIOLOGY

## 2022-03-04 PROCEDURE — 77412 RADIATION TX DELIVERY LVL 3: CPT | Performed by: RADIOLOGY

## 2022-03-08 ENCOUNTER — APPOINTMENT (OUTPATIENT)
Dept: ONCOLOGY | Facility: MEDICAL CENTER | Age: 57
End: 2022-03-08
Attending: INTERNAL MEDICINE
Payer: MEDICARE

## 2022-03-10 ENCOUNTER — APPOINTMENT (OUTPATIENT)
Dept: ONCOLOGY | Facility: MEDICAL CENTER | Age: 57
End: 2022-03-10
Attending: INTERNAL MEDICINE
Payer: MEDICARE

## 2022-03-21 NOTE — PROGRESS NOTES
"Pharmacy Chemotherapy Verification  Patient Name: Dre Eaton   Dx: Metastatic rectal adenocarcinoa   Protocol: mFOLFIRI + Bevacizumab       Regimen:  Bevacizumab 5 mg/kg  IV over 90 min Day 1   C1: hold  Irinotecan 150 mg/m2 IV over 90 min Day 1   Fluorouracil 2000 mg/m2 CIVI over 46-48hrs starting Day 1   Q14-day cycle until DP or UT  *Dosing Reference*  NCCN Guidelines for Rectal Cancer v1.2022  1. Richi LING et al. CPT-11 (irinotecan) addition to bimonthly, high-dose leucovorin and bolus and continuous-infusion 5-fluorouracil (FOLFIRI) for pretreated metastatic colorectal cancer. GERCOR. Eur J Cancer. 1999 Sep;35(9):1343-7.   2. Bulmaro MOSQUEDA et al. Randomized, controlled trial of irinotecan plus infusional, bolus, or oral fluoropyrimidines in first-line treatment of metastatic colorectal cancer: results from the BICC-C Study. J Clin Oncol. 2007 Oct 20;25(30):7977-46.     Allergies: Patient has no known allergies.     /66   Pulse 87   Temp 36.4 °C (97.6 °F) (Temporal)   Resp 18   Ht 1.76 m (5' 9.29\")   Wt 85.7 kg (188 lb 15 oz)   SpO2 98%   BMI 27.67 kg/m²  Body surface area is 2.05 meters squared.    Labs 3/22/22  Meet treatment parameters    Drug Order   (Drug name, dose, route, IV Fluid & volume, frequency, number of doses) Cycle 1  Previous treatment: 7/27/21    Medication = Bevacizumab-bvzr (Zirabev)  Base Dose = 5 mg/kg  Calc Dose: Base Dose x * kg = * mg  Final Dose = * mg  Route = IV  Fluid & Volume =   mL  Admin Duration = Over 30 minutes   Rounded to vial size (within 10%) per dose rounding protocol.       Okay to treat with final dose     Medication = Irinotecan   Base Dose = 150 mg/m²   Calc Dose: Base Dose x 2.05 m2 = 307.5 mg  Final Dose = 307.6 mg  Route = IV  Fluid & Volume = D5W 500 mL  Final [conc] ~ 0.6 mg/mL   Admin Duration = Over 90 minutes    Run concurrently with leucovorin       Okay to treat with final dose     Medication = Fluorouracil   Base Dose = 2,000 mg/m2  Calc " Dose: Base Dose x 2.05 m2 = 4100 mg  Final Dose = 4100 mg  Route = CIVI  Conc = 50 mg/mL  Fluid & Volume = 82 mL + 3 mL overfill = 85 mL  Admin Duration = Over 46 hours at 1.8 mL/hour       Via CADD pump for home infusion    Okay to treat with final dose

## 2022-03-22 ENCOUNTER — PATIENT OUTREACH (OUTPATIENT)
Dept: OTHER | Facility: MEDICAL CENTER | Age: 57
End: 2022-03-22
Payer: MEDICARE

## 2022-03-22 ENCOUNTER — OUTPATIENT INFUSION SERVICES (OUTPATIENT)
Dept: ONCOLOGY | Facility: MEDICAL CENTER | Age: 57
End: 2022-03-22
Attending: INTERNAL MEDICINE
Payer: MEDICARE

## 2022-03-22 ENCOUNTER — PATIENT MESSAGE (OUTPATIENT)
Dept: HEALTH INFORMATION MANAGEMENT | Facility: OTHER | Age: 57
End: 2022-03-22

## 2022-03-22 VITALS
SYSTOLIC BLOOD PRESSURE: 102 MMHG | OXYGEN SATURATION: 98 % | WEIGHT: 188.93 LBS | BODY MASS INDEX: 27.98 KG/M2 | HEART RATE: 87 BPM | RESPIRATION RATE: 18 BRPM | TEMPERATURE: 97.6 F | DIASTOLIC BLOOD PRESSURE: 66 MMHG | HEIGHT: 69 IN

## 2022-03-22 DIAGNOSIS — C20 RECTAL CARCINOMA (HCC): ICD-10-CM

## 2022-03-22 LAB
ALBUMIN SERPL BCP-MCNC: 3.7 G/DL (ref 3.2–4.9)
ALBUMIN/GLOB SERPL: 1.2 G/DL
ALP SERPL-CCNC: 82 U/L (ref 30–99)
ALT SERPL-CCNC: 12 U/L (ref 2–50)
ANION GAP SERPL CALC-SCNC: 10 MMOL/L (ref 7–16)
AST SERPL-CCNC: 16 U/L (ref 12–45)
BASOPHILS # BLD AUTO: 0.7 % (ref 0–1.8)
BASOPHILS # BLD: 0.07 K/UL (ref 0–0.12)
BILIRUB SERPL-MCNC: 0.3 MG/DL (ref 0.1–1.5)
BUN SERPL-MCNC: 24 MG/DL (ref 8–22)
CALCIUM SERPL-MCNC: 9 MG/DL (ref 8.5–10.5)
CHLORIDE SERPL-SCNC: 105 MMOL/L (ref 96–112)
CO2 SERPL-SCNC: 26 MMOL/L (ref 20–33)
CREAT SERPL-MCNC: 0.7 MG/DL (ref 0.5–1.4)
EOSINOPHIL # BLD AUTO: 0.38 K/UL (ref 0–0.51)
EOSINOPHIL NFR BLD: 3.8 % (ref 0–6.9)
ERYTHROCYTE [DISTWIDTH] IN BLOOD BY AUTOMATED COUNT: 50 FL (ref 35.9–50)
GFR SERPLBLD CREATININE-BSD FMLA CKD-EPI: 109 ML/MIN/1.73 M 2
GLOBULIN SER CALC-MCNC: 3.2 G/DL (ref 1.9–3.5)
GLUCOSE SERPL-MCNC: 103 MG/DL (ref 65–99)
HCT VFR BLD AUTO: 34.2 % (ref 42–52)
HGB BLD-MCNC: 10.6 G/DL (ref 14–18)
IMM GRANULOCYTES # BLD AUTO: 0.04 K/UL (ref 0–0.11)
IMM GRANULOCYTES NFR BLD AUTO: 0.4 % (ref 0–0.9)
LYMPHOCYTES # BLD AUTO: 0.63 K/UL (ref 1–4.8)
LYMPHOCYTES NFR BLD: 6.3 % (ref 22–41)
MCH RBC QN AUTO: 25.5 PG (ref 27–33)
MCHC RBC AUTO-ENTMCNC: 31 G/DL (ref 33.7–35.3)
MCV RBC AUTO: 82.4 FL (ref 81.4–97.8)
MONOCYTES # BLD AUTO: 0.66 K/UL (ref 0–0.85)
MONOCYTES NFR BLD AUTO: 6.6 % (ref 0–13.4)
NEUTROPHILS # BLD AUTO: 8.29 K/UL (ref 1.82–7.42)
NEUTROPHILS NFR BLD: 82.2 % (ref 44–72)
NRBC # BLD AUTO: 0 K/UL
NRBC BLD-RTO: 0 /100 WBC
OUTPT INFUS CBC COMMENT OICOM: ABNORMAL
PLATELET # BLD AUTO: 349 K/UL (ref 164–446)
PMV BLD AUTO: 9.6 FL (ref 9–12.9)
POTASSIUM SERPL-SCNC: 3.9 MMOL/L (ref 3.6–5.5)
PROT SERPL-MCNC: 6.9 G/DL (ref 6–8.2)
RBC # BLD AUTO: 4.15 M/UL (ref 4.7–6.1)
SODIUM SERPL-SCNC: 141 MMOL/L (ref 135–145)
WBC # BLD AUTO: 10.1 K/UL (ref 4.8–10.8)

## 2022-03-22 PROCEDURE — 96415 CHEMO IV INFUSION ADDL HR: CPT

## 2022-03-22 PROCEDURE — A4212 NON CORING NEEDLE OR STYLET: HCPCS

## 2022-03-22 PROCEDURE — 700105 HCHG RX REV CODE 258: Performed by: INTERNAL MEDICINE

## 2022-03-22 PROCEDURE — G0498 CHEMO EXTEND IV INFUS W/PUMP: HCPCS

## 2022-03-22 PROCEDURE — 96375 TX/PRO/DX INJ NEW DRUG ADDON: CPT

## 2022-03-22 PROCEDURE — 80053 COMPREHEN METABOLIC PANEL: CPT

## 2022-03-22 PROCEDURE — 96413 CHEMO IV INFUSION 1 HR: CPT

## 2022-03-22 PROCEDURE — 700111 HCHG RX REV CODE 636 W/ 250 OVERRIDE (IP): Performed by: INTERNAL MEDICINE

## 2022-03-22 PROCEDURE — 85025 COMPLETE CBC W/AUTO DIFF WBC: CPT

## 2022-03-22 RX ORDER — DEXTROSE MONOHYDRATE 50 MG/ML
INJECTION, SOLUTION INTRAVENOUS CONTINUOUS
Status: DISCONTINUED | OUTPATIENT
Start: 2022-03-22 | End: 2022-03-22 | Stop reason: HOSPADM

## 2022-03-22 RX ORDER — SODIUM CHLORIDE 9 MG/ML
INJECTION, SOLUTION INTRAVENOUS CONTINUOUS
Status: DISCONTINUED | OUTPATIENT
Start: 2022-03-22 | End: 2022-03-22 | Stop reason: HOSPADM

## 2022-03-22 RX ADMIN — DEXTROSE MONOHYDRATE: 50 INJECTION, SOLUTION INTRAVENOUS at 09:11

## 2022-03-22 RX ADMIN — DEXAMETHASONE SODIUM PHOSPHATE 12 MG: 4 INJECTION, SOLUTION INTRA-ARTICULAR; INTRALESIONAL; INTRAMUSCULAR; INTRAVENOUS; SOFT TISSUE at 08:10

## 2022-03-22 RX ADMIN — SODIUM CHLORIDE: 9 INJECTION, SOLUTION INTRAVENOUS at 08:10

## 2022-03-22 RX ADMIN — ONDANSETRON 16 MG: 2 INJECTION INTRAMUSCULAR; INTRAVENOUS at 08:23

## 2022-03-22 RX ADMIN — ATROPINE SULFATE 0.5 MG: 1 INJECTION, SOLUTION INTRAMUSCULAR; INTRAVENOUS; SUBCUTANEOUS at 09:07

## 2022-03-22 RX ADMIN — FLUOROURACIL 4100 MG: 50 INJECTION, SOLUTION INTRAVENOUS at 11:20

## 2022-03-22 RX ADMIN — IRINOTECAN HYDROCHLORIDE 307.6 MG: 20 INJECTION, SOLUTION INTRAVENOUS at 09:13

## 2022-03-22 ASSESSMENT — FIBROSIS 4 INDEX: FIB4 SCORE: 1.62

## 2022-03-22 NOTE — PROGRESS NOTES
Patient presented to [facility] for C1D1 Irinotecan & Fluorouracil.  Right chest wall port accessed in sterile fashion. Flushed easily, jenny briskly. Pre-treatment labs were  drawn on 3/18/22. Results within treatable parameters. Patient received pre-meds as ordered. 46 hour Fluorouracil infusion started. Patient watched educational video and denied questions regarding use of pump. Patient left [facility] in no apparent distress. Next appointment confirmed prior to departure.

## 2022-03-22 NOTE — PROGRESS NOTES
"Pharmacy Chemotherapy Verification Note:    DX:  Metastatic Rectal Adenocarcinoma    Cycle 1  Previous treatment- Capecitabine/bevacizumab+ XRT 7/27/2022    Protocol: FOLFiri + bevacizumab  Irinotecan  IV on day 1  *dose reduced to 150mg/m2 for tolerance/ heavily pretreated  *discontinued for tolerance/ heavily pretreated  5-FU iv over 46 hrs  *dose reduced to 2000mg/m2 for tolerance/ heavily pretreated  *bolus discontinued for tolerance/ heavily pretreated  bevacizumab 5mg/kg IV on day 1*3/22/22- HOLD for cycle 1 per MD  q14 days for 8-12 cycles  NCCN Guidelines for Colon Cancer V.2.2018  Bradly V Et al - Lancet Oncol. 2014 Sep;15(10):1065-75. Doi:  10.1016/-[id number]. Epub 2014 Jul 31.  Bulmaro MARRERO, et al - J Clin Oncol. 2007 Oct 20;25(30):4779-86.      /66   Pulse 87   Temp 36.4 °C (97.6 °F) (Temporal)   Resp 18   Ht 1.76 m (5' 9.29\")   Wt 85.7 kg (188 lb 15 oz)   SpO2 98%   BMI 27.67 kg/m²     Body surface area is 2.05 meters squared.    All lab results 3/22 [facility] and 3/19/22 [facility] within treatment parameters.       Irinotecan 150mg/m2 x 2.05m2 = 307.5mg    <10% difference, ok to treat with final dose = 307.6mg IV    5-FU 2000 mg/m2 x 2.05m2 = 4100mg    <10% difference, ok to treat with final dose = 4100mg    CIVI over 46 hours via CADD pump at 1.8mL/hr      HEATHER Grande Pharm.D.      "

## 2022-03-22 NOTE — PROGRESS NOTES
Chemotherapy Verification - SECONDARY RN   C1 D1      Height = 1.76 m  Weight = 85.7 kg  BSA = 2.05 m2       Medication: irinotecan  Dose: 150 mg/m2  Calculated Dose: 307.5 mg                             (In mg/m2, AUC, mg/kg)     Medication: fluorouracil CADD pu,p  Dose: 2000 mg/m2  Calculated Dose: 4100 mg                             (In mg/m2, AUC, mg/kg)      I confirm that this process was performed independently.

## 2022-03-22 NOTE — PROGRESS NOTES
Oncology nurse navigator met patient in person at the [facility].  Oncology nurse navigator self my role and services.  Patient reports that he is doing good and that this is not a new diagnosis for him and that he has been dealing with this diagnosis for several years.  Patient states that he is just back at it referring to treatments.  Patient states that he has a wife and 3 supportive children he states that on a scale of 0-10 his distress level is about a 1-2 related to worries.  ONN provided education materials for eating hints, MTM and the calendar of events for [facility].  Patient has my contact information.

## 2022-03-22 NOTE — PROGRESS NOTES
Chemotherapy Verification - PRIMARY RN      Height = 176 cm  Weight = 85.7 kg  BSA = 2.05 m2     Cycle I Day 1    Medication: Irinotecan  Dose: 150 mg/m2    Calculated Dose: 307.5 mg     Medication: Fluorouracil  Dose: 2,000 mg/m2  Calculated Dose: 4,100 mg                              I confirm this process was performed independently with the BSA and all final chemotherapy dosing calculations congruent.  Any discrepancies of 10% or greater have been addressed with the chemotherapy pharmacist. The resolution of the discrepancy has been documented in the EPIC progress notes.

## 2022-03-24 ENCOUNTER — OUTPATIENT INFUSION SERVICES (OUTPATIENT)
Dept: ONCOLOGY | Facility: MEDICAL CENTER | Age: 57
End: 2022-03-24
Attending: INTERNAL MEDICINE
Payer: MEDICARE

## 2022-03-24 DIAGNOSIS — C20 RECTAL CARCINOMA (HCC): ICD-10-CM

## 2022-03-24 PROCEDURE — 700111 HCHG RX REV CODE 636 W/ 250 OVERRIDE (IP)

## 2022-03-24 PROCEDURE — 96523 IRRIG DRUG DELIVERY DEVICE: CPT

## 2022-03-24 RX ORDER — HEPARIN SODIUM (PORCINE) LOCK FLUSH IV SOLN 100 UNIT/ML 100 UNIT/ML
SOLUTION INTRAVENOUS
Status: COMPLETED
Start: 2022-03-24 | End: 2022-03-24

## 2022-03-24 RX ADMIN — HEPARIN 500 UNITS: 100 SYRINGE at 11:11

## 2022-03-24 NOTE — PROGRESS NOTES
Pt arrived ambulatory to [facility] for 5FU pump de-access.  Ordered dose of chemotherapy delivered to patient without issue.  Port flushed, de-accessed, and site covered with gauze and tape.  Next appointment confirmed.  Pt discharged from [facility] in NAD under self care.

## 2022-04-03 NOTE — PROGRESS NOTES
"Pharmacy Chemotherapy Verification  Patient Name: Dre Eaton   Dx: Metastatic rectal adenocarcinoa   Protocol: mFOLFIRI + Bevacizumab       Regimen:  Bevacizumab 5 mg/kg  IV over 90 min Day 1   C1: hold  Irinotecan 150 mg/m2 IV over 90 min Day 1   Fluorouracil 2000 mg/m2 CIVI over 46-48hrs starting Day 1   Q14-day cycle until DP or UT  *Dosing Reference*  NCCN Guidelines for Rectal Cancer v1.2022  1. Richi LING et al. CPT-11 (irinotecan) addition to bimonthly, high-dose leucovorin and bolus and continuous-infusion 5-fluorouracil (FOLFIRI) for pretreated metastatic colorectal cancer. ALEXANDERR. Eur J Cancer. 1999 Sep;35(9):1343-7.   2. Bulmaro MOSQUEDA et al. Randomized, controlled trial of irinotecan plus infusional, bolus, or oral fluoropyrimidines in first-line treatment of metastatic colorectal cancer: results from the BICC-C Study. J Clin Oncol. 2007 Oct 20;25(30):7438-62.     Allergies: Patient has no known allergies.     /69   Pulse 88   Temp 36.7 °C (98 °F) (Temporal)   Resp 18   Ht 1.76 m (5' 9.29\")   Wt 87.5 kg (192 lb 14.4 oz)   SpO2 98%   BMI 28.25 kg/m²  Body surface area is 2.07 meters squared.    Labs 4/5/22  Meet treatment parameters    Drug Order   (Drug name, dose, route, IV Fluid & volume, frequency, number of doses) Cycle 2  Previous treatment: 3/22/21    Medication = Bevacizumab-bvzr (Zirabev)  Base Dose = 5 mg/kg  Calc Dose: Base Dose x 87.5 kg = 437.5 mg  Final Dose = 400 mg  Route = IV  Fluid & Volume =   mL  Admin Duration = Over 30 minutes   Rounded to vial size (within 10%) per dose rounding protocol.       Okay to treat with final dose     Medication = Irinotecan   Base Dose = 150 mg/m²   Calc Dose: Base Dose x 2.07 m2 = 310.5 mg  Final Dose = 310.6 mg  Route = IV  Fluid & Volume = D5W 500 mL  Final [conc] ~ 0.6 mg/mL   Admin Duration = Over 90 minutes    Run concurrently with leucovorin       Okay to treat with final dose     Medication = Fluorouracil   Base Dose = 2,000 " mg/m2  Calc Dose: Base Dose x 2.07 m2 = 4140 mg  Final Dose = 4140 mg  Route = CIVI  Conc = 50 mg/mL  Fluid & Volume = 82.8 mL + 3 mL overfill = 85.8 mL  Admin Duration = Over 46 hours at 1.8 mL/hour       Via CADD pump for home infusion    Okay to treat with final dose

## 2022-04-05 ENCOUNTER — OUTPATIENT INFUSION SERVICES (OUTPATIENT)
Dept: ONCOLOGY | Facility: MEDICAL CENTER | Age: 57
End: 2022-04-05
Attending: INTERNAL MEDICINE
Payer: MEDICARE

## 2022-04-05 VITALS
BODY MASS INDEX: 28.57 KG/M2 | SYSTOLIC BLOOD PRESSURE: 108 MMHG | TEMPERATURE: 98 F | HEIGHT: 69 IN | RESPIRATION RATE: 18 BRPM | OXYGEN SATURATION: 98 % | WEIGHT: 192.9 LBS | HEART RATE: 88 BPM | DIASTOLIC BLOOD PRESSURE: 69 MMHG

## 2022-04-05 DIAGNOSIS — C20 RECTAL CARCINOMA (HCC): ICD-10-CM

## 2022-04-05 LAB
ALBUMIN SERPL BCP-MCNC: 3.7 G/DL (ref 3.2–4.9)
ALBUMIN/GLOB SERPL: 1.1 G/DL
ALP SERPL-CCNC: 94 U/L (ref 30–99)
ALT SERPL-CCNC: 15 U/L (ref 2–50)
ANION GAP SERPL CALC-SCNC: 14 MMOL/L (ref 7–16)
APPEARANCE UR: CLEAR
AST SERPL-CCNC: 18 U/L (ref 12–45)
BASOPHILS # BLD AUTO: 0.5 % (ref 0–1.8)
BASOPHILS # BLD: 0.04 K/UL (ref 0–0.12)
BILIRUB SERPL-MCNC: 0.3 MG/DL (ref 0.1–1.5)
BUN SERPL-MCNC: 16 MG/DL (ref 8–22)
CALCIUM SERPL-MCNC: 9.4 MG/DL (ref 8.5–10.5)
CHLORIDE SERPL-SCNC: 102 MMOL/L (ref 96–112)
CO2 SERPL-SCNC: 24 MMOL/L (ref 20–33)
COLOR UR AUTO: YELLOW
CREAT SERPL-MCNC: 0.69 MG/DL (ref 0.5–1.4)
EOSINOPHIL # BLD AUTO: 0.39 K/UL (ref 0–0.51)
EOSINOPHIL NFR BLD: 5.3 % (ref 0–6.9)
ERYTHROCYTE [DISTWIDTH] IN BLOOD BY AUTOMATED COUNT: 49.2 FL (ref 35.9–50)
GFR SERPLBLD CREATININE-BSD FMLA CKD-EPI: 110 ML/MIN/1.73 M 2
GLOBULIN SER CALC-MCNC: 3.4 G/DL (ref 1.9–3.5)
GLUCOSE SERPL-MCNC: 103 MG/DL (ref 65–99)
GLUCOSE UR QL STRIP.AUTO: NEGATIVE MG/DL
HCT VFR BLD AUTO: 32.2 % (ref 42–52)
HGB BLD-MCNC: 10.1 G/DL (ref 14–18)
IMM GRANULOCYTES # BLD AUTO: 0.03 K/UL (ref 0–0.11)
IMM GRANULOCYTES NFR BLD AUTO: 0.4 % (ref 0–0.9)
KETONES UR QL STRIP.AUTO: NEGATIVE MG/DL
LEUKOCYTE ESTERASE UR QL STRIP.AUTO: NEGATIVE
LYMPHOCYTES # BLD AUTO: 0.72 K/UL (ref 1–4.8)
LYMPHOCYTES NFR BLD: 9.7 % (ref 22–41)
MCH RBC QN AUTO: 25.1 PG (ref 27–33)
MCHC RBC AUTO-ENTMCNC: 31.4 G/DL (ref 33.7–35.3)
MCV RBC AUTO: 79.9 FL (ref 81.4–97.8)
MONOCYTES # BLD AUTO: 0.65 K/UL (ref 0–0.85)
MONOCYTES NFR BLD AUTO: 8.8 % (ref 0–13.4)
NEUTROPHILS # BLD AUTO: 5.59 K/UL (ref 1.82–7.42)
NEUTROPHILS NFR BLD: 75.3 % (ref 44–72)
NITRITE UR QL STRIP.AUTO: NEGATIVE
NRBC # BLD AUTO: 0 K/UL
NRBC BLD-RTO: 0 /100 WBC
OUTPT INFUS CBC COMMENT OICOM: ABNORMAL
PH UR STRIP.AUTO: 6.5 [PH] (ref 5–8)
PLATELET # BLD AUTO: 413 K/UL (ref 164–446)
PMV BLD AUTO: 9 FL (ref 9–12.9)
POTASSIUM SERPL-SCNC: 4.2 MMOL/L (ref 3.6–5.5)
PROT SERPL-MCNC: 7.1 G/DL (ref 6–8.2)
PROT UR QL STRIP: NEGATIVE MG/DL
RBC # BLD AUTO: 4.03 M/UL (ref 4.7–6.1)
RBC UR QL AUTO: ABNORMAL
SODIUM SERPL-SCNC: 140 MMOL/L (ref 135–145)
SP GR UR STRIP.AUTO: 1.02 (ref 1–1.03)
WBC # BLD AUTO: 7.4 K/UL (ref 4.8–10.8)

## 2022-04-05 PROCEDURE — 96374 THER/PROPH/DIAG INJ IV PUSH: CPT

## 2022-04-05 PROCEDURE — 80053 COMPREHEN METABOLIC PANEL: CPT

## 2022-04-05 PROCEDURE — 96365 THER/PROPH/DIAG IV INF INIT: CPT

## 2022-04-05 PROCEDURE — 96413 CHEMO IV INFUSION 1 HR: CPT

## 2022-04-05 PROCEDURE — 85025 COMPLETE CBC W/AUTO DIFF WBC: CPT

## 2022-04-05 PROCEDURE — G0498 CHEMO EXTEND IV INFUS W/PUMP: HCPCS

## 2022-04-05 PROCEDURE — 96417 CHEMO IV INFUS EACH ADDL SEQ: CPT

## 2022-04-05 PROCEDURE — 81002 URINALYSIS NONAUTO W/O SCOPE: CPT

## 2022-04-05 PROCEDURE — 96415 CHEMO IV INFUSION ADDL HR: CPT

## 2022-04-05 PROCEDURE — 700105 HCHG RX REV CODE 258: Performed by: INTERNAL MEDICINE

## 2022-04-05 PROCEDURE — 96375 TX/PRO/DX INJ NEW DRUG ADDON: CPT

## 2022-04-05 PROCEDURE — A4212 NON CORING NEEDLE OR STYLET: HCPCS

## 2022-04-05 PROCEDURE — 700111 HCHG RX REV CODE 636 W/ 250 OVERRIDE (IP): Performed by: INTERNAL MEDICINE

## 2022-04-05 RX ORDER — 0.9 % SODIUM CHLORIDE 0.9 %
10 VIAL (ML) INJECTION PRN
Status: CANCELLED | OUTPATIENT
Start: 2022-04-05

## 2022-04-05 RX ORDER — 0.9 % SODIUM CHLORIDE 0.9 %
20 VIAL (ML) INJECTION PRN
Status: CANCELLED | OUTPATIENT
Start: 2022-04-06

## 2022-04-05 RX ORDER — DEXTROSE MONOHYDRATE 50 MG/ML
INJECTION, SOLUTION INTRAVENOUS CONTINUOUS
Status: CANCELLED | OUTPATIENT
Start: 2022-04-05

## 2022-04-05 RX ORDER — LOPERAMIDE HYDROCHLORIDE 2 MG/1
2 CAPSULE ORAL
Status: CANCELLED | OUTPATIENT
Start: 2022-04-05

## 2022-04-05 RX ORDER — ONDANSETRON 8 MG/1
8 TABLET, ORALLY DISINTEGRATING ORAL PRN
Status: CANCELLED | OUTPATIENT
Start: 2022-04-05

## 2022-04-05 RX ORDER — 0.9 % SODIUM CHLORIDE 0.9 %
3 VIAL (ML) INJECTION PRN
Status: CANCELLED | OUTPATIENT
Start: 2022-04-05

## 2022-04-05 RX ORDER — HEPARIN SODIUM (PORCINE) LOCK FLUSH IV SOLN 100 UNIT/ML 100 UNIT/ML
500 SOLUTION INTRAVENOUS PRN
Status: CANCELLED | OUTPATIENT
Start: 2022-04-06

## 2022-04-05 RX ORDER — ONDANSETRON 2 MG/ML
4 INJECTION INTRAMUSCULAR; INTRAVENOUS PRN
Status: CANCELLED | OUTPATIENT
Start: 2022-04-05

## 2022-04-05 RX ORDER — HEPARIN SODIUM (PORCINE) LOCK FLUSH IV SOLN 100 UNIT/ML 100 UNIT/ML
500 SOLUTION INTRAVENOUS PRN
Status: CANCELLED | OUTPATIENT
Start: 2022-04-05

## 2022-04-05 RX ORDER — PROCHLORPERAZINE MALEATE 10 MG
10 TABLET ORAL EVERY 6 HOURS PRN
Status: CANCELLED | OUTPATIENT
Start: 2022-04-05

## 2022-04-05 RX ORDER — 0.9 % SODIUM CHLORIDE 0.9 %
VIAL (ML) INJECTION PRN
Status: CANCELLED | OUTPATIENT
Start: 2022-04-05

## 2022-04-05 RX ORDER — DIPHENHYDRAMINE HYDROCHLORIDE 50 MG/ML
50 INJECTION INTRAMUSCULAR; INTRAVENOUS PRN
Status: CANCELLED | OUTPATIENT
Start: 2022-04-05

## 2022-04-05 RX ORDER — SODIUM CHLORIDE 9 MG/ML
INJECTION, SOLUTION INTRAVENOUS CONTINUOUS
Status: CANCELLED | OUTPATIENT
Start: 2022-04-05

## 2022-04-05 RX ORDER — LOPERAMIDE HYDROCHLORIDE 2 MG/1
4 CAPSULE ORAL PRN
Status: CANCELLED | OUTPATIENT
Start: 2022-04-05

## 2022-04-05 RX ORDER — EPINEPHRINE 1 MG/ML(1)
0.5 AMPUL (ML) INJECTION PRN
Status: CANCELLED | OUTPATIENT
Start: 2022-04-05

## 2022-04-05 RX ORDER — METHYLPREDNISOLONE SODIUM SUCCINATE 125 MG/2ML
125 INJECTION, POWDER, LYOPHILIZED, FOR SOLUTION INTRAMUSCULAR; INTRAVENOUS PRN
Status: CANCELLED | OUTPATIENT
Start: 2022-04-05

## 2022-04-05 RX ADMIN — ONDANSETRON 16 MG: 2 INJECTION INTRAMUSCULAR; INTRAVENOUS at 08:45

## 2022-04-05 RX ADMIN — IRINOTECAN HYDROCHLORIDE 310.6 MG: 20 INJECTION, SOLUTION INTRAVENOUS at 09:15

## 2022-04-05 RX ADMIN — ATROPINE SULFATE 0.5 MG: 1 INJECTION, SOLUTION INTRAMUSCULAR; INTRAVENOUS; SUBCUTANEOUS at 09:08

## 2022-04-05 RX ADMIN — SODIUM CHLORIDE 400 MG: 9 INJECTION, SOLUTION INTRAVENOUS at 11:27

## 2022-04-05 RX ADMIN — DEXAMETHASONE SODIUM PHOSPHATE 12 MG: 4 INJECTION, SOLUTION INTRAMUSCULAR; INTRAVENOUS at 08:31

## 2022-04-05 RX ADMIN — FLUOROURACIL 4140 MG: 50 INJECTION, SOLUTION INTRAVENOUS at 13:21

## 2022-04-05 ASSESSMENT — FIBROSIS 4 INDEX: FIB4 SCORE: 0.71

## 2022-04-05 ASSESSMENT — PAIN DESCRIPTION - PAIN TYPE: TYPE: ACUTE PAIN

## 2022-04-05 NOTE — PROGRESS NOTES
Chemotherapy Verification - SECONDARY RN       Height = 176 cm  Weight = 87.5 kg  BSA = 2.07 m^2       Medication: irinotecan  Dose: 150 mg/m^2  Calculated Dose: 310.5 mg                             (In mg/m2, AUC, mg/kg)     Medication: bevacizumab-bvzr  Dose: 5 mg/kg  Calculated Dose: 437.5 mg                             (In mg/m2, AUC, mg/kg)    Medication: fluorouracil  Dose: 2000 mg/m^2  Calculated Dose: 4,140 mg over 46 hours                             (In mg/m2, AUC, mg/kg)        I confirm that this process was performed independently.

## 2022-04-05 NOTE — PROGRESS NOTES
"Dre arrives to [facility] for C2D1 FOLFIRI + Zirabev for rectal carcinoma. Patient denies acute health concerns. Denies s/s active infections. Patient c/o rectal discomfort, which is improving. Right chest port accessed in sterile fashion with a 20g 1\" low profile needle. Port flushes easily and has brisk blood return. UA results WNL. Labs drawn, reviewed, and meets parameters to proceed with treatment. Premedications administered. Atropine administered prior to irinotecan. Irinotecan infused over 90 mins without adverse s/s. Zirabev infused over 90 mins without adverse s/s. 5FU CADD connected and set to RUN mode over 46 hours. Patient to return on Thursday 4/7. Discharged home to self care in no apparent distress.   "

## 2022-04-05 NOTE — PROGRESS NOTES
Chemotherapy Verification - PRIMARY RN  Cycle 2 Day 1    Height = 176 cm  Weight = 87.5 kg  BSA = 2.07 m^2       Medication: irinotecan (CAMPTOSAR)  Dose: 150 mg/m2  Calculated Dose: 310.5 mg                            (In mg/m2, AUC, mg/kg)     Medication: bevacizumab-bvzr (ZIRABEV)  Dose: 5 mg/kg   Calculated Dose: 437.5 mg                            (In mg/m2, AUC, mg/kg)    Medication: fluorouracil (ADRUCIL) in CADD  Dose: 2000 mg/m2  Calculated Dose: 4140 mg in CADD pump over 46 hours                             (In mg/m2, AUC, mg/kg)      I confirm this process was performed independently with the BSA and all final chemotherapy dosing calculations congruent.  Any discrepancies of 10% or greater have been addressed with the chemotherapy pharmacist. The resolution of the discrepancy has been documented in the EPIC progress notes.

## 2022-04-05 NOTE — PROGRESS NOTES
"Pharmacy Chemotherapy Verification Note:    DX:  Metastatic Rectal Adenocarcinoma    Cycle 2  Previous treatment: C1 on 3/22/22    Protocol: FOLFiri + bevacizumab  Irinotecan  IV on day 1  *dose reduced to 150mg/m2 for tolerance/ heavily pretreated  *discontinued for tolerance/ heavily pretreated  5-FU iv over 46 hrs  *dose reduced to 2000mg/m2 for tolerance/ heavily pretreated  *bolus discontinued for tolerance/ heavily pretreated  bevacizumab 5mg/kg IV on day 1*3/22/22- HOLD for cycle 1 per MD  q14 days for 8-12 cycles  NCCN Guidelines for Colon Cancer V.2.2018  Bradly POSEY Et al - Lancet Oncol. 2014 Sep;15(10):1065-75. Doi:  10.1016/-[id number]. Epub 2014 Jul 31.  Bulmaro MARRERO et al - J Clin Oncol. 2007 Oct 20;25(30):4718-86.    /69   Pulse 88   Temp 36.7 °C (98 °F) (Temporal)   Resp 18   Ht 1.76 m (5' 9.29\")   Wt 87.5 kg (192 lb 14.4 oz)   SpO2 98%   BMI 28.25 kg/m²     Body surface area is 2.07 meters squared.    Labs 4/5/22:  ANC~ 5590 Plt = 413k   Hgb = 10.1     SCr = 0.69 mg/dL CrCl >125 mL/min   LFT's = WNL TBili = 0.3  Urine Protein =  Negative  BP = 108/69    Irinotecan 150 mg/m2 x 2.07 m2 = 310.5 mg    <10% difference, ok to treat with final dose = 310.6 mg IV    Bevacizumab-bvzr (Zirabev) 5 mg/kg x 87.7 kg = 437.5 mg   Rounded to nearest vial size (within 10%) per rounding protocol   Okay to treat with final dose = 400 mg IV    5-FU 2000 mg/m2 x 2.07 m2 = 4140 mg    <10% difference, ok to treat with final dose = 4140 mg CIVI   Via CADD pump for home infusion over 46 hours @ 1.8 mL/hr      Angelina Garay, PharmD        "

## 2022-04-06 ENCOUNTER — APPOINTMENT (OUTPATIENT)
Dept: ONCOLOGY | Facility: MEDICAL CENTER | Age: 57
End: 2022-04-06
Attending: INTERNAL MEDICINE
Payer: MEDICARE

## 2022-04-07 ENCOUNTER — OUTPATIENT INFUSION SERVICES (OUTPATIENT)
Dept: ONCOLOGY | Facility: MEDICAL CENTER | Age: 57
End: 2022-04-07
Attending: INTERNAL MEDICINE
Payer: MEDICARE

## 2022-04-07 VITALS — DIASTOLIC BLOOD PRESSURE: 63 MMHG | SYSTOLIC BLOOD PRESSURE: 104 MMHG

## 2022-04-07 DIAGNOSIS — C20 RECTAL CARCINOMA (HCC): ICD-10-CM

## 2022-04-07 PROCEDURE — 700111 HCHG RX REV CODE 636 W/ 250 OVERRIDE (IP): Performed by: INTERNAL MEDICINE

## 2022-04-07 PROCEDURE — 96523 IRRIG DRUG DELIVERY DEVICE: CPT

## 2022-04-07 RX ORDER — HEPARIN SODIUM (PORCINE) LOCK FLUSH IV SOLN 100 UNIT/ML 100 UNIT/ML
500 SOLUTION INTRAVENOUS PRN
Status: DISCONTINUED | OUTPATIENT
Start: 2022-04-07 | End: 2022-04-07 | Stop reason: HOSPADM

## 2022-04-07 RX ADMIN — Medication 500 UNITS: at 16:38

## 2022-04-07 ASSESSMENT — PAIN DESCRIPTION - PAIN TYPE: TYPE: CHRONIC PAIN

## 2022-04-07 NOTE — PROGRESS NOTES
Patient arrived to [facility] for C2D3 5FU CADD pump de-access.  Pump stopped with 0ml remaining in reservoir, 83 mLs administered.  Disconnected pump, Port flushed per protocol with brisk blood return noted, heparinized, de-accessed and gauze dressing applied.  Pump cleaned and placed in pharmacy drawer.  Confirmed pt's next appt. Pt discharged home in [facility].

## 2022-04-12 RX ORDER — 0.9 % SODIUM CHLORIDE 0.9 %
VIAL (ML) INJECTION PRN
Status: CANCELLED | OUTPATIENT
Start: 2022-04-17

## 2022-04-12 RX ORDER — 0.9 % SODIUM CHLORIDE 0.9 %
10 VIAL (ML) INJECTION PRN
Status: CANCELLED | OUTPATIENT
Start: 2022-04-20

## 2022-04-12 RX ORDER — DEXTROSE MONOHYDRATE 50 MG/ML
INJECTION, SOLUTION INTRAVENOUS CONTINUOUS
Status: CANCELLED | OUTPATIENT
Start: 2022-04-20

## 2022-04-12 RX ORDER — ONDANSETRON 8 MG/1
8 TABLET, ORALLY DISINTEGRATING ORAL PRN
Status: CANCELLED | OUTPATIENT
Start: 2022-04-20

## 2022-04-12 RX ORDER — DIPHENHYDRAMINE HYDROCHLORIDE 50 MG/ML
50 INJECTION INTRAMUSCULAR; INTRAVENOUS PRN
Status: CANCELLED | OUTPATIENT
Start: 2022-04-20

## 2022-04-12 RX ORDER — LOPERAMIDE HYDROCHLORIDE 2 MG/1
2 CAPSULE ORAL
Status: CANCELLED | OUTPATIENT
Start: 2022-04-20

## 2022-04-12 RX ORDER — 0.9 % SODIUM CHLORIDE 0.9 %
3 VIAL (ML) INJECTION PRN
Status: CANCELLED | OUTPATIENT
Start: 2022-04-20

## 2022-04-12 RX ORDER — PROCHLORPERAZINE MALEATE 10 MG
10 TABLET ORAL EVERY 6 HOURS PRN
Status: CANCELLED | OUTPATIENT
Start: 2022-04-20

## 2022-04-12 RX ORDER — 0.9 % SODIUM CHLORIDE 0.9 %
3 VIAL (ML) INJECTION PRN
Status: CANCELLED | OUTPATIENT
Start: 2022-04-17

## 2022-04-12 RX ORDER — 0.9 % SODIUM CHLORIDE 0.9 %
10 VIAL (ML) INJECTION PRN
Status: CANCELLED | OUTPATIENT
Start: 2022-04-17

## 2022-04-12 RX ORDER — METHYLPREDNISOLONE SODIUM SUCCINATE 125 MG/2ML
125 INJECTION, POWDER, LYOPHILIZED, FOR SOLUTION INTRAMUSCULAR; INTRAVENOUS PRN
Status: CANCELLED | OUTPATIENT
Start: 2022-04-20

## 2022-04-12 RX ORDER — 0.9 % SODIUM CHLORIDE 0.9 %
20 VIAL (ML) INJECTION PRN
Status: CANCELLED | OUTPATIENT
Start: 2022-04-22

## 2022-04-12 RX ORDER — HEPARIN SODIUM (PORCINE) LOCK FLUSH IV SOLN 100 UNIT/ML 100 UNIT/ML
500 SOLUTION INTRAVENOUS PRN
Status: CANCELLED | OUTPATIENT
Start: 2022-04-17

## 2022-04-12 RX ORDER — HEPARIN SODIUM (PORCINE) LOCK FLUSH IV SOLN 100 UNIT/ML 100 UNIT/ML
500 SOLUTION INTRAVENOUS PRN
Status: CANCELLED | OUTPATIENT
Start: 2022-04-20

## 2022-04-12 RX ORDER — SODIUM CHLORIDE 9 MG/ML
INJECTION, SOLUTION INTRAVENOUS CONTINUOUS
Status: CANCELLED | OUTPATIENT
Start: 2022-04-20

## 2022-04-12 RX ORDER — 0.9 % SODIUM CHLORIDE 0.9 %
VIAL (ML) INJECTION PRN
Status: CANCELLED | OUTPATIENT
Start: 2022-04-20

## 2022-04-12 RX ORDER — HEPARIN SODIUM (PORCINE) LOCK FLUSH IV SOLN 100 UNIT/ML 100 UNIT/ML
500 SOLUTION INTRAVENOUS PRN
Status: CANCELLED | OUTPATIENT
Start: 2022-04-22

## 2022-04-12 RX ORDER — ONDANSETRON 2 MG/ML
4 INJECTION INTRAMUSCULAR; INTRAVENOUS PRN
Status: CANCELLED | OUTPATIENT
Start: 2022-04-20

## 2022-04-12 RX ORDER — EPINEPHRINE 1 MG/ML(1)
0.5 AMPUL (ML) INJECTION PRN
Status: CANCELLED | OUTPATIENT
Start: 2022-04-20

## 2022-04-12 RX ORDER — LOPERAMIDE HYDROCHLORIDE 2 MG/1
4 CAPSULE ORAL PRN
Status: CANCELLED | OUTPATIENT
Start: 2022-04-20

## 2022-04-18 ENCOUNTER — HOSPITAL ENCOUNTER (OUTPATIENT)
Dept: RADIATION ONCOLOGY | Facility: MEDICAL CENTER | Age: 57
End: 2022-04-30
Attending: RADIOLOGY
Payer: MEDICARE

## 2022-04-18 VITALS
WEIGHT: 190.92 LBS | SYSTOLIC BLOOD PRESSURE: 122 MMHG | HEART RATE: 80 BPM | BODY MASS INDEX: 27.96 KG/M2 | DIASTOLIC BLOOD PRESSURE: 71 MMHG | OXYGEN SATURATION: 99 %

## 2022-04-18 PROCEDURE — 99212 OFFICE O/P EST SF 10 MIN: CPT | Performed by: RADIOLOGY

## 2022-04-18 ASSESSMENT — FIBROSIS 4 INDEX: FIB4 SCORE: 0.61

## 2022-04-18 ASSESSMENT — PAIN SCALES - GENERAL: PAINLEVEL: NO PAIN

## 2022-04-18 NOTE — PROGRESS NOTES
RADIATION ONCOLOGY FOLLOW-UP    DATE OF SERVICE: 4/18/2022    IDENTIFICATION:    A 56 y.o. male with recurrent rectal cancer now with multiple skin nodules just above the penis. Now status post radiation therapy to the skin nodules completed 3/4/2022.    HISTORY OF PRESENT ILLNESS:   Since last seen he has received 3 cycles of FOLFIRI and Avastin.  He also had a very brisk reaction with the radiation therapy but that has since resolved.  The oozing that he had previously has gone.He is here for skin check.He does state that he has a little rectal discomfort but it is actually getting better since he started the systemic therapy.    CURRENT MEDICATIONS:  Current Outpatient Medications   Medication Sig Dispense Refill   • HOMEOPATHIC PRODUCTS PO Take  by mouth.     • cyanocobalamin (VITAMIN B-12) 500 MCG Tab Take 500 mcg by mouth every day.     • vitamin D (CHOLECALCIFEROL) 1000 UNIT Tab Take 1,000 Units by mouth every day. Pt takes 3,000 units every other day     • Ascorbic Acid (VITAMIN C PO) Take 1,000 mg by mouth every day. Powder form       No current facility-administered medications for this encounter.       ALLERGIES:  Patient has no known allergies.    FAMILY HISTORY:    Family History   Problem Relation Age of Onset   • Cancer Father         Unk    [unfilled]        SOCIAL HISTORY:     reports that he has never smoked. He has never used smokeless tobacco. He reports previous alcohol use. He reports that he does not use drugs.    PAIN:   Patient reports no acute/chronic pain just discomfort from his tumor.    REVIEW OF SYSTEMS: Is significant for That mentioned in the HPI  The rest of the review of systems has been reviewed.    PHYSICAL EXAM:     ECOG PERFORMANCE STATUS:  0= Fully active, able to carry on all pre-disease performance without restriction.   Vitals:    04/18/22 1128   BP: 122/71   BP Location: Left arm   Patient Position: Sitting   BP Cuff Size: Adult   Pulse: 80   SpO2: 99%   Weight: 86.6 kg  (190 lb 14.7 oz)   Pain Score: No pain        GENERAL: Well-appearing alert and oriented x3 no apparent distress  Rectal/pubis.  The rectal mass is less prominent than it was prior to starting chemotherapy.  Is still present with serosanguineous drainage.  It is substantially smaller than it was prior to radiation to this area which was delivered in 2020.  The pubic skin area now has no oozing nodules and there are fewer nodules in this irradiated area.  HEENT:  Pupils are equal, round, and reactive to light.  Extraocular muscles   are intact. Sclerae nonicteric.  Conjunctivae pink.  Oral cavity, tongue   protrudes midline.   NECK:  No preauricular neck supraclavicular axillary adenopathy.  LUNGS:  Clear to ascultation   HEART:  Regular rate and rhythm.  No murmur appreciated  ABDOMEN:  Soft. No evidence of hepatosplenomegaly.   EXTREMITIES:  Without Edema.  NEUROLOGIC:  Cranial nerves II through XII were intact. Normal stance and gait motor and sensory grossly within normal limits      IMPRESSION:    A 56 y.o. male with recurrent rectal cancer now with multiple skin nodules just above the penis..  Now status post radiation therapy 3/4/2022.  Status post radiation therapy to the pelvis in 2020 now on FOLFIRI and Avastin with good response.    RECOMMENDATIONS:     Patient is can to continue his treatment with Dr Werner.  I am happy to see him on an as-needed basis.I did explain that if he does have a progression in the rectal area we could deliver a palliative dose to that area as well.        Thank you for the opportunity to participate in his care.  If any questions or comments, please do not hesitate in calling.      Please note that this dictation was created using voice recognition software. I have made every reasonable attempt to correct obvious errors, but I expect that there are errors of grammar and possibly content that I did not discover before finalizing the note.

## 2022-04-18 NOTE — PROGRESS NOTES
"Pharmacy Chemotherapy Verification Note:    DX:  Metastatic Rectal Adenocarcinoma    Cycle 3  Previous treatment: C2 on 4/5/22    Protocol: FOLFiri + bevacizumab  Irinotecan  IV on day 1  *dose reduced to 150mg/m2 for tolerance/ heavily pretreated  *discontinued for tolerance/ heavily pretreated  5-FU iv over 46 hrs  *dose reduced to 2000mg/m2 for tolerance/ heavily pretreated  *bolus discontinued for tolerance/ heavily pretreated  bevacizumab 5mg/kg IV on day 1*3/22/22- HOLD for cycle 1 per MD  q14 days for 8-12 cycles  NCCN Guidelines for Colon Cancer V.2.2018  Bradly POSEY Et al - Lancet Oncol. 2014 Sep;15(10):1065-75. Doi:  10.1016/-[id number]. Epub 2014 Jul 31.  Bulmaro MARRERO, et al - J Clin Oncol. 2007 Oct 20;25(30):4743-86.    /71   Pulse 75   Temp 36 °C (96.8 °F) (Temporal)   Resp 18   Ht 1.76 m (5' 9.29\")   Wt 86 kg (189 lb 9.5 oz)   SpO2 98%   BMI 27.76 kg/m²     Body surface area is 2.05 meters squared.    All lab results, BP and urine protein 4/19/22 within treatment parameters.     Irinotecan 150 mg/m2 x 2.05 m2 = 307.5mg    <10% difference, ok to treat with final dose = 307.6mg IV    Bevacizumab-bvzr (Zirabev) 5 mg/kg x 86 kg = 430mg   Rounded to nearest vial size (within 10%) per rounding protocol   Okay to treat with final dose = 400 mg IV    5-FU 2000 mg/m2 x 2.05m2 = 4100mg    <10% difference, ok to treat with final dose = 4100 mg CIVI   Via CADD pump for home infusion over 46 hours @ 1.8mL/hr      Bal Grande, PharmD        "

## 2022-04-19 ENCOUNTER — OUTPATIENT INFUSION SERVICES (OUTPATIENT)
Dept: ONCOLOGY | Facility: MEDICAL CENTER | Age: 57
End: 2022-04-19
Attending: INTERNAL MEDICINE
Payer: MEDICARE

## 2022-04-19 VITALS
HEART RATE: 75 BPM | SYSTOLIC BLOOD PRESSURE: 102 MMHG | TEMPERATURE: 96.8 F | HEIGHT: 69 IN | DIASTOLIC BLOOD PRESSURE: 71 MMHG | OXYGEN SATURATION: 98 % | WEIGHT: 189.6 LBS | BODY MASS INDEX: 28.08 KG/M2 | RESPIRATION RATE: 18 BRPM

## 2022-04-19 DIAGNOSIS — C20 RECTAL CARCINOMA (HCC): Primary | ICD-10-CM

## 2022-04-19 LAB
ALBUMIN SERPL BCP-MCNC: 3.7 G/DL (ref 3.2–4.9)
ALBUMIN/GLOB SERPL: 1.3 G/DL
ALP SERPL-CCNC: 92 U/L (ref 30–99)
ALT SERPL-CCNC: 17 U/L (ref 2–50)
ANION GAP SERPL CALC-SCNC: 10 MMOL/L (ref 7–16)
APPEARANCE UR: CLEAR
AST SERPL-CCNC: 20 U/L (ref 12–45)
BASOPHILS # BLD AUTO: 0.8 % (ref 0–1.8)
BASOPHILS # BLD: 0.04 K/UL (ref 0–0.12)
BILIRUB SERPL-MCNC: 0.2 MG/DL (ref 0.1–1.5)
BUN SERPL-MCNC: 12 MG/DL (ref 8–22)
CALCIUM SERPL-MCNC: 8.3 MG/DL (ref 8.5–10.5)
CHLORIDE SERPL-SCNC: 107 MMOL/L (ref 96–112)
CO2 SERPL-SCNC: 25 MMOL/L (ref 20–33)
COLOR UR AUTO: YELLOW
CREAT SERPL-MCNC: 0.7 MG/DL (ref 0.5–1.4)
EOSINOPHIL # BLD AUTO: 0.17 K/UL (ref 0–0.51)
EOSINOPHIL NFR BLD: 3.5 % (ref 0–6.9)
ERYTHROCYTE [DISTWIDTH] IN BLOOD BY AUTOMATED COUNT: 50.9 FL (ref 35.9–50)
GFR SERPLBLD CREATININE-BSD FMLA CKD-EPI: 109 ML/MIN/1.73 M 2
GLOBULIN SER CALC-MCNC: 2.9 G/DL (ref 1.9–3.5)
GLUCOSE SERPL-MCNC: 101 MG/DL (ref 65–99)
GLUCOSE UR QL STRIP.AUTO: NEGATIVE MG/DL
HCT VFR BLD AUTO: 34.3 % (ref 42–52)
HGB BLD-MCNC: 10.5 G/DL (ref 14–18)
IMM GRANULOCYTES # BLD AUTO: 0.01 K/UL (ref 0–0.11)
IMM GRANULOCYTES NFR BLD AUTO: 0.2 % (ref 0–0.9)
KETONES UR QL STRIP.AUTO: NEGATIVE MG/DL
LEUKOCYTE ESTERASE UR QL STRIP.AUTO: NEGATIVE
LYMPHOCYTES # BLD AUTO: 0.65 K/UL (ref 1–4.8)
LYMPHOCYTES NFR BLD: 13.3 % (ref 22–41)
MCH RBC QN AUTO: 24.5 PG (ref 27–33)
MCHC RBC AUTO-ENTMCNC: 30.6 G/DL (ref 33.7–35.3)
MCV RBC AUTO: 80.1 FL (ref 81.4–97.8)
MONOCYTES # BLD AUTO: 0.41 K/UL (ref 0–0.85)
MONOCYTES NFR BLD AUTO: 8.4 % (ref 0–13.4)
NEUTROPHILS # BLD AUTO: 3.61 K/UL (ref 1.82–7.42)
NEUTROPHILS NFR BLD: 73.8 % (ref 44–72)
NITRITE UR QL STRIP.AUTO: NEGATIVE
NRBC # BLD AUTO: 0 K/UL
NRBC BLD-RTO: 0 /100 WBC
OUTPT INFUS CBC COMMENT OICOM: ABNORMAL
PH UR STRIP.AUTO: 6 [PH] (ref 5–8)
PLATELET # BLD AUTO: 379 K/UL (ref 164–446)
PMV BLD AUTO: 9 FL (ref 9–12.9)
POTASSIUM SERPL-SCNC: 3.5 MMOL/L (ref 3.6–5.5)
PROT SERPL-MCNC: 6.6 G/DL (ref 6–8.2)
PROT UR QL STRIP: NEGATIVE MG/DL
RBC # BLD AUTO: 4.28 M/UL (ref 4.7–6.1)
RBC UR QL AUTO: ABNORMAL
SODIUM SERPL-SCNC: 142 MMOL/L (ref 135–145)
SP GR UR STRIP.AUTO: >=1.03 (ref 1–1.03)
WBC # BLD AUTO: 4.9 K/UL (ref 4.8–10.8)

## 2022-04-19 PROCEDURE — 96375 TX/PRO/DX INJ NEW DRUG ADDON: CPT

## 2022-04-19 PROCEDURE — 700105 HCHG RX REV CODE 258: Performed by: INTERNAL MEDICINE

## 2022-04-19 PROCEDURE — 700102 HCHG RX REV CODE 250 W/ 637 OVERRIDE(OP): Performed by: INTERNAL MEDICINE

## 2022-04-19 PROCEDURE — 81002 URINALYSIS NONAUTO W/O SCOPE: CPT

## 2022-04-19 PROCEDURE — A4212 NON CORING NEEDLE OR STYLET: HCPCS

## 2022-04-19 PROCEDURE — G0498 CHEMO EXTEND IV INFUS W/PUMP: HCPCS

## 2022-04-19 PROCEDURE — A9270 NON-COVERED ITEM OR SERVICE: HCPCS | Performed by: INTERNAL MEDICINE

## 2022-04-19 PROCEDURE — 85025 COMPLETE CBC W/AUTO DIFF WBC: CPT

## 2022-04-19 PROCEDURE — 96413 CHEMO IV INFUSION 1 HR: CPT

## 2022-04-19 PROCEDURE — 96417 CHEMO IV INFUS EACH ADDL SEQ: CPT

## 2022-04-19 PROCEDURE — 96411 CHEMO IV PUSH ADDL DRUG: CPT

## 2022-04-19 PROCEDURE — 700111 HCHG RX REV CODE 636 W/ 250 OVERRIDE (IP): Performed by: INTERNAL MEDICINE

## 2022-04-19 PROCEDURE — 80053 COMPREHEN METABOLIC PANEL: CPT

## 2022-04-19 RX ORDER — POTASSIUM CHLORIDE 20 MEQ/1
40 TABLET, EXTENDED RELEASE ORAL ONCE
Status: DISCONTINUED | OUTPATIENT
Start: 2022-04-19 | End: 2022-04-19 | Stop reason: HOSPADM

## 2022-04-19 RX ORDER — POTASSIUM CHLORIDE 20 MEQ/1
40 TABLET, EXTENDED RELEASE ORAL DAILY
Status: DISCONTINUED | OUTPATIENT
Start: 2022-04-19 | End: 2022-04-19

## 2022-04-19 RX ADMIN — SODIUM CHLORIDE 400 MG: 9 INJECTION, SOLUTION INTRAVENOUS at 13:46

## 2022-04-19 RX ADMIN — DEXAMETHASONE SODIUM PHOSPHATE 12 MG: 4 INJECTION, SOLUTION INTRA-ARTICULAR; INTRALESIONAL; INTRAMUSCULAR; INTRAVENOUS; SOFT TISSUE at 11:12

## 2022-04-19 RX ADMIN — ONDANSETRON HYDROCHLORIDE 16 MG: 2 INJECTION, SOLUTION INTRAMUSCULAR; INTRAVENOUS at 11:22

## 2022-04-19 RX ADMIN — ATROPINE SULFATE 0.5 MG: 1 INJECTION, SOLUTION INTRAMUSCULAR; INTRAVENOUS; SUBCUTANEOUS at 11:56

## 2022-04-19 RX ADMIN — IRINOTECAN HYDROCHLORIDE 307.6 MG: 20 INJECTION, SOLUTION INTRAVENOUS at 11:56

## 2022-04-19 RX ADMIN — POTASSIUM CHLORIDE 40 MEQ: 1500 TABLET, EXTENDED RELEASE ORAL at 14:24

## 2022-04-19 RX ADMIN — FLUOROURACIL 4100 MG: 50 INJECTION, SOLUTION INTRAVENOUS at 14:24

## 2022-04-19 ASSESSMENT — FIBROSIS 4 INDEX: FIB4 SCORE: 0.61

## 2022-04-19 NOTE — PROGRESS NOTES
"Pharmacy Chemotherapy Verification Note:    DX:  Metastatic Rectal Adenocarcinoma    Protocol: FOLFiri + bevacizumab  Irinotecan  IV on day 1  *dose reduced to 150mg/m2 for tolerance/ heavily pretreated  *discontinued for tolerance/ heavily pretreated  5-FU iv over 46 hrs  *dose reduced to 2000mg/m2 for tolerance/ heavily pretreated  *bolus discontinued for tolerance/ heavily pretreated  bevacizumab 5mg/kg IV on day 1*3/22/22- HOLD for cycle 1 per MD   q14 days for 8-12 cycles  NCCN Guidelines for Colon Cancer V.2.2018  Bradly POSEY Et al - Lancet Oncol. 2014 Sep;15(10):1065-75. Doi:  10.1016/-[id number]. Epub 2014 Jul 31.  Bulmaro CS, et al - J Clin Oncol. 2007 Oct 20;25(30):1865-28.    /71   Pulse 75   Temp 36 °C (96.8 °F) (Temporal)   Resp 18   Ht 1.76 m (5' 9.29\")   Wt 86 kg (189 lb 9.5 oz)   SpO2 98%   BMI 27.76 kg/m²     Body surface area is 2.05 meters squared.    Labs 4/19/22:  ANC~ 3610 Plt = 379k   Hgb = 10.5     SCr = 0.7 mg/dL CrCl >125 mL/min   LFT's = WNL TBili = 0.2   Urine Protein = negative BP = 102/71     Drug Order   (Drug name, dose, route, IV Fluid & volume, frequency, number of doses) Cycle 3      Previous treatment: C2 on 4/5/22     Medication = Irinotecan (Camptosar)  Base Dose= 150 mg/m²   Calc Dose: Base Dose x 2.05 m² = 307.6 mg  Final Dose = 307.6 mg  Route = IV  Fluid & Volume = D5W 500 mL  Admin Duration = Over 90 minutes          <10% difference, okay to treat with final dose   Medication = Bevacizumab-bvzr (Zirabev)  Base Dose= 5 mg/kg   Calc Dose: Base Dose x 86 kg = 430 mg  Final Dose = 400 mg  Route = IV  Fluid & Volume =  mL  Admin Duration = Over 30 minutes          Rounded to nearest vial size (within 10%) per rounding protocol, okay to treat with final dose     Medication = Fluorouracil   Base Dose= 2000 mg/m²   Calc Dose:Base Dose x 2.05 m² = 4100 mg  Final Dose = 4100 mg  Route = CIVI  Conc = 50 mg/mL  Fluid & Volume = 82 mL (+3 mL of overfill = 85 " mL)  Admin Duration = Over 46 hours @ 1.8 mL/hr   Via CADD pump for home infusion        <10% difference, okay to treat with final dose     By my signature below, I confirm this process was performed independently with the BSA and all final chemotherapy dosing calculations congruent. I have reviewed the above chemotherapy order and that my calculation of the final dose and BSA (when applicable) corroborate those calculations of the  pharmacist. Discrepancies of 10% or greater in the written dose have been addressed and documented within the EPIC Progress notes.    Angelina Garay, PharmD

## 2022-04-19 NOTE — PROGRESS NOTES
"Dre arrives to [facility] for C3D1 FOLFIRI + Zirabev for rectal carcinoma. Patient denies acute health concerns. Denies s/s active infections. Patient c/o rectal discomfort, which is improving. Right chest port accessed in sterile fashion with a 20g 1\" low profile needle. Port flushes easily and has brisk blood return. UA results WNL. Labs drawn, reviewed, and meets parameters to proceed with treatment. Premedications administered. Atropine administered prior to irinotecan. Irinotecan infused over 90 mins without adverse s/s. Zirabev infused as ordered without adverse s/s. Blood return verified and 5FU CADD connected and set to RUN mode over 46 hours. Dr. Aguila notified of potassium 3.5 replaced orally per protocol. Patient to return on Thursday 4/21/2022. Discharged home to self care in no apparent distress.   "

## 2022-04-19 NOTE — PROGRESS NOTES
Chemotherapy Verification - SECONDARY RN       Height = 176 cm  Weight = 86 kg  BSA = 2.05 m2       Medication: irinotecan  Dose: 150 mg/m2  Calculated Dose: 307.5 mg                             (In mg/m2, AUC, mg/kg)     Medication: Zirabev  Dose: 5 mg/kg  Calculated Dose: 430 mg                             (In mg/m2, AUC, mg/kg)    Medication: 5FU CADD pump  Dose: 2000 mg/m2  Calculated Dose: 4100 mg                             (In mg/m2, AUC, mg/kg)    I confirm that this process was performed independently.

## 2022-04-19 NOTE — PROGRESS NOTES
Chemotherapy Verification - PRIMARY RN      Height = 1.76m  Weight = 86kg  BSA = 2.05m^2       Medication: irinotecan (CAMPTOSAR)  Dose: 150mg/m^2  Calculated Dose: 307.5mg                            (In mg/m2, AUC, mg/kg)     Medication: bevacizumab-bvzr (ZIRABEV) Dose: 5mg/kg Calculated Dose: 430mg                            (In mg/m2, AUC, mg/kg)    Medication: fluorouracil (ARUCIL) CADD pump Dose: 2000mg/m^2  Calculated Dose: 4100mg                            (In mg/m2, AUC, mg/kg)      I confirm this process was performed independently with the BSA and all final chemotherapy dosing calculations congruent.  Any discrepancies of 10% or greater have been addressed with the chemotherapy pharmacist. The resolution of the discrepancy has been documented in the EPIC progress notes.

## 2022-04-20 ENCOUNTER — APPOINTMENT (OUTPATIENT)
Dept: ONCOLOGY | Facility: MEDICAL CENTER | Age: 57
End: 2022-04-20
Attending: INTERNAL MEDICINE
Payer: MEDICARE

## 2022-04-21 ENCOUNTER — OUTPATIENT INFUSION SERVICES (OUTPATIENT)
Dept: ONCOLOGY | Facility: MEDICAL CENTER | Age: 57
End: 2022-04-21
Attending: INTERNAL MEDICINE
Payer: MEDICARE

## 2022-04-21 VITALS
OXYGEN SATURATION: 98 % | SYSTOLIC BLOOD PRESSURE: 111 MMHG | DIASTOLIC BLOOD PRESSURE: 73 MMHG | HEART RATE: 69 BPM | RESPIRATION RATE: 18 BRPM | TEMPERATURE: 98 F

## 2022-04-21 DIAGNOSIS — C20 RECTAL CARCINOMA (HCC): ICD-10-CM

## 2022-04-21 PROCEDURE — 700111 HCHG RX REV CODE 636 W/ 250 OVERRIDE (IP): Performed by: INTERNAL MEDICINE

## 2022-04-21 PROCEDURE — 96523 IRRIG DRUG DELIVERY DEVICE: CPT

## 2022-04-21 RX ORDER — HEPARIN SODIUM (PORCINE) LOCK FLUSH IV SOLN 100 UNIT/ML 100 UNIT/ML
500 SOLUTION INTRAVENOUS PRN
Status: DISCONTINUED | OUTPATIENT
Start: 2022-04-21 | End: 2022-04-21 | Stop reason: HOSPADM

## 2022-04-21 RX ADMIN — HEPARIN 500 UNITS: 100 SYRINGE at 16:08

## 2022-04-21 NOTE — PROGRESS NOTES
Patient arrived to [facility] for C3D3 5FU CADD pump de-access.  Pump stopped with 0ml remaining in reservoir, 83.5 mLs administered.  Disconnected pump, Port flushed per protocol with brisk blood return noted, heparinized, de-accessed and gauze dressing applied.  Pump cleaned and placed in pharmacy drawer.  Confirmed pt's next appt. Pt discharged home in [facility].

## 2022-04-26 RX ORDER — 0.9 % SODIUM CHLORIDE 0.9 %
3 VIAL (ML) INJECTION PRN
Status: CANCELLED | OUTPATIENT
Start: 2022-05-03

## 2022-04-26 RX ORDER — DEXTROSE MONOHYDRATE 50 MG/ML
INJECTION, SOLUTION INTRAVENOUS CONTINUOUS
Status: CANCELLED | OUTPATIENT
Start: 2022-05-04

## 2022-04-26 RX ORDER — 0.9 % SODIUM CHLORIDE 0.9 %
10 VIAL (ML) INJECTION PRN
Status: CANCELLED | OUTPATIENT
Start: 2022-05-03

## 2022-04-26 RX ORDER — SODIUM CHLORIDE 9 MG/ML
INJECTION, SOLUTION INTRAVENOUS CONTINUOUS
Status: CANCELLED | OUTPATIENT
Start: 2022-05-04

## 2022-04-26 RX ORDER — PROCHLORPERAZINE MALEATE 10 MG
10 TABLET ORAL EVERY 6 HOURS PRN
Status: CANCELLED | OUTPATIENT
Start: 2022-05-04

## 2022-04-26 RX ORDER — METHYLPREDNISOLONE SODIUM SUCCINATE 125 MG/2ML
125 INJECTION, POWDER, LYOPHILIZED, FOR SOLUTION INTRAMUSCULAR; INTRAVENOUS PRN
Status: CANCELLED | OUTPATIENT
Start: 2022-05-04

## 2022-04-26 RX ORDER — HEPARIN SODIUM (PORCINE) LOCK FLUSH IV SOLN 100 UNIT/ML 100 UNIT/ML
500 SOLUTION INTRAVENOUS PRN
Status: CANCELLED | OUTPATIENT
Start: 2022-05-04

## 2022-04-26 RX ORDER — DIPHENHYDRAMINE HYDROCHLORIDE 50 MG/ML
50 INJECTION INTRAMUSCULAR; INTRAVENOUS PRN
Status: CANCELLED | OUTPATIENT
Start: 2022-05-04

## 2022-04-26 RX ORDER — LOPERAMIDE HYDROCHLORIDE 2 MG/1
2 CAPSULE ORAL
Status: CANCELLED | OUTPATIENT
Start: 2022-05-04

## 2022-04-26 RX ORDER — LOPERAMIDE HYDROCHLORIDE 2 MG/1
4 CAPSULE ORAL PRN
Status: CANCELLED | OUTPATIENT
Start: 2022-05-04

## 2022-04-26 RX ORDER — 0.9 % SODIUM CHLORIDE 0.9 %
3 VIAL (ML) INJECTION PRN
Status: CANCELLED | OUTPATIENT
Start: 2022-05-04

## 2022-04-26 RX ORDER — EPINEPHRINE 1 MG/ML(1)
0.5 AMPUL (ML) INJECTION PRN
Status: CANCELLED | OUTPATIENT
Start: 2022-05-04

## 2022-04-26 RX ORDER — 0.9 % SODIUM CHLORIDE 0.9 %
10 VIAL (ML) INJECTION PRN
Status: CANCELLED | OUTPATIENT
Start: 2022-05-04

## 2022-04-26 RX ORDER — HEPARIN SODIUM (PORCINE) LOCK FLUSH IV SOLN 100 UNIT/ML 100 UNIT/ML
500 SOLUTION INTRAVENOUS PRN
Status: CANCELLED | OUTPATIENT
Start: 2022-05-06

## 2022-04-26 RX ORDER — ONDANSETRON 8 MG/1
8 TABLET, ORALLY DISINTEGRATING ORAL PRN
Status: CANCELLED | OUTPATIENT
Start: 2022-05-04

## 2022-04-26 RX ORDER — ONDANSETRON 2 MG/ML
4 INJECTION INTRAMUSCULAR; INTRAVENOUS PRN
Status: CANCELLED | OUTPATIENT
Start: 2022-05-04

## 2022-04-26 RX ORDER — 0.9 % SODIUM CHLORIDE 0.9 %
20 VIAL (ML) INJECTION PRN
Status: CANCELLED | OUTPATIENT
Start: 2022-05-06

## 2022-04-26 RX ORDER — HEPARIN SODIUM (PORCINE) LOCK FLUSH IV SOLN 100 UNIT/ML 100 UNIT/ML
500 SOLUTION INTRAVENOUS PRN
Status: CANCELLED | OUTPATIENT
Start: 2022-05-03

## 2022-04-26 RX ORDER — 0.9 % SODIUM CHLORIDE 0.9 %
VIAL (ML) INJECTION PRN
Status: CANCELLED | OUTPATIENT
Start: 2022-05-04

## 2022-04-26 RX ORDER — 0.9 % SODIUM CHLORIDE 0.9 %
VIAL (ML) INJECTION PRN
Status: CANCELLED | OUTPATIENT
Start: 2022-05-03

## 2022-05-03 ENCOUNTER — OUTPATIENT INFUSION SERVICES (OUTPATIENT)
Dept: ONCOLOGY | Facility: MEDICAL CENTER | Age: 57
End: 2022-05-03
Attending: INTERNAL MEDICINE
Payer: MEDICARE

## 2022-05-03 VITALS
RESPIRATION RATE: 16 BRPM | DIASTOLIC BLOOD PRESSURE: 74 MMHG | TEMPERATURE: 97.5 F | SYSTOLIC BLOOD PRESSURE: 124 MMHG | BODY MASS INDEX: 28.47 KG/M2 | HEART RATE: 66 BPM | HEIGHT: 69 IN | WEIGHT: 192.24 LBS | OXYGEN SATURATION: 98 %

## 2022-05-03 DIAGNOSIS — C20 RECTAL CARCINOMA (HCC): ICD-10-CM

## 2022-05-03 LAB
ALBUMIN SERPL BCP-MCNC: 4 G/DL (ref 3.2–4.9)
ALBUMIN/GLOB SERPL: 1.4 G/DL
ALP SERPL-CCNC: 101 U/L (ref 30–99)
ALT SERPL-CCNC: 15 U/L (ref 2–50)
ANION GAP SERPL CALC-SCNC: 10 MMOL/L (ref 7–16)
APPEARANCE UR: CLEAR
AST SERPL-CCNC: 20 U/L (ref 12–45)
BASOPHILS # BLD AUTO: 1 % (ref 0–1.8)
BASOPHILS # BLD: 0.05 K/UL (ref 0–0.12)
BILIRUB SERPL-MCNC: 0.4 MG/DL (ref 0.1–1.5)
BUN SERPL-MCNC: 14 MG/DL (ref 8–22)
CALCIUM SERPL-MCNC: 8.8 MG/DL (ref 8.5–10.5)
CHLORIDE SERPL-SCNC: 104 MMOL/L (ref 96–112)
CO2 SERPL-SCNC: 27 MMOL/L (ref 20–33)
COLOR UR AUTO: YELLOW
CREAT SERPL-MCNC: 0.64 MG/DL (ref 0.5–1.4)
EOSINOPHIL # BLD AUTO: 0.11 K/UL (ref 0–0.51)
EOSINOPHIL NFR BLD: 2.1 % (ref 0–6.9)
ERYTHROCYTE [DISTWIDTH] IN BLOOD BY AUTOMATED COUNT: 53.6 FL (ref 35.9–50)
GFR SERPLBLD CREATININE-BSD FMLA CKD-EPI: 112 ML/MIN/1.73 M 2
GLOBULIN SER CALC-MCNC: 2.8 G/DL (ref 1.9–3.5)
GLUCOSE SERPL-MCNC: 102 MG/DL (ref 65–99)
GLUCOSE UR QL STRIP.AUTO: NEGATIVE MG/DL
HCT VFR BLD AUTO: 38.4 % (ref 42–52)
HGB BLD-MCNC: 11.9 G/DL (ref 14–18)
IMM GRANULOCYTES # BLD AUTO: 0.01 K/UL (ref 0–0.11)
IMM GRANULOCYTES NFR BLD AUTO: 0.2 % (ref 0–0.9)
KETONES UR QL STRIP.AUTO: NEGATIVE MG/DL
LEUKOCYTE ESTERASE UR QL STRIP.AUTO: NEGATIVE
LYMPHOCYTES # BLD AUTO: 0.54 K/UL (ref 1–4.8)
LYMPHOCYTES NFR BLD: 10.5 % (ref 22–41)
MCH RBC QN AUTO: 25.2 PG (ref 27–33)
MCHC RBC AUTO-ENTMCNC: 31 G/DL (ref 33.7–35.3)
MCV RBC AUTO: 81.2 FL (ref 81.4–97.8)
MONOCYTES # BLD AUTO: 0.4 K/UL (ref 0–0.85)
MONOCYTES NFR BLD AUTO: 7.8 % (ref 0–13.4)
NEUTROPHILS # BLD AUTO: 4.05 K/UL (ref 1.82–7.42)
NEUTROPHILS NFR BLD: 78.4 % (ref 44–72)
NITRITE UR QL STRIP.AUTO: NEGATIVE
NRBC # BLD AUTO: 0 K/UL
NRBC BLD-RTO: 0 /100 WBC
OUTPT INFUS CBC COMMENT OICOM: ABNORMAL
PH UR STRIP.AUTO: 6.5 [PH] (ref 5–8)
PLATELET # BLD AUTO: 313 K/UL (ref 164–446)
PMV BLD AUTO: 8.8 FL (ref 9–12.9)
POTASSIUM SERPL-SCNC: 3.5 MMOL/L (ref 3.6–5.5)
PROT SERPL-MCNC: 6.8 G/DL (ref 6–8.2)
PROT UR QL STRIP: NEGATIVE MG/DL
RBC # BLD AUTO: 4.73 M/UL (ref 4.7–6.1)
RBC UR QL AUTO: ABNORMAL
SODIUM SERPL-SCNC: 141 MMOL/L (ref 135–145)
SP GR UR STRIP.AUTO: 1.02 (ref 1–1.03)
WBC # BLD AUTO: 5.2 K/UL (ref 4.8–10.8)

## 2022-05-03 PROCEDURE — 700111 HCHG RX REV CODE 636 W/ 250 OVERRIDE (IP): Performed by: INTERNAL MEDICINE

## 2022-05-03 PROCEDURE — 96415 CHEMO IV INFUSION ADDL HR: CPT

## 2022-05-03 PROCEDURE — 81002 URINALYSIS NONAUTO W/O SCOPE: CPT

## 2022-05-03 PROCEDURE — 96375 TX/PRO/DX INJ NEW DRUG ADDON: CPT

## 2022-05-03 PROCEDURE — 700105 HCHG RX REV CODE 258: Performed by: INTERNAL MEDICINE

## 2022-05-03 PROCEDURE — G0498 CHEMO EXTEND IV INFUS W/PUMP: HCPCS

## 2022-05-03 PROCEDURE — 96413 CHEMO IV INFUSION 1 HR: CPT

## 2022-05-03 PROCEDURE — A9270 NON-COVERED ITEM OR SERVICE: HCPCS | Performed by: INTERNAL MEDICINE

## 2022-05-03 PROCEDURE — 85025 COMPLETE CBC W/AUTO DIFF WBC: CPT

## 2022-05-03 PROCEDURE — 700102 HCHG RX REV CODE 250 W/ 637 OVERRIDE(OP): Performed by: INTERNAL MEDICINE

## 2022-05-03 PROCEDURE — A4212 NON CORING NEEDLE OR STYLET: HCPCS

## 2022-05-03 PROCEDURE — 80053 COMPREHEN METABOLIC PANEL: CPT

## 2022-05-03 PROCEDURE — 96417 CHEMO IV INFUS EACH ADDL SEQ: CPT

## 2022-05-03 RX ORDER — POTASSIUM CHLORIDE 20 MEQ/1
40 TABLET, EXTENDED RELEASE ORAL ONCE
Status: COMPLETED | OUTPATIENT
Start: 2022-05-03 | End: 2022-05-03

## 2022-05-03 RX ADMIN — POTASSIUM CHLORIDE 40 MEQ: 1500 TABLET, EXTENDED RELEASE ORAL at 11:53

## 2022-05-03 RX ADMIN — ATROPINE SULFATE 0.5 MG: 1 INJECTION, SOLUTION INTRAMUSCULAR; INTRAVENOUS; SUBCUTANEOUS at 12:39

## 2022-05-03 RX ADMIN — SODIUM CHLORIDE 400 MG: 9 INJECTION, SOLUTION INTRAVENOUS at 14:26

## 2022-05-03 RX ADMIN — DEXAMETHASONE SODIUM PHOSPHATE 12 MG: 4 INJECTION, SOLUTION INTRA-ARTICULAR; INTRALESIONAL; INTRAMUSCULAR; INTRAVENOUS; SOFT TISSUE at 11:53

## 2022-05-03 RX ADMIN — ONDANSETRON 16 MG: 2 INJECTION INTRAMUSCULAR; INTRAVENOUS at 12:05

## 2022-05-03 RX ADMIN — FLUOROURACIL 4120 MG: 50 INJECTION, SOLUTION INTRAVENOUS at 15:22

## 2022-05-03 RX ADMIN — IRINOTECAN HYDROCHLORIDE 309 MG: 20 INJECTION, SOLUTION INTRAVENOUS at 12:40

## 2022-05-03 ASSESSMENT — FIBROSIS 4 INDEX: FIB4 SCORE: 0.69

## 2022-05-03 NOTE — PROGRESS NOTES
Chemotherapy Verification - SECONDARY RN       Height = 176 cm  Weight = 87.2 kg  BSA = 2.06 m2       Medication: Irinotecan  Dose: 150 mg/m2  Calculated Dose: 309 mg                             (In mg/m2, AUC, mg/kg)     Medication: Zirabev  Dose: 5 mg/kg  Calculated Dose: 436 mg                             (In mg/m2, AUC, mg/kg)    Medication: 5FU CADD pump  Dose: 2000 mg/m2 over 46 hours    Calculated Dose: 4120 mg                             (In mg/m2, AUC, mg/kg)        I confirm that this process was performed independently.

## 2022-05-03 NOTE — PROGRESS NOTES
Chemotherapy Verification - PRIMARY RN      Height = 1.76 m  Weight = 87.2 kg  BSA = 2.06 m^2       Medication: irinotecan  Dose: 150 mg/m^2  Calculated Dose: 3.9 mg                             (In mg/m2, AUC, mg/kg)     Medication: bevacizumab-bvzr  Dose: 5 mg/kg  Calculated Dose: 436 mg                             (In mg/m2, AUC, mg/kg)    Medication: fluorouracil CADD pump  Dose: 2000 mg/m^2  Calculated Dose: 4120 mg                             (In mg/m2, AUC, mg/kg)      I confirm this process was performed independently with the BSA and all final chemotherapy dosing calculations congruent.  Any discrepancies of 10% or greater have been addressed with the chemotherapy pharmacist. The resolution of the discrepancy has been documented in the EPIC progress notes.

## 2022-05-03 NOTE — PROGRESS NOTES
Pt presents to [facility] for irinotecan, becacizumab-bvzr, and fluorouracil CADD pump. R chest port accessed using sterile technique; brisk blood return observed and pt tolerated well. Labs drawn and within treatable parameters. Pre meds administered and irinotecan, and becacizumab-bvzr infused with no s/s of adverse reactions. Brisk blood return from port verified before initiation of the fluorouracil CADD pump. Connection confirmed and pump placed in carrying case. Next appointment confirmed for de-access and education provided. Pt discharged to self care with all personal belongings and in NAD.

## 2022-05-03 NOTE — PROGRESS NOTES
"Pharmacy Chemotherapy Verification  Patient Name: Dre Eaton   Dx: Metastatic rectal adenocarcinoa   Protocol: mFOLFIRI + Bevacizumab       Regimen:  Bevacizumab 5 mg/kg  IV over 90 min Day 1   C1: hold  Irinotecan 150 mg/m2 IV over 90 min Day 1   Fluorouracil 2000 mg/m2 CIVI over 46-48hrs starting Day 1   Q14-day cycle until DP or UT  *Dosing Reference*  NCCN Guidelines for Rectal Cancer v1.2022  1. Richi LING et al. CPT-11 (irinotecan) addition to bimonthly, high-dose leucovorin and bolus and continuous-infusion 5-fluorouracil (FOLFIRI) for pretreated metastatic colorectal cancer. ALEXANDERR. Eur J Cancer. 1999 Sep;35(9):1343-7.   2. Bulmaro MOSQUEDA et al. Randomized, controlled trial of irinotecan plus infusional, bolus, or oral fluoropyrimidines in first-line treatment of metastatic colorectal cancer: results from the BICC-C Study. J Clin Oncol. 2007 Oct 20;25(30):9451-14.     Allergies: Patient has no known allergies.     /74   Pulse 66   Temp 36.4 °C (97.5 °F) (Temporal)   Resp 16   Ht 1.76 m (5' 9.29\")   Wt 87.2 kg (192 lb 3.9 oz)   SpO2 98%   BMI 28.15 kg/m²  Body surface area is 2.06 meters squared.    Labs 5/3/22  Meet treatment parameters    Drug Order   (Drug name, dose, route, IV Fluid & volume, frequency, number of doses) Cycle 4  Previous treatment: 4/19/22   Medication = Bevacizumab-bvzr (Zirabev)  Base Dose = 5 mg/kg  Calc Dose: Base Dose x 87.2 kg = 436 mg  Final Dose = 400 mg  Route = IV  Fluid & Volume =   mL  Admin Duration = Over 30 minutes   Rounded to vial size (within 10%) per dose rounding protocol.       Okay to treat with final dose     Medication = Irinotecan   Base Dose = 150 mg/m²   Calc Dose: Base Dose x 2.06 m2 = 309 mg  Final Dose = 309 mg  Route = IV  Fluid & Volume = D5W 500 mL  Final [conc] ~ 0.6 mg/mL   Admin Duration = Over 90 minutes    Run concurrently with leucovorin       Okay to treat with final dose     Medication = Fluorouracil   Base Dose = 2,000 mg/m2  Calc " Dose: Base Dose x 2.06 m2 = 4120 mg  Final Dose = 4120 mg  Route = CIVI  Conc = 50 mg/mL  Fluid & Volume = 82.4 mL + 3 mL overfill  Admin Duration = Over 46 hours at 1.8 mL/hour       Via CADD pump for home infusion    Okay to treat with final dose

## 2022-05-03 NOTE — PROGRESS NOTES
"Pharmacy Chemotherapy Verification Note:    DX:  Metastatic Rectal Adenocarcinoma    Cycle 4  Previous treatment: C3 on 4/19/22    Protocol: FOLFiri + bevacizumab  Irinotecan  IV on day 1  *dose reduced to 150mg/m2 for tolerance/ heavily pretreated  *discontinued for tolerance/ heavily pretreated  5-FU iv over 46 hrs  *dose reduced to 2000mg/m2 for tolerance/ heavily pretreated  *bolus discontinued for tolerance/ heavily pretreated  bevacizumab 5mg/kg IV on day 1*3/22/22- HOLD for cycle 1 per MD  q14 days for 8-12 cycles  NCCN Guidelines for Colon Cancer V.2.2018  Bradly POSEY Et al - Lancet Oncol. 2014 Sep;15(10):1065-75. Doi:  [id number]. Epub 2014 Jul 31.  Bulmaro MARRERO et al - J Clin Oncol. 2007 Oct 20;25(30):4751-86.    /74   Pulse 66   Temp 36.4 °C (97.5 °F) (Temporal)   Resp 16   Ht 1.76 m (5' 9.29\")   Wt 87.2 kg (192 lb 3.9 oz)   SpO2 98%   BMI 28.15 kg/m²     Body surface area is 2.06 meters squared.    Labs 5/3/22:  ANC~ 4050 Plt = 313k   Hgb = 11.9     SCr = 0.64 mg/dL CrCl >125 mL/min   AST/ALT/ALK = 20/15/101 TBili = 0.4  Urine Protein = negative  BP = 124/74    Irinotecan (Camptosar) 150 mg/m2 x 2.06 m2 = 309 mg    <10% difference, ok to treat with final dose = 309 mg IV    Bevacizumab-bvzr (Zirabev) 5 mg/kg x 87.2 kg = 436 mg   Rounded to nearest vial size (within 10%) per rounding protocol   Okay to treat with final dose = 400 mg IV    Fluorouracil 2000 mg/m2 x 2.06 m2 = 4120 mg    <10% difference, ok to treat with final dose = 4120 mg CIVI   Via CADD pump for home infusion over 46 hours @ 1.8 mL/hr      Angelina Garay, PharmD        "

## 2022-05-05 ENCOUNTER — OUTPATIENT INFUSION SERVICES (OUTPATIENT)
Dept: ONCOLOGY | Facility: MEDICAL CENTER | Age: 57
End: 2022-05-05
Attending: INTERNAL MEDICINE
Payer: MEDICARE

## 2022-05-05 VITALS
HEART RATE: 90 BPM | SYSTOLIC BLOOD PRESSURE: 108 MMHG | OXYGEN SATURATION: 97 % | TEMPERATURE: 97.6 F | RESPIRATION RATE: 18 BRPM | DIASTOLIC BLOOD PRESSURE: 73 MMHG

## 2022-05-05 DIAGNOSIS — C20 RECTAL CARCINOMA (HCC): ICD-10-CM

## 2022-05-05 PROCEDURE — 700111 HCHG RX REV CODE 636 W/ 250 OVERRIDE (IP): Performed by: INTERNAL MEDICINE

## 2022-05-05 PROCEDURE — 96523 IRRIG DRUG DELIVERY DEVICE: CPT

## 2022-05-05 RX ORDER — HEPARIN SODIUM (PORCINE) LOCK FLUSH IV SOLN 100 UNIT/ML 100 UNIT/ML
500 SOLUTION INTRAVENOUS PRN
Status: DISCONTINUED | OUTPATIENT
Start: 2022-05-05 | End: 2022-05-05 | Stop reason: HOSPADM

## 2022-05-05 RX ADMIN — HEPARIN 500 UNITS: 100 SYRINGE at 17:13

## 2022-05-06 NOTE — PROGRESS NOTES
Mr. Eaton is here today for removal of CADD pump. Infusion is complete and was tolerated well. He voices no new concerns or complaints. Medi port to right chest was flushed with NS, heparinized and ramires need was removed intact. Flushed well and had good blood return.     Pt was discharged home in stable condition. Confirmed next appointment.

## 2022-05-17 ENCOUNTER — OUTPATIENT INFUSION SERVICES (OUTPATIENT)
Dept: ONCOLOGY | Facility: MEDICAL CENTER | Age: 57
End: 2022-05-17
Attending: INTERNAL MEDICINE
Payer: MEDICARE

## 2022-05-17 VITALS
WEIGHT: 193.56 LBS | HEART RATE: 75 BPM | TEMPERATURE: 97.6 F | DIASTOLIC BLOOD PRESSURE: 84 MMHG | SYSTOLIC BLOOD PRESSURE: 146 MMHG | BODY MASS INDEX: 27.71 KG/M2 | OXYGEN SATURATION: 98 % | RESPIRATION RATE: 18 BRPM | HEIGHT: 70 IN

## 2022-05-17 DIAGNOSIS — C20 RECTAL CARCINOMA (HCC): ICD-10-CM

## 2022-05-17 LAB
ALBUMIN SERPL BCP-MCNC: 3.9 G/DL (ref 3.2–4.9)
ALBUMIN/GLOB SERPL: 1.5 G/DL
ALP SERPL-CCNC: 97 U/L (ref 30–99)
ALT SERPL-CCNC: 17 U/L (ref 2–50)
ANION GAP SERPL CALC-SCNC: 13 MMOL/L (ref 7–16)
APPEARANCE UR: CLEAR
AST SERPL-CCNC: 21 U/L (ref 12–45)
BASOPHILS # BLD AUTO: 0.9 % (ref 0–1.8)
BASOPHILS # BLD: 0.05 K/UL (ref 0–0.12)
BILIRUB SERPL-MCNC: 0.3 MG/DL (ref 0.1–1.5)
BUN SERPL-MCNC: 15 MG/DL (ref 8–22)
CALCIUM SERPL-MCNC: 8.5 MG/DL (ref 8.5–10.5)
CHLORIDE SERPL-SCNC: 107 MMOL/L (ref 96–112)
CO2 SERPL-SCNC: 25 MMOL/L (ref 20–33)
COLOR UR AUTO: YELLOW
CREAT SERPL-MCNC: 0.67 MG/DL (ref 0.5–1.4)
EOSINOPHIL # BLD AUTO: 0.19 K/UL (ref 0–0.51)
EOSINOPHIL NFR BLD: 3.4 % (ref 0–6.9)
ERYTHROCYTE [DISTWIDTH] IN BLOOD BY AUTOMATED COUNT: 54.8 FL (ref 35.9–50)
GFR SERPLBLD CREATININE-BSD FMLA CKD-EPI: 110 ML/MIN/1.73 M 2
GLOBULIN SER CALC-MCNC: 2.6 G/DL (ref 1.9–3.5)
GLUCOSE SERPL-MCNC: 99 MG/DL (ref 65–99)
GLUCOSE UR QL STRIP.AUTO: NEGATIVE MG/DL
HCT VFR BLD AUTO: 38.2 % (ref 42–52)
HGB BLD-MCNC: 11.7 G/DL (ref 14–18)
IMM GRANULOCYTES # BLD AUTO: 0.02 K/UL (ref 0–0.11)
IMM GRANULOCYTES NFR BLD AUTO: 0.4 % (ref 0–0.9)
KETONES UR QL STRIP.AUTO: NEGATIVE MG/DL
LEUKOCYTE ESTERASE UR QL STRIP.AUTO: NEGATIVE
LYMPHOCYTES # BLD AUTO: 0.62 K/UL (ref 1–4.8)
LYMPHOCYTES NFR BLD: 11.2 % (ref 22–41)
MCH RBC QN AUTO: 25.1 PG (ref 27–33)
MCHC RBC AUTO-ENTMCNC: 30.6 G/DL (ref 33.7–35.3)
MCV RBC AUTO: 81.8 FL (ref 81.4–97.8)
MONOCYTES # BLD AUTO: 0.41 K/UL (ref 0–0.85)
MONOCYTES NFR BLD AUTO: 7.4 % (ref 0–13.4)
NEUTROPHILS # BLD AUTO: 4.24 K/UL (ref 1.82–7.42)
NEUTROPHILS NFR BLD: 76.7 % (ref 44–72)
NITRITE UR QL STRIP.AUTO: NEGATIVE
NRBC # BLD AUTO: 0 K/UL
NRBC BLD-RTO: 0 /100 WBC
OUTPT INFUS CBC COMMENT OICOM: ABNORMAL
PH UR STRIP.AUTO: 6 [PH] (ref 5–8)
PLATELET # BLD AUTO: 271 K/UL (ref 164–446)
PMV BLD AUTO: 9.2 FL (ref 9–12.9)
POTASSIUM SERPL-SCNC: 3.5 MMOL/L (ref 3.6–5.5)
PROT SERPL-MCNC: 6.5 G/DL (ref 6–8.2)
PROT UR QL STRIP: 30 MG/DL
RBC # BLD AUTO: 4.67 M/UL (ref 4.7–6.1)
RBC UR QL AUTO: ABNORMAL
SODIUM SERPL-SCNC: 145 MMOL/L (ref 135–145)
SP GR UR STRIP.AUTO: >=1.03 (ref 1–1.03)
WBC # BLD AUTO: 5.5 K/UL (ref 4.8–10.8)

## 2022-05-17 PROCEDURE — 96411 CHEMO IV PUSH ADDL DRUG: CPT

## 2022-05-17 PROCEDURE — G0498 CHEMO EXTEND IV INFUS W/PUMP: HCPCS

## 2022-05-17 PROCEDURE — 96413 CHEMO IV INFUSION 1 HR: CPT

## 2022-05-17 PROCEDURE — 85025 COMPLETE CBC W/AUTO DIFF WBC: CPT

## 2022-05-17 PROCEDURE — 80053 COMPREHEN METABOLIC PANEL: CPT

## 2022-05-17 PROCEDURE — 700111 HCHG RX REV CODE 636 W/ 250 OVERRIDE (IP): Performed by: INTERNAL MEDICINE

## 2022-05-17 PROCEDURE — 96375 TX/PRO/DX INJ NEW DRUG ADDON: CPT

## 2022-05-17 PROCEDURE — 81002 URINALYSIS NONAUTO W/O SCOPE: CPT

## 2022-05-17 PROCEDURE — A4212 NON CORING NEEDLE OR STYLET: HCPCS

## 2022-05-17 PROCEDURE — 700105 HCHG RX REV CODE 258: Performed by: INTERNAL MEDICINE

## 2022-05-17 RX ORDER — 0.9 % SODIUM CHLORIDE 0.9 %
3 VIAL (ML) INJECTION PRN
Status: CANCELLED | OUTPATIENT
Start: 2022-05-17

## 2022-05-17 RX ORDER — DEXTROSE MONOHYDRATE 50 MG/ML
INJECTION, SOLUTION INTRAVENOUS CONTINUOUS
Status: CANCELLED | OUTPATIENT
Start: 2022-05-18

## 2022-05-17 RX ORDER — 0.9 % SODIUM CHLORIDE 0.9 %
10 VIAL (ML) INJECTION PRN
Status: CANCELLED | OUTPATIENT
Start: 2022-05-18

## 2022-05-17 RX ORDER — PROCHLORPERAZINE MALEATE 10 MG
10 TABLET ORAL EVERY 6 HOURS PRN
Status: CANCELLED | OUTPATIENT
Start: 2022-05-18

## 2022-05-17 RX ORDER — DIPHENHYDRAMINE HYDROCHLORIDE 50 MG/ML
50 INJECTION INTRAMUSCULAR; INTRAVENOUS PRN
Status: CANCELLED | OUTPATIENT
Start: 2022-05-18

## 2022-05-17 RX ORDER — HEPARIN SODIUM (PORCINE) LOCK FLUSH IV SOLN 100 UNIT/ML 100 UNIT/ML
500 SOLUTION INTRAVENOUS PRN
Status: CANCELLED | OUTPATIENT
Start: 2022-05-18

## 2022-05-17 RX ORDER — SODIUM CHLORIDE 9 MG/ML
INJECTION, SOLUTION INTRAVENOUS CONTINUOUS
Status: CANCELLED | OUTPATIENT
Start: 2022-05-18

## 2022-05-17 RX ORDER — ONDANSETRON 8 MG/1
8 TABLET, ORALLY DISINTEGRATING ORAL PRN
Status: CANCELLED | OUTPATIENT
Start: 2022-05-18

## 2022-05-17 RX ORDER — 0.9 % SODIUM CHLORIDE 0.9 %
3 VIAL (ML) INJECTION PRN
Status: CANCELLED | OUTPATIENT
Start: 2022-05-18

## 2022-05-17 RX ORDER — HEPARIN SODIUM (PORCINE) LOCK FLUSH IV SOLN 100 UNIT/ML 100 UNIT/ML
500 SOLUTION INTRAVENOUS PRN
Status: CANCELLED | OUTPATIENT
Start: 2022-05-17

## 2022-05-17 RX ORDER — EPINEPHRINE 1 MG/ML(1)
0.5 AMPUL (ML) INJECTION PRN
Status: CANCELLED | OUTPATIENT
Start: 2022-05-18

## 2022-05-17 RX ORDER — LOPERAMIDE HYDROCHLORIDE 2 MG/1
2 CAPSULE ORAL
Status: CANCELLED | OUTPATIENT
Start: 2022-05-18

## 2022-05-17 RX ORDER — METHYLPREDNISOLONE SODIUM SUCCINATE 125 MG/2ML
125 INJECTION, POWDER, LYOPHILIZED, FOR SOLUTION INTRAMUSCULAR; INTRAVENOUS PRN
Status: CANCELLED | OUTPATIENT
Start: 2022-05-18

## 2022-05-17 RX ORDER — 0.9 % SODIUM CHLORIDE 0.9 %
10 VIAL (ML) INJECTION PRN
Status: CANCELLED | OUTPATIENT
Start: 2022-05-17

## 2022-05-17 RX ORDER — 0.9 % SODIUM CHLORIDE 0.9 %
20 VIAL (ML) INJECTION PRN
Status: CANCELLED | OUTPATIENT
Start: 2022-05-20

## 2022-05-17 RX ORDER — 0.9 % SODIUM CHLORIDE 0.9 %
VIAL (ML) INJECTION PRN
Status: CANCELLED | OUTPATIENT
Start: 2022-05-17

## 2022-05-17 RX ORDER — LOPERAMIDE HYDROCHLORIDE 2 MG/1
4 CAPSULE ORAL PRN
Status: CANCELLED | OUTPATIENT
Start: 2022-05-18

## 2022-05-17 RX ORDER — 0.9 % SODIUM CHLORIDE 0.9 %
VIAL (ML) INJECTION PRN
Status: CANCELLED | OUTPATIENT
Start: 2022-05-18

## 2022-05-17 RX ORDER — ONDANSETRON 2 MG/ML
4 INJECTION INTRAMUSCULAR; INTRAVENOUS PRN
Status: CANCELLED | OUTPATIENT
Start: 2022-05-18

## 2022-05-17 RX ORDER — HEPARIN SODIUM (PORCINE) LOCK FLUSH IV SOLN 100 UNIT/ML 100 UNIT/ML
500 SOLUTION INTRAVENOUS PRN
Status: CANCELLED | OUTPATIENT
Start: 2022-05-20

## 2022-05-17 RX ADMIN — IRINOTECAN HYDROCHLORIDE 312 MG: 20 INJECTION, SOLUTION INTRAVENOUS at 11:49

## 2022-05-17 RX ADMIN — FLUOROURACIL 4160 MG: 50 INJECTION, SOLUTION INTRAVENOUS at 14:12

## 2022-05-17 RX ADMIN — ATROPINE SULFATE 0.5 MG: 1 INJECTION, SOLUTION INTRAMUSCULAR; INTRAVENOUS; SUBCUTANEOUS at 11:48

## 2022-05-17 RX ADMIN — DEXAMETHASONE SODIUM PHOSPHATE 12 MG: 4 INJECTION, SOLUTION INTRA-ARTICULAR; INTRALESIONAL; INTRAMUSCULAR; INTRAVENOUS; SOFT TISSUE at 11:11

## 2022-05-17 RX ADMIN — SODIUM CHLORIDE 400 MG: 9 INJECTION, SOLUTION INTRAVENOUS at 13:33

## 2022-05-17 RX ADMIN — ONDANSETRON HYDROCHLORIDE 16 MG: 2 INJECTION, SOLUTION INTRAMUSCULAR; INTRAVENOUS at 11:25

## 2022-05-17 ASSESSMENT — FIBROSIS 4 INDEX: FIB4 SCORE: 0.89

## 2022-05-17 NOTE — PROGRESS NOTES
Chemotherapy Verification - PRIMARY RN      Height = 1.775m  Weight = 87.8kg  BSA = 2.08m^2      Medication: irinotecan (CAMPOSTAR)  Dose: 150mg/m^2  Calculated Dose: 312mg                            (In mg/m2, AUC, mg/kg)     Medication: bevacizumab-bvzr (Zirabev) Dose: 5mg/kg  Calculated Dose: 439mg                          (In mg/m2, AUC, mg/kg)    Medication: fluorouracil (ADRUCIL)  Dose: 2000mg/m^2  Calculated Dose: 4160mg                            (In mg/m2, AUC, mg/kg)        I confirm this process was performed independently with the BSA and all final chemotherapy dosing calculations congruent.  Any discrepancies of 10% or greater have been addressed with the chemotherapy pharmacist. The resolution of the discrepancy has been documented in the EPIC progress notes.

## 2022-05-17 NOTE — PROGRESS NOTES
Chemotherapy Verification - SECONDARY RN       Height = 177.5 cm  Weight = 87.8 kg  BSA = 2.08 m2       Medication: Irinotecan  Dose: 150 mg/m2  Calculated Dose: 312 mg                             (In mg/m2, AUC, mg/kg)     Medication: Zirabev  Dose: 5 mg/kg  Calculated Dose: 439 mg                             (In mg/m2, AUC, mg/kg)    Medication: 5FU CADD pump  Dose: 2000 mg/m2  Calculated Dose: 4160 mg                             (In mg/m2, AUC, mg/kg)    I confirm that this process was performed independently.

## 2022-05-17 NOTE — PROGRESS NOTES
"Pharmacy Chemotherapy Verification Note:    DX:  Metastatic Rectal Adenocarcinoma    Cycle 5  Previous treatment: C4 on 5/3/22    Protocol: FOLFiri + bevacizumab  Irinotecan  IV on day 1  *dose reduced to 150mg/m2 for tolerance/ heavily pretreated  *discontinued for tolerance/ heavily pretreated  5-FU iv over 46 hrs  *dose reduced to 2000mg/m2 for tolerance/ heavily pretreated  *bolus discontinued for tolerance/ heavily pretreated  bevacizumab 5mg/kg IV on day 1*3/22/22- HOLD for cycle 1 per MD  q14 days for 8-12 cycles  NCCN Guidelines for Colon Cancer V.2.2018  Bradly POSEY Et al - Lancet Oncol. 2014 Sep;15(10):1065-75. Doi:  10.1016/-[id number]. Epub 2014 Jul 31.  Bulmaro MARRERO et al - J Clin Oncol. 2007 Oct 20;25(30):4786-86.    BP (!) 146/84   Pulse 75   Temp 36.4 °C (97.6 °F) (Temporal)   Resp 18   Ht 1.775 m (5' 9.88\")   Wt 87.8 kg (193 lb 9 oz)   SpO2 98%   BMI 27.87 kg/m²     Body surface area is 2.08 meters squared.    Labs 5/17/22:  ANC~ 4240 Plt = 271k   Hgb = 11.7     SCr = 0.67 mg/dL CrCl >125 mL/min   LFT's = WNL TBili = 0.3   Urine Protein = 30 mg/dL (1+) BP = 146/84    Irinotecan (Camptosar) 150 mg/m2 x 2.08 m2 = 312 mg    <10% difference, ok to treat with final dose = 312 mg IV    Bevacizumab-bvzr (Zirabev) 5 mg/kg x 87.8 kg = 439 mg   Rounded to nearest vial size (within 10%) per rounding protocol   Okay to treat with final dose = 400 mg IV    Fluorouracil 2000 mg/m2 x 2.08 m2 = 4160 mg    <10% difference, ok to treat with final dose = 4160 mg CIVI   Via CADD pump for home infusion over 46 hours @ 1.8 mL/hr      Angelina Garay, PharmD        "

## 2022-05-17 NOTE — PROGRESS NOTES
Pt presents to [facility] for irinotecan, becacizumab-bvzr, and fluorouracil CADD pump. R chest port accessed using sterile technique; brisk blood return observed and pt tolerated well. Labs drawn and within treatable parameters. Pre meds administered and irinotecan, and becacizumab-bvzr infused with no s/s of adverse reactions. Brisk blood return from port verified before initiation of the fluorouracil CADD pump. Connection confirmed and pump placed in carrying case. Next appointment confirmed for de-access and education provided. Pt discharged to self care with all personal belongings and in NAD.

## 2022-05-17 NOTE — PROGRESS NOTES
"Pharmacy Chemotherapy Verification Note:    DX:  Metastatic Rectal Adenocarcinoma    Protocol: FOLFiri + bevacizumab  Irinotecan  IV on day 1  *dose reduced to 150mg/m2 for tolerance/ heavily pretreated  *discontinued for tolerance/ heavily pretreated  5-FU iv over 46 hrs  *dose reduced to 2000mg/m2 for tolerance/ heavily pretreated  *bolus discontinued for tolerance/ heavily pretreated  bevacizumab 5mg/kg IV on day 1*3/22/22- HOLD for cycle 1 per MD   q14 days for 8-12 cycles  NCCN Guidelines for Colon Cancer V.2.2018  Bradly V, Et al - Lancet Oncol. 2014 Sep;15(10):1065-75. Doi:  10.1016/-[id number]. Epub 2014 Jul 31.  Bulmaro CS, et al - J Clin Oncol. 2007 Oct 20;25(30):6768-53.    BP (!) 146/84   Pulse 75   Temp 36.4 °C (97.6 °F) (Temporal)   Resp 18   Ht 1.775 m (5' 9.88\")   Wt 87.8 kg (193 lb 9 oz)   SpO2 98%   BMI 27.87 kg/m²     Body surface area is 2.08 meters squared.    All lab results 5/17/22 within treatment parameters.      Drug Order   (Drug name, dose, route, IV Fluid & volume, frequency, number of doses) Cycle 5      Previous treatment: C4 on 5/3/22      Medication = Irinotecan (Camptosar)  Base Dose= 150 mg/m²   Calc Dose: Base Dose x 2.08 m² = 312mg  Final Dose = 312mg  Route = IV  Fluid & Volume = D5W 500 mL  Admin Duration = Over 90 minutes          <10% difference, okay to treat with final dose   Medication = Bevacizumab-bvzr (Zirabev)  Base Dose= 5 mg/kg   Calc Dose: Base Dose x 87.8 kg = 439mg  Final Dose = 400 mg  Route = IV  Fluid & Volume =  mL  Admin Duration = Over 30 minutes          Rounded to nearest vial size (within 10%) per rounding protocol, okay to treat with final dose     Medication = Fluorouracil   Base Dose= 2000 mg/m²   Calc Dose:Base Dose x 2.08 m² = 4160 mg  Final Dose = 4160mg  Route = CIVI  Conc = 50 mg/mL  Fluid & Volume = 83.2 mL (+3 mL of overfill )  Admin Duration = Over 46 hours @ 1.8 mL/hr   Via CADD pump for home infusion        <10% " difference, okay to treat with final dose     By my signature below, I confirm this process was performed independently with the BSA and all final chemotherapy dosing calculations congruent. I have reviewed the above chemotherapy order and that my calculation of the final dose and BSA (when applicable) corroborate those calculations of the  pharmacist. Discrepancies of 10% or greater in the written dose have been addressed and documented within the EPIC Progress notes.    Bal Grande, ElleD

## 2022-05-19 ENCOUNTER — OUTPATIENT INFUSION SERVICES (OUTPATIENT)
Dept: ONCOLOGY | Facility: MEDICAL CENTER | Age: 57
End: 2022-05-19
Attending: INTERNAL MEDICINE
Payer: MEDICARE

## 2022-05-19 VITALS
SYSTOLIC BLOOD PRESSURE: 120 MMHG | OXYGEN SATURATION: 97 % | HEART RATE: 85 BPM | RESPIRATION RATE: 16 BRPM | DIASTOLIC BLOOD PRESSURE: 70 MMHG | TEMPERATURE: 98.1 F

## 2022-05-19 DIAGNOSIS — C20 RECTAL CARCINOMA (HCC): ICD-10-CM

## 2022-05-19 PROCEDURE — 96523 IRRIG DRUG DELIVERY DEVICE: CPT

## 2022-05-19 PROCEDURE — 700111 HCHG RX REV CODE 636 W/ 250 OVERRIDE (IP)

## 2022-05-19 RX ORDER — HEPARIN SODIUM (PORCINE) LOCK FLUSH IV SOLN 100 UNIT/ML 100 UNIT/ML
500 SOLUTION INTRAVENOUS PRN
Status: DISCONTINUED | OUTPATIENT
Start: 2022-05-19 | End: 2022-05-19 | Stop reason: HOSPADM

## 2022-05-19 RX ORDER — HEPARIN SODIUM (PORCINE) LOCK FLUSH IV SOLN 100 UNIT/ML 100 UNIT/ML
SOLUTION INTRAVENOUS
Status: COMPLETED
Start: 2022-05-19 | End: 2022-05-19

## 2022-05-19 RX ADMIN — HEPARIN 500 UNITS: 100 SYRINGE at 14:05

## 2022-05-19 RX ADMIN — HEPARIN SODIUM (PORCINE) LOCK FLUSH IV SOLN 100 UNIT/ML 500 UNITS: 100 SOLUTION at 14:05

## 2022-05-20 NOTE — PROGRESS NOTES
Pt presented to infusion center for 5FU pump de-access. Pump had 0 ml in reservoir and full amount delivered. Pump disconnected, cleaned and returned to pharmacy. Port flushed per protocol, brisk blood return observed, heparinized and de-accessed with needle intact, gauze dressing placed. Pt has next appt, left on foot to self care.

## 2022-05-26 RX ORDER — 0.9 % SODIUM CHLORIDE 0.9 %
10 VIAL (ML) INJECTION PRN
Status: CANCELLED | OUTPATIENT
Start: 2022-05-31

## 2022-05-26 RX ORDER — EPINEPHRINE 1 MG/ML(1)
0.5 AMPUL (ML) INJECTION PRN
Status: CANCELLED | OUTPATIENT
Start: 2022-06-01

## 2022-05-26 RX ORDER — HEPARIN SODIUM (PORCINE) LOCK FLUSH IV SOLN 100 UNIT/ML 100 UNIT/ML
500 SOLUTION INTRAVENOUS PRN
Status: CANCELLED | OUTPATIENT
Start: 2022-06-01

## 2022-05-26 RX ORDER — SODIUM CHLORIDE 9 MG/ML
INJECTION, SOLUTION INTRAVENOUS CONTINUOUS
Status: CANCELLED | OUTPATIENT
Start: 2022-06-01

## 2022-05-26 RX ORDER — 0.9 % SODIUM CHLORIDE 0.9 %
VIAL (ML) INJECTION PRN
Status: CANCELLED | OUTPATIENT
Start: 2022-05-31

## 2022-05-26 RX ORDER — PROCHLORPERAZINE MALEATE 10 MG
10 TABLET ORAL EVERY 6 HOURS PRN
Status: CANCELLED | OUTPATIENT
Start: 2022-06-01

## 2022-05-26 RX ORDER — DIPHENHYDRAMINE HYDROCHLORIDE 50 MG/ML
50 INJECTION INTRAMUSCULAR; INTRAVENOUS PRN
Status: CANCELLED | OUTPATIENT
Start: 2022-06-01

## 2022-05-26 RX ORDER — 0.9 % SODIUM CHLORIDE 0.9 %
10 VIAL (ML) INJECTION PRN
Status: CANCELLED | OUTPATIENT
Start: 2022-06-01

## 2022-05-26 RX ORDER — 0.9 % SODIUM CHLORIDE 0.9 %
3 VIAL (ML) INJECTION PRN
Status: CANCELLED | OUTPATIENT
Start: 2022-05-31

## 2022-05-26 RX ORDER — METHYLPREDNISOLONE SODIUM SUCCINATE 125 MG/2ML
125 INJECTION, POWDER, LYOPHILIZED, FOR SOLUTION INTRAMUSCULAR; INTRAVENOUS PRN
Status: CANCELLED | OUTPATIENT
Start: 2022-06-01

## 2022-05-26 RX ORDER — 0.9 % SODIUM CHLORIDE 0.9 %
3 VIAL (ML) INJECTION PRN
Status: CANCELLED | OUTPATIENT
Start: 2022-06-01

## 2022-05-26 RX ORDER — HEPARIN SODIUM (PORCINE) LOCK FLUSH IV SOLN 100 UNIT/ML 100 UNIT/ML
500 SOLUTION INTRAVENOUS PRN
Status: CANCELLED | OUTPATIENT
Start: 2022-05-31

## 2022-05-26 RX ORDER — ONDANSETRON 2 MG/ML
4 INJECTION INTRAMUSCULAR; INTRAVENOUS PRN
Status: CANCELLED | OUTPATIENT
Start: 2022-06-01

## 2022-05-26 RX ORDER — DEXTROSE MONOHYDRATE 50 MG/ML
INJECTION, SOLUTION INTRAVENOUS CONTINUOUS
Status: CANCELLED | OUTPATIENT
Start: 2022-06-01

## 2022-05-26 RX ORDER — LOPERAMIDE HYDROCHLORIDE 2 MG/1
4 CAPSULE ORAL PRN
Status: CANCELLED | OUTPATIENT
Start: 2022-06-01

## 2022-05-26 RX ORDER — 0.9 % SODIUM CHLORIDE 0.9 %
VIAL (ML) INJECTION PRN
Status: CANCELLED | OUTPATIENT
Start: 2022-06-01

## 2022-05-26 RX ORDER — ONDANSETRON 8 MG/1
8 TABLET, ORALLY DISINTEGRATING ORAL PRN
Status: CANCELLED | OUTPATIENT
Start: 2022-06-01

## 2022-05-26 RX ORDER — HEPARIN SODIUM (PORCINE) LOCK FLUSH IV SOLN 100 UNIT/ML 100 UNIT/ML
500 SOLUTION INTRAVENOUS PRN
Status: CANCELLED | OUTPATIENT
Start: 2022-06-03

## 2022-05-26 RX ORDER — LOPERAMIDE HYDROCHLORIDE 2 MG/1
2 CAPSULE ORAL
Status: CANCELLED | OUTPATIENT
Start: 2022-06-01

## 2022-05-26 RX ORDER — 0.9 % SODIUM CHLORIDE 0.9 %
20 VIAL (ML) INJECTION PRN
Status: CANCELLED | OUTPATIENT
Start: 2022-06-03

## 2022-05-30 NOTE — PROGRESS NOTES
"Pharmacy Chemotherapy Verification Note:    DX:  Metastatic Rectal Adenocarcinoma    Protocol: FOLFiri + bevacizumab  Irinotecan  IV on day 1  *dose reduced to 150mg/m2 for tolerance/ heavily pretreated  *discontinued for tolerance/ heavily pretreated  5-FU iv over 46 hrs  *dose reduced to 2000mg/m2 for tolerance/ heavily pretreated  *bolus discontinued for tolerance/ heavily pretreated  bevacizumab 5mg/kg IV on day 1*3/22/22- HOLD for cycle 1 per MD   q14 days for 8-12 cycles  NCCN Guidelines for Colon Cancer V.2.2018  Bradly V, Et al - Lancet Oncol. 2014 Sep;15(10):1065-75. Doi:  10.1016/-[id number]. Epub 2014 Jul 31.  Bulmaro CS, et al - J Clin Oncol. 2007 Oct 20;25(30):5078-85.    BP (!) 140/89   Pulse 79   Temp 35.8 °C (96.5 °F) (Temporal)   Resp 18   Ht 1.775 m (5' 9.88\")   Wt 87.8 kg (193 lb 9 oz)   SpO2 96%   BMI 27.87 kg/m²     Body surface area is 2.08 meters squared.    Labs from 5/31/22 reviewed - all within treatment parameters, including BP and UA      Drug Order   (Drug name, dose, route, IV Fluid & volume, frequency, number of doses) Cycle 6      Previous treatment: 5/17/22      Medication = Irinotecan (Camptosar)  Base Dose= 150 mg/m²   Calc Dose: Base Dose x 2.08 m² = 312 mg  Final Dose = 312 mg  Route = IV  Fluid & Volume = D5W 500 mL  Admin Duration = Over 90 minutes          <10% difference, okay to treat with final dose   Medication = Bevacizumab-bvzr (Zirabev)  Base Dose= 5 mg/kg   Calc Dose: Base Dose x 87.8 kg = 439 mg  Final Dose = 400 mg  Route = IV  Fluid & Volume =  mL  Admin Duration = Over 30 minutes          rounded to vial size (within 10%) per RX protocol     Medication = Fluorouracil   Base Dose= 2000 mg/m²   Calc Dose:Base Dose x 2.08 m² = 4160 mg  Final Dose = 4160 mg  Route = CIVI  Conc = 50 mg/mL  Fluid & Volume = 83.2 mL (+3 mL of overfill )  Admin Duration = Over 46 hours @ 1.8 mL/hr   Via CADD pump for home infusion        <10% difference, okay to " treat with final dose     By my signature below, I confirm this process was performed independently with the BSA and all final chemotherapy dosing calculations congruent. I have reviewed the above chemotherapy order and that my calculation of the final dose and BSA (when applicable) corroborate those calculations of the  pharmacist. Discrepancies of 10% or greater in the written dose have been addressed and documented within the EPIC Progress notes.    Thuy Ch, PharmD, BCOP

## 2022-05-31 ENCOUNTER — OUTPATIENT INFUSION SERVICES (OUTPATIENT)
Dept: ONCOLOGY | Facility: MEDICAL CENTER | Age: 57
End: 2022-05-31
Attending: INTERNAL MEDICINE
Payer: MEDICARE

## 2022-05-31 VITALS
BODY MASS INDEX: 27.71 KG/M2 | HEIGHT: 70 IN | RESPIRATION RATE: 18 BRPM | HEART RATE: 79 BPM | WEIGHT: 193.56 LBS | DIASTOLIC BLOOD PRESSURE: 89 MMHG | OXYGEN SATURATION: 96 % | SYSTOLIC BLOOD PRESSURE: 140 MMHG | TEMPERATURE: 96.5 F

## 2022-05-31 DIAGNOSIS — C20 RECTAL CARCINOMA (HCC): ICD-10-CM

## 2022-05-31 LAB
ALBUMIN SERPL BCP-MCNC: 4.2 G/DL (ref 3.2–4.9)
ALBUMIN/GLOB SERPL: 1.5 G/DL
ALP SERPL-CCNC: 99 U/L (ref 30–99)
ALT SERPL-CCNC: 18 U/L (ref 2–50)
ANION GAP SERPL CALC-SCNC: 12 MMOL/L (ref 7–16)
APPEARANCE UR: CLEAR
AST SERPL-CCNC: 21 U/L (ref 12–45)
BASOPHILS # BLD AUTO: 0.8 % (ref 0–1.8)
BASOPHILS # BLD: 0.04 K/UL (ref 0–0.12)
BILIRUB SERPL-MCNC: 0.5 MG/DL (ref 0.1–1.5)
BUN SERPL-MCNC: 12 MG/DL (ref 8–22)
CALCIUM SERPL-MCNC: 8.8 MG/DL (ref 8.5–10.5)
CHLORIDE SERPL-SCNC: 102 MMOL/L (ref 96–112)
CO2 SERPL-SCNC: 26 MMOL/L (ref 20–33)
COLOR UR AUTO: YELLOW
CREAT SERPL-MCNC: 0.79 MG/DL (ref 0.5–1.4)
EOSINOPHIL # BLD AUTO: 0.15 K/UL (ref 0–0.51)
EOSINOPHIL NFR BLD: 2.8 % (ref 0–6.9)
ERYTHROCYTE [DISTWIDTH] IN BLOOD BY AUTOMATED COUNT: 52.1 FL (ref 35.9–50)
GFR SERPLBLD CREATININE-BSD FMLA CKD-EPI: 105 ML/MIN/1.73 M 2
GLOBULIN SER CALC-MCNC: 2.8 G/DL (ref 1.9–3.5)
GLUCOSE SERPL-MCNC: 95 MG/DL (ref 65–99)
GLUCOSE UR QL STRIP.AUTO: NEGATIVE MG/DL
HCT VFR BLD AUTO: 39.4 % (ref 42–52)
HGB BLD-MCNC: 12.5 G/DL (ref 14–18)
IMM GRANULOCYTES # BLD AUTO: 0.01 K/UL (ref 0–0.11)
IMM GRANULOCYTES NFR BLD AUTO: 0.2 % (ref 0–0.9)
KETONES UR QL STRIP.AUTO: NEGATIVE MG/DL
LEUKOCYTE ESTERASE UR QL STRIP.AUTO: NEGATIVE
LYMPHOCYTES # BLD AUTO: 0.83 K/UL (ref 1–4.8)
LYMPHOCYTES NFR BLD: 15.6 % (ref 22–41)
MCH RBC QN AUTO: 25.3 PG (ref 27–33)
MCHC RBC AUTO-ENTMCNC: 31.7 G/DL (ref 33.7–35.3)
MCV RBC AUTO: 79.6 FL (ref 81.4–97.8)
MONOCYTES # BLD AUTO: 0.51 K/UL (ref 0–0.85)
MONOCYTES NFR BLD AUTO: 9.6 % (ref 0–13.4)
NEUTROPHILS # BLD AUTO: 3.78 K/UL (ref 1.82–7.42)
NEUTROPHILS NFR BLD: 71 % (ref 44–72)
NITRITE UR QL STRIP.AUTO: NEGATIVE
NRBC # BLD AUTO: 0 K/UL
NRBC BLD-RTO: 0 /100 WBC
OUTPT INFUS CBC COMMENT OICOM: ABNORMAL
PH UR STRIP.AUTO: 7 [PH] (ref 5–8)
PLATELET # BLD AUTO: 271 K/UL (ref 164–446)
PMV BLD AUTO: 9.4 FL (ref 9–12.9)
POTASSIUM SERPL-SCNC: 3.6 MMOL/L (ref 3.6–5.5)
PROT SERPL-MCNC: 7 G/DL (ref 6–8.2)
PROT UR QL STRIP: ABNORMAL MG/DL
RBC # BLD AUTO: 4.95 M/UL (ref 4.7–6.1)
RBC UR QL AUTO: ABNORMAL
SODIUM SERPL-SCNC: 140 MMOL/L (ref 135–145)
SP GR UR STRIP.AUTO: 1.02 (ref 1–1.03)
WBC # BLD AUTO: 5.3 K/UL (ref 4.8–10.8)

## 2022-05-31 PROCEDURE — 96375 TX/PRO/DX INJ NEW DRUG ADDON: CPT

## 2022-05-31 PROCEDURE — 80053 COMPREHEN METABOLIC PANEL: CPT

## 2022-05-31 PROCEDURE — 81002 URINALYSIS NONAUTO W/O SCOPE: CPT

## 2022-05-31 PROCEDURE — 96415 CHEMO IV INFUSION ADDL HR: CPT

## 2022-05-31 PROCEDURE — C8957 PROLONGED IV INF, REQ PUMP: HCPCS

## 2022-05-31 PROCEDURE — 96417 CHEMO IV INFUS EACH ADDL SEQ: CPT

## 2022-05-31 PROCEDURE — 96413 CHEMO IV INFUSION 1 HR: CPT

## 2022-05-31 PROCEDURE — 700105 HCHG RX REV CODE 258: Performed by: INTERNAL MEDICINE

## 2022-05-31 PROCEDURE — 700111 HCHG RX REV CODE 636 W/ 250 OVERRIDE (IP): Performed by: INTERNAL MEDICINE

## 2022-05-31 PROCEDURE — 96411 CHEMO IV PUSH ADDL DRUG: CPT

## 2022-05-31 PROCEDURE — 85025 COMPLETE CBC W/AUTO DIFF WBC: CPT

## 2022-05-31 PROCEDURE — A4212 NON CORING NEEDLE OR STYLET: HCPCS

## 2022-05-31 RX ADMIN — SODIUM CHLORIDE 400 MG: 9 INJECTION, SOLUTION INTRAVENOUS at 11:56

## 2022-05-31 RX ADMIN — IRINOTECAN HYDROCHLORIDE 312 MG: 20 INJECTION, SOLUTION INTRAVENOUS at 10:03

## 2022-05-31 RX ADMIN — FLUOROURACIL 4160 MG: 50 INJECTION, SOLUTION INTRAVENOUS at 13:30

## 2022-05-31 RX ADMIN — ATROPINE SULFATE 0.5 MG: 1 INJECTION, SOLUTION INTRAMUSCULAR; INTRAVENOUS; SUBCUTANEOUS at 10:06

## 2022-05-31 RX ADMIN — ONDANSETRON 16 MG: 2 INJECTION INTRAMUSCULAR; INTRAVENOUS at 09:15

## 2022-05-31 RX ADMIN — DEXAMETHASONE SODIUM PHOSPHATE 12 MG: 4 INJECTION, SOLUTION INTRA-ARTICULAR; INTRALESIONAL; INTRAMUSCULAR; INTRAVENOUS; SOFT TISSUE at 09:33

## 2022-05-31 ASSESSMENT — FIBROSIS 4 INDEX: FIB4 SCORE: 1.01

## 2022-05-31 NOTE — PROGRESS NOTES
Chemotherapy Verification - PRIMARY RN      Height = 1.775m  Weight = 87.8 kg  BSA = 2.08 m2       Medication:  Irinotecan  Dose: 150mg/m2  Calculated Dose: 312 mg                            (In mg/m2, AUC, mg/kg)     Medication: Zirabev Dose: 5mg/kg  Calculated Dose: 439 mg                            (In mg/m2, AUC, mg/kg)    Medication: Adrucil Dose: 2000mg/m2  Calculated Dose: 4160 mg CADD PUMP                            (In mg/m2, AUC, mg/kg)      I confirm this process was performed independently with the BSA and all final chemotherapy dosing calculations congruent.  Any discrepancies of 10% or greater have been addressed with the chemotherapy pharmacist. The resolution of the discrepancy has been documented in the EPIC progress notes.

## 2022-05-31 NOTE — PROGRESS NOTES
Chemotherapy Verification - SECONDARY RN       Height = 177.5 cm  Weight = 87.8 kg  BSA = 2.08 m2       Medication: irinotecan  Dose: 150 mg/m2  Calculated Dose: 312 mg                             (In mg/m2, AUC, mg/kg)     Medication: bevacizumab-bvzr  Dose: 5 mg/kg  Calculated Dose: 439 mg                             (In mg/m2, AUC, mg/kg)    Medication: 5FU CADD pump  Dose: 2000 mg/m2  Calculated Dose: 4160 mg                             (In mg/m2, AUC, mg/kg)    I confirm that this process was performed independently.

## 2022-05-31 NOTE — PROGRESS NOTES
"Pharmacy Chemotherapy Verification Note:    DX:  Metastatic Rectal Adenocarcinoma    Cycle 6  Previous treatment: C5 on 5/17/22    Protocol: FOLFiri + bevacizumab  Irinotecan  IV on day 1  *dose reduced to 150mg/m2 for tolerance/ heavily pretreated  *discontinued for tolerance/ heavily pretreated  5-FU iv over 46 hrs  *dose reduced to 2000mg/m2 for tolerance/ heavily pretreated  *bolus discontinued for tolerance/ heavily pretreated  bevacizumab 5mg/kg IV on day 1*3/22/22- HOLD for cycle 1 per MD  q14 days for 8-12 cycles  NCCN Guidelines for Colon Cancer V.2.2018  Bradly POSEY Et al - Lancet Oncol. 2014 Sep;15(10):1065-75. Doi:  10.1016/-[id number]. Epub 2014 Jul 31.  Bulmaro MARRERO et al - J Clin Oncol. 2007 Oct 20;25(30):4713-86.    BP (!) 140/89   Pulse 79   Temp 35.8 °C (96.5 °F) (Temporal)   Resp 18   Ht 1.775 m (5' 9.88\")   Wt 87.8 kg (193 lb 9 oz)   SpO2 96%   BMI 27.87 kg/m²     Body surface area is 2.08 meters squared.    Labs 5/31/22:  ANC~ 5.3 Plt = 271k   Hgb = 12.5     SCr = 0.79 mg/dL CrCl >125 mL/min   AST/ALT/AlkPhos =21/18/99 TBili = 0.5  Urine Protein = Trace Repeat BP = 140/89    Irinotecan (Camptosar) 150 mg/m2 x 2.08 m2 = 312 mg    <10% difference, ok to treat with final dose = 312 mg IV    Bevacizumab-bvzr (Zirabev) 5 mg/kg x 87.8 kg = 439 mg   Rounded to nearest vial size (within 10%) per rounding protocol   Okay to treat with final dose = 400 mg IV    Fluorouracil 2000 mg/m2 x 2.08 m2 = 4160 mg    <10% difference, ok to treat with final dose = 4160 mg CIVI   Via CADD pump for home infusion over 46 hours @ 1.8 mL/hr      Tonia Michelle, PharmD, BCPS   PGY2 Infectious Diseases Pharmacy Resident          "

## 2022-05-31 NOTE — PROGRESS NOTES
Mr Eaton is here today for chemotherapy. He voices no new concerns or complaints. Medi port to right chest, flushed well with good blood return, labs drawn per orders. Lab results, urine and BP are within parameters for treatment today. Pt tolerated chemotherapy infusions well. Port flushed, CADD pump connected, started, ensured it was functioning properly and placed in carry bag. Pt was discharged home in stable condition. Confirmed next appointment for CADD pump removal. Emailed scheduling for next cycle appointment. Pt stated MD is also scheduling a scan in the next week or two.

## 2022-06-02 ENCOUNTER — OUTPATIENT INFUSION SERVICES (OUTPATIENT)
Dept: ONCOLOGY | Facility: MEDICAL CENTER | Age: 57
End: 2022-06-02
Attending: INTERNAL MEDICINE
Payer: MEDICARE

## 2022-06-02 DIAGNOSIS — C20 RECTAL CARCINOMA (HCC): ICD-10-CM

## 2022-06-02 PROCEDURE — 700111 HCHG RX REV CODE 636 W/ 250 OVERRIDE (IP)

## 2022-06-02 PROCEDURE — 96523 IRRIG DRUG DELIVERY DEVICE: CPT

## 2022-06-02 RX ORDER — HEPARIN SODIUM (PORCINE) LOCK FLUSH IV SOLN 100 UNIT/ML 100 UNIT/ML
500 SOLUTION INTRAVENOUS PRN
Status: DISCONTINUED | OUTPATIENT
Start: 2022-06-02 | End: 2022-06-02 | Stop reason: HOSPADM

## 2022-06-02 RX ORDER — HEPARIN SODIUM (PORCINE) LOCK FLUSH IV SOLN 100 UNIT/ML 100 UNIT/ML
SOLUTION INTRAVENOUS
Status: COMPLETED
Start: 2022-06-02 | End: 2022-06-02

## 2022-06-02 RX ADMIN — HEPARIN SODIUM (PORCINE) LOCK FLUSH IV SOLN 100 UNIT/ML 500 UNITS: 100 SOLUTION at 13:28

## 2022-06-02 RX ADMIN — HEPARIN 500 UNITS: 100 SYRINGE at 13:28

## 2022-06-02 NOTE — PROGRESS NOTES
Pt presented to infusion services for 5FU pump de-access. Pump had 0 ml in reservoir and full amount delivered. Pump disconnected, cleaned and returned to pharmacy. Port flushed per protocol, brisk blood return observed, heparinized and de-accessed with needle intact, gauze dressing placed. Pt has next appt, left on foot to self care.

## 2022-06-13 RX ORDER — 0.9 % SODIUM CHLORIDE 0.9 %
3 VIAL (ML) INJECTION PRN
Status: CANCELLED | OUTPATIENT
Start: 2022-06-14

## 2022-06-13 RX ORDER — HEPARIN SODIUM (PORCINE) LOCK FLUSH IV SOLN 100 UNIT/ML 100 UNIT/ML
500 SOLUTION INTRAVENOUS PRN
Status: CANCELLED | OUTPATIENT
Start: 2022-06-14

## 2022-06-13 RX ORDER — 0.9 % SODIUM CHLORIDE 0.9 %
VIAL (ML) INJECTION PRN
Status: CANCELLED | OUTPATIENT
Start: 2022-06-14

## 2022-06-13 RX ORDER — 0.9 % SODIUM CHLORIDE 0.9 %
10 VIAL (ML) INJECTION PRN
Status: CANCELLED | OUTPATIENT
Start: 2022-06-14

## 2022-06-14 ENCOUNTER — OUTPATIENT INFUSION SERVICES (OUTPATIENT)
Dept: ONCOLOGY | Facility: MEDICAL CENTER | Age: 57
End: 2022-06-14
Attending: INTERNAL MEDICINE
Payer: MEDICARE

## 2022-06-14 VITALS
DIASTOLIC BLOOD PRESSURE: 85 MMHG | BODY MASS INDEX: 28.28 KG/M2 | SYSTOLIC BLOOD PRESSURE: 126 MMHG | HEIGHT: 70 IN | WEIGHT: 197.53 LBS | HEART RATE: 73 BPM | TEMPERATURE: 96.8 F | RESPIRATION RATE: 18 BRPM | OXYGEN SATURATION: 98 %

## 2022-06-14 DIAGNOSIS — C20 RECTAL CARCINOMA (HCC): ICD-10-CM

## 2022-06-14 PROCEDURE — 700111 HCHG RX REV CODE 636 W/ 250 OVERRIDE (IP): Mod: TB

## 2022-06-14 PROCEDURE — G0498 CHEMO EXTEND IV INFUS W/PUMP: HCPCS

## 2022-06-14 PROCEDURE — 96375 TX/PRO/DX INJ NEW DRUG ADDON: CPT

## 2022-06-14 PROCEDURE — A4212 NON CORING NEEDLE OR STYLET: HCPCS

## 2022-06-14 PROCEDURE — 700105 HCHG RX REV CODE 258: Performed by: INTERNAL MEDICINE

## 2022-06-14 PROCEDURE — 700105 HCHG RX REV CODE 258

## 2022-06-14 PROCEDURE — 96411 CHEMO IV PUSH ADDL DRUG: CPT

## 2022-06-14 PROCEDURE — 96417 CHEMO IV INFUS EACH ADDL SEQ: CPT

## 2022-06-14 PROCEDURE — 96413 CHEMO IV INFUSION 1 HR: CPT

## 2022-06-14 PROCEDURE — 700111 HCHG RX REV CODE 636 W/ 250 OVERRIDE (IP): Performed by: INTERNAL MEDICINE

## 2022-06-14 PROCEDURE — 81002 URINALYSIS NONAUTO W/O SCOPE: CPT

## 2022-06-14 RX ORDER — LOPERAMIDE HYDROCHLORIDE 2 MG/1
4 CAPSULE ORAL PRN
Status: CANCELLED | OUTPATIENT
Start: 2022-06-14

## 2022-06-14 RX ORDER — ONDANSETRON 8 MG/1
8 TABLET, ORALLY DISINTEGRATING ORAL PRN
Status: CANCELLED | OUTPATIENT
Start: 2022-06-14

## 2022-06-14 RX ORDER — PROCHLORPERAZINE MALEATE 10 MG
10 TABLET ORAL EVERY 6 HOURS PRN
Status: CANCELLED | OUTPATIENT
Start: 2022-06-14

## 2022-06-14 RX ORDER — 0.9 % SODIUM CHLORIDE 0.9 %
10 VIAL (ML) INJECTION PRN
Status: CANCELLED | OUTPATIENT
Start: 2022-06-14

## 2022-06-14 RX ORDER — EPINEPHRINE 1 MG/ML(1)
0.5 AMPUL (ML) INJECTION PRN
Status: CANCELLED | OUTPATIENT
Start: 2022-06-14

## 2022-06-14 RX ORDER — DIPHENHYDRAMINE HYDROCHLORIDE 50 MG/ML
50 INJECTION INTRAMUSCULAR; INTRAVENOUS PRN
Status: CANCELLED | OUTPATIENT
Start: 2022-06-14

## 2022-06-14 RX ORDER — 0.9 % SODIUM CHLORIDE 0.9 %
VIAL (ML) INJECTION PRN
Status: CANCELLED | OUTPATIENT
Start: 2022-06-14

## 2022-06-14 RX ORDER — METHYLPREDNISOLONE SODIUM SUCCINATE 125 MG/2ML
125 INJECTION, POWDER, LYOPHILIZED, FOR SOLUTION INTRAMUSCULAR; INTRAVENOUS PRN
Status: CANCELLED | OUTPATIENT
Start: 2022-06-14

## 2022-06-14 RX ORDER — ONDANSETRON 2 MG/ML
4 INJECTION INTRAMUSCULAR; INTRAVENOUS PRN
Status: CANCELLED | OUTPATIENT
Start: 2022-06-14

## 2022-06-14 RX ORDER — DEXTROSE MONOHYDRATE 50 MG/ML
INJECTION, SOLUTION INTRAVENOUS CONTINUOUS
Status: CANCELLED | OUTPATIENT
Start: 2022-06-14

## 2022-06-14 RX ORDER — SODIUM CHLORIDE 9 MG/ML
INJECTION, SOLUTION INTRAVENOUS CONTINUOUS
Status: DISCONTINUED | OUTPATIENT
Start: 2022-06-14 | End: 2022-06-14 | Stop reason: HOSPADM

## 2022-06-14 RX ORDER — LOPERAMIDE HYDROCHLORIDE 2 MG/1
2 CAPSULE ORAL
Status: CANCELLED | OUTPATIENT
Start: 2022-06-14

## 2022-06-14 RX ORDER — 0.9 % SODIUM CHLORIDE 0.9 %
3 VIAL (ML) INJECTION PRN
Status: CANCELLED | OUTPATIENT
Start: 2022-06-14

## 2022-06-14 RX ORDER — HEPARIN SODIUM (PORCINE) LOCK FLUSH IV SOLN 100 UNIT/ML 100 UNIT/ML
500 SOLUTION INTRAVENOUS PRN
Status: CANCELLED | OUTPATIENT
Start: 2022-06-14

## 2022-06-14 RX ORDER — HEPARIN SODIUM (PORCINE) LOCK FLUSH IV SOLN 100 UNIT/ML 100 UNIT/ML
500 SOLUTION INTRAVENOUS PRN
Status: CANCELLED | OUTPATIENT
Start: 2022-06-16

## 2022-06-14 RX ORDER — SODIUM CHLORIDE 9 MG/ML
INJECTION, SOLUTION INTRAVENOUS CONTINUOUS
Status: CANCELLED | OUTPATIENT
Start: 2022-06-14

## 2022-06-14 RX ORDER — 0.9 % SODIUM CHLORIDE 0.9 %
20 VIAL (ML) INJECTION PRN
Status: CANCELLED | OUTPATIENT
Start: 2022-06-16

## 2022-06-14 RX ADMIN — SODIUM CHLORIDE: 9 INJECTION, SOLUTION INTRAVENOUS at 08:43

## 2022-06-14 RX ADMIN — ATROPINE SULFATE 0.5 MG: 1 INJECTION, SOLUTION INTRAMUSCULAR; INTRAVENOUS; SUBCUTANEOUS at 10:10

## 2022-06-14 RX ADMIN — ONDANSETRON 16 MG: 2 INJECTION INTRAMUSCULAR; INTRAVENOUS at 08:43

## 2022-06-14 RX ADMIN — FLUOROURACIL 4200 MG: 50 INJECTION, SOLUTION INTRAVENOUS at 11:55

## 2022-06-14 RX ADMIN — DEXAMETHASONE SODIUM PHOSPHATE 12 MG: 4 INJECTION, SOLUTION INTRAMUSCULAR; INTRAVENOUS at 09:06

## 2022-06-14 RX ADMIN — IRINOTECAN HYDROCHLORIDE 315 MG: 20 INJECTION, SOLUTION INTRAVENOUS at 10:13

## 2022-06-14 RX ADMIN — SODIUM CHLORIDE 440 MG: 9 INJECTION, SOLUTION INTRAVENOUS at 09:23

## 2022-06-14 ASSESSMENT — FIBROSIS 4 INDEX: FIB4 SCORE: 0.99

## 2022-06-14 NOTE — PROGRESS NOTES
"Pharmacy Chemotherapy Verification Note:    DX:  Metastatic Rectal Adenocarcinoma    Protocol: FOLFiri + bevacizumab  Irinotecan  IV on day 1  *dose reduced to 150mg/m2 for tolerance/ heavily pretreated  *discontinued for tolerance/ heavily pretreated  5-FU iv over 46 hrs  *dose reduced to 2000mg/m2 for tolerance/ heavily pretreated  *bolus discontinued for tolerance/ heavily pretreated  bevacizumab 5mg/kg IV on day 1*3/22/22- HOLD for cycle 1 per MD   q14 days for 8-12 cycles  NCCN Guidelines for Colon Cancer V.2.2018  Bradly POSEY Et al - Lancet Oncol. 2014 Sep;15(10):1065-75. Doi:  10.1016/-[id number]. Epub 2014 Jul 31.  Bulmaro CS, et al - J Clin Oncol. 2007 Oct 20;25(30):6311-45.    /85   Pulse 73   Temp 36 °C (96.8 °F) (Temporal)   Resp 18   Ht 1.77 m (5' 9.69\")   Wt 89.6 kg (197 lb 8.5 oz)   SpO2 98%   BMI 28.60 kg/m²     Body surface area is 2.1 meters squared.    Labs 6/13/22 ([facility]):  ANC~ 3970 Plt =270 k   Hgb = 13.1     SCr = 0.8 mg/dL CrCl >125 mL/min   LFT's = WNL TBili = 0.5    Labs 6/14/22:  Urine Protein = negative BP= 126/85     Drug Order   (Drug name, dose, route, IV Fluid & volume, frequency, number of doses) Cycle 7      Previous treatment: 5/31/22     Medication = Irinotecan (Camptosar)  Base Dose= 150 mg/m²   Calc Dose: Base Dose x 2.1 m² = 315 mg  Final Dose = 315 mg  Route = IV  Fluid & Volume = D5W 500 mL  Admin Duration = Over 90 minutes          <10% difference, okay to treat with final dose   Medication = Bevacizumab-bvzr (Zirabev)  Base Dose= 5 mg/kg   Calc Dose: Base Dose x 89.6 kg = 448 mg  Final Dose = 440 mg  Route = IV  Fluid & Volume =  mL  Admin Duration = Over 30 minutes          Unable to round to nearest vial size within 10%, okay to treat with final dose     Medication = Fluorouracil   Base Dose= 2000 mg/m²   Calc Dose:Base Dose x 2.1 m² = 4200 mg  Final Dose = 4200 mg  Route = CIVI  Conc = 50 mg/mL  Fluid & Volume = 84 mL (+3 mL of overfill = 87 mL " )  Admin Duration = Over 46 hours @ 1.8 mL/hr   Via CADD pump for home infusion        <10% difference, okay to treat with final dose     By my signature below, I confirm this process was performed independently with the BSA and all final chemotherapy dosing calculations congruent. I have reviewed the above chemotherapy order and that my calculation of the final dose and BSA (when applicable) corroborate those calculations of the  pharmacist. Discrepancies of 10% or greater in the written dose have been addressed and documented within the EPIC Progress notes.    Angelina Garay, PharmD

## 2022-06-14 NOTE — PROGRESS NOTES
"Pharmacy Chemotherapy Verification Note:    DX:  Metastatic Rectal Adenocarcinoma    Cycle 7  Previous treatment: C6 on 5/31/22    Protocol: FOLFiri + bevacizumab  Irinotecan  IV on day 1  *dose reduced to 150mg/m2 for tolerance/ heavily pretreated  *discontinued for tolerance/ heavily pretreated  5-FU iv over 46 hrs  *dose reduced to 2000mg/m2 for tolerance/ heavily pretreated  *bolus discontinued for tolerance/ heavily pretreated  bevacizumab 5mg/kg IV on day 1*3/22/22- HOLD for cycle 1 per MD  q14 days for 8-12 cycles  NCCN Guidelines for Colon Cancer V.2.2018  Bradly POSEY Et al - Lancet Oncol. 2014 Sep;15(10):1065-75. Doi:  10.1016/-[id number]. Epub 2014 Jul 31.  Bulmaro MARRERO et al - J Clin Oncol. 2007 Oct 20;25(30):4751-86.    /85   Pulse 73   Temp 36 °C (96.8 °F) (Temporal)   Resp 18   Ht 1.77 m (5' 9.69\")   Wt 89.6 kg (197 lb 8.5 oz)   SpO2 98%   BMI 28.60 kg/m²     Body surface area is 2.1 meters squared.    All lab results 6/13/22 from [facility], BP and urine protein 6/14/22 from [facility] within treatment parameters.     Irinotecan (Camptosar) 150 mg/m2 x 2.1m2 = 315 mg    <10% difference, ok to treat with final dose = 315 mg IV    Bevacizumab-bvzr (Zirabev) 5 mg/kg x 89.6 kg = 448mg   Unable to Round to nearest vial size (> 10%) per rounding protocol   Okay to treat with final dose = 440mg IV    Fluorouracil 2000 mg/m2 x 2.1 m2 = 4200 mg    <10% difference, ok to treat with final dose = 4200 mg CIVI   Via CADD pump for home infusion over 46 hours @ 1.8mL/hr      Bal Grande, PharmD          "

## 2022-06-14 NOTE — PROGRESS NOTES
Chemotherapy Verification - SECONDARY RN       Height = 177 cm  Weight = 89.6 kg  BSA = 2.1 m2       Medication: Irinotecan  Dose: 150 mg/m2  Calculated Dose: 315 mg                             (In mg/m2, AUC, mg/kg)     Medication: Zirabev  Dose: 5 mg/kg  Calculated Dose: 448 mg                             (In mg/m2, AUC, mg/kg)    Medication: Fluorouracil  Dose: 2,000 mg/m2  Calculated Dose: 4,200 mg                             (In mg/m2, AUC, mg/kg)      I confirm that this process was performed independently.

## 2022-06-14 NOTE — PROGRESS NOTES
Patient presented to [facility] for C7D1 Irinotecan, Bevacizumab & connection of 46 hour Fluorouracil infusion. Port accessed in sterile fashion. Flushed easily, jenny briskly. Pre-treatment labs drawn on 6/13/22 at [facility]. Results within treatable parameters. Patient received pre-meds as ordered. Bevacizumab infused over 30 minutes.  Irinotecan infused over 90 minutes. Treatment tolerated well. CADD pump with Fluorouracil infusion connected. Patient left [facility] in no apparent distress.

## 2022-06-14 NOTE — PROGRESS NOTES
Chemotherapy Verification - PRIMARY RN      Height = 177 cm  Weight = 89.6 kg  BSA = 2.1 m2     Cycle 7 Day 1    Medication: Irinotecan  Dose: 150 mg/m2    Calculated Dose: 315 mg                              Medication: Bevacizumab-bvzr  Dose: 5 mg/kg    Calculated Dose: 448 mg                              Medication: Fluorouracil  Dose: 2,000 mg/m2    Calculated Dose: 4,200 mg                             I confirm this process was performed independently with the BSA and all final chemotherapy dosing calculations congruent.  Any discrepancies of 10% or greater have been addressed with the chemotherapy pharmacist. The resolution of the discrepancy has been documented in the EPIC progress notes.

## 2022-06-16 ENCOUNTER — OUTPATIENT INFUSION SERVICES (OUTPATIENT)
Dept: ONCOLOGY | Facility: MEDICAL CENTER | Age: 57
End: 2022-06-16
Attending: INTERNAL MEDICINE
Payer: MEDICARE

## 2022-06-16 VITALS
SYSTOLIC BLOOD PRESSURE: 130 MMHG | OXYGEN SATURATION: 98 % | DIASTOLIC BLOOD PRESSURE: 78 MMHG | RESPIRATION RATE: 18 BRPM | HEART RATE: 75 BPM | TEMPERATURE: 98 F

## 2022-06-16 DIAGNOSIS — C20 RECTAL CARCINOMA (HCC): ICD-10-CM

## 2022-06-16 PROCEDURE — 96523 IRRIG DRUG DELIVERY DEVICE: CPT

## 2022-06-16 PROCEDURE — 700111 HCHG RX REV CODE 636 W/ 250 OVERRIDE (IP): Performed by: INTERNAL MEDICINE

## 2022-06-16 RX ORDER — HEPARIN SODIUM (PORCINE) LOCK FLUSH IV SOLN 100 UNIT/ML 100 UNIT/ML
500 SOLUTION INTRAVENOUS PRN
Status: DISCONTINUED | OUTPATIENT
Start: 2022-06-16 | End: 2022-06-16 | Stop reason: HOSPADM

## 2022-06-16 RX ADMIN — HEPARIN 500 UNITS: 100 SYRINGE at 15:06

## 2022-06-16 NOTE — PROGRESS NOTES
Pt arrived ambulatory to [facility] for 5FU pump de-access.  POC discussed, pt tolerated well and ordered dose of 5FU was completed.  Pump disconnected, port de-accessed, and site covered with gauze and tape.  Next appointment confirmed.  Pt discharged from [facility] in NAD under self care.

## 2022-06-21 RX ORDER — LOPERAMIDE HYDROCHLORIDE 2 MG/1
2 CAPSULE ORAL
Status: CANCELLED | OUTPATIENT
Start: 2022-06-28

## 2022-06-21 RX ORDER — HEPARIN SODIUM (PORCINE) LOCK FLUSH IV SOLN 100 UNIT/ML 100 UNIT/ML
500 SOLUTION INTRAVENOUS PRN
Status: CANCELLED | OUTPATIENT
Start: 2022-06-30

## 2022-06-21 RX ORDER — DIPHENHYDRAMINE HYDROCHLORIDE 50 MG/ML
50 INJECTION INTRAMUSCULAR; INTRAVENOUS PRN
Status: CANCELLED | OUTPATIENT
Start: 2022-06-28

## 2022-06-21 RX ORDER — ONDANSETRON 8 MG/1
8 TABLET, ORALLY DISINTEGRATING ORAL PRN
Status: CANCELLED | OUTPATIENT
Start: 2022-06-28

## 2022-06-21 RX ORDER — LOPERAMIDE HYDROCHLORIDE 2 MG/1
4 CAPSULE ORAL PRN
Status: CANCELLED | OUTPATIENT
Start: 2022-06-28

## 2022-06-21 RX ORDER — ONDANSETRON 2 MG/ML
4 INJECTION INTRAMUSCULAR; INTRAVENOUS PRN
Status: CANCELLED | OUTPATIENT
Start: 2022-06-28

## 2022-06-21 RX ORDER — 0.9 % SODIUM CHLORIDE 0.9 %
10 VIAL (ML) INJECTION PRN
Status: CANCELLED | OUTPATIENT
Start: 2022-06-28

## 2022-06-21 RX ORDER — METHYLPREDNISOLONE SODIUM SUCCINATE 125 MG/2ML
125 INJECTION, POWDER, LYOPHILIZED, FOR SOLUTION INTRAMUSCULAR; INTRAVENOUS PRN
Status: CANCELLED | OUTPATIENT
Start: 2022-06-28

## 2022-06-21 RX ORDER — 0.9 % SODIUM CHLORIDE 0.9 %
3 VIAL (ML) INJECTION PRN
Status: CANCELLED | OUTPATIENT
Start: 2022-06-28

## 2022-06-21 RX ORDER — 0.9 % SODIUM CHLORIDE 0.9 %
10 VIAL (ML) INJECTION PRN
Status: CANCELLED | OUTPATIENT
Start: 2022-06-27

## 2022-06-21 RX ORDER — 0.9 % SODIUM CHLORIDE 0.9 %
VIAL (ML) INJECTION PRN
Status: CANCELLED | OUTPATIENT
Start: 2022-06-27

## 2022-06-21 RX ORDER — HEPARIN SODIUM (PORCINE) LOCK FLUSH IV SOLN 100 UNIT/ML 100 UNIT/ML
500 SOLUTION INTRAVENOUS PRN
Status: CANCELLED | OUTPATIENT
Start: 2022-06-28

## 2022-06-21 RX ORDER — EPINEPHRINE 1 MG/ML(1)
0.5 AMPUL (ML) INJECTION PRN
Status: CANCELLED | OUTPATIENT
Start: 2022-06-28

## 2022-06-21 RX ORDER — 0.9 % SODIUM CHLORIDE 0.9 %
20 VIAL (ML) INJECTION PRN
Status: CANCELLED | OUTPATIENT
Start: 2022-06-30

## 2022-06-21 RX ORDER — SODIUM CHLORIDE 9 MG/ML
INJECTION, SOLUTION INTRAVENOUS CONTINUOUS
Status: CANCELLED | OUTPATIENT
Start: 2022-06-28

## 2022-06-21 RX ORDER — DEXTROSE MONOHYDRATE 50 MG/ML
INJECTION, SOLUTION INTRAVENOUS CONTINUOUS
Status: CANCELLED | OUTPATIENT
Start: 2022-06-28

## 2022-06-21 RX ORDER — 0.9 % SODIUM CHLORIDE 0.9 %
VIAL (ML) INJECTION PRN
Status: CANCELLED | OUTPATIENT
Start: 2022-06-28

## 2022-06-21 RX ORDER — HEPARIN SODIUM (PORCINE) LOCK FLUSH IV SOLN 100 UNIT/ML 100 UNIT/ML
500 SOLUTION INTRAVENOUS PRN
Status: CANCELLED | OUTPATIENT
Start: 2022-06-27

## 2022-06-21 RX ORDER — PROCHLORPERAZINE MALEATE 10 MG
10 TABLET ORAL EVERY 6 HOURS PRN
Status: CANCELLED | OUTPATIENT
Start: 2022-06-28

## 2022-06-21 RX ORDER — 0.9 % SODIUM CHLORIDE 0.9 %
3 VIAL (ML) INJECTION PRN
Status: CANCELLED | OUTPATIENT
Start: 2022-06-27

## 2022-06-27 NOTE — PROGRESS NOTES
"Pharmacy Chemotherapy Verification Note:    DX:  Metastatic Rectal Adenocarcinoma    Protocol: FOLFiri + bevacizumab  Irinotecan  IV on day 1  *dose reduced to 150mg/m2 for tolerance/ heavily pretreated  *discontinued for tolerance/ heavily pretreated  5-FU iv over 46 hrs  *dose reduced to 2000mg/m2 for tolerance/ heavily pretreated  *bolus discontinued for tolerance/ heavily pretreated  bevacizumab 5mg/kg IV on day 1*3/22/22- HOLD for cycle 1 per MD   q14 days for 8-12 cycles  NCCN Guidelines for Colon Cancer V.2.2018  Bradly V Et al - Lancet Oncol. 2014 Sep;15(10):1065-75. Doi:  10.1016/-[id number]. Epub 2014 Jul 31.  Bulmaro CS, et al - J Clin Oncol. 2007 Oct 20;25(30):2205-97.    BP (!) 143/86   Pulse 77   Temp 36.4 °C (97.5 °F) (Temporal)   Resp 18   Ht 1.765 m (5' 9.49\")   Wt 90.3 kg (199 lb 1.2 oz)   SpO2 95%   BMI 28.99 kg/m²     Body surface area is 2.1 meters squared.     Labs from 6/27/22 ([facility]) reviewed- results within treatment parameters     6/28/22:  Urine Protein = trace BP= ok     Drug Order   (Drug name, dose, route, IV Fluid & volume, frequency, number of doses) Cycle 8   Previous treatment: 6/14/22     Medication = Irinotecan (Camptosar)  Base Dose= 150 mg/m²   Calc Dose: Base Dose x 2.10 m² = 315 mg  Final Dose = 315 mg  Route = IV  Fluid & Volume = D5W 500 mL  Admin Duration = Over 90 minutes          <10% difference, okay to treat with final dose   Medication = Bevacizumab-bvzr (Zirabev)  Base Dose= 5 mg/kg   Calc Dose: Base Dose x 90.3 kg = 451 mg  Final Dose = 452 mg  Route = IV  Fluid & Volume =  mL  Admin Duration = Over 30 minutes          Unable to round to nearest vial size within 10%, okay to treat with final dose     Medication = Fluorouracil   Base Dose= 2000 mg/m²   Calc Dose:Base Dose x 2.10 m² = 4200 mg  Final Dose = 4200 mg  Route = CIVI  Conc = 50 mg/mL  Fluid & Volume = 84 mL (+3 mL of overfill = 87 mL )  Admin Duration = Over 46 hours @ 1.8 mL/hr   Via " CADD pump for home infusion        <10% difference, okay to treat with final dose     By my signature below, I confirm this process was performed independently with the BSA and all final chemotherapy dosing calculations congruent. I have reviewed the above chemotherapy order and that my calculation of the final dose and BSA (when applicable) corroborate those calculations of the  pharmacist. Discrepancies of 10% or greater in the written dose have been addressed and documented within the EPIC Progress notes.    Wong Gates, PharmD

## 2022-06-28 ENCOUNTER — OUTPATIENT INFUSION SERVICES (OUTPATIENT)
Dept: ONCOLOGY | Facility: MEDICAL CENTER | Age: 57
End: 2022-06-28
Attending: INTERNAL MEDICINE
Payer: MEDICARE

## 2022-06-28 VITALS
OXYGEN SATURATION: 95 % | DIASTOLIC BLOOD PRESSURE: 86 MMHG | WEIGHT: 199.08 LBS | RESPIRATION RATE: 18 BRPM | HEIGHT: 69 IN | HEART RATE: 77 BPM | BODY MASS INDEX: 29.49 KG/M2 | SYSTOLIC BLOOD PRESSURE: 143 MMHG | TEMPERATURE: 97.5 F

## 2022-06-28 DIAGNOSIS — C20 RECTAL CARCINOMA (HCC): Primary | ICD-10-CM

## 2022-06-28 LAB
APPEARANCE UR: CLEAR
COLOR UR AUTO: YELLOW
GLUCOSE UR QL STRIP.AUTO: NEGATIVE MG/DL
KETONES UR QL STRIP.AUTO: NEGATIVE MG/DL
LEUKOCYTE ESTERASE UR QL STRIP.AUTO: NEGATIVE
NITRITE UR QL STRIP.AUTO: NEGATIVE
PH UR STRIP.AUTO: 6 [PH] (ref 5–8)
PROT UR QL STRIP: ABNORMAL MG/DL
RBC UR QL AUTO: ABNORMAL
SP GR UR STRIP.AUTO: 1.02 (ref 1–1.03)

## 2022-06-28 PROCEDURE — 96413 CHEMO IV INFUSION 1 HR: CPT

## 2022-06-28 PROCEDURE — A4212 NON CORING NEEDLE OR STYLET: HCPCS

## 2022-06-28 PROCEDURE — 96375 TX/PRO/DX INJ NEW DRUG ADDON: CPT

## 2022-06-28 PROCEDURE — 700105 HCHG RX REV CODE 258: Performed by: INTERNAL MEDICINE

## 2022-06-28 PROCEDURE — G0498 CHEMO EXTEND IV INFUS W/PUMP: HCPCS

## 2022-06-28 PROCEDURE — A9270 NON-COVERED ITEM OR SERVICE: HCPCS | Performed by: INTERNAL MEDICINE

## 2022-06-28 PROCEDURE — 96417 CHEMO IV INFUS EACH ADDL SEQ: CPT

## 2022-06-28 PROCEDURE — 81002 URINALYSIS NONAUTO W/O SCOPE: CPT

## 2022-06-28 PROCEDURE — 700111 HCHG RX REV CODE 636 W/ 250 OVERRIDE (IP): Performed by: INTERNAL MEDICINE

## 2022-06-28 PROCEDURE — 700102 HCHG RX REV CODE 250 W/ 637 OVERRIDE(OP): Performed by: INTERNAL MEDICINE

## 2022-06-28 RX ORDER — POTASSIUM CHLORIDE 20 MEQ/1
40 TABLET, EXTENDED RELEASE ORAL ONCE
Status: COMPLETED | OUTPATIENT
Start: 2022-06-28 | End: 2022-06-28

## 2022-06-28 RX ADMIN — ONDANSETRON 16 MG: 2 INJECTION INTRAMUSCULAR; INTRAVENOUS at 09:50

## 2022-06-28 RX ADMIN — ATROPINE SULFATE 0.5 MG: 1 INJECTION, SOLUTION INTRAMUSCULAR; INTRAVENOUS; SUBCUTANEOUS at 11:04

## 2022-06-28 RX ADMIN — IRINOTECAN HYDROCHLORIDE 315 MG: 20 INJECTION, SOLUTION INTRAVENOUS at 11:07

## 2022-06-28 RX ADMIN — SODIUM CHLORIDE 452 MG: 9 INJECTION, SOLUTION INTRAVENOUS at 10:25

## 2022-06-28 RX ADMIN — POTASSIUM CHLORIDE 40 MEQ: 1500 TABLET, EXTENDED RELEASE ORAL at 09:37

## 2022-06-28 RX ADMIN — FLUOROURACIL 4200 MG: 50 INJECTION, SOLUTION INTRAVENOUS at 12:56

## 2022-06-28 RX ADMIN — DEXAMETHASONE SODIUM PHOSPHATE 12 MG: 4 INJECTION, SOLUTION INTRAMUSCULAR; INTRAVENOUS at 09:37

## 2022-06-28 ASSESSMENT — FIBROSIS 4 INDEX: FIB4 SCORE: 0.99

## 2022-06-28 NOTE — PROGRESS NOTES
Patient presented to [facility] for C8D1 Irinotecan, Bevacizumab & connection of 46 hour Fluorouracil infusion. Port accessed in sterile fashion. Flushed easily, jenny briskly. Pre-treatment labs drawn on 6/27/22 at [facility]. Results within treatable parameters. Patient received pre-meds as ordered. Bevacizumab infused over 30 minutes.  Irinotecan infused over 90 minutes. Treatment tolerated well. CADD pump with Fluorouracil infusion connected. Patient to return in two days for disconnect. Patient left [facility] in no apparent distress. Patient to call Imaging and schedule CT and patient to call Dr. Aguila's office to schedule next appointment, then patient will call Infusion Scheduling regarding next round of treatment as patient will be out of town in July.

## 2022-06-28 NOTE — PROGRESS NOTES
Chemotherapy Verification - SECONDARY RN       Height = 176.5 cm  Weight = 90.3 kg  BSA = 2.1 m2       Medication: irinotecan (CAMPOSTAR)  Dose: 150 mg/m2  Calculated Dose: 315 mg                               Medication: bevacizumab-bvzr (Zirabev)  Dose: 5 mg/kg  Calculated Dose: 451.5 mg                             Medication: fluorouracil (ADRUCIL)  Dose: 2000 mg/m2  Calculated Dose: 2002.1 mg     I confirm that this process was performed independently.

## 2022-06-28 NOTE — PROGRESS NOTES
Chemotherapy Verification - SECONDARY RN   Cycle 8 Day 1    Height = 176.5 cm  Weight = 90.3 kg  BSA = 2.1 m^2       Medication: fluorouracil (ADRUCIL)  Dose: 2000 mg/m2  Calculated Dose: 4200 mg                            (In mg/m2, AUC, mg/kg)     I confirm that this process was performed independently.

## 2022-06-28 NOTE — PROGRESS NOTES
"Pharmacy Chemotherapy Verification Note:    DX:  Metastatic Rectal Adenocarcinoma    Cycle 8  Previous treatment: C7 on 6/14/22    Protocol: FOLFiri + bevacizumab  Irinotecan  IV on day 1  *dose reduced to 150mg/m2 for tolerance/ heavily pretreated  *discontinued for tolerance/ heavily pretreated  5-FU iv over 46 hrs  *dose reduced to 2000mg/m2 for tolerance/ heavily pretreated  *bolus discontinued for tolerance/ heavily pretreated  bevacizumab 5mg/kg IV on day 1*3/22/22- HOLD for cycle 1 per MD  q14 days for 8-12 cycles  NCCN Guidelines for Colon Cancer V.2.2018  Bradly POSEY Et al - Lancet Oncol. 2014 Sep;15(10):1065-75. Doi:  10.1016/-[id number]. Epub 2014 Jul 31.  Bulmaro MARRERO et al - J Clin Oncol. 2007 Oct 20;25(30):4799-86.    BP (!) 143/86   Pulse 77   Temp 36.4 °C (97.5 °F) (Temporal)   Resp 18   Ht 1.765 m (5' 9.49\")   Wt 90.3 kg (199 lb 1.2 oz)   SpO2 95%   BMI 28.99 kg/m²     Body surface area is 2.1 meters squared.    All lab results 6/27/22 from [facility] within treatment parameters. BP and urine protein 6/28/22 [facility] acceptable for treatment.      Irinotecan (Camptosar) 150 mg/m2 x 2.1m2 = 315mg    <10% difference, ok to treat with final dose = 315mg IV    Bevacizumab-bvzr (Zirabev) 5 mg/kg x 90.3 kg = 451.5mg   Unable to Round to nearest vial size (> 10%) per rounding protocol   Okay to treat with final dose = 452mg IV    Fluorouracil 2000 mg/m2 x 2.1m2 = 4200mg    <10% difference, ok to treat with final dose = 4200mg CIVI   Via CADD pump for home infusion over 46 hours @ 1.8mL/hr      Bal Grande, PharmD          "

## 2022-06-28 NOTE — PROGRESS NOTES
Chemotherapy Verification - PRIMARY RN      Height = 1.765m  Weight = 90.3kg  BSA = 2.1m^2      Medication: irinotecan (CAMPTOSAR)  Dose: 150mg/m^2  Calculated Dose: 315mg                             (In mg/m2, AUC, mg/kg)     Medication: bevacizumab-bvzr (Zirabev)  Dose: 5mg/kg Calculated Dose: 451.5mg                             (In mg/m2, AUC, mg/kg)    Medication: fluorouracil (ADRUCIL)  Dose: 2000mg/m^2  Calculated Dose: 4200mg                            (In mg/m2, AUC, mg/kg)      I confirm this process was performed independently with the BSA and all final chemotherapy dosing calculations congruent.  Any discrepancies of 10% or greater have been addressed with the chemotherapy pharmacist. The resolution of the discrepancy has been documented in the EPIC progress notes.

## 2022-06-30 ENCOUNTER — OUTPATIENT INFUSION SERVICES (OUTPATIENT)
Dept: ONCOLOGY | Facility: MEDICAL CENTER | Age: 57
End: 2022-06-30
Attending: INTERNAL MEDICINE
Payer: MEDICARE

## 2022-06-30 VITALS
OXYGEN SATURATION: 95 % | TEMPERATURE: 97 F | DIASTOLIC BLOOD PRESSURE: 89 MMHG | RESPIRATION RATE: 75 BRPM | HEART RATE: 88 BPM | SYSTOLIC BLOOD PRESSURE: 142 MMHG

## 2022-06-30 DIAGNOSIS — C20 RECTAL CARCINOMA (HCC): ICD-10-CM

## 2022-06-30 PROCEDURE — 700111 HCHG RX REV CODE 636 W/ 250 OVERRIDE (IP): Performed by: INTERNAL MEDICINE

## 2022-06-30 PROCEDURE — 96523 IRRIG DRUG DELIVERY DEVICE: CPT

## 2022-06-30 RX ORDER — HEPARIN SODIUM (PORCINE) LOCK FLUSH IV SOLN 100 UNIT/ML 100 UNIT/ML
500 SOLUTION INTRAVENOUS PRN
Status: DISCONTINUED | OUTPATIENT
Start: 2022-06-30 | End: 2022-06-30 | Stop reason: HOSPADM

## 2022-06-30 RX ADMIN — HEPARIN 500 UNITS: 100 SYRINGE at 13:29

## 2022-06-30 NOTE — PROGRESS NOTES
Dre arrives to [facility] for a 5FU CADD de-access. Patient c/o fatigue, mild nausea, and diarrhea post-chemotherapy. Patient is looking forward to resting at home for the rest of the day. 5FU CADD disconnected: 85.4 ml infused, 0 ml residual in cannister. Port flushed with 20 ml NS, heparin locked, and de-accessed. Gauze and paper tape applied over site. Emailed scheduling regarding next appointment. Discharged home to self care in no apparent distress.

## 2022-07-12 ENCOUNTER — OUTPATIENT INFUSION SERVICES (OUTPATIENT)
Dept: ONCOLOGY | Facility: MEDICAL CENTER | Age: 57
End: 2022-07-12
Attending: INTERNAL MEDICINE
Payer: MEDICARE

## 2022-07-12 VITALS
HEART RATE: 88 BPM | HEIGHT: 69 IN | SYSTOLIC BLOOD PRESSURE: 125 MMHG | RESPIRATION RATE: 18 BRPM | OXYGEN SATURATION: 98 % | TEMPERATURE: 98 F | WEIGHT: 200.4 LBS | BODY MASS INDEX: 29.68 KG/M2 | DIASTOLIC BLOOD PRESSURE: 82 MMHG

## 2022-07-12 DIAGNOSIS — C20 RECTAL CARCINOMA (HCC): ICD-10-CM

## 2022-07-12 PROCEDURE — 700111 HCHG RX REV CODE 636 W/ 250 OVERRIDE (IP): Mod: JW,TB

## 2022-07-12 PROCEDURE — 81002 URINALYSIS NONAUTO W/O SCOPE: CPT

## 2022-07-12 PROCEDURE — 96375 TX/PRO/DX INJ NEW DRUG ADDON: CPT

## 2022-07-12 PROCEDURE — 96417 CHEMO IV INFUS EACH ADDL SEQ: CPT

## 2022-07-12 PROCEDURE — 96415 CHEMO IV INFUSION ADDL HR: CPT

## 2022-07-12 PROCEDURE — 700105 HCHG RX REV CODE 258: Performed by: INTERNAL MEDICINE

## 2022-07-12 PROCEDURE — 96413 CHEMO IV INFUSION 1 HR: CPT

## 2022-07-12 PROCEDURE — 700105 HCHG RX REV CODE 258

## 2022-07-12 PROCEDURE — A4212 NON CORING NEEDLE OR STYLET: HCPCS

## 2022-07-12 PROCEDURE — 700111 HCHG RX REV CODE 636 W/ 250 OVERRIDE (IP): Mod: TB | Performed by: INTERNAL MEDICINE

## 2022-07-12 PROCEDURE — G0498 CHEMO EXTEND IV INFUS W/PUMP: HCPCS

## 2022-07-12 RX ORDER — 0.9 % SODIUM CHLORIDE 0.9 %
20 VIAL (ML) INJECTION PRN
Status: CANCELLED | OUTPATIENT
Start: 2022-07-14

## 2022-07-12 RX ORDER — 0.9 % SODIUM CHLORIDE 0.9 %
10 VIAL (ML) INJECTION PRN
Status: CANCELLED | OUTPATIENT
Start: 2022-07-12

## 2022-07-12 RX ORDER — HEPARIN SODIUM (PORCINE) LOCK FLUSH IV SOLN 100 UNIT/ML 100 UNIT/ML
500 SOLUTION INTRAVENOUS PRN
Status: CANCELLED | OUTPATIENT
Start: 2022-07-14

## 2022-07-12 RX ORDER — 0.9 % SODIUM CHLORIDE 0.9 %
VIAL (ML) INJECTION PRN
Status: CANCELLED | OUTPATIENT
Start: 2022-07-12

## 2022-07-12 RX ORDER — 0.9 % SODIUM CHLORIDE 0.9 %
3 VIAL (ML) INJECTION PRN
Status: CANCELLED | OUTPATIENT
Start: 2022-07-12

## 2022-07-12 RX ORDER — LOPERAMIDE HYDROCHLORIDE 2 MG/1
4 CAPSULE ORAL PRN
Status: CANCELLED | OUTPATIENT
Start: 2022-07-12

## 2022-07-12 RX ORDER — LOPERAMIDE HYDROCHLORIDE 2 MG/1
2 CAPSULE ORAL
Status: CANCELLED | OUTPATIENT
Start: 2022-07-12

## 2022-07-12 RX ORDER — ONDANSETRON 2 MG/ML
4 INJECTION INTRAMUSCULAR; INTRAVENOUS PRN
Status: CANCELLED | OUTPATIENT
Start: 2022-07-12

## 2022-07-12 RX ORDER — PROCHLORPERAZINE MALEATE 10 MG
10 TABLET ORAL EVERY 6 HOURS PRN
Status: CANCELLED | OUTPATIENT
Start: 2022-07-12

## 2022-07-12 RX ORDER — SODIUM CHLORIDE 9 MG/ML
INJECTION, SOLUTION INTRAVENOUS CONTINUOUS
Status: CANCELLED | OUTPATIENT
Start: 2022-07-12

## 2022-07-12 RX ORDER — DEXTROSE MONOHYDRATE 50 MG/ML
INJECTION, SOLUTION INTRAVENOUS CONTINUOUS
Status: CANCELLED | OUTPATIENT
Start: 2022-07-12

## 2022-07-12 RX ORDER — HEPARIN SODIUM (PORCINE) LOCK FLUSH IV SOLN 100 UNIT/ML 100 UNIT/ML
500 SOLUTION INTRAVENOUS PRN
Status: CANCELLED | OUTPATIENT
Start: 2022-07-12

## 2022-07-12 RX ORDER — METHYLPREDNISOLONE SODIUM SUCCINATE 125 MG/2ML
125 INJECTION, POWDER, LYOPHILIZED, FOR SOLUTION INTRAMUSCULAR; INTRAVENOUS PRN
Status: CANCELLED | OUTPATIENT
Start: 2022-07-12

## 2022-07-12 RX ORDER — DIPHENHYDRAMINE HYDROCHLORIDE 50 MG/ML
50 INJECTION INTRAMUSCULAR; INTRAVENOUS PRN
Status: CANCELLED | OUTPATIENT
Start: 2022-07-12

## 2022-07-12 RX ORDER — ONDANSETRON 8 MG/1
8 TABLET, ORALLY DISINTEGRATING ORAL PRN
Status: CANCELLED | OUTPATIENT
Start: 2022-07-12

## 2022-07-12 RX ORDER — EPINEPHRINE 1 MG/ML(1)
0.5 AMPUL (ML) INJECTION PRN
Status: CANCELLED | OUTPATIENT
Start: 2022-07-12

## 2022-07-12 RX ADMIN — SODIUM CHLORIDE 440 MG: 9 INJECTION, SOLUTION INTRAVENOUS at 15:05

## 2022-07-12 RX ADMIN — FLUOROURACIL 4220 MG: 50 INJECTION, SOLUTION INTRAVENOUS at 15:40

## 2022-07-12 RX ADMIN — ONDANSETRON HYDROCHLORIDE 16 MG: 2 INJECTION, SOLUTION INTRAMUSCULAR; INTRAVENOUS at 12:40

## 2022-07-12 RX ADMIN — IRINOTECAN HYDROCHLORIDE 316.6 MG: 20 INJECTION, SOLUTION INTRAVENOUS at 13:20

## 2022-07-12 RX ADMIN — DEXAMETHASONE SODIUM PHOSPHATE 12 MG: 4 INJECTION, SOLUTION INTRA-ARTICULAR; INTRALESIONAL; INTRAMUSCULAR; INTRAVENOUS; SOFT TISSUE at 12:25

## 2022-07-12 RX ADMIN — ATROPINE SULFATE 0.5 MG: 1 INJECTION, SOLUTION INTRAMUSCULAR; INTRAVENOUS; SUBCUTANEOUS at 13:16

## 2022-07-12 ASSESSMENT — FIBROSIS 4 INDEX: FIB4 SCORE: 0.99

## 2022-07-12 NOTE — PROGRESS NOTES
"Pharmacy Chemotherapy Verification Note:    DX:  Metastatic Rectal Adenocarcinoma    Cycle 9  Previous treatment: C8 on 6/28/22    Protocol: FOLFiri + bevacizumab  Irinotecan  IV on day 1  *dose reduced to 150mg/m2 for tolerance/ heavily pretreated  *discontinued for tolerance/ heavily pretreated  5-FU iv over 46 hrs  *dose reduced to 2000mg/m2 for tolerance/ heavily pretreated  *bolus discontinued for tolerance/ heavily pretreated  bevacizumab 5mg/kg IV on day 1*3/22/22- HOLD for cycle 1 per MD  q14 days for 8-12 cycles  NCCN Guidelines for Colon Cancer V.2.2018  Bradly POSEY Et al - Lancet Oncol. 2014 Sep;15(10):1065-75. Doi:  10.1016/-[id number]. Epub 2014 Jul 31.  Bulmaro MARRERO et al - J Clin Oncol. 2007 Oct 20;25(30):4743-86.    /82   Pulse 88   Temp 36.7 °C (98 °F) (Temporal)   Resp 18   Ht 1.765 m (5' 9.49\")   Wt 90.9 kg (200 lb 6.4 oz)   SpO2 98%   BMI 29.18 kg/m²     Body surface area is 2.11 meters squared.    Labs 7/11/22 ([facility]):  ANC~ 4240 Plt = 251k   Hgb = 13.1     SCr = 0.8 mg/dL CrCl >125 mL/min   LFT's = WNL TBili = 0.6  Labs 7/12/22:  Urine Protein = negative  BP = 125/82    Irinotecan (Camptosar) 150 mg/m2 x 2.11 m2 = 316.5 mg    <10% difference, ok to treat with final dose = 316.6 mg IV    Bevacizumab-bvzr (Zirabev) 5 mg/kg x 90.9 kg = 454.5 mg   Unable to Round to nearest vial size (> 10%) per rounding protocol   Okay to treat with final dose = 440 mg IV    Fluorouracil 2000 mg/m2 x 2.11 m2 = 4220 mg    <10% difference, ok to treat with final dose = 4220 mg CIVI   Via CADD pump for home infusion over 46 hours @ 1.8 mL/hr      Angelina Garay, PharmD          "

## 2022-07-12 NOTE — PROGRESS NOTES
Chemotherapy Verification - PRIMARY RN      Height = 176.5 cm  Weight = 90.9 kg  BSA = 2.11 m^2       Medication: irinotecan (CAMPTOSAR)  Dose: 150 mg/m^2  Calculated Dose: 316.5 mg                             (In mg/m2, AUC, mg/kg)     Medication: bevacizumab-bvzr (Zirabev)  Dose: 5 mg/kg  Calculated Dose: 454.5 mg                             (In mg/m2, AUC, mg/kg)    Medication: fluorouacil (ADRUCIL)  Dose: 2000 mg/m^2  Calculated Dose: 4220 mg over 46 hours via CADD pump                             (In mg/m2, AUC, mg/kg)    I confirm this process was performed independently with the BSA and all final chemotherapy dosing calculations congruent.  Any discrepancies of 10% or greater have been addressed with the chemotherapy pharmacist. The resolution of the discrepancy has been documented in the EPIC progress notes.

## 2022-07-12 NOTE — PROGRESS NOTES
Chemotherapy Verification - SECONDARY RN       Height = 176.5 cm  Weight = 90.9 kg  BSA = 2.11 m2       Medication: Irinotecan  Dose: 150 mg/m2  Calculated Dose: 316.5 mg                             (In mg/m2, AUC, mg/kg)     Medication: Zirabev  Dose: 5 mg/kg  Calculated Dose: 454.5 mg                             (In mg/m2, AUC, mg/kg)    Medication: Adrucil  Dose: 2,000 mg/m2  Calculated Dose: 4,220 mg over 46 hours                             (In mg/m2, AUC, mg/kg)      I confirm that this process was performed independently.

## 2022-07-12 NOTE — PROGRESS NOTES
"Pharmacy Chemotherapy Verification Note:    DX:  Metastatic Rectal Adenocarcinoma    Protocol: FOLFiri + bevacizumab  Irinotecan  IV on day 1  *dose reduced to 150mg/m2 for tolerance/ heavily pretreated  *discontinued for tolerance/ heavily pretreated  5-FU iv over 46 hrs  *dose reduced to 2000mg/m2 for tolerance/ heavily pretreated  *bolus discontinued for tolerance/ heavily pretreated  bevacizumab 5mg/kg IV on day 1*3/22/22- HOLD for cycle 1 per MD   q14 days for 8-12 cycles  NCCN Guidelines for Colon Cancer V.2.2018  Bradly POSEY Et al - Lancet Oncol. 2014 Sep;15(10):1067-75. Doi:  10.1016/-[id number]. Epub 2014 Jul 31.  Bulmaro CS, et al - J Clin Oncol. 2007 Oct 20;25(30):0739-49.    /82   Pulse 88   Temp 36.7 °C (98 °F) (Temporal)   Resp 18   Ht 1.765 m (5' 9.49\")   Wt 90.9 kg (200 lb 6.4 oz)   SpO2 98%   BMI 29.18 kg/m²     Body surface area is 2.11 meters squared.     All lab results 7/11/22 CCS, BP and urine protein 7/12/22 within treatment parameters.      Drug Order   (Drug name, dose, route, IV Fluid & volume, frequency, number of doses) Cycle 9   Previous treatment: 6/28/22     Medication = Irinotecan (Camptosar)  Base Dose= 150 mg/m²   Calc Dose: Base Dose x 2.11m² = 316.5mg  Final Dose = 316.6mg  Route = IV  Fluid & Volume = D5W 500 mL  Admin Duration = Over 90 minutes          <10% difference, okay to treat with final dose   Medication = Bevacizumab-bvzr (Zirabev)  Base Dose= 5 mg/kg   Calc Dose: Base Dose x 90.9kg = 454.5 mg  Final Dose = 440mg  Route = IV  Fluid & Volume =  mL  Admin Duration = Over 30 minutes          Unable to round to nearest vial size > 10%, okay to treat with final dose     Medication = Fluorouracil   Base Dose= 2000 mg/m²   Calc Dose:Base Dose x 2.11m² = 4220mg  Final Dose = 4220mg  Route = CIVI  Conc = 50 mg/mL  Fluid & Volume = 84.4mL (+3 mL of overfill)  Admin Duration = Over 46 hours @ 1.8mL/hr   Via CADD pump for home infusion        <10% " difference, okay to treat with final dose     By my signature below, I confirm this process was performed independently with the BSA and all final chemotherapy dosing calculations congruent. I have reviewed the above chemotherapy order and that my calculation of the final dose and BSA (when applicable) corroborate those calculations of the  pharmacist. Discrepancies of 10% or greater in the written dose have been addressed and documented within the EPIC Progress notes.    Bal Grande, ElleD

## 2022-07-13 RX ORDER — DEXTROSE MONOHYDRATE 50 MG/ML
INJECTION, SOLUTION INTRAVENOUS CONTINUOUS
Status: CANCELLED | OUTPATIENT
Start: 2022-08-03

## 2022-07-13 RX ORDER — EPINEPHRINE 1 MG/ML(1)
0.5 AMPUL (ML) INJECTION PRN
Status: CANCELLED | OUTPATIENT
Start: 2022-08-03

## 2022-07-13 RX ORDER — 0.9 % SODIUM CHLORIDE 0.9 %
20 VIAL (ML) INJECTION PRN
Status: CANCELLED | OUTPATIENT
Start: 2022-08-05

## 2022-07-13 RX ORDER — 0.9 % SODIUM CHLORIDE 0.9 %
3 VIAL (ML) INJECTION PRN
Status: CANCELLED | OUTPATIENT
Start: 2022-08-01

## 2022-07-13 RX ORDER — 0.9 % SODIUM CHLORIDE 0.9 %
10 VIAL (ML) INJECTION PRN
Status: CANCELLED | OUTPATIENT
Start: 2022-08-03

## 2022-07-13 RX ORDER — HEPARIN SODIUM (PORCINE) LOCK FLUSH IV SOLN 100 UNIT/ML 100 UNIT/ML
500 SOLUTION INTRAVENOUS PRN
Status: CANCELLED | OUTPATIENT
Start: 2022-08-01

## 2022-07-13 RX ORDER — PROCHLORPERAZINE MALEATE 10 MG
10 TABLET ORAL EVERY 6 HOURS PRN
Status: CANCELLED | OUTPATIENT
Start: 2022-08-03

## 2022-07-13 RX ORDER — 0.9 % SODIUM CHLORIDE 0.9 %
VIAL (ML) INJECTION PRN
Status: CANCELLED | OUTPATIENT
Start: 2022-08-01

## 2022-07-13 RX ORDER — DIPHENHYDRAMINE HYDROCHLORIDE 50 MG/ML
50 INJECTION INTRAMUSCULAR; INTRAVENOUS PRN
Status: CANCELLED | OUTPATIENT
Start: 2022-08-03

## 2022-07-13 RX ORDER — ONDANSETRON 8 MG/1
8 TABLET, ORALLY DISINTEGRATING ORAL PRN
Status: CANCELLED | OUTPATIENT
Start: 2022-08-03

## 2022-07-13 RX ORDER — LOPERAMIDE HYDROCHLORIDE 2 MG/1
4 CAPSULE ORAL PRN
Status: CANCELLED | OUTPATIENT
Start: 2022-08-03

## 2022-07-13 RX ORDER — 0.9 % SODIUM CHLORIDE 0.9 %
VIAL (ML) INJECTION PRN
Status: CANCELLED | OUTPATIENT
Start: 2022-08-03

## 2022-07-13 RX ORDER — METHYLPREDNISOLONE SODIUM SUCCINATE 125 MG/2ML
125 INJECTION, POWDER, LYOPHILIZED, FOR SOLUTION INTRAMUSCULAR; INTRAVENOUS PRN
Status: CANCELLED | OUTPATIENT
Start: 2022-08-03

## 2022-07-13 RX ORDER — HEPARIN SODIUM (PORCINE) LOCK FLUSH IV SOLN 100 UNIT/ML 100 UNIT/ML
500 SOLUTION INTRAVENOUS PRN
Status: CANCELLED | OUTPATIENT
Start: 2022-08-05

## 2022-07-13 RX ORDER — ONDANSETRON 2 MG/ML
4 INJECTION INTRAMUSCULAR; INTRAVENOUS PRN
Status: CANCELLED | OUTPATIENT
Start: 2022-08-03

## 2022-07-13 RX ORDER — HEPARIN SODIUM (PORCINE) LOCK FLUSH IV SOLN 100 UNIT/ML 100 UNIT/ML
500 SOLUTION INTRAVENOUS PRN
Status: CANCELLED | OUTPATIENT
Start: 2022-08-03

## 2022-07-13 RX ORDER — SODIUM CHLORIDE 9 MG/ML
INJECTION, SOLUTION INTRAVENOUS CONTINUOUS
Status: CANCELLED | OUTPATIENT
Start: 2022-08-03

## 2022-07-13 RX ORDER — 0.9 % SODIUM CHLORIDE 0.9 %
3 VIAL (ML) INJECTION PRN
Status: CANCELLED | OUTPATIENT
Start: 2022-08-03

## 2022-07-13 RX ORDER — 0.9 % SODIUM CHLORIDE 0.9 %
10 VIAL (ML) INJECTION PRN
Status: CANCELLED | OUTPATIENT
Start: 2022-08-01

## 2022-07-13 RX ORDER — LOPERAMIDE HYDROCHLORIDE 2 MG/1
2 CAPSULE ORAL
Status: CANCELLED | OUTPATIENT
Start: 2022-08-03

## 2022-07-14 ENCOUNTER — OUTPATIENT INFUSION SERVICES (OUTPATIENT)
Dept: ONCOLOGY | Facility: MEDICAL CENTER | Age: 57
End: 2022-07-14
Attending: INTERNAL MEDICINE
Payer: MEDICARE

## 2022-07-14 VITALS
DIASTOLIC BLOOD PRESSURE: 86 MMHG | RESPIRATION RATE: 18 BRPM | SYSTOLIC BLOOD PRESSURE: 130 MMHG | HEART RATE: 88 BPM | OXYGEN SATURATION: 97 % | TEMPERATURE: 97.8 F

## 2022-07-14 DIAGNOSIS — C20 RECTAL CARCINOMA (HCC): ICD-10-CM

## 2022-07-14 PROCEDURE — 96523 IRRIG DRUG DELIVERY DEVICE: CPT

## 2022-07-14 PROCEDURE — 700111 HCHG RX REV CODE 636 W/ 250 OVERRIDE (IP): Performed by: INTERNAL MEDICINE

## 2022-07-14 RX ORDER — HEPARIN SODIUM (PORCINE) LOCK FLUSH IV SOLN 100 UNIT/ML 100 UNIT/ML
500 SOLUTION INTRAVENOUS PRN
Status: DISCONTINUED | OUTPATIENT
Start: 2022-07-14 | End: 2022-07-14 | Stop reason: HOSPADM

## 2022-07-14 RX ADMIN — HEPARIN 500 UNITS: 100 SYRINGE at 16:00

## 2022-07-14 NOTE — PROGRESS NOTES
Here for 5 FU pump disconnect after 46 hrs of continuous infusion. See chemotherapy flow sheet for volume / dose administration. Port flushed per protocol, site d-accessed, needle intact, gauze and paper tape applied to site. Patient discharge home with care giver via wheelchair in [facility]. Next appointment confirmed.

## 2022-07-15 ENCOUNTER — HOSPITAL ENCOUNTER (OUTPATIENT)
Dept: RADIOLOGY | Facility: MEDICAL CENTER | Age: 57
End: 2022-07-15
Attending: INTERNAL MEDICINE
Payer: MEDICARE

## 2022-07-15 DIAGNOSIS — C20 MALIGNANT NEOPLASM OF RECTUM (HCC): ICD-10-CM

## 2022-07-15 PROCEDURE — 74177 CT ABD & PELVIS W/CONTRAST: CPT | Mod: MH

## 2022-07-15 PROCEDURE — 700117 HCHG RX CONTRAST REV CODE 255: Performed by: INTERNAL MEDICINE

## 2022-07-15 RX ADMIN — IOHEXOL 25 ML: 240 INJECTION, SOLUTION INTRATHECAL; INTRAVASCULAR; INTRAVENOUS; ORAL at 10:22

## 2022-07-15 RX ADMIN — IOHEXOL 100 ML: 350 INJECTION, SOLUTION INTRAVENOUS at 11:09

## 2022-07-26 ENCOUNTER — APPOINTMENT (OUTPATIENT)
Dept: ONCOLOGY | Facility: MEDICAL CENTER | Age: 57
End: 2022-07-26
Attending: INTERNAL MEDICINE
Payer: MEDICARE

## 2022-07-31 NOTE — PROGRESS NOTES
"Pharmacy Chemotherapy Verification    DX:  Metastatic Rectal Adenocarcinoma    Cycle 10  Previous treatment: C7/12/22    Protocol: FOLFiri + bevacizumab  Irinotecan  IV on day 1   -Dose reduced to 150 mg/m2 for tolerance/heavily pretreated   -Discontinued for tolerance/heavily pretreated  5-FU iv over 46 hrs   -Dose reduced to 2000 mg/m2 for tolerance/heavily pretreated   -Bolus discontinued for tolerance/heavily pretreated  Bevacizumab 5 mg/kg IV on day 1 -3/22/22- HOLD for cycle 1 per MD  q14 days for 8-12 cycles  NCCN Guidelines for Colon Cancer V.2.2018  Bradly POSEY Et al - Lancet Oncol. 2014 Sep;15(10):1065-75. Doi:  10.1016/-[id number]. Epub 2014 Jul 31.  Bulmaro MARRERO et al - J Clin Oncol. 2007 Oct 20;25(30):8666-91.    Allergies:Patient has no known allergies  /84   Pulse 75   Temp 36.1 °C (97 °F) (Temporal)   Resp 18   Ht 1.75 m (5' 8.9\")   Wt 92.2 kg (203 lb 4.2 oz)   SpO2 96%   BMI 30.11 kg/m²     Body surface area is 2.12 meters squared.    Labs 8/2/22  ANC~3090 Hgb 13.5 Plt 298k  SCr 0.76 CrCl >125 mL/min   AST/ALT/AP = 27/24/73 Tbili = 0.5   08/02/22 08:35   POC Protein Negative     Irinotecan (Camptosar) 150 mg/m2 x 2.12 m2 = 318 mg    <10% difference, ok to treat with final dose = 318 mg IV    Bevacizumab-bvzr (Zirabev) 5 mg/kg x 92.2 kg = 461 mg   Unable to Round to nearest vial size (> 10%) per rounding protocol   Okay to treat with final dose = 440 mg IV    Fluorouracil 2000 mg/m2 x 2.12 m2 = 4240 mg    <10% difference, ok to treat with final dose = 4240 mg CIVI   Via CADD pump for home infusion over 46 hours @ 1.8 mL/hr    Jessy Solorio, PharmD , BCOP        "

## 2022-08-01 NOTE — PROGRESS NOTES
"Pharmacy Chemotherapy Verification Note:    DX:  Metastatic Rectal Adenocarcinoma    Protocol: FOLFiri + bevacizumab  Irinotecan  IV on day 1  *dose reduced to 150mg/m2 for tolerance/ heavily pretreated  *discontinued for tolerance/ heavily pretreated  5-FU iv over 46 hrs  *dose reduced to 2000mg/m2 for tolerance/ heavily pretreated  *bolus discontinued for tolerance/ heavily pretreated  bevacizumab 5mg/kg IV on day 1*3/22/22- HOLD for cycle 1 per MD   q14 days for 8-12 cycles  NCCN Guidelines for Colon Cancer V.2.2018  Bradly POSEY Et al - Lancet Oncol. 2014 Sep;15(10):1065-75. Doi:  10.1016/-[id number]. Epub 2014 Jul 31.  Bulmaro CS, et al - J Clin Oncol. 2007 Oct 20;25(30):1008-34.    /84   Pulse 75   Temp 36.1 °C (97 °F) (Temporal)   Resp 18   Ht 1.75 m (5' 8.9\")   Wt 92.2 kg (203 lb 4.2 oz)   SpO2 96%   BMI 30.11 kg/m²     Body surface area is 2.12 meters squared.     Labs 8/2/2022:  ANC~ 3090 Plt = 298k   Hgb = 13.5     SCr = 0.76mg/dL CrCl ~ 143.27mL/min   AST/ALT/ALP = 27/4/73 TBili = 0.5   UP=negative  EQ=892/84      Drug Order   (Drug name, dose, route, IV Fluid & volume, frequency, number of doses) Cycle 10  Previous treatment: C9 7/12/22     Medication = Irinotecan (Camptosar)  Base Dose= 150 mg/m²   Calc Dose: Base Dose x 2.12m² = 318mg  Final Dose = 318mg  Route = IV  Fluid & Volume = D5W 500 mL  Admin Duration = Over 90 minutes          <10% difference, okay to treat with final dose   Medication = Bevacizumab-bvzr (Zirabev)  Base Dose= 5 mg/kg   Calc Dose: Base Dose x 92.2kg = 461 mg  Final Dose = 440mg  Route = IV  Fluid & Volume =  mL  Admin Duration = Over 30 minutes          Unable to round to nearest vial size > 10%, okay to treat with final dose     Medication = Fluorouracil   Base Dose= 2000 mg/m²   Calc Dose:Base Dose x2.12m² =4240mg  Final Dose = 4240mg  Route = CIVI  Conc = 50 mg/mL  Fluid & Volume = 84.8mL (+3 mL of overfill)  Admin Duration = Over 46 hours @ " 1.8mL/hr   Via CADD pump for home infusion        <10% difference, okay to treat with final dose     By my signature below, I confirm this process was performed independently with the BSA and all final chemotherapy dosing calculations congruent. I have reviewed the above chemotherapy order and that my calculation of the final dose and BSA (when applicable) corroborate those calculations of the  pharmacist. Discrepancies of 10% or greater in the written dose have been addressed and documented within the EPIC Progress notes.    Tonia Michelle, PharmD,BCPS

## 2022-08-02 ENCOUNTER — OUTPATIENT INFUSION SERVICES (OUTPATIENT)
Dept: ONCOLOGY | Facility: MEDICAL CENTER | Age: 57
End: 2022-08-02
Attending: INTERNAL MEDICINE
Payer: MEDICARE

## 2022-08-02 VITALS
HEART RATE: 75 BPM | SYSTOLIC BLOOD PRESSURE: 132 MMHG | BODY MASS INDEX: 30.11 KG/M2 | WEIGHT: 203.26 LBS | HEIGHT: 69 IN | TEMPERATURE: 97 F | OXYGEN SATURATION: 96 % | RESPIRATION RATE: 18 BRPM | DIASTOLIC BLOOD PRESSURE: 84 MMHG

## 2022-08-02 DIAGNOSIS — C20 RECTAL CARCINOMA (HCC): ICD-10-CM

## 2022-08-02 LAB
ALBUMIN SERPL BCP-MCNC: 4.2 G/DL (ref 3.2–4.9)
ALBUMIN/GLOB SERPL: 1.6 G/DL
ALP SERPL-CCNC: 73 U/L (ref 30–99)
ALT SERPL-CCNC: 24 U/L (ref 2–50)
ANION GAP SERPL CALC-SCNC: 14 MMOL/L (ref 7–16)
APPEARANCE UR: CLEAR
AST SERPL-CCNC: 27 U/L (ref 12–45)
BASOPHILS # BLD AUTO: 1.1 % (ref 0–1.8)
BASOPHILS # BLD: 0.05 K/UL (ref 0–0.12)
BILIRUB SERPL-MCNC: 0.5 MG/DL (ref 0.1–1.5)
BUN SERPL-MCNC: 14 MG/DL (ref 8–22)
CALCIUM SERPL-MCNC: 8.6 MG/DL (ref 8.5–10.5)
CHLORIDE SERPL-SCNC: 103 MMOL/L (ref 96–112)
CO2 SERPL-SCNC: 23 MMOL/L (ref 20–33)
COLOR UR AUTO: YELLOW
CREAT SERPL-MCNC: 0.76 MG/DL (ref 0.5–1.4)
EOSINOPHIL # BLD AUTO: 0.23 K/UL (ref 0–0.51)
EOSINOPHIL NFR BLD: 4.8 % (ref 0–6.9)
ERYTHROCYTE [DISTWIDTH] IN BLOOD BY AUTOMATED COUNT: 55.6 FL (ref 35.9–50)
GFR SERPLBLD CREATININE-BSD FMLA CKD-EPI: 106 ML/MIN/1.73 M 2
GLOBULIN SER CALC-MCNC: 2.6 G/DL (ref 1.9–3.5)
GLUCOSE SERPL-MCNC: 90 MG/DL (ref 65–99)
GLUCOSE UR QL STRIP.AUTO: NEGATIVE MG/DL
HCT VFR BLD AUTO: 42.7 % (ref 42–52)
HGB BLD-MCNC: 13.5 G/DL (ref 14–18)
IMM GRANULOCYTES # BLD AUTO: 0.02 K/UL (ref 0–0.11)
IMM GRANULOCYTES NFR BLD AUTO: 0.4 % (ref 0–0.9)
KETONES UR QL STRIP.AUTO: NEGATIVE MG/DL
LEUKOCYTE ESTERASE UR QL STRIP.AUTO: NEGATIVE
LYMPHOCYTES # BLD AUTO: 0.71 K/UL (ref 1–4.8)
LYMPHOCYTES NFR BLD: 14.9 % (ref 22–41)
MCH RBC QN AUTO: 25.9 PG (ref 27–33)
MCHC RBC AUTO-ENTMCNC: 31.6 G/DL (ref 33.7–35.3)
MCV RBC AUTO: 81.8 FL (ref 81.4–97.8)
MONOCYTES # BLD AUTO: 0.65 K/UL (ref 0–0.85)
MONOCYTES NFR BLD AUTO: 13.7 % (ref 0–13.4)
NEUTROPHILS # BLD AUTO: 3.09 K/UL (ref 1.82–7.42)
NEUTROPHILS NFR BLD: 65.1 % (ref 44–72)
NITRITE UR QL STRIP.AUTO: NEGATIVE
NRBC # BLD AUTO: 0 K/UL
NRBC BLD-RTO: 0 /100 WBC
OUTPT INFUS CBC COMMENT OICOM: ABNORMAL
PH UR STRIP.AUTO: 6 [PH] (ref 5–8)
PLATELET # BLD AUTO: 298 K/UL (ref 164–446)
PMV BLD AUTO: 9.8 FL (ref 9–12.9)
POTASSIUM SERPL-SCNC: 3.7 MMOL/L (ref 3.6–5.5)
PROT SERPL-MCNC: 6.8 G/DL (ref 6–8.2)
PROT UR QL STRIP: NEGATIVE MG/DL
RBC # BLD AUTO: 5.22 M/UL (ref 4.7–6.1)
RBC UR QL AUTO: ABNORMAL
SODIUM SERPL-SCNC: 140 MMOL/L (ref 135–145)
SP GR UR STRIP.AUTO: >=1.03 (ref 1–1.03)
WBC # BLD AUTO: 4.8 K/UL (ref 4.8–10.8)

## 2022-08-02 PROCEDURE — 96417 CHEMO IV INFUS EACH ADDL SEQ: CPT

## 2022-08-02 PROCEDURE — 700105 HCHG RX REV CODE 258: Performed by: INTERNAL MEDICINE

## 2022-08-02 PROCEDURE — 96367 TX/PROPH/DG ADDL SEQ IV INF: CPT

## 2022-08-02 PROCEDURE — A4212 NON CORING NEEDLE OR STYLET: HCPCS

## 2022-08-02 PROCEDURE — 700102 HCHG RX REV CODE 250 W/ 637 OVERRIDE(OP): Performed by: INTERNAL MEDICINE

## 2022-08-02 PROCEDURE — 85025 COMPLETE CBC W/AUTO DIFF WBC: CPT

## 2022-08-02 PROCEDURE — 700105 HCHG RX REV CODE 258

## 2022-08-02 PROCEDURE — 96375 TX/PRO/DX INJ NEW DRUG ADDON: CPT

## 2022-08-02 PROCEDURE — 80053 COMPREHEN METABOLIC PANEL: CPT

## 2022-08-02 PROCEDURE — 700111 HCHG RX REV CODE 636 W/ 250 OVERRIDE (IP): Mod: TB | Performed by: INTERNAL MEDICINE

## 2022-08-02 PROCEDURE — G0498 CHEMO EXTEND IV INFUS W/PUMP: HCPCS

## 2022-08-02 PROCEDURE — 700111 HCHG RX REV CODE 636 W/ 250 OVERRIDE (IP): Mod: JW,TB

## 2022-08-02 PROCEDURE — 81002 URINALYSIS NONAUTO W/O SCOPE: CPT

## 2022-08-02 PROCEDURE — 96413 CHEMO IV INFUSION 1 HR: CPT

## 2022-08-02 PROCEDURE — 96415 CHEMO IV INFUSION ADDL HR: CPT

## 2022-08-02 RX ORDER — HEPARIN SODIUM (PORCINE) LOCK FLUSH IV SOLN 100 UNIT/ML 100 UNIT/ML
500 SOLUTION INTRAVENOUS PRN
Status: DISCONTINUED | OUTPATIENT
Start: 2022-08-02 | End: 2022-08-02 | Stop reason: HOSPADM

## 2022-08-02 RX ORDER — PROCHLORPERAZINE MALEATE 10 MG
10 TABLET ORAL EVERY 6 HOURS PRN
Status: DISCONTINUED | OUTPATIENT
Start: 2022-08-02 | End: 2022-08-02 | Stop reason: HOSPADM

## 2022-08-02 RX ADMIN — ATROPINE SULFATE 0.5 MG: 1 INJECTION, SOLUTION INTRAMUSCULAR; INTRAVENOUS; SUBCUTANEOUS at 10:00

## 2022-08-02 RX ADMIN — PROCHLORPERAZINE MALEATE 10 MG: 10 TABLET ORAL at 12:56

## 2022-08-02 RX ADMIN — ONDANSETRON HYDROCHLORIDE 16 MG: 2 INJECTION, SOLUTION INTRAMUSCULAR; INTRAVENOUS at 09:30

## 2022-08-02 RX ADMIN — SODIUM CHLORIDE 440 MG: 9 INJECTION, SOLUTION INTRAVENOUS at 12:04

## 2022-08-02 RX ADMIN — IRINOTECAN HYDROCHLORIDE 318 MG: 20 INJECTION, SOLUTION INTRAVENOUS at 09:57

## 2022-08-02 RX ADMIN — FLUOROURACIL 4240 MG: 50 INJECTION, SOLUTION INTRAVENOUS at 12:51

## 2022-08-02 RX ADMIN — DEXAMETHASONE SODIUM PHOSPHATE 12 MG: 4 INJECTION, SOLUTION INTRA-ARTICULAR; INTRALESIONAL; INTRAMUSCULAR; INTRAVENOUS; SOFT TISSUE at 09:16

## 2022-08-02 ASSESSMENT — FIBROSIS 4 INDEX: FIB4 SCORE: 0.99

## 2022-08-02 NOTE — PROGRESS NOTES
Chemotherapy Verification - SECONDARY RN   C10 D1      Height = 1.75 m  Weight = 92.2 kg  BSA = 2.12 m2       Medication: irinotecan  Dose: 150 mg/m2  Calculated Dose: 318 mg                             (In mg/m2, AUC, mg/kg)     Medication: bevacizumab-bvzr  Dose: 5 mg/kg  Calculated Dose: 461 mg                             (In mg/m2, AUC, mg/kg)    Medication: fluorouracil CADD pump  Dose: 2000 mg/m2  Calculated Dose: 4240 mg over 46 hours                             (In mg/m2, AUC, mg/kg)      I confirm that this process was performed independently.

## 2022-08-02 NOTE — PROGRESS NOTES
Chemotherapy Verification - PRIMARY RN    C10 D1    Height = 175cm  Weight = 92.2kg  BSA = 2.12m^2       Medication: irinotecan (Camptosar)  Dose: 150mg/m^2  Calculated Dose: 318mg                             (In mg/m2, AUC, mg/kg)     Medication: bevacizumab-bvzr (Zirabev)  Dose: 5mg/kg  Calculated Dose: 461mg                             (In mg/m2, AUC, mg/kg)    Medication: fluorouracil (Adrucil) CADD pump  Dose: 2,000mg/m^2  Calculated Dose: 4,240mg over 46hrs                            (In mg/m2, AUC, mg/kg)    I confirm this process was performed independently with the BSA and all final chemotherapy dosing calculations congruent.  Any discrepancies of 10% or greater have been addressed with the chemotherapy pharmacist. The resolution of the discrepancy has been documented in the EPIC progress notes.

## 2022-08-02 NOTE — PROGRESS NOTES
Pt presents to \[facility]\"" for Irinotecan/Zirabev/5FU CADD pump. R U chest port accessed using sterile technique; brisk blood return observed and pt tolerated well. Labs drawn and within treatable parameters. Pre meds administered and Irinotecan/Zirabev infused, pt had some nausea near the end of Zirabev, PRN PO compazine give; other than that no s/s of adverse reactions. 5FU CADD pump attached to Port, blood return observed prior to running pump. Next appointment confirmed and education provided. Pt discharged to self care with all personal belongings and in NAD.

## 2022-08-04 ENCOUNTER — OUTPATIENT INFUSION SERVICES (OUTPATIENT)
Dept: ONCOLOGY | Facility: MEDICAL CENTER | Age: 57
End: 2022-08-04
Attending: INTERNAL MEDICINE
Payer: MEDICARE

## 2022-08-04 VITALS
DIASTOLIC BLOOD PRESSURE: 96 MMHG | TEMPERATURE: 97.2 F | HEART RATE: 83 BPM | RESPIRATION RATE: 18 BRPM | SYSTOLIC BLOOD PRESSURE: 132 MMHG | OXYGEN SATURATION: 96 %

## 2022-08-04 DIAGNOSIS — C20 RECTAL CARCINOMA (HCC): ICD-10-CM

## 2022-08-04 PROCEDURE — 96523 IRRIG DRUG DELIVERY DEVICE: CPT

## 2022-08-04 PROCEDURE — 700111 HCHG RX REV CODE 636 W/ 250 OVERRIDE (IP): Performed by: INTERNAL MEDICINE

## 2022-08-04 RX ORDER — HEPARIN SODIUM (PORCINE) LOCK FLUSH IV SOLN 100 UNIT/ML 100 UNIT/ML
500 SOLUTION INTRAVENOUS PRN
Status: DISCONTINUED | OUTPATIENT
Start: 2022-08-04 | End: 2022-08-04 | Stop reason: HOSPADM

## 2022-08-04 RX ADMIN — HEPARIN 500 UNITS: 100 SYRINGE at 14:42

## 2022-08-04 NOTE — PROGRESS NOTES
Mr Eaton is here today for CADD pump removal. He has no new complaints or concerns to report. Infusion is complete. Pump was turned off. Medi port to right side of chest. Flushed well with good blood return. Placed heparin. Removed Joaquin needle intact.   Discharged in stable condition. Confirmed future appointments.

## 2022-08-15 RX ORDER — 0.9 % SODIUM CHLORIDE 0.9 %
VIAL (ML) INJECTION PRN
Status: CANCELLED | OUTPATIENT
Start: 2022-08-16

## 2022-08-15 RX ORDER — 0.9 % SODIUM CHLORIDE 0.9 %
3 VIAL (ML) INJECTION PRN
Status: CANCELLED | OUTPATIENT
Start: 2022-08-16

## 2022-08-15 RX ORDER — 0.9 % SODIUM CHLORIDE 0.9 %
10 VIAL (ML) INJECTION PRN
Status: CANCELLED | OUTPATIENT
Start: 2022-08-16

## 2022-08-15 RX ORDER — HEPARIN SODIUM (PORCINE) LOCK FLUSH IV SOLN 100 UNIT/ML 100 UNIT/ML
500 SOLUTION INTRAVENOUS PRN
Status: CANCELLED | OUTPATIENT
Start: 2022-08-16

## 2022-08-15 NOTE — PROGRESS NOTES
"Pharmacy Chemotherapy Verification Note:    DX:  Metastatic Rectal Adenocarcinoma    Protocol: FOLFiri + bevacizumab  Irinotecan  IV on day 1  *dose reduced to 150mg/m2 for tolerance/ heavily pretreated  *discontinued for tolerance/ heavily pretreated  5-FU iv over 46 hrs  *dose reduced to 2000mg/m2 for tolerance/ heavily pretreated  *bolus discontinued for tolerance/ heavily pretreated  bevacizumab 5mg/kg IV on day 1  *3/22/22- HOLD for cycle 1 per MD   q14 days for 8-12 cycles    NCCN Guidelines for Colon Cancer V.2.2018  Bradly POSEY Et al - Lancet Oncol. 2014 Sep;15(10):1065-75. Doi:  10.1016/-[id number]. Epub 2014 Jul 31.  Bulmaro CS, et al - J Clin Oncol. 2007 Oct 20;25(30):5883-28.    /86   Pulse 83   Temp 36.3 °C (97.4 °F) (Temporal)   Resp 18   Ht 1.75 m (5' 8.9\")   Wt 92.9 kg (204 lb 12.9 oz)   SpO2 97%   BMI 30.33 kg/m²     Body surface area is 2.13 meters squared.     Labs 8/15/22:  ANC~ 4510 Plt = 240k   Hgb = 13.1     SCr = 0.9 mg/dL CrCl ~ 122 mL/min   AST/ALT/AP = 18/19/79 TBili = 0.5   BP: 139/86  Urine protein = negative      Drug Order   (Drug name, dose, route, IV Fluid & volume, frequency, number of doses) Cycle 11  Previous treatment: C10 8/2/22     Medication = Irinotecan (Camptosar)  Base Dose= 150 mg/m²   Calc Dose: Base Dose x 2.13m² = 319.5 mg  Final Dose = 319.6 mg  Route = IV  Fluid & Volume = D5W 500 mL  Admin Duration = Over 90 minutes          <10% difference, okay to treat with final dose   Medication = Bevacizumab-bvzr (Zirabev)  Base Dose= 5 mg/kg   Calc Dose: Base Dose x 92.9 kg = 464.5 mg  Final Dose = 440 mg  Route = IV  Fluid & Volume =  mL  Admin Duration = Over 30 minutes          Unable to round to nearest vial size > 10%, okay to treat with final dose     Medication = Fluorouracil   Base Dose= 2000 mg/m²   Calc Dose:Base Dose x 2.13 m² =4260 mg  Final Dose = 4260 mg  Route = CIVI  Conc = 50 mg/mL  Fluid & Volume = 85.2 mL (+3 mL of overfill) = " 88.2 mL  Admin Duration = Over 46 hours @ 1.9 mL/hr   Via CADD pump for home infusion        <10% difference, okay to treat with final dose     By my signature below, I confirm this process was performed independently with the BSA and all final chemotherapy dosing calculations congruent. I have reviewed the above chemotherapy order and that my calculation of the final dose and BSA (when applicable) corroborate those calculations of the  pharmacist. Discrepancies of 10% or greater in the written dose have been addressed and documented within the EPIC Progress notes.    Kj Winston, PharmD

## 2022-08-16 ENCOUNTER — OUTPATIENT INFUSION SERVICES (OUTPATIENT)
Dept: ONCOLOGY | Facility: MEDICAL CENTER | Age: 57
End: 2022-08-16
Attending: INTERNAL MEDICINE
Payer: MEDICARE

## 2022-08-16 VITALS
HEIGHT: 69 IN | BODY MASS INDEX: 30.33 KG/M2 | TEMPERATURE: 97.4 F | DIASTOLIC BLOOD PRESSURE: 86 MMHG | SYSTOLIC BLOOD PRESSURE: 139 MMHG | WEIGHT: 204.81 LBS | HEART RATE: 83 BPM | RESPIRATION RATE: 18 BRPM | OXYGEN SATURATION: 97 %

## 2022-08-16 DIAGNOSIS — C20 RECTAL CARCINOMA (HCC): ICD-10-CM

## 2022-08-16 PROCEDURE — 700111 HCHG RX REV CODE 636 W/ 250 OVERRIDE (IP): Performed by: INTERNAL MEDICINE

## 2022-08-16 PROCEDURE — 700105 HCHG RX REV CODE 258: Performed by: INTERNAL MEDICINE

## 2022-08-16 PROCEDURE — A4212 NON CORING NEEDLE OR STYLET: HCPCS

## 2022-08-16 PROCEDURE — 96417 CHEMO IV INFUS EACH ADDL SEQ: CPT

## 2022-08-16 PROCEDURE — 96415 CHEMO IV INFUSION ADDL HR: CPT

## 2022-08-16 PROCEDURE — 81002 URINALYSIS NONAUTO W/O SCOPE: CPT

## 2022-08-16 PROCEDURE — 96411 CHEMO IV PUSH ADDL DRUG: CPT

## 2022-08-16 PROCEDURE — 96375 TX/PRO/DX INJ NEW DRUG ADDON: CPT

## 2022-08-16 PROCEDURE — G0498 CHEMO EXTEND IV INFUS W/PUMP: HCPCS

## 2022-08-16 PROCEDURE — 96413 CHEMO IV INFUSION 1 HR: CPT

## 2022-08-16 RX ORDER — 0.9 % SODIUM CHLORIDE 0.9 %
VIAL (ML) INJECTION PRN
Status: CANCELLED | OUTPATIENT
Start: 2022-08-17

## 2022-08-16 RX ORDER — EPINEPHRINE 1 MG/ML(1)
0.5 AMPUL (ML) INJECTION PRN
Status: CANCELLED | OUTPATIENT
Start: 2022-08-17

## 2022-08-16 RX ORDER — PROCHLORPERAZINE MALEATE 10 MG
10 TABLET ORAL EVERY 6 HOURS PRN
Status: CANCELLED | OUTPATIENT
Start: 2022-08-17

## 2022-08-16 RX ORDER — DEXTROSE MONOHYDRATE 50 MG/ML
INJECTION, SOLUTION INTRAVENOUS CONTINUOUS
Status: CANCELLED | OUTPATIENT
Start: 2022-08-17

## 2022-08-16 RX ORDER — DIPHENHYDRAMINE HYDROCHLORIDE 50 MG/ML
50 INJECTION INTRAMUSCULAR; INTRAVENOUS PRN
Status: CANCELLED | OUTPATIENT
Start: 2022-08-17

## 2022-08-16 RX ORDER — 0.9 % SODIUM CHLORIDE 0.9 %
3 VIAL (ML) INJECTION PRN
Status: CANCELLED | OUTPATIENT
Start: 2022-08-17

## 2022-08-16 RX ORDER — HEPARIN SODIUM (PORCINE) LOCK FLUSH IV SOLN 100 UNIT/ML 100 UNIT/ML
500 SOLUTION INTRAVENOUS PRN
Status: CANCELLED | OUTPATIENT
Start: 2022-08-17

## 2022-08-16 RX ORDER — ONDANSETRON 2 MG/ML
4 INJECTION INTRAMUSCULAR; INTRAVENOUS PRN
Status: CANCELLED | OUTPATIENT
Start: 2022-08-17

## 2022-08-16 RX ORDER — SODIUM CHLORIDE 9 MG/ML
INJECTION, SOLUTION INTRAVENOUS CONTINUOUS
Status: CANCELLED | OUTPATIENT
Start: 2022-08-17

## 2022-08-16 RX ORDER — 0.9 % SODIUM CHLORIDE 0.9 %
10 VIAL (ML) INJECTION PRN
Status: CANCELLED | OUTPATIENT
Start: 2022-08-17

## 2022-08-16 RX ORDER — ONDANSETRON 8 MG/1
8 TABLET, ORALLY DISINTEGRATING ORAL PRN
Status: CANCELLED | OUTPATIENT
Start: 2022-08-17

## 2022-08-16 RX ORDER — LOPERAMIDE HYDROCHLORIDE 2 MG/1
4 CAPSULE ORAL PRN
Status: CANCELLED | OUTPATIENT
Start: 2022-08-17

## 2022-08-16 RX ORDER — METHYLPREDNISOLONE SODIUM SUCCINATE 125 MG/2ML
125 INJECTION, POWDER, LYOPHILIZED, FOR SOLUTION INTRAMUSCULAR; INTRAVENOUS PRN
Status: CANCELLED | OUTPATIENT
Start: 2022-08-17

## 2022-08-16 RX ORDER — LOPERAMIDE HYDROCHLORIDE 2 MG/1
2 CAPSULE ORAL
Status: CANCELLED | OUTPATIENT
Start: 2022-08-17

## 2022-08-16 RX ADMIN — ATROPINE SULFATE 0.5 MG: 1 INJECTION, SOLUTION INTRAMUSCULAR; INTRAVENOUS; SUBCUTANEOUS at 09:17

## 2022-08-16 RX ADMIN — SODIUM CHLORIDE 440 MG: 9 INJECTION, SOLUTION INTRAVENOUS at 11:18

## 2022-08-16 RX ADMIN — DEXAMETHASONE SODIUM PHOSPHATE 12 MG: 4 INJECTION, SOLUTION INTRA-ARTICULAR; INTRALESIONAL; INTRAMUSCULAR; INTRAVENOUS; SOFT TISSUE at 08:30

## 2022-08-16 RX ADMIN — ONDANSETRON HYDROCHLORIDE 16 MG: 2 INJECTION, SOLUTION INTRAMUSCULAR; INTRAVENOUS at 08:45

## 2022-08-16 RX ADMIN — IRINOTECAN HYDROCHLORIDE 319.6 MG: 20 INJECTION, SOLUTION INTRAVENOUS at 09:20

## 2022-08-16 RX ADMIN — FLUOROURACIL 4260 MG: 50 INJECTION, SOLUTION INTRAVENOUS at 12:04

## 2022-08-16 ASSESSMENT — FIBROSIS 4 INDEX: FIB4 SCORE: 1.02

## 2022-08-16 NOTE — PROGRESS NOTES
Pt arrived ambulatory to [facility] for D1C11 Irinotecan/Zirabev/5FU pump. POC discussed with pt and he agrees with plan. Pt with call light in reach for safety. Pt verbalized understanding to call for assist/needs.    Port accessed in brisk fashion with low profile needle, brisk blood return noted. Labs reviewed from [facility] on 8/15/2022. POC urine completed, ok to proceed with treatment. Pt medicated per MAR with pre-meds. Irinotecan and Zirabev infused without difficulty. CADD pump connected, clamps open, pump in RUN mode. Pt discharged to self care, NAD. Pt's next appt confirmed 8/18/2022 for pump disconnect. Scheduling emailed for future FOLFIRI+bevacizumab appt. Pt to monitor My Chart for appt info.

## 2022-08-16 NOTE — PROGRESS NOTES
Chemotherapy Verification - PRIMARY RN    D1C11    Height = 175cm  Weight = 92.9kg  BSA = 2.13m^2       Medication: irinotecan (CAMPTOSAR)  Dose: 150mg/m^2  Calculated Dose: 319.5mg                                  Medication: bevacizumab-bvzr (Zirabev)  Dose: 5mg/kg  Calculated Dose: 464.5mg                               Medication: fluorouracil (ADRUCIL)  Dose: 2000mg/m^2  Calculated Dose: 4260mg via CADD pump over 46 hours                                   I confirm this process was performed independently with the BSA and all final chemotherapy dosing calculations congruent.  Any discrepancies of 10% or greater have been addressed with the chemotherapy pharmacist. The resolution of the discrepancy has been documented in the EPIC progress notes.

## 2022-08-16 NOTE — PROGRESS NOTES
Chemotherapy Verification - SECONDARY RN       Height = 175 cm  Weight = 92.9 kg  BSA = 2.13 m2       Medication: irinotecan  Dose: 150 mg/m2  Calculated Dose: 319.5 mg                             (In mg/m2, AUC, mg/kg)     Medication: Zirabev  Dose: 5 mg/kg  Calculated Dose: 464.5 mg                             (In mg/m2, AUC, mg/kg)    Medication: 5FU CADD pump  Dose: 2000 mg/m2  Calculated Dose: 4260 mg                             (In mg/m2, AUC, mg/kg)    I confirm that this process was performed independently.

## 2022-08-16 NOTE — PROGRESS NOTES
"Pharmacy Chemotherapy Verification    DX:  Metastatic Rectal Adenocarcinoma    Cycle 11  Previous treatment: C8 8/2/22    Protocol: FOLFiri + bevacizumab  Irinotecan  IV on day 1   -Dose reduced to 150 mg/m2 for tolerance/heavily pretreated   -Discontinued for tolerance/heavily pretreated  5-FU iv over 46 hrs   -Dose reduced to 2000 mg/m2 for tolerance/heavily pretreated   -Bolus discontinued for tolerance/heavily pretreated  Bevacizumab 5 mg/kg IV on day 1   -3/22/22- HOLD for cycle 1 per MD  q14 days for 8-12 cycles  NCCN Guidelines for Colon Cancer V.2.2018  Bradly POSEY Et al - Lancet Oncol. 2014 Sep;15(10):1065-75. Doi:  10.1016/-[id number]. Epub 2014 Jul 31.  Bulmaro MARRERO et al - J Clin Oncol. 2007 Oct 20;25(30):2862-02.    Allergies:Patient has no known allergies  /86   Pulse 83   Temp 36.3 °C (97.4 °F) (Temporal)   Resp 18   Ht 1.75 m (5' 8.9\")   Wt 92.9 kg (204 lb 12.9 oz)   SpO2 97%   BMI 30.33 kg/m²     Labs 8/15/22 ([facility])  ANC~4510 Hgb 13.1 Plt 240k  SCr 0.9 CrCl 121.8 mL/min   AST/ALT/AP = 18/19/79 Tbili 0.5    Irinotecan (Camptosar) 150 mg/m2 x 2.13 m2 = 319.5 mg    <10% difference, ok to treat with final dose = 319.6 mg IV    Bevacizumab-bvzr (Zirabev) 5 mg/kg x 92.9 kg = 466.5 mg   Unable to round to nearest vial size (> 10%) per rounding protocol   Okay to treat with final dose = 440 mg IV    Fluorouracil 2000 mg/m2 x 2.13 m2 = 4260 mg    <10% difference, ok to treat with final dose = 4260 mg CIVI over 46 hours   Via CADD pump for home infusion over 46 hours @ 1.9 mL/hr    Jessy Solorio, PharmD , BCOP        "

## 2022-08-17 RX ORDER — 0.9 % SODIUM CHLORIDE 0.9 %
20 VIAL (ML) INJECTION PRN
Status: CANCELLED | OUTPATIENT
Start: 2022-08-19

## 2022-08-17 RX ORDER — HEPARIN SODIUM (PORCINE) LOCK FLUSH IV SOLN 100 UNIT/ML 100 UNIT/ML
500 SOLUTION INTRAVENOUS PRN
Status: CANCELLED | OUTPATIENT
Start: 2022-08-19

## 2022-08-18 ENCOUNTER — OUTPATIENT INFUSION SERVICES (OUTPATIENT)
Dept: ONCOLOGY | Facility: MEDICAL CENTER | Age: 57
End: 2022-08-18
Attending: INTERNAL MEDICINE
Payer: MEDICARE

## 2022-08-18 VITALS
OXYGEN SATURATION: 97 % | RESPIRATION RATE: 18 BRPM | TEMPERATURE: 97.8 F | HEART RATE: 86 BPM | SYSTOLIC BLOOD PRESSURE: 130 MMHG | DIASTOLIC BLOOD PRESSURE: 87 MMHG

## 2022-08-18 DIAGNOSIS — C20 RECTAL CARCINOMA (HCC): ICD-10-CM

## 2022-08-18 PROCEDURE — 700111 HCHG RX REV CODE 636 W/ 250 OVERRIDE (IP)

## 2022-08-18 PROCEDURE — 96523 IRRIG DRUG DELIVERY DEVICE: CPT

## 2022-08-18 RX ORDER — HEPARIN SODIUM (PORCINE) LOCK FLUSH IV SOLN 100 UNIT/ML 100 UNIT/ML
500 SOLUTION INTRAVENOUS PRN
Status: DISCONTINUED | OUTPATIENT
Start: 2022-08-18 | End: 2022-08-18 | Stop reason: HOSPADM

## 2022-08-18 RX ORDER — HEPARIN SODIUM (PORCINE) LOCK FLUSH IV SOLN 100 UNIT/ML 100 UNIT/ML
SOLUTION INTRAVENOUS
Status: COMPLETED
Start: 2022-08-18 | End: 2022-08-18

## 2022-08-18 RX ADMIN — HEPARIN SODIUM (PORCINE) LOCK FLUSH IV SOLN 100 UNIT/ML 500 UNITS: 100 SOLUTION at 11:53

## 2022-08-18 RX ADMIN — HEPARIN 500 UNITS: 100 SYRINGE at 11:53

## 2022-08-18 NOTE — PROGRESS NOTES
Pt arrived ambulatory to [facility] for CADD pump de-access. Pump volume reads zero. Pump disconnected. Port with brisk blood return, flushed with NS the heparin. Port de-accessed, gauze dressing applied. Pt discharged to self care, [facility]. Pt's next appt confirmed 8/30/2022.

## 2022-08-29 RX ORDER — HEPARIN SODIUM (PORCINE) LOCK FLUSH IV SOLN 100 UNIT/ML 100 UNIT/ML
500 SOLUTION INTRAVENOUS PRN
Status: CANCELLED | OUTPATIENT
Start: 2022-09-13

## 2022-08-29 RX ORDER — 0.9 % SODIUM CHLORIDE 0.9 %
20 VIAL (ML) INJECTION PRN
Status: CANCELLED | OUTPATIENT
Start: 2022-09-15

## 2022-08-29 RX ORDER — DIPHENHYDRAMINE HYDROCHLORIDE 50 MG/ML
50 INJECTION INTRAMUSCULAR; INTRAVENOUS PRN
Status: CANCELLED | OUTPATIENT
Start: 2022-08-31

## 2022-08-29 RX ORDER — HEPARIN SODIUM (PORCINE) LOCK FLUSH IV SOLN 100 UNIT/ML 100 UNIT/ML
500 SOLUTION INTRAVENOUS PRN
Status: CANCELLED | OUTPATIENT
Start: 2022-08-31

## 2022-08-29 RX ORDER — SODIUM CHLORIDE 9 MG/ML
INJECTION, SOLUTION INTRAVENOUS CONTINUOUS
Status: CANCELLED | OUTPATIENT
Start: 2022-09-14

## 2022-08-29 RX ORDER — HEPARIN SODIUM (PORCINE) LOCK FLUSH IV SOLN 100 UNIT/ML 100 UNIT/ML
500 SOLUTION INTRAVENOUS PRN
Status: CANCELLED | OUTPATIENT
Start: 2022-09-14

## 2022-08-29 RX ORDER — SODIUM CHLORIDE 9 MG/ML
INJECTION, SOLUTION INTRAVENOUS CONTINUOUS
Status: CANCELLED | OUTPATIENT
Start: 2022-08-31

## 2022-08-29 RX ORDER — METHYLPREDNISOLONE SODIUM SUCCINATE 125 MG/2ML
125 INJECTION, POWDER, LYOPHILIZED, FOR SOLUTION INTRAMUSCULAR; INTRAVENOUS PRN
Status: CANCELLED | OUTPATIENT
Start: 2022-08-31

## 2022-08-29 RX ORDER — 0.9 % SODIUM CHLORIDE 0.9 %
VIAL (ML) INJECTION PRN
Status: CANCELLED | OUTPATIENT
Start: 2022-09-13

## 2022-08-29 RX ORDER — 0.9 % SODIUM CHLORIDE 0.9 %
VIAL (ML) INJECTION PRN
Status: CANCELLED | OUTPATIENT
Start: 2022-08-31

## 2022-08-29 RX ORDER — 0.9 % SODIUM CHLORIDE 0.9 %
VIAL (ML) INJECTION PRN
Status: CANCELLED | OUTPATIENT
Start: 2022-09-14

## 2022-08-29 RX ORDER — PROCHLORPERAZINE MALEATE 10 MG
10 TABLET ORAL EVERY 6 HOURS PRN
Status: CANCELLED | OUTPATIENT
Start: 2022-09-14

## 2022-08-29 RX ORDER — DEXTROSE MONOHYDRATE 50 MG/ML
INJECTION, SOLUTION INTRAVENOUS CONTINUOUS
Status: CANCELLED | OUTPATIENT
Start: 2022-08-31

## 2022-08-29 RX ORDER — ONDANSETRON 2 MG/ML
4 INJECTION INTRAMUSCULAR; INTRAVENOUS PRN
Status: CANCELLED | OUTPATIENT
Start: 2022-08-31

## 2022-08-29 RX ORDER — ONDANSETRON 2 MG/ML
4 INJECTION INTRAMUSCULAR; INTRAVENOUS PRN
Status: CANCELLED | OUTPATIENT
Start: 2022-09-14

## 2022-08-29 RX ORDER — LOPERAMIDE HYDROCHLORIDE 2 MG/1
4 CAPSULE ORAL PRN
Status: CANCELLED | OUTPATIENT
Start: 2022-08-31

## 2022-08-29 RX ORDER — 0.9 % SODIUM CHLORIDE 0.9 %
10 VIAL (ML) INJECTION PRN
Status: CANCELLED | OUTPATIENT
Start: 2022-08-31

## 2022-08-29 RX ORDER — HEPARIN SODIUM (PORCINE) LOCK FLUSH IV SOLN 100 UNIT/ML 100 UNIT/ML
500 SOLUTION INTRAVENOUS PRN
Status: CANCELLED | OUTPATIENT
Start: 2022-08-30

## 2022-08-29 RX ORDER — EPINEPHRINE 1 MG/ML(1)
0.5 AMPUL (ML) INJECTION PRN
Status: CANCELLED | OUTPATIENT
Start: 2022-09-14

## 2022-08-29 RX ORDER — 0.9 % SODIUM CHLORIDE 0.9 %
VIAL (ML) INJECTION PRN
Status: CANCELLED | OUTPATIENT
Start: 2022-08-30

## 2022-08-29 RX ORDER — DEXTROSE MONOHYDRATE 50 MG/ML
INJECTION, SOLUTION INTRAVENOUS CONTINUOUS
Status: CANCELLED | OUTPATIENT
Start: 2022-09-14

## 2022-08-29 RX ORDER — PROCHLORPERAZINE MALEATE 10 MG
10 TABLET ORAL EVERY 6 HOURS PRN
Status: CANCELLED | OUTPATIENT
Start: 2022-08-31

## 2022-08-29 RX ORDER — 0.9 % SODIUM CHLORIDE 0.9 %
3 VIAL (ML) INJECTION PRN
Status: CANCELLED | OUTPATIENT
Start: 2022-09-13

## 2022-08-29 RX ORDER — 0.9 % SODIUM CHLORIDE 0.9 %
10 VIAL (ML) INJECTION PRN
Status: CANCELLED | OUTPATIENT
Start: 2022-08-30

## 2022-08-29 RX ORDER — 0.9 % SODIUM CHLORIDE 0.9 %
10 VIAL (ML) INJECTION PRN
Status: CANCELLED | OUTPATIENT
Start: 2022-09-14

## 2022-08-29 RX ORDER — 0.9 % SODIUM CHLORIDE 0.9 %
3 VIAL (ML) INJECTION PRN
Status: CANCELLED | OUTPATIENT
Start: 2022-08-31

## 2022-08-29 RX ORDER — DIPHENHYDRAMINE HYDROCHLORIDE 50 MG/ML
50 INJECTION INTRAMUSCULAR; INTRAVENOUS PRN
Status: CANCELLED | OUTPATIENT
Start: 2022-09-14

## 2022-08-29 RX ORDER — 0.9 % SODIUM CHLORIDE 0.9 %
10 VIAL (ML) INJECTION PRN
Status: CANCELLED | OUTPATIENT
Start: 2022-09-13

## 2022-08-29 RX ORDER — HEPARIN SODIUM (PORCINE) LOCK FLUSH IV SOLN 100 UNIT/ML 100 UNIT/ML
500 SOLUTION INTRAVENOUS PRN
Status: CANCELLED | OUTPATIENT
Start: 2022-09-15

## 2022-08-29 RX ORDER — ONDANSETRON 8 MG/1
8 TABLET, ORALLY DISINTEGRATING ORAL PRN
Status: CANCELLED | OUTPATIENT
Start: 2022-09-14

## 2022-08-29 RX ORDER — LOPERAMIDE HYDROCHLORIDE 2 MG/1
2 CAPSULE ORAL
Status: CANCELLED | OUTPATIENT
Start: 2022-09-14

## 2022-08-29 RX ORDER — LOPERAMIDE HYDROCHLORIDE 2 MG/1
4 CAPSULE ORAL PRN
Status: CANCELLED | OUTPATIENT
Start: 2022-09-14

## 2022-08-29 RX ORDER — METHYLPREDNISOLONE SODIUM SUCCINATE 125 MG/2ML
125 INJECTION, POWDER, LYOPHILIZED, FOR SOLUTION INTRAMUSCULAR; INTRAVENOUS PRN
Status: CANCELLED | OUTPATIENT
Start: 2022-09-14

## 2022-08-29 RX ORDER — HEPARIN SODIUM (PORCINE) LOCK FLUSH IV SOLN 100 UNIT/ML 100 UNIT/ML
500 SOLUTION INTRAVENOUS PRN
Status: CANCELLED | OUTPATIENT
Start: 2022-09-02

## 2022-08-29 RX ORDER — 0.9 % SODIUM CHLORIDE 0.9 %
3 VIAL (ML) INJECTION PRN
Status: CANCELLED | OUTPATIENT
Start: 2022-09-14

## 2022-08-29 RX ORDER — 0.9 % SODIUM CHLORIDE 0.9 %
20 VIAL (ML) INJECTION PRN
Status: CANCELLED | OUTPATIENT
Start: 2022-09-02

## 2022-08-29 RX ORDER — ONDANSETRON 8 MG/1
8 TABLET, ORALLY DISINTEGRATING ORAL PRN
Status: CANCELLED | OUTPATIENT
Start: 2022-08-31

## 2022-08-29 RX ORDER — 0.9 % SODIUM CHLORIDE 0.9 %
3 VIAL (ML) INJECTION PRN
Status: CANCELLED | OUTPATIENT
Start: 2022-08-30

## 2022-08-29 RX ORDER — EPINEPHRINE 1 MG/ML(1)
0.5 AMPUL (ML) INJECTION PRN
Status: CANCELLED | OUTPATIENT
Start: 2022-08-31

## 2022-08-29 RX ORDER — LOPERAMIDE HYDROCHLORIDE 2 MG/1
2 CAPSULE ORAL
Status: CANCELLED | OUTPATIENT
Start: 2022-08-31

## 2022-08-30 ENCOUNTER — OUTPATIENT INFUSION SERVICES (OUTPATIENT)
Dept: ONCOLOGY | Facility: MEDICAL CENTER | Age: 57
End: 2022-08-30
Attending: INTERNAL MEDICINE
Payer: MEDICARE

## 2022-08-30 VITALS
HEIGHT: 69 IN | SYSTOLIC BLOOD PRESSURE: 144 MMHG | DIASTOLIC BLOOD PRESSURE: 88 MMHG | OXYGEN SATURATION: 98 % | BODY MASS INDEX: 30.33 KG/M2 | RESPIRATION RATE: 18 BRPM | WEIGHT: 204.81 LBS | TEMPERATURE: 96.8 F

## 2022-08-30 DIAGNOSIS — C20 RECTAL CARCINOMA (HCC): ICD-10-CM

## 2022-08-30 PROCEDURE — 700111 HCHG RX REV CODE 636 W/ 250 OVERRIDE (IP): Performed by: INTERNAL MEDICINE

## 2022-08-30 PROCEDURE — 96411 CHEMO IV PUSH ADDL DRUG: CPT

## 2022-08-30 PROCEDURE — 96413 CHEMO IV INFUSION 1 HR: CPT

## 2022-08-30 PROCEDURE — 96375 TX/PRO/DX INJ NEW DRUG ADDON: CPT

## 2022-08-30 PROCEDURE — A4212 NON CORING NEEDLE OR STYLET: HCPCS

## 2022-08-30 PROCEDURE — 81002 URINALYSIS NONAUTO W/O SCOPE: CPT

## 2022-08-30 PROCEDURE — G0498 CHEMO EXTEND IV INFUS W/PUMP: HCPCS

## 2022-08-30 PROCEDURE — 96415 CHEMO IV INFUSION ADDL HR: CPT

## 2022-08-30 PROCEDURE — 96417 CHEMO IV INFUS EACH ADDL SEQ: CPT

## 2022-08-30 PROCEDURE — 700105 HCHG RX REV CODE 258: Performed by: INTERNAL MEDICINE

## 2022-08-30 RX ADMIN — IRINOTECAN HYDROCHLORIDE 318 MG: 20 INJECTION, SOLUTION INTRAVENOUS at 09:02

## 2022-08-30 RX ADMIN — FLUOROURACIL 4240 MG: 50 INJECTION, SOLUTION INTRAVENOUS at 11:48

## 2022-08-30 RX ADMIN — ONDANSETRON HYDROCHLORIDE 16 MG: 2 INJECTION, SOLUTION INTRAMUSCULAR; INTRAVENOUS at 08:35

## 2022-08-30 RX ADMIN — ATROPINE SULFATE 0.5 MG: 1 INJECTION, SOLUTION INTRAMUSCULAR; INTRAVENOUS; SUBCUTANEOUS at 09:01

## 2022-08-30 RX ADMIN — DEXAMETHASONE SODIUM PHOSPHATE 12 MG: 4 INJECTION, SOLUTION INTRA-ARTICULAR; INTRALESIONAL; INTRAMUSCULAR; INTRAVENOUS; SOFT TISSUE at 08:20

## 2022-08-30 RX ADMIN — SODIUM CHLORIDE 500 MG: 9 INJECTION, SOLUTION INTRAVENOUS at 11:10

## 2022-08-30 ASSESSMENT — FIBROSIS 4 INDEX: FIB4 SCORE: 1.02

## 2022-08-30 NOTE — PROGRESS NOTES
Pt arrived ambulatory to [facility] for D1C12 Folfiri+Zirabev. POC discussed with pt and he agrees with plan. Pt with call light in reach for safety. Pt verbalized understanding to call for needs/assist.    Port accessed in sterile fashion, brisk blood return noted. POC urine completed. Labs reviewed from 8/29/2022, ok to proceed with treatment. Pt medicated per MAR. Pt tolerated treatment without s/s adverse reaction. CADD pump connected, clamps open, connections secure, pump in RUN mode. Pt discharged to self care, [facility]. Pt's next appt confirmed 9/1/2022 for CADD pump disconnect.

## 2022-08-30 NOTE — PROGRESS NOTES
"Pharmacy Chemotherapy Verification    DX:  Metastatic Rectal Adenocarcinoma    Cycle 12  Previous treatment: C11 8/16/22    Protocol: FOLFiri + bevacizumab  Irinotecan  IV on day 1   -Dose reduced to 150 mg/m2 for tolerance/heavily pretreated   -Discontinued for tolerance/heavily pretreated  5-FU iv over 46 hrs   -Dose reduced to 2000 mg/m2 for tolerance/heavily pretreated   -Bolus discontinued for tolerance/heavily pretreated  Bevacizumab 5 mg/kg IV on day 1   -3/22/22- HOLD for cycle 1 per MD  q14 days for 8-12 cycles  NCCN Guidelines for Colon Cancer V.2.2018  Bradly POSEY Et al - Lancet Oncol. 2014 Sep;15(10):1065-75. Doi:  10.1016/-[id number]. Epub 2014 Jul 31.  Bulmaro MARRERO et al - J Clin Oncol. 2007 Oct 20;25(30):7292-18.    Allergies:Patient has no known allergies  BP (!) 144/88   Temp 36 °C (96.8 °F) (Temporal)   Resp 18   Ht 1.745 m (5' 8.7\")   Wt 92.9 kg (204 lb 12.9 oz)   SpO2 98%   BMI 30.51 kg/m²  Body surface area is 2.12 meters squared.    Labs 8/30/22 ([facility])  ANC~4100 Hgb 13.6 Plt 260k  SCr 0.8 CrCl >125 mL/min   AST/ALT/AP = 19/19/72 Tbili 0.5  /88       Urine Protein: negative     Irinotecan (Camptosar) 150 mg/m2 x 2.12 m2 = 318 mg    <10% difference, ok to treat with final dose = 318 mg IV    Bevacizumab-bvzr (Zirabev) 5 mg/kg x 92.9kg = 464.5 mg   Unable to round to nearest vial size (> 10%) per rounding protocol   Okay to treat with final dose = 500 mg IV    Fluorouracil 2000 mg/m2 x 2.12 m2 = 4240 mg    <10% difference, ok to treat with final dose = 4240 mg CIVI over 46 hours   Via CADD pump for home infusion over 46 hours @ 1.8 mL/hr    Kj Winston, PharmD         "

## 2022-08-30 NOTE — PROGRESS NOTES
Chemotherapy Verification - SECONDARY RN       Height = 174.5 cm  Weight = 92.9 kg  BSA = 2.12 m2       Medication: irinotecan  Dose: 150 mg/m2  Calculated Dose: 318 mg                             (In mg/m2, AUC, mg/kg)     Medication: bevacizumab-bvzr  Dose: 5 mg/kg  Calculated Dose: 464.5 mg                             (In mg/m2, AUC, mg/kg)    Medication: 5FU CADD pump  Dose: 2000 mg/m2  Calculated Dose: 4240 mg                             (In mg/m2, AUC, mg/kg)    I confirm that this process was performed independently.

## 2022-08-30 NOTE — PROGRESS NOTES
"Pharmacy Chemotherapy Verification Note:    DX:  Metastatic Rectal Adenocarcinoma    Protocol: FOLFiri + bevacizumab  Irinotecan  IV on day 1  *dose reduced to 150mg/m2 for tolerance/ heavily pretreated  *discontinued for tolerance/ heavily pretreated  5-FU iv over 46 hrs  *dose reduced to 2000mg/m2 for tolerance/ heavily pretreated  *bolus discontinued for tolerance/ heavily pretreated  bevacizumab 5mg/kg IV on day 1  *3/22/22- HOLD for cycle 1 per MD   q14 days for 8-12 cycles    NCCN Guidelines for Colon Cancer V.2.2018  Bradly V Et al - Lancet Oncol. 2014 Sep;15(10):1065-75. Doi:  10.1016/-[id number]. Epub 2014 Jul 31.  Bulmaro CS, et al - J Clin Oncol. 2007 Oct 20;25(30):0817-75.    BP (!) 144/88   Temp 36 °C (96.8 °F) (Temporal)   Resp 18   Ht 1.745 m (5' 8.7\")   Wt 92.9 kg (204 lb 12.9 oz)   SpO2 98%   BMI 30.51 kg/m²     Body surface area is 2.12 meters squared.     Labs 8/29/22:  ANC~ 4100 Plt = 260k   Hgb = 13.6     SCr = 0.8mg/dL CrCl ~ 137.02mL/min   AST/ALT/AP = 19/19/72 TBili = 0.5   BP: 144/88  Urine protein = negative      Drug Order   (Drug name, dose, route, IV Fluid & volume, frequency, number of doses) Cycle 12  Previous treatment: C11 8/16/22     Medication = Irinotecan (Camptosar)  Base Dose= 150 mg/m²   Calc Dose: Base Dose x 2.12m² = 318mg  Final Dose = 318 mg  Route = IV  Fluid & Volume = D5W 500 mL  Admin Duration = Over 90 minutes          <10% difference, okay to treat with final dose   Medication = Bevacizumab-bvzr (Zirabev)  Base Dose= 5 mg/kg   Calc Dose: Base Dose x 92.9 kg = 464.5 mg  Final Dose = 500 mg  Route = IV  Fluid & Volume =  mL  Admin Duration = Over 30 minutes          Rounded to nearest vial size (within 10%) per rounding protocol, okay to treat with final dose       Medication = Fluorouracil   Base Dose= 2000 mg/m²   Calc Dose:Base Dose x 2.12 m² =4240 mg  Final Dose = 4240 mg  Route = CIVI  Conc = 50 mg/mL  Fluid & Volume = 84.8mL (+3 mL of " overfill) = 87.8 mL  Admin Duration = Over 46 hours @ 1.8 mL/hr   Via CADD pump for home infusion        <10% difference, okay to treat with final dose     By my signature below, I confirm this process was performed independently with the BSA and all final chemotherapy dosing calculations congruent. I have reviewed the above chemotherapy order and that my calculation of the final dose and BSA (when applicable) corroborate those calculations of the  pharmacist. Discrepancies of 10% or greater in the written dose have been addressed and documented within the EPIC Progress notes.    Tonia Michelle, PharmD, BCPS

## 2022-08-30 NOTE — PROGRESS NOTES
Chemotherapy Verification - PRIMARY RN    D1C12    Height = 174.5cm  Weight = 92.9kg  BSA = 2.12m^2       Medication: irinotecan (CAMPTOSAR)  Dose: 150mg/m^2  Calculated Dose: 318mg                                 Medication: bevacizumab-bvzr (Zirabev)  Dose: 5mg/kg  Calculated Dose: 464.5mg                                 Medication: fluorouracil (ADRUCIL)  Dose: 2000mg/m^2  Calculated Dose: 4240mg via CADD pump over 46 hours                                I confirm this process was performed independently with the BSA and all final chemotherapy dosing calculations congruent.  Any discrepancies of 10% or greater have been addressed with the chemotherapy pharmacist. The resolution of the discrepancy has been documented in the EPIC progress notes.

## 2022-09-01 ENCOUNTER — OUTPATIENT INFUSION SERVICES (OUTPATIENT)
Dept: ONCOLOGY | Facility: MEDICAL CENTER | Age: 57
End: 2022-09-01
Attending: INTERNAL MEDICINE
Payer: MEDICARE

## 2022-09-01 VITALS
RESPIRATION RATE: 18 BRPM | OXYGEN SATURATION: 98 % | DIASTOLIC BLOOD PRESSURE: 83 MMHG | TEMPERATURE: 97.7 F | SYSTOLIC BLOOD PRESSURE: 137 MMHG | HEART RATE: 70 BPM

## 2022-09-01 DIAGNOSIS — C20 RECTAL CARCINOMA (HCC): ICD-10-CM

## 2022-09-01 PROCEDURE — 700111 HCHG RX REV CODE 636 W/ 250 OVERRIDE (IP)

## 2022-09-01 PROCEDURE — 96523 IRRIG DRUG DELIVERY DEVICE: CPT

## 2022-09-01 RX ORDER — HEPARIN SODIUM (PORCINE) LOCK FLUSH IV SOLN 100 UNIT/ML 100 UNIT/ML
SOLUTION INTRAVENOUS
Status: COMPLETED
Start: 2022-09-01 | End: 2022-09-01

## 2022-09-01 RX ADMIN — HEPARIN 500 UNITS: 100 SYRINGE at 12:50

## 2022-09-02 NOTE — PROGRESS NOTES
Pt presented to infusion center for 5FU pump de-access. Pump had 0 ml in reservoir and total ordered amount delivered. Pump disconnected, cleaned and returned to pharmacy. Port flushed per protocol, brisk blood return observed, heparinized and de-accessed with needle intact, gauze dressing placed. Pt has next appt, left on foot in good spirits.

## 2022-09-13 ENCOUNTER — OUTPATIENT INFUSION SERVICES (OUTPATIENT)
Dept: ONCOLOGY | Facility: MEDICAL CENTER | Age: 57
End: 2022-09-13
Attending: INTERNAL MEDICINE
Payer: MEDICARE

## 2022-09-13 VITALS
RESPIRATION RATE: 18 BRPM | SYSTOLIC BLOOD PRESSURE: 143 MMHG | DIASTOLIC BLOOD PRESSURE: 89 MMHG | HEIGHT: 70 IN | WEIGHT: 205.91 LBS | HEART RATE: 74 BPM | BODY MASS INDEX: 29.48 KG/M2 | TEMPERATURE: 96.6 F

## 2022-09-13 DIAGNOSIS — C20 RECTAL CARCINOMA (HCC): ICD-10-CM

## 2022-09-13 PROCEDURE — 700111 HCHG RX REV CODE 636 W/ 250 OVERRIDE (IP): Performed by: INTERNAL MEDICINE

## 2022-09-13 PROCEDURE — 96415 CHEMO IV INFUSION ADDL HR: CPT

## 2022-09-13 PROCEDURE — G0498 CHEMO EXTEND IV INFUS W/PUMP: HCPCS

## 2022-09-13 PROCEDURE — 700105 HCHG RX REV CODE 258: Performed by: INTERNAL MEDICINE

## 2022-09-13 PROCEDURE — 96413 CHEMO IV INFUSION 1 HR: CPT

## 2022-09-13 PROCEDURE — 96375 TX/PRO/DX INJ NEW DRUG ADDON: CPT

## 2022-09-13 PROCEDURE — 81002 URINALYSIS NONAUTO W/O SCOPE: CPT

## 2022-09-13 PROCEDURE — 96417 CHEMO IV INFUS EACH ADDL SEQ: CPT

## 2022-09-13 PROCEDURE — A4212 NON CORING NEEDLE OR STYLET: HCPCS

## 2022-09-13 RX ORDER — DEXTROSE MONOHYDRATE 50 MG/ML
INJECTION, SOLUTION INTRAVENOUS CONTINUOUS
Status: DISCONTINUED | OUTPATIENT
Start: 2022-09-13 | End: 2022-09-13 | Stop reason: HOSPADM

## 2022-09-13 RX ORDER — SODIUM CHLORIDE 9 MG/ML
INJECTION, SOLUTION INTRAVENOUS CONTINUOUS
Status: DISCONTINUED | OUTPATIENT
Start: 2022-09-13 | End: 2022-09-13 | Stop reason: HOSPADM

## 2022-09-13 RX ADMIN — FLUOROURACIL 4300 MG: 50 INJECTION, SOLUTION INTRAVENOUS at 12:01

## 2022-09-13 RX ADMIN — SODIUM CHLORIDE 500 MG: 9 INJECTION, SOLUTION INTRAVENOUS at 11:18

## 2022-09-13 RX ADMIN — ATROPINE SULFATE 0.5 MG: 1 INJECTION, SOLUTION INTRAMUSCULAR; INTRAVENOUS; SUBCUTANEOUS at 09:20

## 2022-09-13 RX ADMIN — ONDANSETRON HYDROCHLORIDE 16 MG: 2 INJECTION, SOLUTION INTRAMUSCULAR; INTRAVENOUS at 08:30

## 2022-09-13 RX ADMIN — IRINOTECAN HYDROCHLORIDE 322.6 MG: 20 INJECTION, SOLUTION INTRAVENOUS at 09:24

## 2022-09-13 RX ADMIN — DEXTROSE MONOHYDRATE: 50 INJECTION, SOLUTION INTRAVENOUS at 09:24

## 2022-09-13 RX ADMIN — DEXAMETHASONE SODIUM PHOSPHATE 12 MG: 4 INJECTION, SOLUTION INTRA-ARTICULAR; INTRALESIONAL; INTRAMUSCULAR; INTRAVENOUS; SOFT TISSUE at 08:15

## 2022-09-13 RX ADMIN — SODIUM CHLORIDE: 9 INJECTION, SOLUTION INTRAVENOUS at 11:18

## 2022-09-13 ASSESSMENT — PAIN DESCRIPTION - PAIN TYPE: TYPE: CHRONIC PAIN

## 2022-09-13 ASSESSMENT — FIBROSIS 4 INDEX: FIB4 SCORE: 1.02

## 2022-09-13 NOTE — PROGRESS NOTES
Chemotherapy Verification - SECONDARY RN       Height = 178 cm  Weight = 93.4 kg  BSA = 2.15 m2       Medication: irinotecan  Dose: 150 mg/m2  Calculated Dose: 322.5 mg                             (In mg/m2, AUC, mg/kg)     Medication: Zirabev  Dose: 5 mg/kg  Calculated Dose: 467 mg                             (In mg/m2, AUC, mg/kg)    Medication: 5FU CADD pump  Dose: 2000 mg/m2  Calculated Dose: 4300 mg                             (In mg/m2, AUC, mg/kg)    I confirm that this process was performed independently.

## 2022-09-13 NOTE — PROGRESS NOTES
Dre presents today to [facility]  D1C13 Folfiri/ Zirabev.  Dre denies s/s active infections.  UA completed.  Labs drawn 09/12/2022 reviewed, results within parameters to receive treatment.      Right upper chest port accessed in sterile fashion, + blood return observed.  Pre-medications administered.  Atropine given prior to irinotecan.  Irintotecan administered per MAR.  Zirabev adminsitered per MAR with NS.  Port flushed with NS, + blood return observed.  5FU CADD pump connected, all connections secure, all clamps unclamped.  Pump in RUN mode.  Dre discharged in NAD, returns in 46 hours for port de-access.   a

## 2022-09-13 NOTE — PROGRESS NOTES
Chemotherapy Verification - PRIMARY RN      Height = 178 cm  Weight = 93.4 kg  BSA = 2.15 m2       Medication: irintecan Dose: 150 mg/m2    Calculated Dose:   322.5 mg                                  Medication: bevacizumab-bvzr  Dose: 5 mg/kg  Calculated Dose: 467 mg                             (In mg/m2, AUC, mg/kg)    Medication: fluorouracil  Dose: 2000 mg/m2  Calculated Dose: 4300 mg                             (In mg/m2, AUC, mg/kg)        I confirm this process was performed independently with the BSA and all final chemotherapy dosing calculations congruent.  Any discrepancies of 10% or greater have been addressed with the chemotherapy pharmacist. The resolution of the discrepancy has been documented in the EPIC progress notes.

## 2022-09-13 NOTE — PROGRESS NOTES
"Pharmacy Chemotherapy Verification Note:    DX:  Metastatic Rectal Adenocarcinoma    Protocol: FOLFiri + bevacizumab  Irinotecan  IV on day 1  *dose reduced to 150mg/m2 for tolerance/ heavily pretreated  *discontinued for tolerance/ heavily pretreated  5-FU iv over 46 hrs  *dose reduced to 2000mg/m2 for tolerance/ heavily pretreated  *bolus discontinued for tolerance/ heavily pretreated  bevacizumab 5mg/kg IV on day 1  *3/22/22- HOLD for cycle 1 per MD   q14 days for 8-12 cycles    NCCN Guidelines for Colon Cancer V.2.2018  Bradly V Et al - Lancet Oncol. 2014 Sep;15(10):1065-75. Doi:  10.1016/-[id number]. Epub 2014 Jul 31.  Bulmaro CS, et al - J Clin Oncol. 2007 Oct 20;25(30):8190-67.    BP (!) 143/89   Pulse 74   Temp 35.9 °C (96.6 °F) (Temporal)   Resp 18   Ht 1.78 m (5' 10.08\")   Wt 93.4 kg (205 lb 14.6 oz)   BMI 29.48 kg/m²     Body surface area is 2.15 meters squared.     Labs 9/12/22 ([facility]):  ANC~ 4300 Plt = 246k   Hgb = 13.7     SCr = 0.8 mg/dL CrCl > 125 mL/min   AST/ALT/AP = 21/22/82 TBili = 0.5  Mag = 1.4  K+ = 3.9  BP = 143/89    Urine protein = negative      Drug Order   (Drug name, dose, route, IV Fluid & volume, frequency, number of doses) Cycle 13  Previous treatment: C12 8/30/22     Medication = Irinotecan (Camptosar)  Base Dose= 150 mg/m²   Calc Dose: Base Dose x 2.15 m² = 322.5mg  Final Dose = 322.6 mg  Route = IV  Fluid & Volume = D5W 500 mL  Admin Duration = Over 90 minutes          <10% difference, okay to treat with final dose   Medication = Bevacizumab-bvzr (Zirabev)  Base Dose= 5 mg/kg   Calc Dose: Base Dose x 93.4 kg = 467 mg  Final Dose = 500 mg  Route = IV  Fluid & Volume =  mL  Admin Duration = Over 30 minutes          Rounded to nearest vial size (within 10%) per rounding protocol, okay to treat with final dose       Medication = Fluorouracil   Base Dose= 2000 mg/m²   Calc Dose:Base Dose x 2.15 m² =4300 mg  Final Dose = 4300 mg  Route = CIVI  Conc = 50 mg/mL  Fluid " & Volume = 86 mL (+3 mL of overfill) = 89 mL  Admin Duration = Over 46 hours @ 1.9 mL/hr   Via CADD pump for home infusion        <10% difference, okay to treat with final dose     By my signature below, I confirm this process was performed independently with the BSA and all final chemotherapy dosing calculations congruent. I have reviewed the above chemotherapy order and that my calculation of the final dose and BSA (when applicable) corroborate those calculations of the  pharmacist. Discrepancies of 10% or greater in the written dose have been addressed and documented within the EPIC Progress notes.    Chen Riley, PharmD, BCOP

## 2022-09-13 NOTE — PROGRESS NOTES
"Pharmacy Chemotherapy Verification    DX:  Metastatic Rectal Adenocarcinoma    Cycle 13  Previous treatment: C12  8/30/22    Protocol: FOLFiri + bevacizumab  Irinotecan  IV on day 1   -Dose reduced to 150 mg/m2 for tolerance/heavily pretreated   -Discontinued for tolerance/heavily pretreated  5-FU iv over 46 hrs   -Dose reduced to 2000 mg/m2 for tolerance/heavily pretreated   -Bolus discontinued for tolerance/heavily pretreated  Bevacizumab 5 mg/kg IV on day 1   -3/22/22- HOLD for cycle 1 per MD  q14 days for 8-12 cycles  NCCN Guidelines for Colon Cancer V.2.2018  Bradly POSEY Et al - Lancet Oncol. 2014 Sep;15(10):1065-75. Doi:  [id number]. Epub 2014 Jul 31.  Bulmaro MARRERO et al - J Clin Oncol. 2007 Oct 20;25(30):5943-53.    Allergies:Patient has no known allergies  BP (!) 143/89   Pulse 74   Temp 35.9 °C (96.6 °F) (Temporal)   Resp 18   Ht 1.78 m (5' 10.08\")   Wt 93.4 kg (205 lb 14.6 oz)   BMI 29.48 kg/m²  Body surface area is 2.15 meters squared.    Labs 9/12/22:  ANC~ 4300 Plt = 246k   Hgb = 13.7     SCr = 0.8mg/dL CrCl ~ 137.7mL/min   AST/ALP/ALP= 21/22/82 TBili = 0.50     /89      Urine Protein: negative     Irinotecan (Camptosar) 150 mg/m2 x 2.15m2 = 322.5 mg    <10% difference, ok to treat with final dose = 322.6mg IV    Bevacizumab-bvzr (Zirabev) 5 mg/kg x 93.4kg = 467 mg   Unable to round to nearest vial size (> 10%) per rounding protocol   Okay to treat with final dose = 500 mg IV    Fluorouracil 2000 mg/m2 x 2.15 m2 = 4300 mg    <10% difference, ok to treat with final dose = 4300mg CIVI over 46 hours   Via CADD pump for home infusion over 46 hours @ 1.9mL/hr    Tonia Michelle, PharmD, BCPS          "

## 2022-09-15 ENCOUNTER — OUTPATIENT INFUSION SERVICES (OUTPATIENT)
Dept: ONCOLOGY | Facility: MEDICAL CENTER | Age: 57
End: 2022-09-15
Attending: INTERNAL MEDICINE
Payer: MEDICARE

## 2022-09-15 VITALS
HEART RATE: 94 BPM | SYSTOLIC BLOOD PRESSURE: 114 MMHG | OXYGEN SATURATION: 95 % | TEMPERATURE: 97.2 F | DIASTOLIC BLOOD PRESSURE: 76 MMHG | RESPIRATION RATE: 16 BRPM

## 2022-09-15 DIAGNOSIS — C20 RECTAL CARCINOMA (HCC): ICD-10-CM

## 2022-09-15 PROCEDURE — 700111 HCHG RX REV CODE 636 W/ 250 OVERRIDE (IP): Performed by: INTERNAL MEDICINE

## 2022-09-15 PROCEDURE — 96523 IRRIG DRUG DELIVERY DEVICE: CPT

## 2022-09-15 RX ORDER — HEPARIN SODIUM (PORCINE) LOCK FLUSH IV SOLN 100 UNIT/ML 100 UNIT/ML
500 SOLUTION INTRAVENOUS PRN
Status: DISCONTINUED | OUTPATIENT
Start: 2022-09-15 | End: 2022-09-15 | Stop reason: HOSPADM

## 2022-09-15 RX ADMIN — HEPARIN 500 UNITS: 100 SYRINGE at 14:40

## 2022-09-15 ASSESSMENT — PAIN DESCRIPTION - PAIN TYPE: TYPE: CHRONIC PAIN

## 2022-09-15 NOTE — PROGRESS NOTES
Here for 5 FU pump disconnect after 46 hrs of continuous infusion. See chemotherapy flow sheet for volume / dose administration. Port flushed per protocol, site d-accessed, needle intact compression dressing applied. Patient discharge home to self care in [facility]. Appointment confirm for next treatment.

## 2022-09-26 RX ORDER — 0.9 % SODIUM CHLORIDE 0.9 %
10 VIAL (ML) INJECTION PRN
Status: CANCELLED | OUTPATIENT
Start: 2022-09-27

## 2022-09-26 RX ORDER — ONDANSETRON 8 MG/1
8 TABLET, ORALLY DISINTEGRATING ORAL PRN
Status: CANCELLED | OUTPATIENT
Start: 2022-09-27

## 2022-09-26 RX ORDER — 0.9 % SODIUM CHLORIDE 0.9 %
3 VIAL (ML) INJECTION PRN
Status: CANCELLED | OUTPATIENT
Start: 2022-09-26

## 2022-09-26 RX ORDER — HEPARIN SODIUM (PORCINE) LOCK FLUSH IV SOLN 100 UNIT/ML 100 UNIT/ML
500 SOLUTION INTRAVENOUS PRN
Status: CANCELLED | OUTPATIENT
Start: 2022-09-26

## 2022-09-26 RX ORDER — 0.9 % SODIUM CHLORIDE 0.9 %
10 VIAL (ML) INJECTION PRN
Status: CANCELLED | OUTPATIENT
Start: 2022-09-26

## 2022-09-26 RX ORDER — ONDANSETRON 2 MG/ML
4 INJECTION INTRAMUSCULAR; INTRAVENOUS PRN
Status: CANCELLED | OUTPATIENT
Start: 2022-09-27

## 2022-09-26 RX ORDER — 0.9 % SODIUM CHLORIDE 0.9 %
20 VIAL (ML) INJECTION PRN
Status: CANCELLED | OUTPATIENT
Start: 2022-09-28

## 2022-09-26 RX ORDER — DIPHENHYDRAMINE HYDROCHLORIDE 50 MG/ML
50 INJECTION INTRAMUSCULAR; INTRAVENOUS PRN
Status: CANCELLED | OUTPATIENT
Start: 2022-09-27

## 2022-09-26 RX ORDER — LOPERAMIDE HYDROCHLORIDE 2 MG/1
2 CAPSULE ORAL
Status: CANCELLED | OUTPATIENT
Start: 2022-09-27

## 2022-09-26 RX ORDER — EPINEPHRINE 1 MG/ML(1)
0.5 AMPUL (ML) INJECTION PRN
Status: CANCELLED | OUTPATIENT
Start: 2022-09-27

## 2022-09-26 RX ORDER — HEPARIN SODIUM (PORCINE) LOCK FLUSH IV SOLN 100 UNIT/ML 100 UNIT/ML
500 SOLUTION INTRAVENOUS PRN
Status: CANCELLED | OUTPATIENT
Start: 2022-09-28

## 2022-09-26 RX ORDER — 0.9 % SODIUM CHLORIDE 0.9 %
VIAL (ML) INJECTION PRN
Status: CANCELLED | OUTPATIENT
Start: 2022-09-27

## 2022-09-26 RX ORDER — SODIUM CHLORIDE 9 MG/ML
INJECTION, SOLUTION INTRAVENOUS CONTINUOUS
Status: CANCELLED | OUTPATIENT
Start: 2022-09-27

## 2022-09-26 RX ORDER — DEXTROSE MONOHYDRATE 50 MG/ML
INJECTION, SOLUTION INTRAVENOUS CONTINUOUS
Status: CANCELLED | OUTPATIENT
Start: 2022-09-27

## 2022-09-26 RX ORDER — 0.9 % SODIUM CHLORIDE 0.9 %
3 VIAL (ML) INJECTION PRN
Status: CANCELLED | OUTPATIENT
Start: 2022-09-27

## 2022-09-26 RX ORDER — LOPERAMIDE HYDROCHLORIDE 2 MG/1
4 CAPSULE ORAL PRN
Status: CANCELLED | OUTPATIENT
Start: 2022-09-27

## 2022-09-26 RX ORDER — 0.9 % SODIUM CHLORIDE 0.9 %
VIAL (ML) INJECTION PRN
Status: CANCELLED | OUTPATIENT
Start: 2022-09-26

## 2022-09-26 RX ORDER — METHYLPREDNISOLONE SODIUM SUCCINATE 125 MG/2ML
125 INJECTION, POWDER, LYOPHILIZED, FOR SOLUTION INTRAMUSCULAR; INTRAVENOUS PRN
Status: CANCELLED | OUTPATIENT
Start: 2022-09-27

## 2022-09-26 RX ORDER — PROCHLORPERAZINE MALEATE 10 MG
10 TABLET ORAL EVERY 6 HOURS PRN
Status: CANCELLED | OUTPATIENT
Start: 2022-09-27

## 2022-09-26 RX ORDER — HEPARIN SODIUM (PORCINE) LOCK FLUSH IV SOLN 100 UNIT/ML 100 UNIT/ML
500 SOLUTION INTRAVENOUS PRN
Status: CANCELLED | OUTPATIENT
Start: 2022-09-27

## 2022-09-26 NOTE — PROGRESS NOTES
"Pharmacy Chemotherapy Verification Note:    DX:  Metastatic Rectal Adenocarcinoma    Protocol: FOLFiri + bevacizumab  Irinotecan  IV on day 1  *dose reduced to 150mg/m2 for tolerance/ heavily pretreated  *discontinued for tolerance/ heavily pretreated  5-FU iv over 46 hrs  *dose reduced to 2000mg/m2 for tolerance/ heavily pretreated  *bolus discontinued for tolerance/ heavily pretreated  bevacizumab 5mg/kg IV on day 1  *3/22/22- HOLD for cycle 1 per MD   q14 days for 8-12 cycles    NCCN Guidelines for Colon Cancer V.2.2018  Bradly V Et al - Lancet Oncol. 2014 Sep;15(10):1065-75. Doi:  10.1016/-[id number]. Epub 2014 Jul 31.  Bulmaro CS, et al - J Clin Oncol. 2007 Oct 20;25(30):9185-26.    BP (!) 132/94   Pulse 76   Temp 35.9 °C (96.6 °F) (Temporal)   Resp 18   Ht 1.76 m (5' 9.29\")   Wt 93 kg (205 lb 0.4 oz)   SpO2 96%   BMI 30.02 kg/m²     Body surface area is 2.13 meters squared.     Labs 9/26/22 ([facility]):  ANC~ 3480 Plt = 251k   Hgb = 13.3     SCr = 0.9 mg/dL CrCl ~ 122 mL/min   LFT's = WNL TBili = 0.6   Labs 9/27/22:  Urine protein = Negative  BP = 132/94        *MD aware of BP, okay to proceed with treatment today*     Drug Order   (Drug name, dose, route, IV Fluid & volume, frequency, number of doses) Cycle 14  Previous treatment: C13 on 9/13/22     Medication = Irinotecan (Camptosar)  Base Dose= 150 mg/m²   Calc Dose: Base Dose x 2.13 m² = 319.5mg  Final Dose = 319.6 mg  Route = IV  Fluid & Volume = D5W 500 mL  Admin Duration = Over 90 minutes          <10% difference, okay to treat with final dose   Medication = Bevacizumab-bvzr (Zirabev)  Base Dose= 5 mg/kg   Calc Dose: Base Dose x 93 kg = 465 mg  Final Dose = 500 mg  Route = IV  Fluid & Volume =  mL  Admin Duration = Over 30 minutes          Rounded to nearest vial size (within 10%) per rounding protocol, okay to treat with final dose       Medication = Fluorouracil   Base Dose= 2000 mg/m²   Calc Dose:Base Dose x 2.13 m² =4260 mg  Final " Dose = 4260 mg  Route = CIVI  Conc = 50 mg/mL  Fluid & Volume = 85.2 mL (+3 mL of overfill) = 88.2 mL  Admin Duration = Over 46 hours@ 1.9 mL/hr   Via CADD pump for home infusion        <10% difference, okay to treat with final dose     By my signature below, I confirm this process was performed independently with the BSA and all final chemotherapy dosing calculations congruent. I have reviewed the above chemotherapy order and that my calculation of the final dose and BSA (when applicable) corroborate those calculations of the  pharmacist. Discrepancies of 10% or greater in the written dose have been addressed and documented within the EPIC Progress notes.    Angelina Graay, PharmD

## 2022-09-27 ENCOUNTER — OUTPATIENT INFUSION SERVICES (OUTPATIENT)
Dept: ONCOLOGY | Facility: MEDICAL CENTER | Age: 57
End: 2022-09-27
Attending: INTERNAL MEDICINE
Payer: MEDICARE

## 2022-09-27 VITALS
OXYGEN SATURATION: 96 % | HEART RATE: 76 BPM | TEMPERATURE: 96.6 F | WEIGHT: 205.03 LBS | HEIGHT: 69 IN | SYSTOLIC BLOOD PRESSURE: 132 MMHG | RESPIRATION RATE: 18 BRPM | BODY MASS INDEX: 30.37 KG/M2 | DIASTOLIC BLOOD PRESSURE: 94 MMHG

## 2022-09-27 DIAGNOSIS — C20 RECTAL CARCINOMA (HCC): ICD-10-CM

## 2022-09-27 PROCEDURE — 700111 HCHG RX REV CODE 636 W/ 250 OVERRIDE (IP): Performed by: INTERNAL MEDICINE

## 2022-09-27 PROCEDURE — 81002 URINALYSIS NONAUTO W/O SCOPE: CPT

## 2022-09-27 PROCEDURE — 700105 HCHG RX REV CODE 258: Performed by: INTERNAL MEDICINE

## 2022-09-27 PROCEDURE — 96415 CHEMO IV INFUSION ADDL HR: CPT

## 2022-09-27 PROCEDURE — 96368 THER/DIAG CONCURRENT INF: CPT

## 2022-09-27 PROCEDURE — 96413 CHEMO IV INFUSION 1 HR: CPT

## 2022-09-27 PROCEDURE — 96417 CHEMO IV INFUS EACH ADDL SEQ: CPT

## 2022-09-27 PROCEDURE — G0498 CHEMO EXTEND IV INFUS W/PUMP: HCPCS

## 2022-09-27 PROCEDURE — A4212 NON CORING NEEDLE OR STYLET: HCPCS

## 2022-09-27 PROCEDURE — 96375 TX/PRO/DX INJ NEW DRUG ADDON: CPT

## 2022-09-27 RX ORDER — MAGNESIUM SULFATE HEPTAHYDRATE 40 MG/ML
2 INJECTION, SOLUTION INTRAVENOUS ONCE
Status: COMPLETED | OUTPATIENT
Start: 2022-09-27 | End: 2022-09-27

## 2022-09-27 RX ADMIN — ATROPINE SULFATE 0.5 MG: 1 INJECTION, SOLUTION INTRAMUSCULAR; INTRAVENOUS; SUBCUTANEOUS at 09:39

## 2022-09-27 RX ADMIN — ONDANSETRON 16 MG: 2 INJECTION INTRAMUSCULAR; INTRAVENOUS at 09:20

## 2022-09-27 RX ADMIN — DEXAMETHASONE SODIUM PHOSPHATE 12 MG: 4 INJECTION, SOLUTION INTRA-ARTICULAR; INTRALESIONAL; INTRAMUSCULAR; INTRAVENOUS; SOFT TISSUE at 09:07

## 2022-09-27 RX ADMIN — IRINOTECAN HYDROCHLORIDE 319.6 MG: 20 INJECTION, SOLUTION INTRAVENOUS at 09:49

## 2022-09-27 RX ADMIN — SODIUM CHLORIDE 500 MG: 9 INJECTION, SOLUTION INTRAVENOUS at 11:49

## 2022-09-27 RX ADMIN — MAGNESIUM SULFATE 2 G: 2 INJECTION INTRAVENOUS at 09:47

## 2022-09-27 RX ADMIN — FLUOROURACIL 4260 MG: 50 INJECTION, SOLUTION INTRAVENOUS at 12:35

## 2022-09-27 ASSESSMENT — FIBROSIS 4 INDEX: FIB4 SCORE: 1.02

## 2022-09-27 NOTE — PROGRESS NOTES
"Pharmacy Chemotherapy Verification    DX:  Metastatic Rectal Adenocarcinoma    Cycle 14  Previous treatment: C13  9/13/22    Protocol: FOLFiri + bevacizumab  Irinotecan  IV on day 1   -Dose reduced to 150 mg/m2 for tolerance/heavily pretreated   -Discontinued for tolerance/heavily pretreated  5-FU iv over 46 hrs   -Dose reduced to 2000 mg/m2 for tolerance/heavily pretreated   -Bolus discontinued for tolerance/heavily pretreated  Bevacizumab 5 mg/kg IV on day 1   -3/22/22- HOLD for cycle 1 per MD  q14 days for 8-12 cycles  NCCN Guidelines for Colon Cancer V.2.2018  Bradly V Et al - Lancet Oncol. 2014 Sep;15(10):1065-75. Doi:  10.1016/-[id number]. Epub 2014 Jul 31.  Bulmaro MARRERO et al - J Clin Oncol. 2007 Oct 20;25(30):1715-95.    Allergies:Patient has no known allergies  BP (!) 152/92   Pulse 76   Temp 35.9 °C (96.6 °F) (Temporal)   Resp 18   Ht 1.76 m (5' 9.29\")   Wt 93 kg (205 lb 0.4 oz)   SpO2 96%   BMI 30.02 kg/m²  Body surface area is 2.13 meters squared.    All lab results 9/26/22 [facility], urine protein 9/27/22 [facility] within treatment parameters.   BP elevated, OK to proceed with treatment per MD orders.    Irinotecan (Camptosar) 150 mg/m2 x 2.13m2 = 319.5mg    <10% difference, ok to treat with final dose = 319.6mg IV    Bevacizumab-bvzr (Zirabev) 5 mg/kg x 93kg = 465mg   Unable to round to nearest vial size (> 10%) per rounding protocol   Okay to treat with final dose = 500mg IV    Fluorouracil 2000 mg/m2 x 2.13m2 = 4260mg    <10% difference, ok to treat with final dose = 4260mg CIVI over 46 hours   Via CADD pump for home infusion over 46 hours @ 1.9mL/hr    HEATHER Grande Pharm.D.            "

## 2022-09-27 NOTE — PROGRESS NOTES
Chemotherapy Verification - PRIMARY RN    C14 D1    Height = 176 cm  Weight = 93 kg  BSA = 2.13 m^2       Medication: Irinotecan  Dose: 150 mg/m^2  Calculated Dose: 319.5 mg                             (In mg/m2, AUC, mg/kg)     Medication: Bevacizumab-bvzr (Zirabev)  Dose: 5 mg/kg  Calculated Dose: 465 mg                             (In mg/m2, AUC, mg/kg)    Medication: Fluorouracil CADD pump  Dose: 2,000 mg/m^2  Calculated Dose: 4,260 mg infused over 46 hours                             (In mg/m2, AUC, mg/kg)    I confirm this process was performed independently with the BSA and all final chemotherapy dosing calculations congruent.  Any discrepancies of 10% or greater have been addressed with the chemotherapy pharmacist. The resolution of the discrepancy has been documented in the EPIC progress notes.

## 2022-09-27 NOTE — PROGRESS NOTES
Chemotherapy Verification - SECONDARY RN       Height = 176 cm  Weight = 93 kg  BSA = 2.13 m2       Medication: irinotecan  Dose: 150 mg/m2  Calculated Dose: 319.5 mg                             (In mg/m2, AUC, mg/kg)     Medication: bevacizumab-bvzr  Dose: 5 mg/kg  Calculated Dose: 465 mg                             (In mg/m2, AUC, mg/kg)    Medication: 5FU CADD pump  Dose: 2000 mg/m2  Calculated Dose: 4260 mg                             (In mg/m2, AUC, mg/kg)    I confirm that this process was performed independently.

## 2022-09-27 NOTE — PROGRESS NOTES
Pt arrives to [facility] for cycle 14 of FOLFIRI/Zirabev.  Discussed plan of care with pt.  Pt denies s/sx of infection, signs of bleeding or pain.  RUC port accessed with sterile technique, flushed with NS and brisk blood return observed.  UA was collected and urine protien is negative.  Labs drawn at [facility] office on 9/26/22 were reviewed and results meet parameters for treatment.  Diastolic BP is elevated today after several attempts to check BP.  Notified Dr. Aguila of BP results and received telephone order that it is ok to proceed with treatment today.  Magnesium was 1.5 and will replace magnesium per electrolyte protocol.  Pre-medications given.  Irinotecan infused without adverse reaction.  Magnesium 2g given.  Zirabev infused over 30 minutes.  Port flushed with NS and positive for blood return prior to connecting CADD pump.  5FU CADD pump settings verified by 2 RN's.  CADD pump connected to pt and is functioning properly.  Confirmed next appt on 9/29/22 with pt.  Pt dc home to self care.

## 2022-09-29 ENCOUNTER — OUTPATIENT INFUSION SERVICES (OUTPATIENT)
Dept: ONCOLOGY | Facility: MEDICAL CENTER | Age: 57
End: 2022-09-29
Attending: INTERNAL MEDICINE
Payer: MEDICARE

## 2022-09-29 VITALS
HEART RATE: 72 BPM | SYSTOLIC BLOOD PRESSURE: 127 MMHG | DIASTOLIC BLOOD PRESSURE: 87 MMHG | TEMPERATURE: 98 F | OXYGEN SATURATION: 97 % | RESPIRATION RATE: 18 BRPM

## 2022-09-29 DIAGNOSIS — C20 RECTAL CARCINOMA (HCC): ICD-10-CM

## 2022-09-29 PROCEDURE — 96523 IRRIG DRUG DELIVERY DEVICE: CPT

## 2022-09-29 PROCEDURE — 700111 HCHG RX REV CODE 636 W/ 250 OVERRIDE (IP): Performed by: INTERNAL MEDICINE

## 2022-09-29 RX ORDER — HEPARIN SODIUM (PORCINE) LOCK FLUSH IV SOLN 100 UNIT/ML 100 UNIT/ML
SOLUTION INTRAVENOUS
Status: DISCONTINUED
Start: 2022-09-29 | End: 2022-09-29 | Stop reason: HOSPADM

## 2022-09-29 RX ORDER — HEPARIN SODIUM (PORCINE) LOCK FLUSH IV SOLN 100 UNIT/ML 100 UNIT/ML
500 SOLUTION INTRAVENOUS PRN
Status: DISCONTINUED | OUTPATIENT
Start: 2022-09-29 | End: 2022-09-29 | Stop reason: HOSPADM

## 2022-09-29 RX ADMIN — HEPARIN 500 UNITS: 100 SYRINGE at 11:29

## 2022-09-29 NOTE — PROGRESS NOTES
Pt presented to infusion center for 5FU pump de-access. Pump had 0 ml in reservoir. Pump disconnected, cleaned and returned to pharmacy. Port flushed per protocol, brisk blood return observed, heparinized and de-accessed with needle intact, gauze dressing placed. Pt has next appt, left on foot to self care.

## 2022-10-11 ENCOUNTER — OUTPATIENT INFUSION SERVICES (OUTPATIENT)
Dept: ONCOLOGY | Facility: MEDICAL CENTER | Age: 57
End: 2022-10-11
Attending: INTERNAL MEDICINE
Payer: MEDICARE

## 2022-10-11 VITALS
RESPIRATION RATE: 16 BRPM | HEART RATE: 76 BPM | WEIGHT: 205.47 LBS | SYSTOLIC BLOOD PRESSURE: 129 MMHG | OXYGEN SATURATION: 97 % | HEIGHT: 69 IN | DIASTOLIC BLOOD PRESSURE: 83 MMHG | TEMPERATURE: 96.5 F | BODY MASS INDEX: 30.43 KG/M2

## 2022-10-11 DIAGNOSIS — C20 RECTAL CARCINOMA (HCC): Primary | ICD-10-CM

## 2022-10-11 LAB
APPEARANCE UR: CLEAR
COLOR UR AUTO: YELLOW
GLUCOSE UR QL STRIP.AUTO: NEGATIVE MG/DL
KETONES UR QL STRIP.AUTO: NEGATIVE MG/DL
LEUKOCYTE ESTERASE UR QL STRIP.AUTO: NEGATIVE
NITRITE UR QL STRIP.AUTO: NEGATIVE
PH UR STRIP.AUTO: 6 [PH] (ref 5–8)
PROT UR QL STRIP: NEGATIVE MG/DL
RBC UR QL AUTO: ABNORMAL
SP GR UR STRIP.AUTO: >=1.03 (ref 1–1.03)

## 2022-10-11 PROCEDURE — 81002 URINALYSIS NONAUTO W/O SCOPE: CPT

## 2022-10-11 PROCEDURE — 700105 HCHG RX REV CODE 258: Performed by: INTERNAL MEDICINE

## 2022-10-11 PROCEDURE — 96411 CHEMO IV PUSH ADDL DRUG: CPT

## 2022-10-11 PROCEDURE — 96415 CHEMO IV INFUSION ADDL HR: CPT

## 2022-10-11 PROCEDURE — 96375 TX/PRO/DX INJ NEW DRUG ADDON: CPT

## 2022-10-11 PROCEDURE — 96417 CHEMO IV INFUS EACH ADDL SEQ: CPT

## 2022-10-11 PROCEDURE — G0498 CHEMO EXTEND IV INFUS W/PUMP: HCPCS

## 2022-10-11 PROCEDURE — 96413 CHEMO IV INFUSION 1 HR: CPT

## 2022-10-11 PROCEDURE — 700111 HCHG RX REV CODE 636 W/ 250 OVERRIDE (IP): Performed by: INTERNAL MEDICINE

## 2022-10-11 RX ORDER — PROCHLORPERAZINE MALEATE 10 MG
10 TABLET ORAL EVERY 6 HOURS PRN
Status: CANCELLED | OUTPATIENT
Start: 2022-10-11

## 2022-10-11 RX ORDER — ONDANSETRON 8 MG/1
8 TABLET, ORALLY DISINTEGRATING ORAL PRN
Status: CANCELLED | OUTPATIENT
Start: 2022-10-11

## 2022-10-11 RX ORDER — DIPHENHYDRAMINE HYDROCHLORIDE 50 MG/ML
50 INJECTION INTRAMUSCULAR; INTRAVENOUS PRN
Status: CANCELLED | OUTPATIENT
Start: 2022-10-11

## 2022-10-11 RX ORDER — 0.9 % SODIUM CHLORIDE 0.9 %
10 VIAL (ML) INJECTION PRN
Status: CANCELLED | OUTPATIENT
Start: 2022-10-11

## 2022-10-11 RX ORDER — LOPERAMIDE HYDROCHLORIDE 2 MG/1
4 CAPSULE ORAL PRN
Status: CANCELLED | OUTPATIENT
Start: 2022-10-11

## 2022-10-11 RX ORDER — EPINEPHRINE 1 MG/ML(1)
0.5 AMPUL (ML) INJECTION PRN
Status: CANCELLED | OUTPATIENT
Start: 2022-10-11

## 2022-10-11 RX ORDER — SODIUM CHLORIDE 9 MG/ML
INJECTION, SOLUTION INTRAVENOUS CONTINUOUS
Status: CANCELLED | OUTPATIENT
Start: 2022-10-11

## 2022-10-11 RX ORDER — 0.9 % SODIUM CHLORIDE 0.9 %
3 VIAL (ML) INJECTION PRN
Status: CANCELLED | OUTPATIENT
Start: 2022-10-11

## 2022-10-11 RX ORDER — ONDANSETRON 2 MG/ML
4 INJECTION INTRAMUSCULAR; INTRAVENOUS PRN
Status: CANCELLED | OUTPATIENT
Start: 2022-10-11

## 2022-10-11 RX ORDER — LOPERAMIDE HYDROCHLORIDE 2 MG/1
2 CAPSULE ORAL
Status: CANCELLED | OUTPATIENT
Start: 2022-10-11

## 2022-10-11 RX ORDER — 0.9 % SODIUM CHLORIDE 0.9 %
20 VIAL (ML) INJECTION PRN
Status: CANCELLED | OUTPATIENT
Start: 2022-10-12

## 2022-10-11 RX ORDER — HEPARIN SODIUM (PORCINE) LOCK FLUSH IV SOLN 100 UNIT/ML 100 UNIT/ML
500 SOLUTION INTRAVENOUS PRN
Status: CANCELLED | OUTPATIENT
Start: 2022-10-12

## 2022-10-11 RX ORDER — 0.9 % SODIUM CHLORIDE 0.9 %
VIAL (ML) INJECTION PRN
Status: CANCELLED | OUTPATIENT
Start: 2022-10-11

## 2022-10-11 RX ORDER — HEPARIN SODIUM (PORCINE) LOCK FLUSH IV SOLN 100 UNIT/ML 100 UNIT/ML
500 SOLUTION INTRAVENOUS PRN
Status: CANCELLED | OUTPATIENT
Start: 2022-10-11

## 2022-10-11 RX ORDER — METHYLPREDNISOLONE SODIUM SUCCINATE 125 MG/2ML
125 INJECTION, POWDER, LYOPHILIZED, FOR SOLUTION INTRAMUSCULAR; INTRAVENOUS PRN
Status: CANCELLED | OUTPATIENT
Start: 2022-10-11

## 2022-10-11 RX ORDER — DEXTROSE MONOHYDRATE 50 MG/ML
INJECTION, SOLUTION INTRAVENOUS CONTINUOUS
Status: CANCELLED | OUTPATIENT
Start: 2022-10-11

## 2022-10-11 RX ADMIN — ONDANSETRON 16 MG: 2 INJECTION INTRAMUSCULAR; INTRAVENOUS at 09:45

## 2022-10-11 RX ADMIN — ATROPINE SULFATE 0.5 MG: 1 INJECTION, SOLUTION INTRAMUSCULAR; INTRAVENOUS; SUBCUTANEOUS at 10:18

## 2022-10-11 RX ADMIN — DEXAMETHASONE SODIUM PHOSPHATE 12 MG: 4 INJECTION, SOLUTION INTRA-ARTICULAR; INTRALESIONAL; INTRAMUSCULAR; INTRAVENOUS; SOFT TISSUE at 09:30

## 2022-10-11 RX ADMIN — FLUOROURACIL 4260 MG: 50 INJECTION, SOLUTION INTRAVENOUS at 12:52

## 2022-10-11 RX ADMIN — IRINOTECAN HYDROCHLORIDE 319.6 MG: 20 INJECTION, SOLUTION INTRAVENOUS at 10:09

## 2022-10-11 RX ADMIN — SODIUM CHLORIDE 500 MG: 9 INJECTION, SOLUTION INTRAVENOUS at 12:08

## 2022-10-11 ASSESSMENT — FIBROSIS 4 INDEX: FIB4 SCORE: 1.02

## 2022-10-11 NOTE — PROGRESS NOTES
"Pharmacy Chemotherapy Verification Note:    DX:  Metastatic Rectal Adenocarcinoma    Protocol: FOLFiri + bevacizumab  Irinotecan  IV on day 1  *dose reduced to 150mg/m2 for tolerance/ heavily pretreated  *discontinued for tolerance/ heavily pretreated  5-FU iv over 46 hrs  *dose reduced to 2000mg/m2 for tolerance/ heavily pretreated  *bolus discontinued for tolerance/ heavily pretreated  bevacizumab 5mg/kg IV on day 1  *3/22/22- HOLD for cycle 1 per MD   q14 days for 8-12 cycles    NCCN Guidelines for Colon Cancer V.2.2018  Bradly POSEY Et al - Lancet Oncol. 2014 Sep;15(10):1065-75. Doi:  10.1016/-[id number]. Epub 2014 Jul 31.  Bulmaro CS, et al - J Clin Oncol. 2007 Oct 20;25(30):4848-30.    /83   Pulse 76   Temp 35.8 °C (96.5 °F) (Temporal)   Resp 16   Ht 1.76 m (5' 9.29\")   Wt 93.2 kg (205 lb 7.5 oz)   SpO2 97%   BMI 30.09 kg/m²     Body surface area is 2.13 meters squared.     Labs 10/10/22 ([facility]):  ANC~ 3650 Plt = 241 k   Hgb = 13.4     SCr = 0.9 mg/dL CrCl ~ 122 mL/min   AST/ALT/AP = 19/20/56 TBili = 0.6  Mag = 1.5  K+ = 3.8    Labs 10/11/22:  Urine protein = Negative  BP = 129/83           Drug Order   (Drug name, dose, route, IV Fluid & volume, frequency, number of doses) Cycle 15  Previous treatment: C14 on 9/27/22     Medication = Irinotecan (Camptosar)  Base Dose= 150 mg/m²   Calc Dose: Base Dose x 2.13 m² = 319.5mg  Final Dose = 319.6 mg  Route = IV  Fluid & Volume = D5W 500 mL  Admin Duration = Over 90 minutes          <10% difference, okay to treat with final dose   Medication = Bevacizumab-bvzr (Zirabev)  Base Dose= 5 mg/kg   Calc Dose: Base Dose x 93.2 kg = 466 mg  Final Dose = 500 mg  Route = IV  Fluid & Volume =  mL  Admin Duration = Over 30 minutes          Rounded to nearest vial size (within 10%) per rounding protocol, okay to treat with final dose       Medication = Fluorouracil   Base Dose= 2000 mg/m²   Calc Dose:Base Dose x 2.13 m² =4260 mg  Final Dose = 4260 " mg  Route = CIVI  Conc = 50 mg/mL  Fluid & Volume = 85.2 mL (+3 mL of overfill) = 88.2 mL  Admin Duration = Over 46 hours@ 1.9 mL/hr   Via CADD pump for home infusion        <10% difference, okay to treat with final dose     By my signature below, I confirm this process was performed independently with the BSA and all final chemotherapy dosing calculations congruent. I have reviewed the above chemotherapy order and that my calculation of the final dose and BSA (when applicable) corroborate those calculations of the  pharmacist. Discrepancies of 10% or greater in the written dose have been addressed and documented within the EPIC Progress notes.    Cehn Riley, PharmD

## 2022-10-11 NOTE — PROGRESS NOTES
Chemotherapy Verification - PRIMARY RN      Height = 1.76 m  Weight = 93.2 kg  BSA = 2.13 m2       Medication: Irinotecan  Dose: 150mg/m2  Calculated Dose: 319.5 mg                             (In mg/m2, AUC, mg/kg)     Medication: bevacizumab - bvzr (Zirabev)  Dose: 5mg/kg  Calculated Dose: 466 mg                             (In mg/m2, AUC, mg/kg)    Medication: fluorouracil (Adrucil)   Dose: 2000mg/m2  Calculated Dose: 4260 mg                              (In mg/m2, AUC, mg/kg)        I confirm this process was performed independently with the BSA and all final chemotherapy dosing calculations congruent.  Any discrepancies of 10% or greater have been addressed with the chemotherapy pharmacist. The resolution of the discrepancy has been documented in the EPIC progress notes.

## 2022-10-11 NOTE — PROGRESS NOTES
"Pharmacy Chemotherapy Verification    DX:  Metastatic Rectal Adenocarcinoma    Cycle 15  Previous treatment: C14 9/27/22    Protocol: FOLFiri + bevacizumab  Irinotecan  IV on day 1   -Dose reduced to 150 mg/m2 for tolerance/heavily pretreated   -Discontinued for tolerance/heavily pretreated  5-FU iv over 46 hrs   -Dose reduced to 2000 mg/m2 for tolerance/heavily pretreated   -Bolus discontinued for tolerance/heavily pretreated  Bevacizumab 5 mg/kg IV on day 1   -3/22/22- HOLD for cycle 1 per MD  q14 days for 8-12 cycles  NCCN Guidelines for Colon Cancer V.2.2018  Bradly POSEY Et al - Lancet Oncol. 2014 Sep;15(10):1065-75. Doi:  10.1016/-[id number]. Epub 2014 Jul 31.  Bulmaro MARRERO et al - J Clin Oncol. 2007 Oct 20;25(30):3543-68.    Allergies:Patient has no known allergies  BP (!) 159/93   Pulse 76   Temp 35.8 °C (96.5 °F) (Temporal)   Resp 16   Ht 1.76 m (5' 9.29\")   Wt 93.2 kg (205 lb 7.5 oz)   SpO2 97%   BMI 30.09 kg/m²  Body surface area is 2.13 meters squared.    Labs 10/10/22 ([facility])  ANC 3650 Hgb 13.4 Plt 241k  SCr 0.9 CrCl 122 mL/min   AST/ALT/AP = 19/20/56 Tbili = 0.6    Labs 10/11/22  Urine protein negative    Irinotecan (Camptosar) 150 mg/m2 x 2.13 m2 = 319.5 mg    <10% difference, ok to treat with final dose = 319.6 mg IV    Bevacizumab-bvzr (Zirabev) 5 mg/kg x 93.2 kg = 466 mg   <10% difference, OK to treat with final dose = 500 mg IV    Fluorouracil 2000 mg/m2 x 2.13 m2 = 4260 mg    <10% difference, ok to treat with final dose = 4260 mg CIVI over 46 hours   Via CADD pump for home infusion over 46 hours @ 1.9 mL/hr    Jessy Solorio, PharmD, BCOP              "

## 2022-10-11 NOTE — PROGRESS NOTES
Mr Eaton is here today for FolFiri. He has no new concerns to report.   Labs drawn yesterday at [facility], within parameters for treatment.   Urine negative for protein, +blood. Dr. Aguila notified. TORB to send a urine C/S.    Medi port accessed using low profile ramires needle. Flushed well with good blood return.     Pre meds given, tolerated well.   Chemotherapy infused, tolerated well.    CADD pump connected, connections tight, unclamped and placed in RUN mode. Pump placed in carry case. Pt discharged in stable condition.     Confirmed next appointment.

## 2022-10-11 NOTE — PROGRESS NOTES
Chemotherapy Verification - SECONDARY RN       Height = 1.76 m  Weight = 93.2 kg  BSA = 2.13 m^2       Medication: irinotecan  Dose: 150 mg/m^2  Calculated Dose: 319.5 mg                             (In mg/m2, AUC, mg/kg)     Medication: bevacizumab-bvzr  Dose: 5 mg/kg  Calculated Dose: 466 mg                             (In mg/m2, AUC, mg/kg)    Medication: fluorouracil CADD pump  Dose: 2000 mg/m^2  Calculated Dose: 4260 mg                             (In mg/m2, AUC, mg/kg)    I confirm that this process was performed independently.

## 2022-10-13 ENCOUNTER — OUTPATIENT INFUSION SERVICES (OUTPATIENT)
Dept: ONCOLOGY | Facility: MEDICAL CENTER | Age: 57
End: 2022-10-13
Attending: INTERNAL MEDICINE
Payer: MEDICARE

## 2022-10-13 VITALS
RESPIRATION RATE: 18 BRPM | DIASTOLIC BLOOD PRESSURE: 83 MMHG | HEART RATE: 90 BPM | OXYGEN SATURATION: 98 % | TEMPERATURE: 98 F | SYSTOLIC BLOOD PRESSURE: 138 MMHG

## 2022-10-13 DIAGNOSIS — C20 RECTAL CARCINOMA (HCC): ICD-10-CM

## 2022-10-13 PROCEDURE — 96523 IRRIG DRUG DELIVERY DEVICE: CPT

## 2022-10-13 PROCEDURE — 700111 HCHG RX REV CODE 636 W/ 250 OVERRIDE (IP)

## 2022-10-13 RX ORDER — HEPARIN SODIUM (PORCINE) LOCK FLUSH IV SOLN 100 UNIT/ML 100 UNIT/ML
SOLUTION INTRAVENOUS
Status: COMPLETED
Start: 2022-10-13 | End: 2022-10-13

## 2022-10-13 RX ADMIN — HEPARIN 500 UNITS: 100 SYRINGE at 13:36

## 2022-10-13 NOTE — PROGRESS NOTES
Pt arrived to [facility], ambulatory, for pump disconnect. Pump stopped upon arrival, 0 mL left to be delivered. Pump disconnected and returned to pharmacy. Port flushed and heparin locked per policy, port de-accessed. Pt left IS with no s/sx of distress. Follow up appointment confirmed.

## 2022-10-16 ENCOUNTER — HOSPITAL ENCOUNTER (OUTPATIENT)
Dept: RADIOLOGY | Facility: MEDICAL CENTER | Age: 57
End: 2022-10-16
Attending: INTERNAL MEDICINE
Payer: MEDICARE

## 2022-10-16 DIAGNOSIS — C20 MALIGNANT NEOPLASM OF RECTUM (HCC): ICD-10-CM

## 2022-10-16 PROCEDURE — 700117 HCHG RX CONTRAST REV CODE 255: Performed by: INTERNAL MEDICINE

## 2022-10-16 PROCEDURE — 71260 CT THORAX DX C+: CPT

## 2022-10-16 RX ADMIN — IOHEXOL 20 ML: 240 INJECTION, SOLUTION INTRATHECAL; INTRAVASCULAR; INTRAVENOUS; ORAL at 10:43

## 2022-10-16 RX ADMIN — IOHEXOL 100 ML: 350 INJECTION, SOLUTION INTRAVENOUS at 10:43

## 2022-10-25 ENCOUNTER — OUTPATIENT INFUSION SERVICES (OUTPATIENT)
Dept: ONCOLOGY | Facility: MEDICAL CENTER | Age: 57
End: 2022-10-25
Attending: INTERNAL MEDICINE
Payer: MEDICARE

## 2022-10-25 VITALS
RESPIRATION RATE: 18 BRPM | OXYGEN SATURATION: 97 % | DIASTOLIC BLOOD PRESSURE: 89 MMHG | WEIGHT: 207.23 LBS | BODY MASS INDEX: 30.69 KG/M2 | TEMPERATURE: 98 F | HEIGHT: 69 IN | SYSTOLIC BLOOD PRESSURE: 132 MMHG | HEART RATE: 76 BPM

## 2022-10-25 DIAGNOSIS — C20 RECTAL CARCINOMA (HCC): ICD-10-CM

## 2022-10-25 LAB
ALBUMIN SERPL BCP-MCNC: 3.7 G/DL (ref 3.2–4.9)
ALBUMIN/GLOB SERPL: 1.6 G/DL
ALP SERPL-CCNC: 73 U/L (ref 30–99)
ALT SERPL-CCNC: 22 U/L (ref 2–50)
ANION GAP SERPL CALC-SCNC: 11 MMOL/L (ref 7–16)
APPEARANCE UR: CLEAR
APPEARANCE UR: CLEAR
AST SERPL-CCNC: 23 U/L (ref 12–45)
BACTERIA #/AREA URNS HPF: NEGATIVE /HPF
BILIRUB SERPL-MCNC: 0.5 MG/DL (ref 0.1–1.5)
BILIRUB UR QL STRIP.AUTO: NEGATIVE
BUN SERPL-MCNC: 17 MG/DL (ref 8–22)
CALCIUM SERPL-MCNC: 8 MG/DL (ref 8.5–10.5)
CHLORIDE SERPL-SCNC: 108 MMOL/L (ref 96–112)
CO2 SERPL-SCNC: 23 MMOL/L (ref 20–33)
COLOR UR AUTO: YELLOW
COLOR UR: YELLOW
CREAT SERPL-MCNC: 0.7 MG/DL (ref 0.5–1.4)
EPI CELLS #/AREA URNS HPF: NEGATIVE /HPF
GFR SERPLBLD CREATININE-BSD FMLA CKD-EPI: 109 ML/MIN/1.73 M 2
GLOBULIN SER CALC-MCNC: 2.3 G/DL (ref 1.9–3.5)
GLUCOSE SERPL-MCNC: 89 MG/DL (ref 65–99)
GLUCOSE UR QL STRIP.AUTO: NEGATIVE MG/DL
GLUCOSE UR STRIP.AUTO-MCNC: NEGATIVE MG/DL
HYALINE CASTS #/AREA URNS LPF: ABNORMAL /LPF
KETONES UR QL STRIP.AUTO: NEGATIVE MG/DL
KETONES UR STRIP.AUTO-MCNC: NEGATIVE MG/DL
LEUKOCYTE ESTERASE UR QL STRIP.AUTO: ABNORMAL
LEUKOCYTE ESTERASE UR QL STRIP.AUTO: NEGATIVE
MICRO URNS: ABNORMAL
NITRITE UR QL STRIP.AUTO: NEGATIVE
NITRITE UR QL STRIP.AUTO: NEGATIVE
PH UR STRIP.AUTO: 5.5 [PH] (ref 5–8)
PH UR STRIP.AUTO: 6 [PH] (ref 5–8)
POTASSIUM SERPL-SCNC: 3.3 MMOL/L (ref 3.6–5.5)
PROT SERPL-MCNC: 6 G/DL (ref 6–8.2)
PROT UR QL STRIP: ABNORMAL MG/DL
PROT UR QL STRIP: NEGATIVE MG/DL
RBC # URNS HPF: ABNORMAL /HPF
RBC UR QL AUTO: ABNORMAL
RBC UR QL AUTO: ABNORMAL
SODIUM SERPL-SCNC: 142 MMOL/L (ref 135–145)
SP GR UR STRIP.AUTO: 1.02
SP GR UR STRIP.AUTO: >=1.03 (ref 1–1.03)
UROBILINOGEN UR STRIP.AUTO-MCNC: 0.2 MG/DL
WBC #/AREA URNS HPF: ABNORMAL /HPF

## 2022-10-25 PROCEDURE — A4212 NON CORING NEEDLE OR STYLET: HCPCS

## 2022-10-25 PROCEDURE — 700105 HCHG RX REV CODE 258: Performed by: INTERNAL MEDICINE

## 2022-10-25 PROCEDURE — A9270 NON-COVERED ITEM OR SERVICE: HCPCS | Performed by: INTERNAL MEDICINE

## 2022-10-25 PROCEDURE — 80053 COMPREHEN METABOLIC PANEL: CPT

## 2022-10-25 PROCEDURE — 96413 CHEMO IV INFUSION 1 HR: CPT

## 2022-10-25 PROCEDURE — 700111 HCHG RX REV CODE 636 W/ 250 OVERRIDE (IP): Performed by: INTERNAL MEDICINE

## 2022-10-25 PROCEDURE — 87086 URINE CULTURE/COLONY COUNT: CPT

## 2022-10-25 PROCEDURE — G0498 CHEMO EXTEND IV INFUS W/PUMP: HCPCS

## 2022-10-25 PROCEDURE — 81002 URINALYSIS NONAUTO W/O SCOPE: CPT | Mod: XU

## 2022-10-25 PROCEDURE — 700102 HCHG RX REV CODE 250 W/ 637 OVERRIDE(OP): Performed by: INTERNAL MEDICINE

## 2022-10-25 PROCEDURE — 81001 URINALYSIS AUTO W/SCOPE: CPT

## 2022-10-25 PROCEDURE — 96375 TX/PRO/DX INJ NEW DRUG ADDON: CPT

## 2022-10-25 RX ORDER — DEXTROSE MONOHYDRATE 50 MG/ML
INJECTION, SOLUTION INTRAVENOUS CONTINUOUS
Status: CANCELLED | OUTPATIENT
Start: 2022-10-25

## 2022-10-25 RX ORDER — HEPARIN SODIUM (PORCINE) LOCK FLUSH IV SOLN 100 UNIT/ML 100 UNIT/ML
500 SOLUTION INTRAVENOUS PRN
Status: CANCELLED | OUTPATIENT
Start: 2022-10-26

## 2022-10-25 RX ORDER — PROCHLORPERAZINE MALEATE 10 MG
10 TABLET ORAL EVERY 6 HOURS PRN
Status: CANCELLED | OUTPATIENT
Start: 2022-10-25

## 2022-10-25 RX ORDER — ONDANSETRON 8 MG/1
8 TABLET, ORALLY DISINTEGRATING ORAL PRN
Status: CANCELLED | OUTPATIENT
Start: 2022-10-25

## 2022-10-25 RX ORDER — SODIUM CHLORIDE 9 MG/ML
INJECTION, SOLUTION INTRAVENOUS CONTINUOUS
Status: CANCELLED | OUTPATIENT
Start: 2022-10-25

## 2022-10-25 RX ORDER — ONDANSETRON 2 MG/ML
4 INJECTION INTRAMUSCULAR; INTRAVENOUS PRN
Status: CANCELLED | OUTPATIENT
Start: 2022-10-25

## 2022-10-25 RX ORDER — 0.9 % SODIUM CHLORIDE 0.9 %
VIAL (ML) INJECTION PRN
Status: CANCELLED | OUTPATIENT
Start: 2022-10-25

## 2022-10-25 RX ORDER — HEPARIN SODIUM (PORCINE) LOCK FLUSH IV SOLN 100 UNIT/ML 100 UNIT/ML
500 SOLUTION INTRAVENOUS PRN
Status: CANCELLED | OUTPATIENT
Start: 2022-10-25

## 2022-10-25 RX ORDER — 0.9 % SODIUM CHLORIDE 0.9 %
3 VIAL (ML) INJECTION PRN
Status: CANCELLED | OUTPATIENT
Start: 2022-10-25

## 2022-10-25 RX ORDER — LOPERAMIDE HYDROCHLORIDE 2 MG/1
4 CAPSULE ORAL PRN
Status: CANCELLED | OUTPATIENT
Start: 2022-10-25

## 2022-10-25 RX ORDER — POTASSIUM CHLORIDE 20 MEQ/1
40 TABLET, EXTENDED RELEASE ORAL DAILY
Status: DISCONTINUED | OUTPATIENT
Start: 2022-10-25 | End: 2022-10-25

## 2022-10-25 RX ORDER — DIPHENHYDRAMINE HYDROCHLORIDE 50 MG/ML
50 INJECTION INTRAMUSCULAR; INTRAVENOUS PRN
Status: CANCELLED | OUTPATIENT
Start: 2022-10-25

## 2022-10-25 RX ORDER — EPINEPHRINE 1 MG/ML(1)
0.5 AMPUL (ML) INJECTION PRN
Status: CANCELLED | OUTPATIENT
Start: 2022-10-25

## 2022-10-25 RX ORDER — LOPERAMIDE HYDROCHLORIDE 2 MG/1
2 CAPSULE ORAL
Status: CANCELLED | OUTPATIENT
Start: 2022-10-25

## 2022-10-25 RX ORDER — 0.9 % SODIUM CHLORIDE 0.9 %
10 VIAL (ML) INJECTION PRN
Status: CANCELLED | OUTPATIENT
Start: 2022-10-25

## 2022-10-25 RX ORDER — 0.9 % SODIUM CHLORIDE 0.9 %
20 VIAL (ML) INJECTION PRN
Status: CANCELLED | OUTPATIENT
Start: 2022-10-26

## 2022-10-25 RX ORDER — METHYLPREDNISOLONE SODIUM SUCCINATE 125 MG/2ML
125 INJECTION, POWDER, LYOPHILIZED, FOR SOLUTION INTRAMUSCULAR; INTRAVENOUS PRN
Status: CANCELLED | OUTPATIENT
Start: 2022-10-25

## 2022-10-25 RX ADMIN — SODIUM CHLORIDE 500 MG: 9 INJECTION, SOLUTION INTRAVENOUS at 09:38

## 2022-10-25 RX ADMIN — ONDANSETRON 16 MG: 2 INJECTION INTRAMUSCULAR; INTRAVENOUS at 09:04

## 2022-10-25 RX ADMIN — DEXAMETHASONE SODIUM PHOSPHATE 12 MG: 4 INJECTION, SOLUTION INTRA-ARTICULAR; INTRALESIONAL; INTRAMUSCULAR; INTRAVENOUS; SOFT TISSUE at 09:21

## 2022-10-25 RX ADMIN — POTASSIUM CHLORIDE 40 MEQ: 1500 TABLET, EXTENDED RELEASE ORAL at 09:25

## 2022-10-25 RX ADMIN — FLUOROURACIL 4260 MG: 50 INJECTION, SOLUTION INTRAVENOUS at 10:16

## 2022-10-25 ASSESSMENT — FIBROSIS 4 INDEX: FIB4 SCORE: 1.02

## 2022-10-25 NOTE — PROGRESS NOTES
Chemotherapy Verification - SECONDARY RN       Height = 1.74m  Weight = 94kg  BSA = 2.13m2       Medication: bevacizumab-bvzr  Dose: 5mg/kg  Calculated Dose: 470mg dose 500mg                             (In mg/m2, AUC, mg/kg)     Medication: fluorouracil  Dose: 2000mg/m2  Calculated Dose: 4260mg                             (In mg/m2, AUC, mg/kg)      I confirm that this process was performed independently.

## 2022-10-25 NOTE — PROGRESS NOTES
"Pharmacy Chemotherapy Verification    DX:  Metastatic Rectal Adenocarcinoma    Cycle 16  Previous treatment: C15 10/11/22     Protocol: FOLFiri + bevacizumab   C6 10/25/22 Remove from the plan per  Dr. Aguila   -Discontinued for tolerance/heavily pretreated  5-FU iv over 46 hrs   -Dose reduced to 2000 mg/m2 for tolerance/heavily pretreated   -Bolus discontinued for tolerance/heavily pretreated  Bevacizumab 5 mg/kg V on day 1   -3/22/22- HOLD for cycle 1 per MD   for 8-12 cycles  Q21 day cycle  starting C16 10/25/22 per MD  NCCN Guidelines for Colon Cancer V.2.2018  Bradly POSEY Et al - Lancet Oncol. 2014 Sep;15(10):1065-75. Doi:  10.1016/-[id number]. Epub 2014 Jul 31.  Bulmaro MARRERO, et al - J Clin Oncol. 2007 Oct 20;25(30):9391-29.    Allergies:Patient has no known allergies  /89   Pulse 76   Temp 36.7 °C (98 °F) (Temporal)   Resp 18   Ht 1.74 m (5' 8.5\")   Wt 94 kg (207 lb 3.7 oz)   SpO2 97%   BMI 31.05 kg/m²  Body surface area is 2.13 meters squared.    Labs 10/24/22 ([facility]):  ANC~ 4230 Plt = 252k   Hgb = 14     SCr = 0.7mg/dL CrCl ~ 158.163mL/min   LFT's = WNL TBili = 0.5     Labs 10/25/22:   BP= 132/89  UP= trace     Bevacizumab-bvzr (Zirabev) 5 mg/kg x 94 kg = 470 mg              <10% difference, OK to treat with final dose = 500 mg IV    Fluorouracil 2000 mg/m2 x 2.13 m2 = 4260 mg    <10% difference, ok to treat with final dose = 4260 mg CIVI over 46 hours   Via CADD pump for home infusion over 46 hours @ 1.9 mL/hr    Tonia Michelle, PharmD, BCPS                "

## 2022-10-25 NOTE — PROGRESS NOTES
Mr Eaton is here today for Zirabev and CADD pump. He has no new concerns or complaints today.    Mediport to the right chest accessed, flushed well with good blood return. CBC was drawn yesterday at [facility], CMP was not. CMP was drawn today and sent off. All labs within parameters for treatment.    Potassium 3.3 - 40 meq  of potassium replaced PO. Tolerated well.    Premeds given per MAR. Tolerated well.    Zirabev infused over 30 minutes, tolerated well.    Fluorouracil CADD pump connected. Connections tight, unclamped, and placed in RUN mode and in carry bag.     Mr Eaton will return in 46 hours for removal.    Discharged in stable condition.

## 2022-10-25 NOTE — PROGRESS NOTES
Chemotherapy Verification - PRIMARY RN      Height = 1.74 m  Weight = 94 kg  BSA = 2.13 m2       Medication: bevacizumab - bvzr (Zirabev)  Dose: 5mg/kg  Calculated Dose: 470 mg                             (In mg/m2, AUC, mg/kg)     Medication: fluorouracil (Adrucil)   Dose: 2400mg/m2  Calculated Dose: 4260 mg                             (In mg/m2, AUC, mg/kg)        I confirm this process was performed independently with the BSA and all final chemotherapy dosing calculations congruent.  Any discrepancies of 10% or greater have been addressed with the chemotherapy pharmacist. The resolution of the discrepancy has been documented in the EPIC progress notes.

## 2022-10-25 NOTE — PROGRESS NOTES
"Pharmacy Chemotherapy Verification Note:    DX:  Metastatic Rectal Adenocarcinoma    Protocol: FOLFiri + bevacizumab  10/25/22 d/c'ed from treatment plan per MD  *discontinued for tolerance/ heavily pretreated  5-FU iv over 46 hrs  *dose reduced to 2000mg/m2 for tolerance/ heavily pretreated  *bolus discontinued for tolerance/ heavily pretreated  bevacizumab 5mg/kg IV on day 1  *3/22/22- HOLD for cycle 1 per MD   q14 days for 8-12 cycles    NCCN Guidelines for Colon Cancer V.2.2018  Bradly V, Et al - Lancet Oncol. 2014 Sep;15(10):1065-75. Doi:  10.1016/-[id number]. Epub 2014 Jul 31.  Bulmaro CS, et al - J Clin Oncol. 2007 Oct 20;25(30):6823-43.    /89   Pulse 76   Temp 36.7 °C (98 °F) (Temporal)   Resp 18   Ht 1.74 m (5' 8.5\")   Wt 94 kg (207 lb 3.7 oz)   SpO2 97%   BMI 31.05 kg/m²     Body surface area is 2.13 meters squared.     Labs 10/24/22 ([facility]):  ANC~ 4230 Plt = 252k   Hgb = 14  Labs 10/25/22:     SCr = 0.7 mg/dL CrCl >125 mL/min   LFT's = WNL TBili = 0.5   Urine protein = trace  BP = 132/89           Drug Order   (Drug name, dose, route, IV Fluid & volume, frequency, number of doses) Cycle 16  Previous treatment: C15 on 10/11/22     Medication = Bevacizumab-bvzr (Zirabev)  Base Dose= 5 mg/kg   Calc Dose: Base Dose x 94 kg = 470 mg  Final Dose = 500 mg  Route = IV  Fluid & Volume =  mL  Admin Duration = Over 30 minutes          Rounded to nearest vial size (within 10%) per rounding protocol, okay to treat with final dose       Medication = Fluorouracil   Base Dose= 2000 mg/m²   Calc Dose:Base Dose x 2.13 m² =4260 mg  Final Dose = 4260 mg  Route = CIVI  Conc = 50 mg/mL  Fluid & Volume = 85.2 mL (+3 mL of overfill) = 88.2 mL  Admin Duration = Over 46 hours@ 1.9 mL/hr   Via CADD pump for home infusion        <10% difference, okay to treat with final dose     By my signature below, I confirm this process was performed independently with the BSA and all final chemotherapy dosing " calculations congruent. I have reviewed the above chemotherapy order and that my calculation of the final dose and BSA (when applicable) corroborate those calculations of the  pharmacist. Discrepancies of 10% or greater in the written dose have been addressed and documented within the EPIC Progress notes.    Angelina Garay, PharmD

## 2022-10-27 ENCOUNTER — OUTPATIENT INFUSION SERVICES (OUTPATIENT)
Dept: ONCOLOGY | Facility: MEDICAL CENTER | Age: 57
End: 2022-10-27
Attending: INTERNAL MEDICINE
Payer: MEDICARE

## 2022-10-27 DIAGNOSIS — C20 RECTAL CARCINOMA (HCC): ICD-10-CM

## 2022-10-27 LAB
BACTERIA UR CULT: NORMAL
SIGNIFICANT IND 70042: NORMAL
SITE SITE: NORMAL
SOURCE SOURCE: NORMAL

## 2022-10-27 PROCEDURE — 96523 IRRIG DRUG DELIVERY DEVICE: CPT

## 2022-10-27 PROCEDURE — 700111 HCHG RX REV CODE 636 W/ 250 OVERRIDE (IP): Performed by: INTERNAL MEDICINE

## 2022-10-27 RX ORDER — HEPARIN SODIUM (PORCINE) LOCK FLUSH IV SOLN 100 UNIT/ML 100 UNIT/ML
500 SOLUTION INTRAVENOUS PRN
Status: DISCONTINUED | OUTPATIENT
Start: 2022-10-27 | End: 2022-10-27 | Stop reason: HOSPADM

## 2022-10-27 RX ADMIN — HEPARIN 500 UNITS: 100 SYRINGE at 09:37

## 2022-10-27 NOTE — PROGRESS NOTES
Pt presents to [facility] for fluorouracil pump disconnect after 46 hrs of continuous infusion. See chemotherapy flow sheet for volume and dose administration. Brisk blood return observed from R U chest port before flushed, heparin locked, and de-accessed; gauze and tape dressing applied. Next appointment confirmed and education provided. Pt discharged to self care with all personal belongings and in NAD.

## 2022-11-08 ENCOUNTER — PATIENT MESSAGE (OUTPATIENT)
Dept: HEALTH INFORMATION MANAGEMENT | Facility: OTHER | Age: 57
End: 2022-11-08

## 2022-11-09 RX ORDER — 0.9 % SODIUM CHLORIDE 0.9 %
3 VIAL (ML) INJECTION PRN
Status: CANCELLED | OUTPATIENT
Start: 2022-11-15

## 2022-11-09 RX ORDER — DEXTROSE MONOHYDRATE 50 MG/ML
INJECTION, SOLUTION INTRAVENOUS CONTINUOUS
Status: CANCELLED | OUTPATIENT
Start: 2022-11-15

## 2022-11-09 RX ORDER — SODIUM CHLORIDE 9 MG/ML
INJECTION, SOLUTION INTRAVENOUS CONTINUOUS
Status: CANCELLED | OUTPATIENT
Start: 2022-11-15

## 2022-11-09 RX ORDER — HEPARIN SODIUM (PORCINE) LOCK FLUSH IV SOLN 100 UNIT/ML 100 UNIT/ML
500 SOLUTION INTRAVENOUS PRN
Status: CANCELLED | OUTPATIENT
Start: 2022-11-15

## 2022-11-09 RX ORDER — 0.9 % SODIUM CHLORIDE 0.9 %
10 VIAL (ML) INJECTION PRN
Status: CANCELLED | OUTPATIENT
Start: 2022-11-15

## 2022-11-09 RX ORDER — PROCHLORPERAZINE MALEATE 10 MG
10 TABLET ORAL EVERY 6 HOURS PRN
Status: CANCELLED | OUTPATIENT
Start: 2022-11-15

## 2022-11-09 RX ORDER — HEPARIN SODIUM (PORCINE) LOCK FLUSH IV SOLN 100 UNIT/ML 100 UNIT/ML
500 SOLUTION INTRAVENOUS PRN
Status: CANCELLED | OUTPATIENT
Start: 2022-11-17

## 2022-11-09 RX ORDER — LOPERAMIDE HYDROCHLORIDE 2 MG/1
4 CAPSULE ORAL PRN
Status: CANCELLED | OUTPATIENT
Start: 2022-11-15

## 2022-11-09 RX ORDER — 0.9 % SODIUM CHLORIDE 0.9 %
10 VIAL (ML) INJECTION PRN
Status: CANCELLED | OUTPATIENT
Start: 2022-11-13

## 2022-11-09 RX ORDER — DIPHENHYDRAMINE HYDROCHLORIDE 50 MG/ML
50 INJECTION INTRAMUSCULAR; INTRAVENOUS PRN
Status: CANCELLED | OUTPATIENT
Start: 2022-11-15

## 2022-11-09 RX ORDER — EPINEPHRINE 1 MG/ML(1)
0.5 AMPUL (ML) INJECTION PRN
Status: CANCELLED | OUTPATIENT
Start: 2022-11-15

## 2022-11-09 RX ORDER — 0.9 % SODIUM CHLORIDE 0.9 %
VIAL (ML) INJECTION PRN
Status: CANCELLED | OUTPATIENT
Start: 2022-11-13

## 2022-11-09 RX ORDER — LOPERAMIDE HYDROCHLORIDE 2 MG/1
2 CAPSULE ORAL
Status: CANCELLED | OUTPATIENT
Start: 2022-11-15

## 2022-11-09 RX ORDER — 0.9 % SODIUM CHLORIDE 0.9 %
3 VIAL (ML) INJECTION PRN
Status: CANCELLED | OUTPATIENT
Start: 2022-11-13

## 2022-11-09 RX ORDER — HEPARIN SODIUM (PORCINE) LOCK FLUSH IV SOLN 100 UNIT/ML 100 UNIT/ML
500 SOLUTION INTRAVENOUS PRN
Status: CANCELLED | OUTPATIENT
Start: 2022-11-13

## 2022-11-09 RX ORDER — ONDANSETRON 2 MG/ML
4 INJECTION INTRAMUSCULAR; INTRAVENOUS PRN
Status: CANCELLED | OUTPATIENT
Start: 2022-11-15

## 2022-11-09 RX ORDER — METHYLPREDNISOLONE SODIUM SUCCINATE 125 MG/2ML
125 INJECTION, POWDER, LYOPHILIZED, FOR SOLUTION INTRAMUSCULAR; INTRAVENOUS PRN
Status: CANCELLED | OUTPATIENT
Start: 2022-11-15

## 2022-11-09 RX ORDER — 0.9 % SODIUM CHLORIDE 0.9 %
VIAL (ML) INJECTION PRN
Status: CANCELLED | OUTPATIENT
Start: 2022-11-15

## 2022-11-09 RX ORDER — 0.9 % SODIUM CHLORIDE 0.9 %
20 VIAL (ML) INJECTION PRN
Status: CANCELLED | OUTPATIENT
Start: 2022-11-17

## 2022-11-09 RX ORDER — ONDANSETRON 8 MG/1
8 TABLET, ORALLY DISINTEGRATING ORAL PRN
Status: CANCELLED | OUTPATIENT
Start: 2022-11-15

## 2022-11-14 NOTE — PROGRESS NOTES
"Pharmacy Chemotherapy Verification    DX:  Metastatic Rectal Adenocarcinoma    Cycle 17  Previous treatment: C16 10/25/22    Protocol: FOLFiri + bevacizumab  Irinotecan  IV on day 1   -C6 10/25/22 Remove from the plan per  Dr. Aguila  -Added back at 150 mg/m2 starting Cycle 17   -Discontinued for tolerance/heavily pretreated  5-FU iv over 46 hrs   -Dose reduced to 2000 mg/m2 for tolerance/heavily pretreated   -Bolus discontinued for tolerance/heavily pretreated  Bevacizumab 5 mg/kg V on day 1   -3/22/22- HOLD for cycle 1 per MD   -11/15/22 HOLD for Cycle 17 due to proteinuria   for 8-12 cycles  Q21 day cycle  starting C16 10/25/22 per MD  NCCN Guidelines for Colon Cancer V.2.2018  Bradly V, Et al - Lancet Oncol. 2014 Sep;15(10):1064-75. Doi:  10.1016/-[id number]. Epub 2014 Jul 31.  Bulmaro MARRERO, et al - J Clin Oncol. 2007 Oct 20;25(30):6559-62.    Allergies:Patient has no known allergies  /87   Pulse 84   Temp 36.1 °C (97 °F) (Temporal)   Resp 18   Ht 1.74 m (5' 8.5\")   Wt 93.9 kg (207 lb 0.2 oz)   SpO2 97%   BMI 31.01 kg/m²  Body surface area is 2.13 meters squared.    Labs 11/15/22  SCr 0.76 CrCl >125 mL/min   AST/ALT/AP = 18/16/70 Tbili = 0.6   11/15/22 08:17   POC Protein 100 !     Labs 11/14/22 ([facility])  ANC~4970 Hgb 14 Plt 282k    Irinotecan 150 mg/m2 x 2.13 m2 = 319.5 mg   <10% difference, OK to treat with final dose = 319.6 mg IV    Bevacizumab-bvzr (Zirabev) 5 mg/kg x 93.9 kg = 469.5 mg              <10% difference, OK to treat with final dose = 0 mg IV    Fluorouracil 2000 mg/m2 x 2.13 m2 = 4260 mg    <10% difference, ok to treat with final dose = 4260 mg CIVI over 46 hours   Via CADD pump for home infusion over 46 hours @ 1.9 mL/hr    Jessy Solorio, PharmD, BCOP                  "

## 2022-11-15 ENCOUNTER — OUTPATIENT INFUSION SERVICES (OUTPATIENT)
Dept: ONCOLOGY | Facility: MEDICAL CENTER | Age: 57
End: 2022-11-15
Attending: INTERNAL MEDICINE
Payer: MEDICARE

## 2022-11-15 VITALS
BODY MASS INDEX: 30.66 KG/M2 | RESPIRATION RATE: 18 BRPM | TEMPERATURE: 97 F | HEART RATE: 84 BPM | SYSTOLIC BLOOD PRESSURE: 128 MMHG | OXYGEN SATURATION: 97 % | DIASTOLIC BLOOD PRESSURE: 87 MMHG | WEIGHT: 207.01 LBS | HEIGHT: 69 IN

## 2022-11-15 DIAGNOSIS — C20 RECTAL CARCINOMA (HCC): ICD-10-CM

## 2022-11-15 LAB
ALBUMIN SERPL BCP-MCNC: 4.3 G/DL (ref 3.2–4.9)
ALBUMIN/GLOB SERPL: 1.5 G/DL
ALP SERPL-CCNC: 70 U/L (ref 30–99)
ALT SERPL-CCNC: 16 U/L (ref 2–50)
ANION GAP SERPL CALC-SCNC: 10 MMOL/L (ref 7–16)
APPEARANCE UR: CLEAR
AST SERPL-CCNC: 18 U/L (ref 12–45)
BILIRUB SERPL-MCNC: 0.6 MG/DL (ref 0.1–1.5)
BUN SERPL-MCNC: 19 MG/DL (ref 8–22)
CALCIUM SERPL-MCNC: 9.3 MG/DL (ref 8.5–10.5)
CHLORIDE SERPL-SCNC: 101 MMOL/L (ref 96–112)
CO2 SERPL-SCNC: 27 MMOL/L (ref 20–33)
COLOR UR AUTO: YELLOW
CREAT SERPL-MCNC: 0.76 MG/DL (ref 0.5–1.4)
GFR SERPLBLD CREATININE-BSD FMLA CKD-EPI: 106 ML/MIN/1.73 M 2
GLOBULIN SER CALC-MCNC: 2.8 G/DL (ref 1.9–3.5)
GLUCOSE SERPL-MCNC: 99 MG/DL (ref 65–99)
GLUCOSE UR QL STRIP.AUTO: NEGATIVE MG/DL
KETONES UR QL STRIP.AUTO: ABNORMAL MG/DL
LEUKOCYTE ESTERASE UR QL STRIP.AUTO: NEGATIVE
NITRITE UR QL STRIP.AUTO: NEGATIVE
PH UR STRIP.AUTO: 6.5 [PH] (ref 5–8)
POTASSIUM SERPL-SCNC: 3.6 MMOL/L (ref 3.6–5.5)
PROT SERPL-MCNC: 7.1 G/DL (ref 6–8.2)
PROT UR QL STRIP: 100 MG/DL
RBC UR QL AUTO: ABNORMAL
SODIUM SERPL-SCNC: 138 MMOL/L (ref 135–145)
SP GR UR STRIP.AUTO: >=1.03 (ref 1–1.03)

## 2022-11-15 PROCEDURE — 96413 CHEMO IV INFUSION 1 HR: CPT

## 2022-11-15 PROCEDURE — A4212 NON CORING NEEDLE OR STYLET: HCPCS

## 2022-11-15 PROCEDURE — G0498 CHEMO EXTEND IV INFUS W/PUMP: HCPCS

## 2022-11-15 PROCEDURE — 80053 COMPREHEN METABOLIC PANEL: CPT

## 2022-11-15 PROCEDURE — 700111 HCHG RX REV CODE 636 W/ 250 OVERRIDE (IP): Performed by: INTERNAL MEDICINE

## 2022-11-15 PROCEDURE — 96375 TX/PRO/DX INJ NEW DRUG ADDON: CPT

## 2022-11-15 PROCEDURE — 96415 CHEMO IV INFUSION ADDL HR: CPT

## 2022-11-15 PROCEDURE — 81002 URINALYSIS NONAUTO W/O SCOPE: CPT

## 2022-11-15 PROCEDURE — 700105 HCHG RX REV CODE 258: Performed by: INTERNAL MEDICINE

## 2022-11-15 RX ADMIN — ONDANSETRON HYDROCHLORIDE 16 MG: 2 INJECTION, SOLUTION INTRAMUSCULAR; INTRAVENOUS at 09:06

## 2022-11-15 RX ADMIN — IRINOTECAN HYDROCHLORIDE 319.6 MG: 20 INJECTION, SOLUTION INTRAVENOUS at 09:31

## 2022-11-15 RX ADMIN — FLUOROURACIL 4260 MG: 50 INJECTION, SOLUTION INTRAVENOUS at 11:38

## 2022-11-15 RX ADMIN — DEXAMETHASONE SODIUM PHOSPHATE 12 MG: 4 INJECTION, SOLUTION INTRA-ARTICULAR; INTRALESIONAL; INTRAMUSCULAR; INTRAVENOUS; SOFT TISSUE at 08:50

## 2022-11-15 RX ADMIN — ATROPINE SULFATE 0.5 MG: 1 INJECTION, SOLUTION INTRAVENOUS at 09:31

## 2022-11-15 ASSESSMENT — FIBROSIS 4 INDEX: FIB4 SCORE: 0.91

## 2022-11-15 NOTE — PROGRESS NOTES
Chemotherapy Verification - SECONDARY RN       Height = 1.74 m  Weight = 93.9 kg  BSA = 2.13 m^2       Medication: irinotecan  Dose: 150 mg/m^2  Calculated Dose: 319.5 mg                             (In mg/m2, AUC, mg/kg)     Medication: fluorouracil CADD pump  Dose: 2000 mg/m^2  Calculated Dose: 4260 mg                             (In mg/m2, AUC, mg/kg)    I confirm that this process was performed independently.    Have 4:15 today; can do video

## 2022-11-15 NOTE — PROGRESS NOTES
"Pharmacy Chemotherapy Verification Note:    DX:  Metastatic Rectal Adenocarcinoma    Protocol: FOLFiri + bevacizumab  Irinotecan  IV on day 1  *dose reduced to 150mg/m2 for tolerance/ heavily pretreated  10/25/22 d/c'ed from treatment plan per MD  11/15/22 added back to treatment plan per MD   *discontinued for tolerance/ heavily pretreated  5-FU iv over 46 hrs  *dose reduced to 2000mg/m2 for tolerance/ heavily pretreated  *bolus discontinued for tolerance/ heavily pretreated  bevacizumab 5mg/kg IV on day 1  *3/22/22- HOLD for cycle 1 per MD  11/15/22 HOLD for cycle 17 due to urine protein    q14 days for 8-12 cycles    NCCN Guidelines for Colon Cancer V.2.2018  Bradly V, Et al - Lancet Oncol. 2014 Sep;15(10):106-75. Doi:  10.1016/-[id number]. Epub 2014 Jul 31.  Bulmaro MARRERO et al - J Clin Oncol. 2007 Oct 20;25(30):4185-60.    /87   Pulse 84   Temp 36.1 °C (97 °F) (Temporal)   Resp 18   Ht 1.74 m (5' 8.5\")   Wt 93.9 kg (207 lb 0.2 oz)   SpO2 97%   BMI 31.01 kg/m²     Body surface area is 2.13 meters squared.     Labs 11/14/22 ([facility]):  ANC~ 4970 Plt = 282k   Hgb = 14   Labs 11/15/22 ([facility]):    SCr = 0.76 mg/dL CrCl >125 mL/min   LFT's = WNL TBili = 0.6   Urine protein = 100 mg/dL (2+)  BP = 128/87        Drug Order   (Drug name, dose, route, IV Fluid & volume, frequency, number of doses) Cycle 17      Previous treatment: C16 on 10/25/22     Medication = Irinotecan   Base Dose= 150 mg/m²   Calc Dose: Base Dose x 2.13 m² = 319.5 mg  Final Dose = 319.6 mg  Route = IV  Fluid & Volume = D5W 500 mL  Admin Duration = Over 90 minutes          <10% difference, okay to treat with final dose   Medication = Fluorouracil   Base Dose= 2000 mg/m²   Calc Dose:Base Dose x 2.13 m² =4260 mg  Final Dose = 4260 mg  Route = CIVI  Conc = 50 mg/mL  Fluid & Volume = 85.2 mL (+3 mL of overfill) = 88.2 mL  Admin Duration = Over 46 hours@ 1.9 mL/hr   Via CADD pump for home infusion       <10% difference, okay to treat " with final dose     By my signature below, I confirm this process was performed independently with the BSA and all final chemotherapy dosing calculations congruent. I have reviewed the above chemotherapy order and that my calculation of the final dose and BSA (when applicable) corroborate those calculations of the  pharmacist. Discrepancies of 10% or greater in the written dose have been addressed and documented within the EPIC Progress notes.    Angelina Garay, PharmD

## 2022-11-15 NOTE — PROGRESS NOTES
Chemotherapy Verification - PRIMARY RN      Height = 174 cm  Weight = 93.9 kg  BSA = 2.13 m^2       Medication: irinotecan  Dose: 150 mg/m^2  Calculated Dose: 319.5 mg                             (In mg/m2, AUC, mg/kg)         Medication: fluorouracil  Dose: 2000 mg/m^2  Calculated Dose: 4260 mg over 46 hours                            (In mg/m2, AUC, mg/kg)          I confirm this process was performed independently with the BSA and all final chemotherapy dosing calculations congruent.  Any discrepancies of 10% or greater have been addressed with the chemotherapy pharmacist. The resolution of the discrepancy has been documented in the EPIC progress notes.

## 2022-11-15 NOTE — PROGRESS NOTES
[facility]re [facility] Day [facility]/ Cycle 17 for zirabev, irinotecan, and fluorouracil in CADD pump. Pt denied having any new or acute complaints today, reports tolerating past treatments well. Port accessed in a sterile manner, had + blood return, flushed briskly. Zirabev held per Dr. Aguila due to urine protein level. Pt given pre-medications and irinotecan as prescribed, tolerated well, denied having any complaints during or after infusion. Port had + blood return after, attached CADD pump. CADD pump states that it is infusing at this time. Dre denies having further needs at this time. Next appointment scheduled, Dre discharged home to self care.

## 2022-11-17 ENCOUNTER — OUTPATIENT INFUSION SERVICES (OUTPATIENT)
Dept: ONCOLOGY | Facility: MEDICAL CENTER | Age: 57
End: 2022-11-17
Attending: INTERNAL MEDICINE
Payer: MEDICARE

## 2022-11-17 VITALS — WEIGHT: 207.01 LBS | BODY MASS INDEX: 31.01 KG/M2

## 2022-11-17 DIAGNOSIS — C20 RECTAL CARCINOMA (HCC): ICD-10-CM

## 2022-11-17 PROCEDURE — 96523 IRRIG DRUG DELIVERY DEVICE: CPT

## 2022-11-17 PROCEDURE — 700111 HCHG RX REV CODE 636 W/ 250 OVERRIDE (IP)

## 2022-11-17 RX ORDER — HEPARIN SODIUM (PORCINE) LOCK FLUSH IV SOLN 100 UNIT/ML 100 UNIT/ML
SOLUTION INTRAVENOUS
Status: COMPLETED
Start: 2022-11-17 | End: 2022-11-17

## 2022-11-17 RX ADMIN — HEPARIN 5 ML: 100 SYRINGE at 13:55

## 2022-11-17 ASSESSMENT — FIBROSIS 4 INDEX: FIB4 SCORE: 0.83

## 2022-11-17 NOTE — PROGRESS NOTES
Patient presents for disconnect from home infusion pump. Pump reads volume 0. Pump disconnected and stored in pharmacy chemotherapy disposed of in the proper place. Port flushed per protocol with brisk blood return and de-accessed sterile gauze and paper tape to site. Patient scheduled for his next appointment and released in no acute distress.  
wheelchair/Clothing

## 2022-11-30 RX ORDER — DEXTROSE MONOHYDRATE 50 MG/ML
INJECTION, SOLUTION INTRAVENOUS CONTINUOUS
Status: CANCELLED | OUTPATIENT
Start: 2022-12-07

## 2022-11-30 RX ORDER — 0.9 % SODIUM CHLORIDE 0.9 %
VIAL (ML) INJECTION PRN
Status: CANCELLED | OUTPATIENT
Start: 2022-12-07

## 2022-11-30 RX ORDER — 0.9 % SODIUM CHLORIDE 0.9 %
10 VIAL (ML) INJECTION PRN
Status: CANCELLED | OUTPATIENT
Start: 2022-12-07

## 2022-11-30 RX ORDER — LOPERAMIDE HYDROCHLORIDE 2 MG/1
2 CAPSULE ORAL
Status: CANCELLED | OUTPATIENT
Start: 2022-12-07

## 2022-11-30 RX ORDER — 0.9 % SODIUM CHLORIDE 0.9 %
20 VIAL (ML) INJECTION PRN
Status: CANCELLED | OUTPATIENT
Start: 2022-12-09

## 2022-11-30 RX ORDER — 0.9 % SODIUM CHLORIDE 0.9 %
VIAL (ML) INJECTION PRN
Status: CANCELLED | OUTPATIENT
Start: 2022-12-05

## 2022-11-30 RX ORDER — PROCHLORPERAZINE MALEATE 10 MG
10 TABLET ORAL EVERY 6 HOURS PRN
Status: CANCELLED | OUTPATIENT
Start: 2022-12-07

## 2022-11-30 RX ORDER — 0.9 % SODIUM CHLORIDE 0.9 %
3 VIAL (ML) INJECTION PRN
Status: CANCELLED | OUTPATIENT
Start: 2022-12-07

## 2022-11-30 RX ORDER — METHYLPREDNISOLONE SODIUM SUCCINATE 125 MG/2ML
125 INJECTION, POWDER, LYOPHILIZED, FOR SOLUTION INTRAMUSCULAR; INTRAVENOUS PRN
Status: CANCELLED | OUTPATIENT
Start: 2022-12-07

## 2022-11-30 RX ORDER — SODIUM CHLORIDE 9 MG/ML
INJECTION, SOLUTION INTRAVENOUS CONTINUOUS
Status: CANCELLED | OUTPATIENT
Start: 2022-12-07

## 2022-11-30 RX ORDER — HEPARIN SODIUM (PORCINE) LOCK FLUSH IV SOLN 100 UNIT/ML 100 UNIT/ML
500 SOLUTION INTRAVENOUS PRN
Status: CANCELLED | OUTPATIENT
Start: 2022-12-09

## 2022-11-30 RX ORDER — 0.9 % SODIUM CHLORIDE 0.9 %
10 VIAL (ML) INJECTION PRN
Status: CANCELLED | OUTPATIENT
Start: 2022-12-05

## 2022-11-30 RX ORDER — HEPARIN SODIUM (PORCINE) LOCK FLUSH IV SOLN 100 UNIT/ML 100 UNIT/ML
500 SOLUTION INTRAVENOUS PRN
Status: CANCELLED | OUTPATIENT
Start: 2022-12-05

## 2022-11-30 RX ORDER — EPINEPHRINE 1 MG/ML(1)
0.5 AMPUL (ML) INJECTION PRN
Status: CANCELLED | OUTPATIENT
Start: 2022-12-07

## 2022-11-30 RX ORDER — 0.9 % SODIUM CHLORIDE 0.9 %
3 VIAL (ML) INJECTION PRN
Status: CANCELLED | OUTPATIENT
Start: 2022-12-05

## 2022-11-30 RX ORDER — HEPARIN SODIUM (PORCINE) LOCK FLUSH IV SOLN 100 UNIT/ML 100 UNIT/ML
500 SOLUTION INTRAVENOUS PRN
Status: CANCELLED | OUTPATIENT
Start: 2022-12-07

## 2022-11-30 RX ORDER — LOPERAMIDE HYDROCHLORIDE 2 MG/1
4 CAPSULE ORAL PRN
Status: CANCELLED | OUTPATIENT
Start: 2022-12-07

## 2022-11-30 RX ORDER — ONDANSETRON 2 MG/ML
4 INJECTION INTRAMUSCULAR; INTRAVENOUS PRN
Status: CANCELLED | OUTPATIENT
Start: 2022-12-07

## 2022-11-30 RX ORDER — ONDANSETRON 8 MG/1
8 TABLET, ORALLY DISINTEGRATING ORAL PRN
Status: CANCELLED | OUTPATIENT
Start: 2022-12-07

## 2022-11-30 RX ORDER — DIPHENHYDRAMINE HYDROCHLORIDE 50 MG/ML
50 INJECTION INTRAMUSCULAR; INTRAVENOUS PRN
Status: CANCELLED | OUTPATIENT
Start: 2022-12-07

## 2022-12-06 ENCOUNTER — OUTPATIENT INFUSION SERVICES (OUTPATIENT)
Dept: ONCOLOGY | Facility: MEDICAL CENTER | Age: 57
End: 2022-12-06
Attending: INTERNAL MEDICINE
Payer: MEDICARE

## 2022-12-06 VITALS
WEIGHT: 214.51 LBS | TEMPERATURE: 97 F | OXYGEN SATURATION: 98 % | BODY MASS INDEX: 33.67 KG/M2 | SYSTOLIC BLOOD PRESSURE: 146 MMHG | HEIGHT: 67 IN | DIASTOLIC BLOOD PRESSURE: 88 MMHG | HEART RATE: 80 BPM | RESPIRATION RATE: 18 BRPM

## 2022-12-06 DIAGNOSIS — C20 RECTAL CARCINOMA (HCC): ICD-10-CM

## 2022-12-06 LAB
ALBUMIN SERPL BCP-MCNC: 4.1 G/DL (ref 3.2–4.9)
ALBUMIN/GLOB SERPL: 1.5 G/DL
ALP SERPL-CCNC: 97 U/L (ref 30–99)
ALT SERPL-CCNC: 29 U/L (ref 2–50)
ANION GAP SERPL CALC-SCNC: 12 MMOL/L (ref 7–16)
APPEARANCE UR: CLEAR
AST SERPL-CCNC: 34 U/L (ref 12–45)
BASOPHILS # BLD AUTO: 0.9 % (ref 0–1.8)
BASOPHILS # BLD: 0.04 K/UL (ref 0–0.12)
BILIRUB SERPL-MCNC: 0.3 MG/DL (ref 0.1–1.5)
BUN SERPL-MCNC: 17 MG/DL (ref 8–22)
CALCIUM SERPL-MCNC: 8.8 MG/DL (ref 8.5–10.5)
CHLORIDE SERPL-SCNC: 103 MMOL/L (ref 96–112)
CO2 SERPL-SCNC: 25 MMOL/L (ref 20–33)
COLOR UR AUTO: YELLOW
CREAT SERPL-MCNC: 0.76 MG/DL (ref 0.5–1.4)
EOSINOPHIL # BLD AUTO: 0.4 K/UL (ref 0–0.51)
EOSINOPHIL NFR BLD: 8.9 % (ref 0–6.9)
ERYTHROCYTE [DISTWIDTH] IN BLOOD BY AUTOMATED COUNT: 52.6 FL (ref 35.9–50)
GFR SERPLBLD CREATININE-BSD FMLA CKD-EPI: 106 ML/MIN/1.73 M 2
GLOBULIN SER CALC-MCNC: 2.7 G/DL (ref 1.9–3.5)
GLUCOSE SERPL-MCNC: 95 MG/DL (ref 65–99)
GLUCOSE UR QL STRIP.AUTO: NEGATIVE MG/DL
HCT VFR BLD AUTO: 41.9 % (ref 42–52)
HGB BLD-MCNC: 13.3 G/DL (ref 14–18)
IMM GRANULOCYTES # BLD AUTO: 0.03 K/UL (ref 0–0.11)
IMM GRANULOCYTES NFR BLD AUTO: 0.7 % (ref 0–0.9)
KETONES UR QL STRIP.AUTO: NEGATIVE MG/DL
LEUKOCYTE ESTERASE UR QL STRIP.AUTO: NEGATIVE
LYMPHOCYTES # BLD AUTO: 0.84 K/UL (ref 1–4.8)
LYMPHOCYTES NFR BLD: 18.8 % (ref 22–41)
MCH RBC QN AUTO: 27.1 PG (ref 27–33)
MCHC RBC AUTO-ENTMCNC: 31.7 G/DL (ref 33.7–35.3)
MCV RBC AUTO: 85.5 FL (ref 81.4–97.8)
MONOCYTES # BLD AUTO: 0.6 K/UL (ref 0–0.85)
MONOCYTES NFR BLD AUTO: 13.4 % (ref 0–13.4)
NEUTROPHILS # BLD AUTO: 2.57 K/UL (ref 1.82–7.42)
NEUTROPHILS NFR BLD: 57.3 % (ref 44–72)
NITRITE UR QL STRIP.AUTO: NEGATIVE
NRBC # BLD AUTO: 0.02 K/UL
NRBC BLD-RTO: 0.4 /100 WBC
OUTPT INFUS CBC COMMENT OICOM: ABNORMAL
PH UR STRIP.AUTO: 6.5 [PH] (ref 5–8)
PLATELET # BLD AUTO: 263 K/UL (ref 164–446)
PMV BLD AUTO: 9.7 FL (ref 9–12.9)
POTASSIUM SERPL-SCNC: 3.9 MMOL/L (ref 3.6–5.5)
PROT SERPL-MCNC: 6.8 G/DL (ref 6–8.2)
PROT UR QL STRIP: ABNORMAL MG/DL
RBC # BLD AUTO: 4.9 M/UL (ref 4.7–6.1)
RBC UR QL AUTO: ABNORMAL
SODIUM SERPL-SCNC: 140 MMOL/L (ref 135–145)
SP GR UR STRIP.AUTO: >=1.03 (ref 1–1.03)
WBC # BLD AUTO: 4.5 K/UL (ref 4.8–10.8)

## 2022-12-06 PROCEDURE — 85025 COMPLETE CBC W/AUTO DIFF WBC: CPT

## 2022-12-06 PROCEDURE — 81002 URINALYSIS NONAUTO W/O SCOPE: CPT

## 2022-12-06 PROCEDURE — 96411 CHEMO IV PUSH ADDL DRUG: CPT

## 2022-12-06 PROCEDURE — 96375 TX/PRO/DX INJ NEW DRUG ADDON: CPT

## 2022-12-06 PROCEDURE — 700105 HCHG RX REV CODE 258: Performed by: INTERNAL MEDICINE

## 2022-12-06 PROCEDURE — G0498 CHEMO EXTEND IV INFUS W/PUMP: HCPCS

## 2022-12-06 PROCEDURE — 96413 CHEMO IV INFUSION 1 HR: CPT

## 2022-12-06 PROCEDURE — 96417 CHEMO IV INFUS EACH ADDL SEQ: CPT

## 2022-12-06 PROCEDURE — 80053 COMPREHEN METABOLIC PANEL: CPT

## 2022-12-06 PROCEDURE — 96415 CHEMO IV INFUSION ADDL HR: CPT

## 2022-12-06 PROCEDURE — 700111 HCHG RX REV CODE 636 W/ 250 OVERRIDE (IP): Performed by: INTERNAL MEDICINE

## 2022-12-06 RX ADMIN — ATROPINE SULFATE 0.5 MG: 1 INJECTION, SOLUTION INTRAMUSCULAR; INTRAVENOUS; SUBCUTANEOUS at 09:14

## 2022-12-06 RX ADMIN — IRINOTECAN HYDROCHLORIDE 322.6 MG: 20 INJECTION, SOLUTION INTRAVENOUS at 09:24

## 2022-12-06 RX ADMIN — ONDANSETRON 16 MG: 2 INJECTION INTRAMUSCULAR; INTRAVENOUS at 08:38

## 2022-12-06 RX ADMIN — SODIUM CHLORIDE 500 MG: 9 INJECTION, SOLUTION INTRAVENOUS at 11:01

## 2022-12-06 RX ADMIN — FLUOROURACIL 4300 MG: 50 INJECTION, SOLUTION INTRAVENOUS at 11:37

## 2022-12-06 RX ADMIN — DEXAMETHASONE SODIUM PHOSPHATE 12 MG: 4 INJECTION, SOLUTION INTRA-ARTICULAR; INTRALESIONAL; INTRAMUSCULAR; INTRAVENOUS; SOFT TISSUE at 08:58

## 2022-12-06 ASSESSMENT — FIBROSIS 4 INDEX: FIB4 SCORE: 0.83

## 2022-12-06 NOTE — PROGRESS NOTES
Mr Eaton is here today for Zirabev and 5FU. He has no new concerns to report today. Medi port was accessed to the right chest, flushed well with good blood return. Labs drawn per orders, [facility] did not send labs from yesterday.    Labs are within parameters for treatment.    Pre meds given per MAR and were tolerated well.    Chemotherapy infused per MAR and was tolerated well.    CADD pump was connected, turned on and placed in RUN mode. Placed in carry case.    Pt was discharged home in stable condition and will return for pump removal.

## 2022-12-06 NOTE — PROGRESS NOTES
Chemotherapy Verification - SECONDARY RN       Height = 1.71m  Weight = 97.3kg  BSA = 2.15m2       Medication: irinotecan  Dose: 150mg/m2  Calculated Dose: 322.5mg                             (In mg/m2, AUC, mg/kg)     Medication: bevacizumab-bvzr  Dose: 5mg/kg  Calculated Dose: 486.5mg                             (In mg/m2, AUC, mg/kg)    Medication: fluorouracil  Dose: 2000mg/m2  Calculated Dose: 4300mg                             (In mg/m2, AUC, mg/kg)      I confirm that this process was performed independently.

## 2022-12-06 NOTE — PROGRESS NOTES
"Pharmacy Chemotherapy Verification Note:    DX:  Metastatic Rectal Adenocarcinoma    Protocol: FOLFiri + bevacizumab  Irinotecan  IV on day 1  *dose reduced to 150mg/m2 for tolerance/ heavily pretreated  10/25/22 d/c'ed from treatment plan per MD  11/15/22 added back to treatment plan per MD   *discontinued for tolerance/ heavily pretreated  5-FU iv over 46 hrs  *dose reduced to 2000mg/m2 for tolerance/ heavily pretreated  *bolus discontinued for tolerance/ heavily pretreated  bevacizumab 5mg/kg IV on day 1  *3/22/22- HOLD for cycle 1 per MD  11/15/22 HOLD for cycle 17 due to urine protein    q14 days for 8-12 cycles    NCCN Guidelines for Colon Cancer V.2.2018  Bradly V, Et al - Lancet Oncol. 2014 Sep;15(10):1068-75. Doi:  [id number]. Epub 2014 Jul 31.  Bulmaro MARRERO et al - J Clin Oncol. 2007 Oct 20;25(30):8867-74.    BP (!) 146/88   Pulse 80   Temp 36.1 °C (97 °F) (Temporal)   Resp 18   Ht 1.71 m (5' 7.32\")   Wt 97.3 kg (214 lb 8.1 oz)   SpO2 98%   BMI 33.28 kg/m²     Body surface area is 2.15 meters squared.     Labs 12/6/22:  ANC~ 2570 Plt = 263k   Hgb = 13.3     SCr = 0.76 mg/dL CrCl >125 mL/min   LFT's = WNL TBili = 0.3   Urine protein = trace BP = 146/88        Drug Order   (Drug name, dose, route, IV Fluid & volume, frequency, number of doses) Cycle 18     Previous treatment: C17 on 11/15/22     Medication = Irinotecan   Base Dose= 150 mg/m²   Calc Dose: Base Dose x 2.15 m² = 322.5 mg  Final Dose = 322.6 mg  Route = IV  Fluid & Volume = D5W 500 mL  Admin Duration = Over 90 minutes          <10% difference, okay to treat with final dose   Medication = Bevacizumab-bvzr (Zirabev)  Base Dose= 5 mg/kg   Calc Dose: Base Dose x 97.3 kg = 486.5 mg  Final Dose = 500 mg  Route = IV  Fluid & Volume =  mL  Admin Duration = Over 30 minutes          Rounded to nearest vial size (within 10%) per rounding protocol, okay to treat with final dose   Medication = Fluorouracil   Base Dose= 2000 " mg/m²   Calc Dose:Base Dose x 2.15 m² =4300 mg  Final Dose = 4300 mg  Route = CIVI  Conc = 50 mg/mL  Fluid & Volume = 86 mL (+3 mL of overfill) = 89 mL  Admin Duration = Over 46 hours@ 1.9 mL/hr   Via CADD pump for home infusion       <10% difference, okay to treat with final dose     By my signature below, I confirm this process was performed independently with the BSA and all final chemotherapy dosing calculations congruent. I have reviewed the above chemotherapy order and that my calculation of the final dose and BSA (when applicable) corroborate those calculations of the  pharmacist. Discrepancies of 10% or greater in the written dose have been addressed and documented within the EPIC Progress notes.    Angelina Garay, PharmD

## 2022-12-06 NOTE — PROGRESS NOTES
Chemotherapy Verification - PRIMARY RN      Height = 1.71  Weight = 97.3 kg  BSA = 2.15 m2       Medication: Irinotecan  Dose: 150mg/m2  Calculated Dose: 322.5 mg                             (In mg/m2, AUC, mg/kg)     Medication: Zirabev  Dose: 5mg/kg  Calculated Dose: 486.5 mg                             (In mg/m2, AUC, mg/kg)    Medication: fluorouracil   Dose: 2000mg/m2  Calculated Dose: 4300 mg                             (In mg/m2, AUC, mg/kg)        I confirm this process was performed independently with the BSA and all final chemotherapy dosing calculations congruent.  Any discrepancies of 10% or greater have been addressed with the chemotherapy pharmacist. The resolution of the discrepancy has been documented in the EPIC progress notes.

## 2022-12-08 ENCOUNTER — OUTPATIENT INFUSION SERVICES (OUTPATIENT)
Dept: ONCOLOGY | Facility: MEDICAL CENTER | Age: 57
End: 2022-12-08
Attending: INTERNAL MEDICINE
Payer: MEDICARE

## 2022-12-08 VITALS
DIASTOLIC BLOOD PRESSURE: 97 MMHG | RESPIRATION RATE: 18 BRPM | TEMPERATURE: 96.8 F | OXYGEN SATURATION: 97 % | SYSTOLIC BLOOD PRESSURE: 151 MMHG | HEART RATE: 86 BPM

## 2022-12-08 DIAGNOSIS — C20 RECTAL CARCINOMA (HCC): ICD-10-CM

## 2022-12-08 PROCEDURE — 96374 THER/PROPH/DIAG INJ IV PUSH: CPT

## 2022-12-08 PROCEDURE — 700111 HCHG RX REV CODE 636 W/ 250 OVERRIDE (IP)

## 2022-12-08 PROCEDURE — 700111 HCHG RX REV CODE 636 W/ 250 OVERRIDE (IP): Performed by: INTERNAL MEDICINE

## 2022-12-08 RX ORDER — HEPARIN SODIUM (PORCINE) LOCK FLUSH IV SOLN 100 UNIT/ML 100 UNIT/ML
500 SOLUTION INTRAVENOUS PRN
Status: DISCONTINUED | OUTPATIENT
Start: 2022-12-08 | End: 2022-12-08 | Stop reason: HOSPADM

## 2022-12-08 RX ORDER — ONDANSETRON 2 MG/ML
INJECTION INTRAMUSCULAR; INTRAVENOUS
Status: COMPLETED
Start: 2022-12-08 | End: 2022-12-08

## 2022-12-08 RX ORDER — ONDANSETRON 2 MG/ML
8 INJECTION INTRAMUSCULAR; INTRAVENOUS ONCE
Status: COMPLETED | OUTPATIENT
Start: 2022-12-08 | End: 2022-12-08

## 2022-12-08 RX ORDER — 0.9 % SODIUM CHLORIDE 0.9 %
20 VIAL (ML) INJECTION PRN
Status: DISCONTINUED | OUTPATIENT
Start: 2022-12-08 | End: 2022-12-08 | Stop reason: HOSPADM

## 2022-12-08 RX ORDER — SODIUM CHLORIDE 9 MG/ML
500 INJECTION, SOLUTION INTRAVENOUS ONCE
Status: DISCONTINUED | OUTPATIENT
Start: 2022-12-08 | End: 2022-12-08 | Stop reason: HOSPADM

## 2022-12-08 RX ADMIN — ONDANSETRON 8 MG: 2 INJECTION INTRAMUSCULAR; INTRAVENOUS at 11:47

## 2022-12-08 RX ADMIN — HEPARIN 500 UNITS: 100 SYRINGE at 11:52

## 2022-12-08 NOTE — PROGRESS NOTES
Pt to [facility] for pump disconnect. POC discussed, pt verbalized understanding. Pt arrived nauseated, states he hasn't had anything to drink today, ate a banana only, feels nauseous. RN called RODO Ndiaye, and provider ordered Zofran 8mg for nausea x1 dose now and 500ml NS bolus if pt feels he needs the extra fluids. Updated pt on APRNs orders and pt agreeable to Zofran, declines IVFs at this time. Pump reads 87.5ml given, reservoir volume of 0.0ml. Pump disconnected, PORT flushed, brisk blood return noted. Zofran administered per MAR over 2 mins followed by NS/Heparin per protocol (see MAR). PORT deaccessed with needle intact; Pt tolerated well. Pt given chicken broth, encouraged hydration and discharged home ambulatory in NAD. Next appt confirmed with pt prior to discharge.

## 2022-12-20 ENCOUNTER — HOSPITAL ENCOUNTER (OUTPATIENT)
Dept: RADIOLOGY | Facility: MEDICAL CENTER | Age: 57
End: 2022-12-20
Attending: INTERNAL MEDICINE
Payer: MEDICARE

## 2022-12-20 DIAGNOSIS — R97.0 ELEVATED CARCINOEMBRYONIC ANTIGEN (CEA): ICD-10-CM

## 2022-12-20 DIAGNOSIS — Z51.12 ENCOUNTER FOR ANTINEOPLASTIC IMMUNOTHERAPY: ICD-10-CM

## 2022-12-20 DIAGNOSIS — Z45.2 ENCOUNTER FOR ADJUSTMENT OR MANAGEMENT OF VASCULAR ACCESS DEVICE: ICD-10-CM

## 2022-12-20 DIAGNOSIS — D50.8 OTHER IRON DEFICIENCY ANEMIAS: ICD-10-CM

## 2022-12-20 DIAGNOSIS — C20 MALIGNANT NEOPLASM OF RECTUM (HCC): ICD-10-CM

## 2022-12-20 DIAGNOSIS — Z79.899 OTHER LONG TERM (CURRENT) DRUG THERAPY: ICD-10-CM

## 2022-12-20 DIAGNOSIS — Z51.11 ENCOUNTER FOR ANTINEOPLASTIC CHEMOTHERAPY: ICD-10-CM

## 2022-12-20 PROCEDURE — 700117 HCHG RX CONTRAST REV CODE 255: Performed by: INTERNAL MEDICINE

## 2022-12-20 PROCEDURE — 700111 HCHG RX REV CODE 636 W/ 250 OVERRIDE (IP)

## 2022-12-20 PROCEDURE — 71260 CT THORAX DX C+: CPT

## 2022-12-20 RX ORDER — HEPARIN SODIUM (PORCINE) LOCK FLUSH IV SOLN 100 UNIT/ML 100 UNIT/ML
SOLUTION INTRAVENOUS
Status: COMPLETED
Start: 2022-12-20 | End: 2022-12-20

## 2022-12-20 RX ADMIN — IOHEXOL 100 ML: 350 INJECTION, SOLUTION INTRAVENOUS at 13:10

## 2022-12-20 RX ADMIN — HEPARIN 500 UNITS: 100 SYRINGE at 13:15

## 2022-12-20 NOTE — PROCEDURES
Port to right chest wall accessed per protocol with Joaquin needle. Port flushes easily with brisk blood return. Port deaccessed per protocol flushing with 20ml normal saline and 500 units heparin. Blood return verified prior to deaccess. Patient tolerated well with minor discomfort.

## 2022-12-22 RX ORDER — EPINEPHRINE 1 MG/ML(1)
0.5 AMPUL (ML) INJECTION PRN
Status: CANCELLED | OUTPATIENT
Start: 2022-12-28

## 2022-12-22 RX ORDER — DIPHENHYDRAMINE HYDROCHLORIDE 50 MG/ML
50 INJECTION INTRAMUSCULAR; INTRAVENOUS PRN
Status: CANCELLED | OUTPATIENT
Start: 2022-12-28

## 2022-12-22 RX ORDER — 0.9 % SODIUM CHLORIDE 0.9 %
VIAL (ML) INJECTION PRN
Status: CANCELLED | OUTPATIENT
Start: 2022-12-27

## 2022-12-22 RX ORDER — 0.9 % SODIUM CHLORIDE 0.9 %
10 VIAL (ML) INJECTION PRN
Status: CANCELLED | OUTPATIENT
Start: 2022-12-27

## 2022-12-22 RX ORDER — 0.9 % SODIUM CHLORIDE 0.9 %
3 VIAL (ML) INJECTION PRN
Status: CANCELLED | OUTPATIENT
Start: 2022-12-27

## 2022-12-22 RX ORDER — SODIUM CHLORIDE 9 MG/ML
INJECTION, SOLUTION INTRAVENOUS CONTINUOUS
Status: CANCELLED | OUTPATIENT
Start: 2022-12-28

## 2022-12-22 RX ORDER — 0.9 % SODIUM CHLORIDE 0.9 %
VIAL (ML) INJECTION PRN
Status: CANCELLED | OUTPATIENT
Start: 2022-12-28

## 2022-12-22 RX ORDER — HEPARIN SODIUM (PORCINE) LOCK FLUSH IV SOLN 100 UNIT/ML 100 UNIT/ML
500 SOLUTION INTRAVENOUS PRN
Status: CANCELLED | OUTPATIENT
Start: 2022-12-27

## 2022-12-22 RX ORDER — 0.9 % SODIUM CHLORIDE 0.9 %
3 VIAL (ML) INJECTION PRN
Status: CANCELLED | OUTPATIENT
Start: 2022-12-28

## 2022-12-22 RX ORDER — 0.9 % SODIUM CHLORIDE 0.9 %
20 VIAL (ML) INJECTION PRN
Status: CANCELLED | OUTPATIENT
Start: 2022-12-30

## 2022-12-22 RX ORDER — METHYLPREDNISOLONE SODIUM SUCCINATE 125 MG/2ML
125 INJECTION, POWDER, LYOPHILIZED, FOR SOLUTION INTRAMUSCULAR; INTRAVENOUS PRN
Status: CANCELLED | OUTPATIENT
Start: 2022-12-28

## 2022-12-22 RX ORDER — 0.9 % SODIUM CHLORIDE 0.9 %
10 VIAL (ML) INJECTION PRN
Status: CANCELLED | OUTPATIENT
Start: 2022-12-28

## 2022-12-22 RX ORDER — DEXTROSE MONOHYDRATE 50 MG/ML
INJECTION, SOLUTION INTRAVENOUS CONTINUOUS
Status: CANCELLED | OUTPATIENT
Start: 2022-12-28

## 2022-12-22 RX ORDER — PROCHLORPERAZINE MALEATE 10 MG
10 TABLET ORAL EVERY 6 HOURS PRN
Status: CANCELLED | OUTPATIENT
Start: 2022-12-28

## 2022-12-22 RX ORDER — LOPERAMIDE HYDROCHLORIDE 2 MG/1
4 CAPSULE ORAL PRN
Status: CANCELLED | OUTPATIENT
Start: 2022-12-28

## 2022-12-22 RX ORDER — ONDANSETRON 2 MG/ML
4 INJECTION INTRAMUSCULAR; INTRAVENOUS PRN
Status: CANCELLED | OUTPATIENT
Start: 2022-12-28

## 2022-12-22 RX ORDER — HEPARIN SODIUM (PORCINE) LOCK FLUSH IV SOLN 100 UNIT/ML 100 UNIT/ML
500 SOLUTION INTRAVENOUS PRN
Status: CANCELLED | OUTPATIENT
Start: 2022-12-28

## 2022-12-22 RX ORDER — HEPARIN SODIUM (PORCINE) LOCK FLUSH IV SOLN 100 UNIT/ML 100 UNIT/ML
500 SOLUTION INTRAVENOUS PRN
Status: CANCELLED | OUTPATIENT
Start: 2022-12-30

## 2022-12-22 RX ORDER — ONDANSETRON 8 MG/1
8 TABLET, ORALLY DISINTEGRATING ORAL PRN
Status: CANCELLED | OUTPATIENT
Start: 2022-12-28

## 2022-12-22 RX ORDER — LOPERAMIDE HYDROCHLORIDE 2 MG/1
2 CAPSULE ORAL
Status: CANCELLED | OUTPATIENT
Start: 2022-12-28

## 2022-12-27 ENCOUNTER — OUTPATIENT INFUSION SERVICES (OUTPATIENT)
Dept: ONCOLOGY | Facility: MEDICAL CENTER | Age: 57
End: 2022-12-27
Attending: INTERNAL MEDICINE
Payer: MEDICARE

## 2022-12-27 VITALS
TEMPERATURE: 97.2 F | HEIGHT: 67 IN | BODY MASS INDEX: 33.63 KG/M2 | OXYGEN SATURATION: 96 % | HEART RATE: 86 BPM | RESPIRATION RATE: 18 BRPM | DIASTOLIC BLOOD PRESSURE: 89 MMHG | SYSTOLIC BLOOD PRESSURE: 124 MMHG | WEIGHT: 214.29 LBS

## 2022-12-27 DIAGNOSIS — C20 RECTAL CARCINOMA (HCC): ICD-10-CM

## 2022-12-27 LAB
ALBUMIN SERPL BCP-MCNC: 4 G/DL (ref 3.2–4.9)
ALBUMIN/GLOB SERPL: 1.5 G/DL
ALP SERPL-CCNC: 82 U/L (ref 30–99)
ALT SERPL-CCNC: 24 U/L (ref 2–50)
ANION GAP SERPL CALC-SCNC: 11 MMOL/L (ref 7–16)
APPEARANCE UR: CLEAR
AST SERPL-CCNC: 25 U/L (ref 12–45)
BASOPHILS # BLD AUTO: 1.6 % (ref 0–1.8)
BASOPHILS # BLD: 0.07 K/UL (ref 0–0.12)
BILIRUB SERPL-MCNC: 0.4 MG/DL (ref 0.1–1.5)
BUN SERPL-MCNC: 17 MG/DL (ref 8–22)
CALCIUM ALBUM COR SERPL-MCNC: 9.2 MG/DL (ref 8.5–10.5)
CALCIUM SERPL-MCNC: 9.2 MG/DL (ref 8.5–10.5)
CHLORIDE SERPL-SCNC: 104 MMOL/L (ref 96–112)
CO2 SERPL-SCNC: 24 MMOL/L (ref 20–33)
COLOR UR AUTO: YELLOW
CREAT SERPL-MCNC: 0.78 MG/DL (ref 0.5–1.4)
EOSINOPHIL # BLD AUTO: 0.42 K/UL (ref 0–0.51)
EOSINOPHIL NFR BLD: 9.5 % (ref 0–6.9)
ERYTHROCYTE [DISTWIDTH] IN BLOOD BY AUTOMATED COUNT: 51.6 FL (ref 35.9–50)
GFR SERPLBLD CREATININE-BSD FMLA CKD-EPI: 105 ML/MIN/1.73 M 2
GLOBULIN SER CALC-MCNC: 2.7 G/DL (ref 1.9–3.5)
GLUCOSE SERPL-MCNC: 101 MG/DL (ref 65–99)
GLUCOSE UR QL STRIP.AUTO: NEGATIVE MG/DL
HCT VFR BLD AUTO: 44.1 % (ref 42–52)
HGB BLD-MCNC: 14.2 G/DL (ref 14–18)
IMM GRANULOCYTES # BLD AUTO: 0.04 K/UL (ref 0–0.11)
IMM GRANULOCYTES NFR BLD AUTO: 0.9 % (ref 0–0.9)
KETONES UR QL STRIP.AUTO: NEGATIVE MG/DL
LEUKOCYTE ESTERASE UR QL STRIP.AUTO: NEGATIVE
LYMPHOCYTES # BLD AUTO: 0.75 K/UL (ref 1–4.8)
LYMPHOCYTES NFR BLD: 17 % (ref 22–41)
MCH RBC QN AUTO: 27.2 PG (ref 27–33)
MCHC RBC AUTO-ENTMCNC: 32.2 G/DL (ref 33.7–35.3)
MCV RBC AUTO: 84.3 FL (ref 81.4–97.8)
MONOCYTES # BLD AUTO: 0.6 K/UL (ref 0–0.85)
MONOCYTES NFR BLD AUTO: 13.6 % (ref 0–13.4)
NEUTROPHILS # BLD AUTO: 2.52 K/UL (ref 1.82–7.42)
NEUTROPHILS NFR BLD: 57.4 % (ref 44–72)
NITRITE UR QL STRIP.AUTO: NEGATIVE
NRBC # BLD AUTO: 0 K/UL
NRBC BLD-RTO: 0 /100 WBC
OUTPT INFUS CBC COMMENT OICOM: ABNORMAL
PH UR STRIP.AUTO: 6.5 [PH] (ref 5–8)
PLATELET # BLD AUTO: 292 K/UL (ref 164–446)
PMV BLD AUTO: 9.6 FL (ref 9–12.9)
POTASSIUM SERPL-SCNC: 3.9 MMOL/L (ref 3.6–5.5)
PROT SERPL-MCNC: 6.7 G/DL (ref 6–8.2)
PROT UR QL STRIP: NEGATIVE MG/DL
RBC # BLD AUTO: 5.23 M/UL (ref 4.7–6.1)
RBC UR QL AUTO: ABNORMAL
SODIUM SERPL-SCNC: 139 MMOL/L (ref 135–145)
SP GR UR STRIP.AUTO: 1.02 (ref 1–1.03)
WBC # BLD AUTO: 4.4 K/UL (ref 4.8–10.8)

## 2022-12-27 PROCEDURE — 96417 CHEMO IV INFUS EACH ADDL SEQ: CPT

## 2022-12-27 PROCEDURE — 700111 HCHG RX REV CODE 636 W/ 250 OVERRIDE (IP): Performed by: INTERNAL MEDICINE

## 2022-12-27 PROCEDURE — 80053 COMPREHEN METABOLIC PANEL: CPT

## 2022-12-27 PROCEDURE — 96413 CHEMO IV INFUSION 1 HR: CPT

## 2022-12-27 PROCEDURE — 700105 HCHG RX REV CODE 258: Performed by: INTERNAL MEDICINE

## 2022-12-27 PROCEDURE — 85025 COMPLETE CBC W/AUTO DIFF WBC: CPT

## 2022-12-27 PROCEDURE — A4212 NON CORING NEEDLE OR STYLET: HCPCS

## 2022-12-27 PROCEDURE — G0498 CHEMO EXTEND IV INFUS W/PUMP: HCPCS

## 2022-12-27 PROCEDURE — 96375 TX/PRO/DX INJ NEW DRUG ADDON: CPT

## 2022-12-27 PROCEDURE — 81002 URINALYSIS NONAUTO W/O SCOPE: CPT

## 2022-12-27 RX ADMIN — FLUOROURACIL 4300 MG: 50 INJECTION, SOLUTION INTRAVENOUS at 11:56

## 2022-12-27 RX ADMIN — ATROPINE SULFATE 0.5 MG: 1 INJECTION, SOLUTION INTRAVENOUS at 09:19

## 2022-12-27 RX ADMIN — DEXAMETHASONE SODIUM PHOSPHATE 12 MG: 4 INJECTION, SOLUTION INTRAMUSCULAR; INTRAVENOUS at 08:35

## 2022-12-27 RX ADMIN — IRINOTECAN HYDROCHLORIDE 322.6 MG: 20 INJECTION, SOLUTION INTRAVENOUS at 09:21

## 2022-12-27 RX ADMIN — ONDANSETRON 16 MG: 2 INJECTION INTRAMUSCULAR; INTRAVENOUS at 08:47

## 2022-12-27 RX ADMIN — SODIUM CHLORIDE 500 MG: 9 INJECTION, SOLUTION INTRAVENOUS at 11:05

## 2022-12-27 ASSESSMENT — FIBROSIS 4 INDEX: FIB4 SCORE: 1.32

## 2022-12-27 NOTE — PROGRESS NOTES
Dre presents today to [facility]  D1C19 Folfiri/ Zirabev.  Dre denies s/s active infections, voices no new complaints.  Right upper chest port accessed in sterile fashion, + blood return observed.  Labs drawn and reviewed, UA completed, results within parameters to receive treatment.        Pre-medications administered.  Atropine given prior to irinotecan.  Irintotecan administered per MAR.  Zirabev adminsitered per MAR with NS.  Port flushed with NS, + blood return observed.  5FU CADD pump connected, all connections secure, all clamps unclamped.  Pump in RUN mode.  Dre discharged in NAD, returns in 46 hours for port de-access.

## 2022-12-27 NOTE — PROGRESS NOTES
"Pharmacy Chemotherapy Verification Note:    DX:  Metastatic Rectal Adenocarcinoma    Protocol: FOLFiri + bevacizumab  Irinotecan  IV on day 1  *dose reduced to 150mg/m2 for tolerance/ heavily pretreated  10/25/22 d/c'ed from treatment plan per MD  11/15/22 added back to treatment plan per MD   *discontinued for tolerance/ heavily pretreated  5-FU iv over 46 hrs  *dose reduced to 2000mg/m2 for tolerance/ heavily pretreated  *bolus discontinued for tolerance/ heavily pretreated  bevacizumab 5mg/kg IV on day 1  *3/22/22- HOLD for cycle 1 per MD  11/15/22 HOLD for cycle 17 due to urine protein    q14 days for 8-12 cycles    NCCN Guidelines for Colon Cancer V.2.2018  Bradly V, Et al - Lancet Oncol. 2014 Sep;15(10):1067-75. Doi:  10.1016/-[id number]. Epub 2014 Jul 31.  Bulmaro MARRERO et al - J Clin Oncol. 2007 Oct 20;25(30):7862-47.    /89   Pulse 86   Temp 36.2 °C (97.2 °F) (Temporal)   Resp 18   Ht 1.71 m (5' 7.32\")   Wt 97.2 kg (214 lb 4.6 oz)   SpO2 96%   BMI 33.24 kg/m²     Body surface area is 2.15 meters squared.     Labs 12/27/22:  ANC~ 2520 Plt = 292k   Hgb = 14.2     SCr = 0.78 mg/dL CrCl >125 mL/min   AST/ALT/AP = 25/24/82 TBili = 0.4   Urine protein = negative BP = 124/89    Drug Order   (Drug name, dose, route, IV Fluid & volume, frequency, number of doses) Cycle 19  Previous treatment: C18 on 12/6/22     Medication = Irinotecan   Base Dose= 150 mg/m²   Calc Dose: Base Dose x 2.15 m² = 322.5 mg  Final Dose = 322.6 mg  Route = IV  Fluid & Volume = D5W 500 mL  Admin Duration = Over 90 minutes          <10% difference, okay to treat with final dose   Medication = Bevacizumab-bvzr (Zirabev)  Base Dose= 5 mg/kg   Calc Dose: Base Dose x 97.2 kg = 486 mg  Final Dose = 500 mg  Route = IV  Fluid & Volume =  mL  Admin Duration = Over 30 minutes          Rounded to nearest vial size (within 10%) per rounding protocol, okay to treat with final dose   Medication = Fluorouracil   Base Dose= 2000 " mg/m²   Calc Dose:Base Dose x 2.15 m² =4300 mg  Final Dose = 4300 mg  Route = CIVI  Conc = 50 mg/mL  Fluid & Volume = 86 mL (+3 mL of overfill) = 89 mL  Admin Duration = Over 46 hours @ 1.9 mL/hr   Via CADD pump for home infusion       <10% difference, okay to treat with final dose     By my signature below, I confirm this process was performed independently with the BSA and all final chemotherapy dosing calculations congruent. I have reviewed the above chemotherapy order and that my calculation of the final dose and BSA (when applicable) corroborate those calculations of the  pharmacist. Discrepancies of 10% or greater in the written dose have been addressed and documented within the EPIC Progress notes.    Kj Winston, PharmD

## 2022-12-27 NOTE — PROGRESS NOTES
Chemotherapy Verification - SECONDARY RN       Height = 171 cm  Weight = 97.2 kg  BSA = 2.15 m2       Medication: irinotecan  Dose: 150 mg/m2  Calculated Dose: 322.5 mg                             (In mg/m2, AUC, mg/kg)     Medication: Zirabev  Dose: 5 mg/kg  Calculated Dose: 486 mg                             (In mg/m2, AUC, mg/kg)    Medication: 5FU CADD pump  Dose: 2000 mg/m2  Calculated Dose: 4300 mg                             (In mg/m2, AUC, mg/kg)    I confirm that this process was performed independently.

## 2022-12-27 NOTE — PROGRESS NOTES
Chemotherapy Verification - PRIMARY RN      Height = 171 cm  Weight = 97.2 kg  BSA = 2.15 m2       Medication: irinotecan  Dose: 150 mg/m2  Calculated Dose: 322.5 mg                             (In mg/m2, AUC, mg/kg)     Medication: bevacizumab-bvzr  Dose: 5 mg/kg  Calculated Dose: 486 mg                             (In mg/m2, AUC, mg/kg)    Medication: fluorouracil  Dose: 2000 mg/m2  Calculated Dose: 4300 mg                             (In mg/m2, AUC, mg/kg)          I confirm this process was performed independently with the BSA and all final chemotherapy dosing calculations congruent.  Any discrepancies of 10% or greater have been addressed with the chemotherapy pharmacist. The resolution of the discrepancy has been documented in the EPIC progress notes.

## 2022-12-27 NOTE — PROGRESS NOTES
"Pharmacy Chemotherapy Verification    DX:  Metastatic Rectal Adenocarcinoma    Cycle 19  Previous treatment: C18 12/6/22    Protocol: FOLFiri + bevacizumab  Irinotecan  IV on day 1   -C6 10/25/22 Remove from the plan per  Dr. Aguila  -Added back at 150 mg/m2 starting Cycle 17   -Discontinued for tolerance/heavily pretreated  5-FU iv over 46 hrs   -Dose reduced to 2000 mg/m2 for tolerance/heavily pretreated   -Bolus discontinued for tolerance/heavily pretreated  Bevacizumab 5 mg/kg V on day 1   -3/22/22- HOLD for cycle 1 per MD   -11/15/22 HOLD for Cycle 17 due to proteinuria   for 8-12 cycles  Q21 day cycle  starting C16 10/25/22 per MD  NCCN Guidelines for Colon Cancer V.2.2018  Bradly V, Et al - Lancet Oncol. 2014 Sep;15(10):1060-75. Doi:  10.1016/-[id number]. Epub 2014 Jul 31.  Bulmaro MARRERO, et al - J Clin Oncol. 2007 Oct 20;25(30):8143-34.    Allergies:Patient has no known allergies  BP (!) 143/91   Pulse 86   Temp 36.2 °C (97.2 °F) (Temporal)   Resp 18   Ht 1.71 m (5' 7.32\")   Wt 97.2 kg (214 lb 4.6 oz)   SpO2 96%   BMI 33.24 kg/m²  Body surface area is 2.15 meters squared.    All lab results, BP and urine protein 12/27/22 within treatment parameters.       Irinotecan 150 mg/m2 x 2.15m2 = 322.5mg   <10% difference, OK to treat with final dose = 322.6mg IV    Bevacizumab-bvzr (Zirabev) 5 mg/kg x 97.2kg = 486mg   Rounded to vial size(within 10%) per dose rounding protocol.               OK to treat with final dose = 500mg IV    Fluorouracil 2000 mg/m2 x 2.15m2 = 4300mg    <10% difference, ok to treat with final dose = 4300mg CIVI over 46 hours   Via CADD pump for home infusion over 46 hours @ 1.9 mL/hr    HEATHER Grande PharmRohiniD.                    "

## 2022-12-29 ENCOUNTER — OUTPATIENT INFUSION SERVICES (OUTPATIENT)
Dept: ONCOLOGY | Facility: MEDICAL CENTER | Age: 57
End: 2022-12-29
Attending: INTERNAL MEDICINE
Payer: MEDICARE

## 2022-12-29 VITALS
RESPIRATION RATE: 18 BRPM | OXYGEN SATURATION: 97 % | TEMPERATURE: 97 F | SYSTOLIC BLOOD PRESSURE: 124 MMHG | DIASTOLIC BLOOD PRESSURE: 85 MMHG | HEART RATE: 77 BPM

## 2022-12-29 DIAGNOSIS — C20 RECTAL CARCINOMA (HCC): ICD-10-CM

## 2022-12-29 PROCEDURE — 96523 IRRIG DRUG DELIVERY DEVICE: CPT

## 2022-12-29 PROCEDURE — 700111 HCHG RX REV CODE 636 W/ 250 OVERRIDE (IP): Performed by: INTERNAL MEDICINE

## 2022-12-29 RX ORDER — HEPARIN SODIUM (PORCINE) LOCK FLUSH IV SOLN 100 UNIT/ML 100 UNIT/ML
500 SOLUTION INTRAVENOUS PRN
Status: DISCONTINUED | OUTPATIENT
Start: 2022-12-29 | End: 2022-12-29 | Stop reason: HOSPADM

## 2022-12-29 RX ADMIN — HEPARIN 500 UNITS: 100 SYRINGE at 14:02

## 2022-12-29 NOTE — PROGRESS NOTES
Pt to [facility] for pump disconnect. POC discussed, pt verbalized understanding.     CADD pump disconnected: 88.3 given and zero remaining.    Pt feeling well today.  Port flushed and heparin locked.      Pt left ambulatory and in no apparent distress.

## 2023-01-12 RX ORDER — HEPARIN SODIUM (PORCINE) LOCK FLUSH IV SOLN 100 UNIT/ML 100 UNIT/ML
500 SOLUTION INTRAVENOUS PRN
Status: CANCELLED | OUTPATIENT
Start: 2023-01-17

## 2023-01-12 RX ORDER — 0.9 % SODIUM CHLORIDE 0.9 %
10 VIAL (ML) INJECTION PRN
Status: CANCELLED | OUTPATIENT
Start: 2023-01-17

## 2023-01-12 RX ORDER — 0.9 % SODIUM CHLORIDE 0.9 %
VIAL (ML) INJECTION PRN
Status: CANCELLED | OUTPATIENT
Start: 2023-01-17

## 2023-01-12 RX ORDER — 0.9 % SODIUM CHLORIDE 0.9 %
3 VIAL (ML) INJECTION PRN
Status: CANCELLED | OUTPATIENT
Start: 2023-01-17

## 2023-01-16 RX ORDER — DIPHENHYDRAMINE HYDROCHLORIDE 50 MG/ML
50 INJECTION INTRAMUSCULAR; INTRAVENOUS PRN
Status: CANCELLED | OUTPATIENT
Start: 2023-01-18

## 2023-01-16 RX ORDER — HEPARIN SODIUM (PORCINE) LOCK FLUSH IV SOLN 100 UNIT/ML 100 UNIT/ML
500 SOLUTION INTRAVENOUS PRN
Status: CANCELLED | OUTPATIENT
Start: 2023-01-20

## 2023-01-16 RX ORDER — 0.9 % SODIUM CHLORIDE 0.9 %
10 VIAL (ML) INJECTION PRN
Status: CANCELLED | OUTPATIENT
Start: 2023-01-18

## 2023-01-16 RX ORDER — LOPERAMIDE HYDROCHLORIDE 2 MG/1
4 CAPSULE ORAL PRN
Status: CANCELLED | OUTPATIENT
Start: 2023-01-18

## 2023-01-16 RX ORDER — METHYLPREDNISOLONE SODIUM SUCCINATE 125 MG/2ML
125 INJECTION, POWDER, LYOPHILIZED, FOR SOLUTION INTRAMUSCULAR; INTRAVENOUS PRN
Status: CANCELLED | OUTPATIENT
Start: 2023-01-18

## 2023-01-16 RX ORDER — ONDANSETRON 2 MG/ML
4 INJECTION INTRAMUSCULAR; INTRAVENOUS PRN
Status: CANCELLED | OUTPATIENT
Start: 2023-01-18

## 2023-01-16 RX ORDER — 0.9 % SODIUM CHLORIDE 0.9 %
3 VIAL (ML) INJECTION PRN
Status: CANCELLED | OUTPATIENT
Start: 2023-01-18

## 2023-01-16 RX ORDER — 0.9 % SODIUM CHLORIDE 0.9 %
VIAL (ML) INJECTION PRN
Status: CANCELLED | OUTPATIENT
Start: 2023-01-18

## 2023-01-16 RX ORDER — SODIUM CHLORIDE 9 MG/ML
INJECTION, SOLUTION INTRAVENOUS CONTINUOUS
Status: CANCELLED | OUTPATIENT
Start: 2023-01-18

## 2023-01-16 RX ORDER — EPINEPHRINE 1 MG/ML(1)
0.5 AMPUL (ML) INJECTION PRN
Status: CANCELLED | OUTPATIENT
Start: 2023-01-18

## 2023-01-16 RX ORDER — ONDANSETRON 8 MG/1
8 TABLET, ORALLY DISINTEGRATING ORAL PRN
Status: CANCELLED | OUTPATIENT
Start: 2023-01-18

## 2023-01-16 RX ORDER — 0.9 % SODIUM CHLORIDE 0.9 %
20 VIAL (ML) INJECTION PRN
Status: CANCELLED | OUTPATIENT
Start: 2023-01-20

## 2023-01-16 RX ORDER — HEPARIN SODIUM (PORCINE) LOCK FLUSH IV SOLN 100 UNIT/ML 100 UNIT/ML
500 SOLUTION INTRAVENOUS PRN
Status: CANCELLED | OUTPATIENT
Start: 2023-01-18

## 2023-01-16 RX ORDER — DEXTROSE MONOHYDRATE 50 MG/ML
INJECTION, SOLUTION INTRAVENOUS CONTINUOUS
Status: CANCELLED | OUTPATIENT
Start: 2023-01-18

## 2023-01-16 RX ORDER — LOPERAMIDE HYDROCHLORIDE 2 MG/1
2 CAPSULE ORAL
Status: CANCELLED | OUTPATIENT
Start: 2023-01-18

## 2023-01-16 RX ORDER — PROCHLORPERAZINE MALEATE 10 MG
10 TABLET ORAL EVERY 6 HOURS PRN
Status: CANCELLED | OUTPATIENT
Start: 2023-01-18

## 2023-01-16 NOTE — PROGRESS NOTES
"Pharmacy Chemotherapy Verification    DX:  Metastatic Rectal Adenocarcinoma    Protocol: FOLFiri + bevacizumab  Irinotecan  IV on day 1  -Dose reduced to 150 mg/m2 for tolerance/ heavily pretreated  -10/25/22 d/c'ed from treatment plan per MD  -11/15/22 added back to treatment plan per MD   -Discontinued for tolerance/ heavily pretreated  5-FU iv over 46 hrs  -Dose reduced to 2000 mg/m2 for tolerance/ heavily pretreated  -Bolus discontinued for tolerance/ heavily pretreated  evacizumab 5 mg/kg IV on day 1  -3/22/22- HOLD for cycle 1 per MD  -11/15/22 HOLD for cycle 17 due to urine protein   q14 days for 8-12 cycles  NCCN Guidelines for Colon Cancer V.2.2018  Bradly V, Et al - Lancet Oncol. 2014 Sep;15(10):1063-75. Doi:  [id number]. Epub 2014 Jul 31.  Bulmaro MARRERO et al - J Clin Oncol. 2007 Oct 20;25(30):0820-62.    Allergies:Patient has no known allergies.  BP (!) 140/88   Pulse 81   Temp 36 °C (96.8 °F) (Temporal)   Resp 18   Ht 1.71 m (5' 7.32\")   Wt 97.6 kg (215 lb 2.7 oz)   SpO2 94%   BMI 33.38 kg/m²     Body surface area is 2.15 meters squared.     Labs 1/17/23  ANC~2530 Hgb 13.6 Plt 255k  SCr 0.82 CrCl >125 mL/min   AST/ALT/AP = 22/21/68 Tbili = 0.7   01/17/23 08:39   POC Protein Negative     Drug Order   (Drug name, dose, route, IV Fluid & volume, frequency, number of doses) Cycle 20  Previous treatment: C19 12/27/22     Medication = Irinotecan   Base Dose = 150 mg/m²   Calc Dose: Base Dose x 2.15 m² = 322.5 mg  Final Dose = 322.6 mg  Route = IV  Fluid & Volume = D5W 500 mL  Admin Duration = Over 90 minutes          <10% difference, okay to treat with final dose   Medication = Bevacizumab-bvzr (Zirabev)  Base Dose= 5 mg/kg   Calc Dose: Base Dose x 97.6 kg = 488 mg  Final Dose = 500 mg  Route = IV  Fluid & Volume =  mL  Admin Duration = Over 30 minutes          Rounded to nearest vial size (within 10%) per rounding protocol, okay to treat with final dose   Medication = " Fluorouracil   Base Dose = 2000 mg/m²   Calc Dose:Base Dose x 2.15 m² =4300 mg  Final Dose = 4300 mg  Route = CIVI  Conc = 50 mg/mL  Fluid & Volume = 86 mL (+3 mL of overfill) = 89 mL  Admin Duration = Over 46 hours @ 1.9 mL/hr   Via CADD pump for home infusion       <10% difference, okay to treat with final dose     By my signature below, I confirm this process was performed independently with the BSA and all final chemotherapy dosing calculations congruent. I have reviewed the above chemotherapy order and that my calculation of the final dose and BSA (when applicable) corroborate those calculations of the  pharmacist. Discrepancies of 10% or greater in the written dose have been addressed and documented within the EPIC Progress notes.    Jessy Solorio, PharmD, BCOP

## 2023-01-16 NOTE — PROGRESS NOTES
"Pharmacy Chemotherapy Verification    DX:  Metastatic Rectal Adenocarcinoma    Cycle 20  Previous treatment: C19 12/27/22    Protocol: FOLFiri + bevacizumab  Irinotecan  IV on day 1   -C6 10/25/22 Remove from the plan per  Dr. Aguila  -Added back at 150 mg/m2 starting Cycle 17   -Discontinued for tolerance/heavily pretreated  5-FU iv over 46 hrs   -Dose reduced to 2000 mg/m2 for tolerance/heavily pretreated   -Bolus discontinued for tolerance/heavily pretreated  Bevacizumab 5 mg/kg V on day 1   -3/22/22- HOLD for cycle 1 per MD   -11/15/22 HOLD for Cycle 17 due to proteinuria   for 8-12 cycles  Q21 day cycle  starting C16 10/25/22 per MD  NCCN Guidelines for Colon Cancer V.2.2018  Bradly V, Et al - Lancet Oncol. 2014 Sep;15(10):1063-75. Doi:  [id number]. Epub 2014 Jul 31.  Bulmaro MARRERO, et al - J Clin Oncol. 2007 Oct 20;25(30):7679-12.    Allergies:Patient has no known allergies  BP (!) 140/88   Pulse 81   Temp 36 °C (96.8 °F) (Temporal)   Resp 18   Ht 1.71 m (5' 7.32\")   Wt 97.6 kg (215 lb 2.7 oz)   SpO2 94%   BMI 33.38 kg/m²  Body surface area is 2.15 meters squared.    Labs 1/17/23:  ANC~ 2530 Plt = 255k   Hgb = 13.6     SCr = 0.82 mg/dL CrCl >125 mL/min   AST/ALT/AP = 22/21/68 TBili = 0.7   Urine protein = negative BP = 140/88    Irinotecan 150 mg/m2 x 2.15 m2 = 322.5 mg   <10% difference, OK to treat with final dose = 322.6 mg IV    Bevacizumab-bvzr (Zirabev) 5 mg/kg x 97.6 kg = 488 mg   Rounded to vial size(within 10%) per dose rounding protocol.               OK to treat with final dose = 500 mg IV    Fluorouracil 2000 mg/m2 x 2.15 m2 = 4300 mg    <10% difference, ok to treat with final dose = 4300 mg CIVI over 46 hours   Via CADD pump for home infusion over 46 hours @ 1.9 mL/hr    Kj Winston, PharmD  "

## 2023-01-17 ENCOUNTER — OUTPATIENT INFUSION SERVICES (OUTPATIENT)
Dept: ONCOLOGY | Facility: MEDICAL CENTER | Age: 58
End: 2023-01-17
Attending: INTERNAL MEDICINE
Payer: MEDICARE

## 2023-01-17 VITALS
SYSTOLIC BLOOD PRESSURE: 140 MMHG | BODY MASS INDEX: 33.77 KG/M2 | TEMPERATURE: 96.8 F | WEIGHT: 215.17 LBS | HEIGHT: 67 IN | OXYGEN SATURATION: 94 % | RESPIRATION RATE: 18 BRPM | HEART RATE: 81 BPM | DIASTOLIC BLOOD PRESSURE: 88 MMHG

## 2023-01-17 DIAGNOSIS — C20 RECTAL CARCINOMA (HCC): ICD-10-CM

## 2023-01-17 LAB
ALBUMIN SERPL BCP-MCNC: 4.1 G/DL (ref 3.2–4.9)
ALBUMIN/GLOB SERPL: 1.6 G/DL
ALP SERPL-CCNC: 68 U/L (ref 30–99)
ALT SERPL-CCNC: 21 U/L (ref 2–50)
ANION GAP SERPL CALC-SCNC: 11 MMOL/L (ref 7–16)
APPEARANCE UR: CLEAR
AST SERPL-CCNC: 22 U/L (ref 12–45)
BASOPHILS # BLD AUTO: 1.1 % (ref 0–1.8)
BASOPHILS # BLD: 0.05 K/UL (ref 0–0.12)
BILIRUB SERPL-MCNC: 0.7 MG/DL (ref 0.1–1.5)
BUN SERPL-MCNC: 18 MG/DL (ref 8–22)
CALCIUM ALBUM COR SERPL-MCNC: 8.8 MG/DL (ref 8.5–10.5)
CALCIUM SERPL-MCNC: 8.9 MG/DL (ref 8.5–10.5)
CHLORIDE SERPL-SCNC: 105 MMOL/L (ref 96–112)
CO2 SERPL-SCNC: 26 MMOL/L (ref 20–33)
COLOR UR AUTO: YELLOW
CREAT SERPL-MCNC: 0.82 MG/DL (ref 0.5–1.4)
EOSINOPHIL # BLD AUTO: 0.29 K/UL (ref 0–0.51)
EOSINOPHIL NFR BLD: 6.7 % (ref 0–6.9)
ERYTHROCYTE [DISTWIDTH] IN BLOOD BY AUTOMATED COUNT: 50.9 FL (ref 35.9–50)
GFR SERPLBLD CREATININE-BSD FMLA CKD-EPI: 103 ML/MIN/1.73 M 2
GLOBULIN SER CALC-MCNC: 2.5 G/DL (ref 1.9–3.5)
GLUCOSE SERPL-MCNC: 96 MG/DL (ref 65–99)
GLUCOSE UR QL STRIP.AUTO: NEGATIVE MG/DL
HCT VFR BLD AUTO: 42.2 % (ref 42–52)
HGB BLD-MCNC: 13.6 G/DL (ref 14–18)
IMM GRANULOCYTES # BLD AUTO: 0.01 K/UL (ref 0–0.11)
IMM GRANULOCYTES NFR BLD AUTO: 0.2 % (ref 0–0.9)
KETONES UR QL STRIP.AUTO: NEGATIVE MG/DL
LEUKOCYTE ESTERASE UR QL STRIP.AUTO: NEGATIVE
LYMPHOCYTES # BLD AUTO: 0.79 K/UL (ref 1–4.8)
LYMPHOCYTES NFR BLD: 18.1 % (ref 22–41)
MCH RBC QN AUTO: 26.9 PG (ref 27–33)
MCHC RBC AUTO-ENTMCNC: 32.2 G/DL (ref 33.7–35.3)
MCV RBC AUTO: 83.6 FL (ref 81.4–97.8)
MONOCYTES # BLD AUTO: 0.69 K/UL (ref 0–0.85)
MONOCYTES NFR BLD AUTO: 15.8 % (ref 0–13.4)
NEUTROPHILS # BLD AUTO: 2.53 K/UL (ref 1.82–7.42)
NEUTROPHILS NFR BLD: 58.1 % (ref 44–72)
NITRITE UR QL STRIP.AUTO: NEGATIVE
NRBC # BLD AUTO: 0 K/UL
NRBC BLD-RTO: 0 /100 WBC
OUTPT INFUS CBC COMMENT OICOM: ABNORMAL
PH UR STRIP.AUTO: 6 [PH] (ref 5–8)
PLATELET # BLD AUTO: 255 K/UL (ref 164–446)
PMV BLD AUTO: 9.5 FL (ref 9–12.9)
POTASSIUM SERPL-SCNC: 3.5 MMOL/L (ref 3.6–5.5)
PROT SERPL-MCNC: 6.6 G/DL (ref 6–8.2)
PROT UR QL STRIP: NEGATIVE MG/DL
RBC # BLD AUTO: 5.05 M/UL (ref 4.7–6.1)
RBC UR QL AUTO: ABNORMAL
SODIUM SERPL-SCNC: 142 MMOL/L (ref 135–145)
SP GR UR STRIP.AUTO: >=1.03 (ref 1–1.03)
WBC # BLD AUTO: 4.4 K/UL (ref 4.8–10.8)

## 2023-01-17 PROCEDURE — 96415 CHEMO IV INFUSION ADDL HR: CPT

## 2023-01-17 PROCEDURE — 96413 CHEMO IV INFUSION 1 HR: CPT

## 2023-01-17 PROCEDURE — 700105 HCHG RX REV CODE 258: Performed by: INTERNAL MEDICINE

## 2023-01-17 PROCEDURE — A9270 NON-COVERED ITEM OR SERVICE: HCPCS | Performed by: INTERNAL MEDICINE

## 2023-01-17 PROCEDURE — 700111 HCHG RX REV CODE 636 W/ 250 OVERRIDE (IP): Mod: TB | Performed by: INTERNAL MEDICINE

## 2023-01-17 PROCEDURE — 85025 COMPLETE CBC W/AUTO DIFF WBC: CPT

## 2023-01-17 PROCEDURE — 81002 URINALYSIS NONAUTO W/O SCOPE: CPT

## 2023-01-17 PROCEDURE — G0498 CHEMO EXTEND IV INFUS W/PUMP: HCPCS

## 2023-01-17 PROCEDURE — 96417 CHEMO IV INFUS EACH ADDL SEQ: CPT

## 2023-01-17 PROCEDURE — A4212 NON CORING NEEDLE OR STYLET: HCPCS

## 2023-01-17 PROCEDURE — 96375 TX/PRO/DX INJ NEW DRUG ADDON: CPT

## 2023-01-17 PROCEDURE — 80053 COMPREHEN METABOLIC PANEL: CPT

## 2023-01-17 PROCEDURE — 700102 HCHG RX REV CODE 250 W/ 637 OVERRIDE(OP): Performed by: INTERNAL MEDICINE

## 2023-01-17 RX ORDER — POTASSIUM CHLORIDE 20 MEQ/1
40 TABLET, EXTENDED RELEASE ORAL ONCE
Status: COMPLETED | OUTPATIENT
Start: 2023-01-17 | End: 2023-01-17

## 2023-01-17 RX ADMIN — ONDANSETRON HYDROCHLORIDE 16 MG: 2 INJECTION, SOLUTION INTRAMUSCULAR; INTRAVENOUS at 09:00

## 2023-01-17 RX ADMIN — ATROPINE SULFATE 0.5 MG: 1 INJECTION, SOLUTION INTRAVENOUS at 09:34

## 2023-01-17 RX ADMIN — IRINOTECAN HYDROCHLORIDE 322.6 MG: 20 INJECTION, SOLUTION INTRAVENOUS at 09:40

## 2023-01-17 RX ADMIN — POTASSIUM CHLORIDE 40 MEQ: 1500 TABLET, EXTENDED RELEASE ORAL at 09:17

## 2023-01-17 RX ADMIN — DEXAMETHASONE SODIUM PHOSPHATE 12 MG: 4 INJECTION, SOLUTION INTRA-ARTICULAR; INTRALESIONAL; INTRAMUSCULAR; INTRAVENOUS; SOFT TISSUE at 08:48

## 2023-01-17 RX ADMIN — FLUOROURACIL 4300 MG: 50 INJECTION, SOLUTION INTRAVENOUS at 12:13

## 2023-01-17 RX ADMIN — SODIUM CHLORIDE 500 MG: 9 INJECTION, SOLUTION INTRAVENOUS at 11:27

## 2023-01-17 ASSESSMENT — FIBROSIS 4 INDEX: FIB4 SCORE: 0.96

## 2023-01-17 NOTE — PROGRESS NOTES
"Dre arrives to [facility] for C20D1 FOLFIRIR/Zirabev for rectal carcinoma. Patient denies acute health concerns. Denies s/s active infections, open wounds, and mouth sores. Right chest port accessed in sterile fashion with a 20g 1\" low profile needle. Port flushes easily and has brisk blood return. Labs drawn, reviewed, and meets parameters to proceed. Potassium level low at 3.5; replaced per protocol. POC UA WNL. Premedications administered. Atropine administered prior to irinotecan. Irinotecan then Zirabev infused without issues. Port with brisk blood return prior to treatments and prior to 5FU. 5FU CADD connected and set to RUN mode over 46 hours at 1215. Patient has his next appt. Discharged home to self care in no apparent distress.   "

## 2023-01-17 NOTE — PROGRESS NOTES
Chemotherapy Verification - SECONDARY RN       Height = 1.71 m  Weight = 97.6 kg  BSA = 2.15 m^2       Medication: irinotecan  Dose: 150 mg/m^2  Calculated Dose: 322.5 mg                             (In mg/m2, AUC, mg/kg)     Medication: bevacizumab-bvzr  Dose: 5 mg/kg  Calculated Dose: 488 mg                             (In mg/m2, AUC, mg/kg)    Medication: fluorouracil CADD pump  Dose: 2000 mg/m^2  Calculated Dose: 4300 mg                             (In mg/m2, AUC, mg/kg)    I confirm that this process was performed independently.

## 2023-01-17 NOTE — PROGRESS NOTES
Chemotherapy Verification - PRIMARY RN  Cycle 20 Day 1    Height = 171 cm  Weight = 97.6 kg  BSA = 2.15 m^2       Medication: irinotecan (CAMPTOSAR)  Dose: 150 mg/m2  Calculated Dose: 322.5 mg                             (In mg/m2, AUC, mg/kg)     Medication: bevacizumab-bvzr (ZIRABEV)  Dose: 5 mg/kg  Calculated Dose: 488 mg                             (In mg/m2, AUC, mg/kg)    Medication: fluorouracil (ADRUCIL)  Dose: 2,000 mg/m2  Calculated Dose: 4,300 mg                             (In mg/m2, AUC, mg/kg)      I confirm this process was performed independently with the BSA and all final chemotherapy dosing calculations congruent.  Any discrepancies of 10% or greater have been addressed with the chemotherapy pharmacist. The resolution of the discrepancy has been documented in the EPIC progress notes.

## 2023-01-19 ENCOUNTER — OUTPATIENT INFUSION SERVICES (OUTPATIENT)
Dept: ONCOLOGY | Facility: MEDICAL CENTER | Age: 58
End: 2023-01-19
Attending: INTERNAL MEDICINE
Payer: MEDICARE

## 2023-01-19 VITALS
RESPIRATION RATE: 18 BRPM | OXYGEN SATURATION: 94 % | HEART RATE: 81 BPM | TEMPERATURE: 96.4 F | DIASTOLIC BLOOD PRESSURE: 90 MMHG | SYSTOLIC BLOOD PRESSURE: 123 MMHG

## 2023-01-19 DIAGNOSIS — C20 RECTAL CARCINOMA (HCC): ICD-10-CM

## 2023-01-19 PROCEDURE — 96523 IRRIG DRUG DELIVERY DEVICE: CPT

## 2023-01-19 PROCEDURE — 700111 HCHG RX REV CODE 636 W/ 250 OVERRIDE (IP): Performed by: INTERNAL MEDICINE

## 2023-01-19 RX ORDER — HEPARIN SODIUM (PORCINE) LOCK FLUSH IV SOLN 100 UNIT/ML 100 UNIT/ML
500 SOLUTION INTRAVENOUS PRN
Status: DISCONTINUED | OUTPATIENT
Start: 2023-01-19 | End: 2023-01-19 | Stop reason: HOSPADM

## 2023-01-19 RX ADMIN — HEPARIN 500 UNITS: 100 SYRINGE at 14:17

## 2023-01-20 NOTE — PROGRESS NOTES
Pt arrived ambulatory for PORT flush/CADD pump de-access. Pt reports he feels well, has mild rectal pressure/pain and some fatigue. CADD pump reads 87.4 ml given, pump has 0.0 ml reservoir. Pump disconnected and PORT flushed per protocol (see MAR), site covered in sterile gauze/paper tape, and pt tolerated well. Pt confirms his next appt and discharged home ambulatory in [facility].

## 2023-02-01 RX ORDER — LOPERAMIDE HYDROCHLORIDE 2 MG/1
4 CAPSULE ORAL PRN
Status: CANCELLED | OUTPATIENT
Start: 2023-02-08

## 2023-02-01 RX ORDER — 0.9 % SODIUM CHLORIDE 0.9 %
3 VIAL (ML) INJECTION PRN
Status: CANCELLED | OUTPATIENT
Start: 2023-02-08

## 2023-02-01 RX ORDER — PROCHLORPERAZINE MALEATE 10 MG
10 TABLET ORAL EVERY 6 HOURS PRN
Status: CANCELLED | OUTPATIENT
Start: 2023-02-08

## 2023-02-01 RX ORDER — SODIUM CHLORIDE 9 MG/ML
INJECTION, SOLUTION INTRAVENOUS CONTINUOUS
Status: CANCELLED | OUTPATIENT
Start: 2023-02-08

## 2023-02-01 RX ORDER — 0.9 % SODIUM CHLORIDE 0.9 %
20 VIAL (ML) INJECTION PRN
Status: CANCELLED | OUTPATIENT
Start: 2023-02-10

## 2023-02-01 RX ORDER — LOPERAMIDE HYDROCHLORIDE 2 MG/1
2 CAPSULE ORAL
Status: CANCELLED | OUTPATIENT
Start: 2023-02-08

## 2023-02-01 RX ORDER — HEPARIN SODIUM (PORCINE) LOCK FLUSH IV SOLN 100 UNIT/ML 100 UNIT/ML
500 SOLUTION INTRAVENOUS PRN
Status: CANCELLED | OUTPATIENT
Start: 2023-02-10

## 2023-02-01 RX ORDER — 0.9 % SODIUM CHLORIDE 0.9 %
VIAL (ML) INJECTION PRN
Status: CANCELLED | OUTPATIENT
Start: 2023-02-08

## 2023-02-01 RX ORDER — DEXTROSE MONOHYDRATE 50 MG/ML
INJECTION, SOLUTION INTRAVENOUS CONTINUOUS
Status: CANCELLED | OUTPATIENT
Start: 2023-02-08

## 2023-02-01 RX ORDER — DIPHENHYDRAMINE HYDROCHLORIDE 50 MG/ML
50 INJECTION INTRAMUSCULAR; INTRAVENOUS PRN
Status: CANCELLED | OUTPATIENT
Start: 2023-02-08

## 2023-02-01 RX ORDER — 0.9 % SODIUM CHLORIDE 0.9 %
VIAL (ML) INJECTION PRN
Status: CANCELLED | OUTPATIENT
Start: 2023-02-07

## 2023-02-01 RX ORDER — 0.9 % SODIUM CHLORIDE 0.9 %
10 VIAL (ML) INJECTION PRN
Status: CANCELLED | OUTPATIENT
Start: 2023-02-08

## 2023-02-01 RX ORDER — 0.9 % SODIUM CHLORIDE 0.9 %
10 VIAL (ML) INJECTION PRN
Status: CANCELLED | OUTPATIENT
Start: 2023-02-07

## 2023-02-01 RX ORDER — ONDANSETRON 8 MG/1
8 TABLET, ORALLY DISINTEGRATING ORAL PRN
Status: CANCELLED | OUTPATIENT
Start: 2023-02-08

## 2023-02-01 RX ORDER — ONDANSETRON 2 MG/ML
4 INJECTION INTRAMUSCULAR; INTRAVENOUS PRN
Status: CANCELLED | OUTPATIENT
Start: 2023-02-08

## 2023-02-01 RX ORDER — HEPARIN SODIUM (PORCINE) LOCK FLUSH IV SOLN 100 UNIT/ML 100 UNIT/ML
500 SOLUTION INTRAVENOUS PRN
Status: CANCELLED | OUTPATIENT
Start: 2023-02-07

## 2023-02-01 RX ORDER — EPINEPHRINE 1 MG/ML(1)
0.5 AMPUL (ML) INJECTION PRN
Status: CANCELLED | OUTPATIENT
Start: 2023-02-08

## 2023-02-01 RX ORDER — METHYLPREDNISOLONE SODIUM SUCCINATE 125 MG/2ML
125 INJECTION, POWDER, LYOPHILIZED, FOR SOLUTION INTRAMUSCULAR; INTRAVENOUS PRN
Status: CANCELLED | OUTPATIENT
Start: 2023-02-08

## 2023-02-01 RX ORDER — HEPARIN SODIUM (PORCINE) LOCK FLUSH IV SOLN 100 UNIT/ML 100 UNIT/ML
500 SOLUTION INTRAVENOUS PRN
Status: CANCELLED | OUTPATIENT
Start: 2023-02-08

## 2023-02-01 RX ORDER — 0.9 % SODIUM CHLORIDE 0.9 %
3 VIAL (ML) INJECTION PRN
Status: CANCELLED | OUTPATIENT
Start: 2023-02-07

## 2023-02-05 NOTE — PROGRESS NOTES
"Pharmacy Chemotherapy Verification    DX:  Metastatic Rectal Adenocarcinoma    Protocol: FOLFiri + bevacizumab  Irinotecan  IV on day 1  -Dose reduced to 150 mg/m2 for tolerance/ heavily pretreated  -10/25/22 d/c'ed from treatment plan per MD  -11/15/22 added back to treatment plan per MD   -Discontinued for tolerance/ heavily pretreated  5-FU iv over 46 hrs  -Dose reduced to 2000 mg/m2 for tolerance/ heavily pretreated  -Bolus discontinued for tolerance/ heavily pretreated  evacizumab 5 mg/kg IV on day 1  -3/22/22- HOLD for cycle 1 per MD  -11/15/22 HOLD for cycle 17 due to urine protein   q14 days for 8-12 cycles  NCCN Guidelines for Colon Cancer V.2.2018  Bradly V, Et al - Lancet Oncol. 2014 Sep;15(10):1069-75. Doi:  [id number]. Epub 2014 Jul 31.  Bulmaro MARRERO et al - J Clin Oncol. 2007 Oct 20;25(30):1765-63.    Allergies:Patient has no known allergies.  /76   Pulse 87   Temp 36.5 °C (97.7 °F) (Tympanic)   Resp 18   Ht 1.71 m (5' 7.32\")   Wt 96.6 kg (212 lb 15.4 oz)   SpO2 95%   BMI 33.04 kg/m²     Body surface area is 2.14 meters squared.     Labs 2/7/23:  ANC~ 2770 Plt = 314k   Hgb = 13.5     SCr = 0.81 mg/dL CrCl > 125 mL/min (min SCr 0.7 used)  AST/ALT/AP = WNL TBili = 0.6     /76   Urine protein = 30 mg/dL (1+)     Drug Order   (Drug name, dose, route, IV Fluid & volume, frequency, number of doses) Cycle 21  Previous treatment: C20 on 1/17/23     Medication = Irinotecan   Base Dose = 150 mg/m²   Calc Dose: Base Dose x 2.14 m² = 321 mg  Final Dose = 321 mg  Route = IV  Fluid & Volume = D5W 500 mL  Admin Duration = Over 90 minutes          <10% difference, okay to treat with final dose   Medication = Bevacizumab-bvzr (Zirabev)  Base Dose= 5 mg/kg   Calc Dose: Base Dose x 96.6 kg = 483 mg  Final Dose = 500 mg  Route = IV  Fluid & Volume =  mL  Admin Duration = Over 30 minutes          Rounded to nearest vial size (within 10%) per rounding protocol, okay to treat with " final dose   Medication = Fluorouracil   Base Dose = 2000 mg/m²   Calc Dose:Base Dose x 2.14 m² =4280 mg  Final Dose = 4280 mg  Route = CIVI  Conc = 50 mg/mL  Fluid & Volume = 85.6 mL (+3 mL of overfill) = 88.6 mL  Admin Duration = Over 46 hours @ 1.9 mL/hr   Via CADD pump for home infusion       <10% difference, okay to treat with final dose     By my signature below, I confirm this process was performed independently with the BSA and all final chemotherapy dosing calculations congruent. I have reviewed the above chemotherapy order and that my calculation of the final dose and BSA (when applicable) corroborate those calculations of the  pharmacist. Discrepancies of 10% or greater in the written dose have been addressed and documented within the EPIC Progress notes.    Chen Riley, PharmD, BCOP

## 2023-02-06 NOTE — PROGRESS NOTES
"Pharmacy Chemotherapy Verification    DX:  Metastatic Rectal Adenocarcinoma    Cycle 21  Previous treatment: C20 1/17/23    Protocol: FOLFiri + bevacizumab  Irinotecan  IV on day 1   -C6 10/25/22 Remove from the plan per  Dr. Aguila  -Added back at 150 mg/m2 starting Cycle 17   -Discontinued for tolerance/heavily pretreated  5-FU iv over 46 hrs   -Dose reduced to 2000 mg/m2 for tolerance/heavily pretreated   -Bolus discontinued for tolerance/heavily pretreated  Bevacizumab 5 mg/kg V on day 1   -3/22/22- HOLD for cycle 1 per MD   -11/15/22 HOLD for Cycle 17 due to proteinuria   for 8-12 cycles  Q21 day cycle  starting C16 10/25/22 per MD  NCCN Guidelines for Colon Cancer V.2.2018  Bradly V, Et al - Lancet Oncol. 2014 Sep;15(10):1069-75. Doi:  10.1016/-[id number]. Epub 2014 Jul 31.  Bulmaro MARRERO, et al - J Clin Oncol. 2007 Oct 20;25(30):3244-93.    Allergies:Patient has no known allergies  /76   Pulse 87   Temp 36.5 °C (97.7 °F) (Tympanic)   Resp 18   Ht 1.71 m (5' 7.32\")   Wt 96.6 kg (212 lb 15.4 oz)   SpO2 95%   BMI 33.04 kg/m²  Body surface area is 2.14 meters squared.    Labs 2/7/23:  ANC~ 2770 Plt = 314k   Hgb = 13.5     SCr = 0.81 mg/dL CrCl >125 mL/min   AST/ALT/AP = 22/19/69 TBili = 0.6   Urine protein = 30 mg/dL (1+)  BP = 119/76    Irinotecan 150 mg/m2 x 2.14 m2 = 321 mg   <10% difference, OK to treat with final dose = 321 mg IV    Bevacizumab-bvzr (Zirabev) 5 mg/kg x 96.6 kg = 483 mg   Rounded to vial size(within 10%) per dose rounding protocol.               OK to treat with final dose = 500 mg IV    Fluorouracil 2000 mg/m2 x 2.14 m2 = 4280 mg    <10% difference, ok to treat with final dose = 4280 mg CIVI over 46 hours   Via CADD pump for home infusion over 46 hours @ 1.9 mL/hr    Kj Winston, PharmD  "

## 2023-02-07 ENCOUNTER — OUTPATIENT INFUSION SERVICES (OUTPATIENT)
Dept: ONCOLOGY | Facility: MEDICAL CENTER | Age: 58
End: 2023-02-07
Attending: INTERNAL MEDICINE
Payer: MEDICARE

## 2023-02-07 VITALS
WEIGHT: 212.96 LBS | DIASTOLIC BLOOD PRESSURE: 76 MMHG | RESPIRATION RATE: 18 BRPM | SYSTOLIC BLOOD PRESSURE: 119 MMHG | TEMPERATURE: 97.7 F | HEIGHT: 67 IN | BODY MASS INDEX: 33.43 KG/M2 | HEART RATE: 87 BPM | OXYGEN SATURATION: 95 %

## 2023-02-07 DIAGNOSIS — C20 RECTAL CARCINOMA (HCC): ICD-10-CM

## 2023-02-07 LAB
ALBUMIN SERPL BCP-MCNC: 4.1 G/DL (ref 3.2–4.9)
ALBUMIN/GLOB SERPL: 1.6 G/DL
ALP SERPL-CCNC: 69 U/L (ref 30–99)
ALT SERPL-CCNC: 19 U/L (ref 2–50)
ANION GAP SERPL CALC-SCNC: 11 MMOL/L (ref 7–16)
APPEARANCE UR: CLEAR
AST SERPL-CCNC: 22 U/L (ref 12–45)
BASOPHILS # BLD AUTO: 1.4 % (ref 0–1.8)
BASOPHILS # BLD: 0.07 K/UL (ref 0–0.12)
BILIRUB SERPL-MCNC: 0.6 MG/DL (ref 0.1–1.5)
BUN SERPL-MCNC: 25 MG/DL (ref 8–22)
CALCIUM ALBUM COR SERPL-MCNC: 9.1 MG/DL (ref 8.5–10.5)
CALCIUM SERPL-MCNC: 9.2 MG/DL (ref 8.5–10.5)
CHLORIDE SERPL-SCNC: 102 MMOL/L (ref 96–112)
CO2 SERPL-SCNC: 28 MMOL/L (ref 20–33)
COLOR UR AUTO: YELLOW
CREAT SERPL-MCNC: 0.81 MG/DL (ref 0.5–1.4)
EOSINOPHIL # BLD AUTO: 0.43 K/UL (ref 0–0.51)
EOSINOPHIL NFR BLD: 8.5 % (ref 0–6.9)
ERYTHROCYTE [DISTWIDTH] IN BLOOD BY AUTOMATED COUNT: 52.5 FL (ref 35.9–50)
GFR SERPLBLD CREATININE-BSD FMLA CKD-EPI: 104 ML/MIN/1.73 M 2
GLOBULIN SER CALC-MCNC: 2.6 G/DL (ref 1.9–3.5)
GLUCOSE SERPL-MCNC: 97 MG/DL (ref 65–99)
GLUCOSE UR QL STRIP.AUTO: NEGATIVE MG/DL
HCT VFR BLD AUTO: 42.6 % (ref 42–52)
HGB BLD-MCNC: 13.5 G/DL (ref 14–18)
IMM GRANULOCYTES # BLD AUTO: 0.01 K/UL (ref 0–0.11)
IMM GRANULOCYTES NFR BLD AUTO: 0.2 % (ref 0–0.9)
KETONES UR QL STRIP.AUTO: NEGATIVE MG/DL
LEUKOCYTE ESTERASE UR QL STRIP.AUTO: NEGATIVE
LYMPHOCYTES # BLD AUTO: 0.97 K/UL (ref 1–4.8)
LYMPHOCYTES NFR BLD: 19.1 % (ref 22–41)
MCH RBC QN AUTO: 26.8 PG (ref 27–33)
MCHC RBC AUTO-ENTMCNC: 31.7 G/DL (ref 33.7–35.3)
MCV RBC AUTO: 84.7 FL (ref 81.4–97.8)
MONOCYTES # BLD AUTO: 0.82 K/UL (ref 0–0.85)
MONOCYTES NFR BLD AUTO: 16.2 % (ref 0–13.4)
NEUTROPHILS # BLD AUTO: 2.77 K/UL (ref 1.82–7.42)
NEUTROPHILS NFR BLD: 54.6 % (ref 44–72)
NITRITE UR QL STRIP.AUTO: NEGATIVE
NRBC # BLD AUTO: 0 K/UL
NRBC BLD-RTO: 0 /100 WBC
OUTPT INFUS CBC COMMENT OICOM: ABNORMAL
PH UR STRIP.AUTO: 6 [PH] (ref 5–8)
PLATELET # BLD AUTO: 314 K/UL (ref 164–446)
PMV BLD AUTO: 9.7 FL (ref 9–12.9)
POTASSIUM SERPL-SCNC: 3.4 MMOL/L (ref 3.6–5.5)
PROT SERPL-MCNC: 6.7 G/DL (ref 6–8.2)
PROT UR QL STRIP: 30 MG/DL
RBC # BLD AUTO: 5.03 M/UL (ref 4.7–6.1)
RBC UR QL AUTO: ABNORMAL
SODIUM SERPL-SCNC: 141 MMOL/L (ref 135–145)
SP GR UR STRIP.AUTO: >=1.03 (ref 1–1.03)
WBC # BLD AUTO: 5.1 K/UL (ref 4.8–10.8)

## 2023-02-07 PROCEDURE — 700111 HCHG RX REV CODE 636 W/ 250 OVERRIDE (IP): Performed by: INTERNAL MEDICINE

## 2023-02-07 PROCEDURE — 96417 CHEMO IV INFUS EACH ADDL SEQ: CPT

## 2023-02-07 PROCEDURE — 700105 HCHG RX REV CODE 258: Performed by: INTERNAL MEDICINE

## 2023-02-07 PROCEDURE — 96411 CHEMO IV PUSH ADDL DRUG: CPT

## 2023-02-07 PROCEDURE — G0498 CHEMO EXTEND IV INFUS W/PUMP: HCPCS

## 2023-02-07 PROCEDURE — A4212 NON CORING NEEDLE OR STYLET: HCPCS

## 2023-02-07 PROCEDURE — 85025 COMPLETE CBC W/AUTO DIFF WBC: CPT

## 2023-02-07 PROCEDURE — 96375 TX/PRO/DX INJ NEW DRUG ADDON: CPT

## 2023-02-07 PROCEDURE — 96413 CHEMO IV INFUSION 1 HR: CPT

## 2023-02-07 PROCEDURE — 700102 HCHG RX REV CODE 250 W/ 637 OVERRIDE(OP): Performed by: INTERNAL MEDICINE

## 2023-02-07 PROCEDURE — 81002 URINALYSIS NONAUTO W/O SCOPE: CPT

## 2023-02-07 PROCEDURE — 80053 COMPREHEN METABOLIC PANEL: CPT

## 2023-02-07 PROCEDURE — A9270 NON-COVERED ITEM OR SERVICE: HCPCS | Performed by: INTERNAL MEDICINE

## 2023-02-07 RX ORDER — POTASSIUM CHLORIDE 20 MEQ/1
40 TABLET, EXTENDED RELEASE ORAL ONCE
Status: COMPLETED | OUTPATIENT
Start: 2023-02-07 | End: 2023-02-07

## 2023-02-07 RX ADMIN — DEXAMETHASONE SODIUM PHOSPHATE 12 MG: 4 INJECTION, SOLUTION INTRA-ARTICULAR; INTRALESIONAL; INTRAMUSCULAR; INTRAVENOUS; SOFT TISSUE at 08:34

## 2023-02-07 RX ADMIN — FLUOROURACIL 4280 MG: 50 INJECTION, SOLUTION INTRAVENOUS at 11:14

## 2023-02-07 RX ADMIN — POTASSIUM CHLORIDE 40 MEQ: 1500 TABLET, EXTENDED RELEASE ORAL at 08:19

## 2023-02-07 RX ADMIN — IRINOTECAN HYDROCHLORIDE 321 MG: 20 INJECTION, SOLUTION INTRAVENOUS at 08:57

## 2023-02-07 RX ADMIN — ONDANSETRON HYDROCHLORIDE 16 MG: 2 INJECTION, SOLUTION INTRAMUSCULAR; INTRAVENOUS at 08:16

## 2023-02-07 RX ADMIN — SODIUM CHLORIDE 500 MG: 9 INJECTION, SOLUTION INTRAVENOUS at 10:34

## 2023-02-07 ASSESSMENT — FIBROSIS 4 INDEX: FIB4 SCORE: 1.04

## 2023-02-07 NOTE — PROGRESS NOTES
Chemotherapy Verification - PRIMARY RN      Height = 1.71 m  Weight = 96.6 kg  BSA = 2.14 m2       Medication: irinotecan   Dose: 150mg/m2  Calculated Dose: 321 mg                             (In mg/m2, AUC, mg/kg)     Medication: Zirabev  Dose: 5m/kg  Calculated Dose: 483 mg                             (In mg/m2, AUC, mg/kg)    Medication: fluorouracil  Dose: 2000mg/m2  Calculated Dose: 4280 mg                             (In mg/m2, AUC, mg/kg)      I confirm this process was performed independently with the BSA and all final chemotherapy dosing calculations congruent.  Any discrepancies of 10% or greater have been addressed with the chemotherapy pharmacist. The resolution of the discrepancy has been documented in the EPIC progress notes.

## 2023-02-07 NOTE — PROGRESS NOTES
Mr Eaton is here today for FolFiri and Zirabev infusion.    He has no new concerns to report. Mediport to right upper chest accessed. Flushed well with good blood return. Labs drawn per orders.  Labs and urine are within parameters for treatment today.    Pre meds given per MAR and were tolerated well.     Chemotherapy was infused and tolerated well.     CADD pump connected, unclamped and placed in RUN mode.   Pt will return Thursday for disconnect.    Pt was discharged in stable condition.

## 2023-02-07 NOTE — PROGRESS NOTES
Chemotherapy Verification - SECONDARY RN       Height = 171 cm  Weight = 96.6 kg  BSA = 2.14 m2       Medication: Irinotecan  Dose: 150 mg/m2  Calculated Dose: 321 mg                             (In mg/m2, AUC, mg/kg)     Medication: Zirabev  Dose: 5mg/kg  Calculated Dose: 483 mg                             (In mg/m2, AUC, mg/kg)    Medication: Fluorouracil CADD pump  Dose: 2000 mg/m2 over 46 hours Calculated Dose: 4280 mg over 46 hours                             (In mg/m2, AUC, mg/kg)    I confirm that this process was performed independently.

## 2023-02-08 ENCOUNTER — APPOINTMENT (OUTPATIENT)
Dept: ONCOLOGY | Facility: MEDICAL CENTER | Age: 58
End: 2023-02-08
Attending: INTERNAL MEDICINE
Payer: MEDICARE

## 2023-02-09 ENCOUNTER — OUTPATIENT INFUSION SERVICES (OUTPATIENT)
Dept: ONCOLOGY | Facility: MEDICAL CENTER | Age: 58
End: 2023-02-09
Attending: INTERNAL MEDICINE
Payer: MEDICARE

## 2023-02-09 VITALS
DIASTOLIC BLOOD PRESSURE: 95 MMHG | SYSTOLIC BLOOD PRESSURE: 133 MMHG | HEART RATE: 94 BPM | RESPIRATION RATE: 18 BRPM | OXYGEN SATURATION: 96 % | TEMPERATURE: 97 F

## 2023-02-09 DIAGNOSIS — C20 RECTAL CARCINOMA (HCC): ICD-10-CM

## 2023-02-09 PROCEDURE — 96523 IRRIG DRUG DELIVERY DEVICE: CPT

## 2023-02-09 PROCEDURE — 700111 HCHG RX REV CODE 636 W/ 250 OVERRIDE (IP): Performed by: INTERNAL MEDICINE

## 2023-02-09 RX ORDER — HEPARIN SODIUM (PORCINE) LOCK FLUSH IV SOLN 100 UNIT/ML 100 UNIT/ML
500 SOLUTION INTRAVENOUS PRN
Status: DISCONTINUED | OUTPATIENT
Start: 2023-02-09 | End: 2023-02-09 | Stop reason: HOSPADM

## 2023-02-09 RX ADMIN — HEPARIN 500 UNITS: 100 SYRINGE at 10:31

## 2023-02-10 NOTE — PROGRESS NOTES
Pt presents to [facility] for fluorouracil pump disconnect after 46 hrs of continuous infusion. See chemotherapy flow sheet for volume and dose administration. Brisk blood return observed from R chest port before flushed, heparin locked, and de-accessed; gauze and tape dressing applied. Next appointment confirmed and education provided. Pt discharged to self care with all personal belongings and in NAD.

## 2023-02-22 RX ORDER — HEPARIN SODIUM (PORCINE) LOCK FLUSH IV SOLN 100 UNIT/ML 100 UNIT/ML
500 SOLUTION INTRAVENOUS PRN
Status: CANCELLED | OUTPATIENT
Start: 2023-03-01

## 2023-02-22 RX ORDER — 0.9 % SODIUM CHLORIDE 0.9 %
20 VIAL (ML) INJECTION PRN
Status: CANCELLED | OUTPATIENT
Start: 2023-03-03

## 2023-02-22 RX ORDER — 0.9 % SODIUM CHLORIDE 0.9 %
10 VIAL (ML) INJECTION PRN
Status: CANCELLED | OUTPATIENT
Start: 2023-02-28

## 2023-02-22 RX ORDER — SODIUM CHLORIDE 9 MG/ML
INJECTION, SOLUTION INTRAVENOUS CONTINUOUS
Status: CANCELLED | OUTPATIENT
Start: 2023-03-01

## 2023-02-22 RX ORDER — LOPERAMIDE HYDROCHLORIDE 2 MG/1
2 CAPSULE ORAL
Status: CANCELLED | OUTPATIENT
Start: 2023-03-01

## 2023-02-22 RX ORDER — EPINEPHRINE 1 MG/ML(1)
0.5 AMPUL (ML) INJECTION PRN
Status: CANCELLED | OUTPATIENT
Start: 2023-03-01

## 2023-02-22 RX ORDER — 0.9 % SODIUM CHLORIDE 0.9 %
10 VIAL (ML) INJECTION PRN
Status: CANCELLED | OUTPATIENT
Start: 2023-03-01

## 2023-02-22 RX ORDER — 0.9 % SODIUM CHLORIDE 0.9 %
3 VIAL (ML) INJECTION PRN
Status: CANCELLED | OUTPATIENT
Start: 2023-02-28

## 2023-02-22 RX ORDER — DEXTROSE MONOHYDRATE 50 MG/ML
INJECTION, SOLUTION INTRAVENOUS CONTINUOUS
Status: CANCELLED | OUTPATIENT
Start: 2023-03-01

## 2023-02-22 RX ORDER — METHYLPREDNISOLONE SODIUM SUCCINATE 125 MG/2ML
125 INJECTION, POWDER, LYOPHILIZED, FOR SOLUTION INTRAMUSCULAR; INTRAVENOUS PRN
Status: CANCELLED | OUTPATIENT
Start: 2023-03-01

## 2023-02-22 RX ORDER — PROCHLORPERAZINE MALEATE 10 MG
10 TABLET ORAL EVERY 6 HOURS PRN
Status: CANCELLED | OUTPATIENT
Start: 2023-03-01

## 2023-02-22 RX ORDER — ONDANSETRON 2 MG/ML
4 INJECTION INTRAMUSCULAR; INTRAVENOUS PRN
Status: CANCELLED | OUTPATIENT
Start: 2023-03-01

## 2023-02-22 RX ORDER — 0.9 % SODIUM CHLORIDE 0.9 %
3 VIAL (ML) INJECTION PRN
Status: CANCELLED | OUTPATIENT
Start: 2023-03-01

## 2023-02-22 RX ORDER — 0.9 % SODIUM CHLORIDE 0.9 %
VIAL (ML) INJECTION PRN
Status: CANCELLED | OUTPATIENT
Start: 2023-02-28

## 2023-02-22 RX ORDER — HEPARIN SODIUM (PORCINE) LOCK FLUSH IV SOLN 100 UNIT/ML 100 UNIT/ML
500 SOLUTION INTRAVENOUS PRN
Status: CANCELLED | OUTPATIENT
Start: 2023-03-03

## 2023-02-22 RX ORDER — DIPHENHYDRAMINE HYDROCHLORIDE 50 MG/ML
50 INJECTION INTRAMUSCULAR; INTRAVENOUS PRN
Status: CANCELLED | OUTPATIENT
Start: 2023-03-01

## 2023-02-22 RX ORDER — 0.9 % SODIUM CHLORIDE 0.9 %
VIAL (ML) INJECTION PRN
Status: CANCELLED | OUTPATIENT
Start: 2023-03-01

## 2023-02-22 RX ORDER — HEPARIN SODIUM (PORCINE) LOCK FLUSH IV SOLN 100 UNIT/ML 100 UNIT/ML
500 SOLUTION INTRAVENOUS PRN
Status: CANCELLED | OUTPATIENT
Start: 2023-02-28

## 2023-02-22 RX ORDER — ONDANSETRON 8 MG/1
8 TABLET, ORALLY DISINTEGRATING ORAL PRN
Status: CANCELLED | OUTPATIENT
Start: 2023-03-01

## 2023-02-22 RX ORDER — LOPERAMIDE HYDROCHLORIDE 2 MG/1
4 CAPSULE ORAL PRN
Status: CANCELLED | OUTPATIENT
Start: 2023-03-01

## 2023-02-25 NOTE — PROGRESS NOTES
"Pharmacy Chemotherapy Verification    DX:  Metastatic Rectal Adenocarcinoma    Cycle 22  Previous treatment: C21 2/7/23    Protocol: FOLFiri + bevacizumab  Irinotecan  IV on day 1   -C6 10/25/22 Remove from the plan per  Dr. Aguila  -Added back at 150 mg/m2 starting Cycle 17   -Discontinued for tolerance/heavily pretreated  5-FU iv over 46 hrs   -Dose reduced to 2000 mg/m2 for tolerance/heavily pretreated   -Bolus discontinued for tolerance/heavily pretreated  Bevacizumab 5 mg/kg V on day 1   -3/22/22- HOLD for cycle 1 per MD   -11/15/22 HOLD for Cycle 17 due to proteinuria   for 8-12 cycles  Q21 day cycle  starting C16 10/25/22 per MD  NCCN Guidelines for Colon Cancer V.2.2018  Bradly V, Et al - Lancet Oncol. 2014 Sep;15(10):1066-75. Doi:  10.1016/-[id number]. Epub 2014 Jul 31.  Bulmaro MARRERO, et al - J Clin Oncol. 2007 Oct 20;25(30):9217-13.    Allergies:Patient has no known allergies  /86   Pulse 84   Temp 35.8 °C (96.5 °F) (Temporal)   Resp 18   Ht 1.71 m (5' 7.32\")   Wt 96.4 kg (212 lb 8.4 oz)   SpO2 94%   BMI 32.97 kg/m²  Body surface area is 2.14 meters squared.    Labs 2/28/23  ANC~ 2380 Hgb 13.4 Plt 282k  SCr 0.82 CrCl >125 mL/min   AST/ALT/AP = 19/14/80 Tbili 0.5  Urine protein - trace    Irinotecan 150 mg/m2 x 2.14 m2 = 321 mg   <10% difference, OK to treat with final dose = 321 mg IV    Bevacizumab-bvzr (Zirabev) 5 mg/kg x 96.4 kg = 482 mg   Rounded to vial size (within 10%) per dose rounding protocol.               OK to treat with final dose = 500 mg IV    Fluorouracil 2000 mg/m2 x 2.14 m2 = 4280 mg    <10% difference, ok to treat with final dose = 4280 mg CIVI over 46 hours   Via CADD pump for home infusion over 46 hours @ 1.9 mL/hr    Jessy Solorio, PharmD, BCOP  "

## 2023-02-27 NOTE — PROGRESS NOTES
"Pharmacy Chemotherapy Verification    DX:  Metastatic Rectal Adenocarcinoma    Protocol: FOLFiri + bevacizumab  Irinotecan  IV on day 1  -Dose reduced to 150 mg/m2 for tolerance/ heavily pretreated  -10/25/22 d/c'ed from treatment plan per MD  -11/15/22 added back to treatment plan per MD   -Discontinued for tolerance/ heavily pretreated  5-FU iv over 46 hrs  -Dose reduced to 2000 mg/m2 for tolerance/ heavily pretreated  -Bolus discontinued for tolerance/ heavily pretreated  evacizumab 5 mg/kg IV on day 1  -3/22/22- HOLD for cycle 1 per MD  -11/15/22 HOLD for cycle 17 due to urine protein   q14 days for 8-12 cycles - Pt receiving every 21 days for better toleration per Dr. Ayla Abdullahi V, Et al - Lancet Oncol. 2014 Sep;15(10):1069-75. Doi:  10.1016/-[id number]. Epub 2014 Jul 31.  Bulmaro MARRERO, et al - J Clin Oncol. 2007 Oct 20;25(30):8405-85.    Allergies:Patient has no known allergies.  /86   Pulse 84   Temp 35.8 °C (96.5 °F) (Temporal)   Resp 18   Ht 1.71 m (5' 7.32\")   Wt 96.4 kg (212 lb 8.4 oz)   SpO2 94%   BMI 32.97 kg/m²     Body surface area is 2.14 meters squared.     Labs 2/28/23:  ANC~ 2380 Plt = 282k   Hgb = 13.4     SCr = 0.82 mg/dL CrCl >125 mL/min   AST/ALT/AP = 19/14/80 TBili = 0.5   Urine protein = trace BP = 134/86    Drug Order   (Drug name, dose, route, IV Fluid & volume, frequency, number of doses) Cycle 22  Previous treatment: C21 on 2/7/23     Medication = Irinotecan   Base Dose = 150 mg/m²   Calc Dose: Base Dose x 2.14 m² = 321 mg  Final Dose = 321 mg  Route = IV  Fluid & Volume = D5W 500 mL  Admin Duration = Over 90 minutes          <10% difference, okay to treat with final dose   Medication = Bevacizumab-bvzr (Zirabev)  Base Dose= 5 mg/kg   Calc Dose: Base Dose x 96.4 kg = 482 mg  Final Dose = 500 mg  Route = IV  Fluid & Volume =  mL  Admin Duration = Over 30 minutes          Rounded to nearest vial size (within 10%) per rounding protocol, okay to treat with " final dose   Medication = Fluorouracil   Base Dose = 2000 mg/m²   Calc Dose:Base Dose x 2.14 m² =4280 mg  Final Dose = 4280 mg  Route = CIVI  Conc = 50 mg/mL  Fluid & Volume = 85.6 mL (+3 mL of overfill) = 88.6 mL  Admin Duration = Over 46 hours @ 1.9 mL/hr   Via CADD pump for home infusion       <10% difference, okay to treat with final dose     By my signature below, I confirm this process was performed independently with the BSA and all final chemotherapy dosing calculations congruent. I have reviewed the above chemotherapy order and that my calculation of the final dose and BSA (when applicable) corroborate those calculations of the  pharmacist. Discrepancies of 10% or greater in the written dose have been addressed and documented within the EPIC Progress notes.    Kj Winston, PharmD

## 2023-02-28 ENCOUNTER — OUTPATIENT INFUSION SERVICES (OUTPATIENT)
Dept: ONCOLOGY | Facility: MEDICAL CENTER | Age: 58
End: 2023-02-28
Attending: INTERNAL MEDICINE
Payer: MEDICARE

## 2023-02-28 VITALS
DIASTOLIC BLOOD PRESSURE: 86 MMHG | BODY MASS INDEX: 33.36 KG/M2 | HEART RATE: 84 BPM | SYSTOLIC BLOOD PRESSURE: 134 MMHG | WEIGHT: 212.52 LBS | TEMPERATURE: 96.5 F | RESPIRATION RATE: 18 BRPM | OXYGEN SATURATION: 94 % | HEIGHT: 67 IN

## 2023-02-28 DIAGNOSIS — C20 RECTAL CARCINOMA (HCC): ICD-10-CM

## 2023-02-28 LAB
ALBUMIN SERPL BCP-MCNC: 3.9 G/DL (ref 3.2–4.9)
ALBUMIN/GLOB SERPL: 1.3 G/DL
ALP SERPL-CCNC: 80 U/L (ref 30–99)
ALT SERPL-CCNC: 14 U/L (ref 2–50)
ANION GAP SERPL CALC-SCNC: 12 MMOL/L (ref 7–16)
AST SERPL-CCNC: 19 U/L (ref 12–45)
BASOPHILS # BLD AUTO: 1.6 % (ref 0–1.8)
BASOPHILS # BLD: 0.07 K/UL (ref 0–0.12)
BILIRUB SERPL-MCNC: 0.5 MG/DL (ref 0.1–1.5)
BUN SERPL-MCNC: 17 MG/DL (ref 8–22)
CALCIUM ALBUM COR SERPL-MCNC: 9.2 MG/DL (ref 8.5–10.5)
CALCIUM SERPL-MCNC: 9.1 MG/DL (ref 8.5–10.5)
CHLORIDE SERPL-SCNC: 107 MMOL/L (ref 96–112)
CO2 SERPL-SCNC: 24 MMOL/L (ref 20–33)
CREAT SERPL-MCNC: 0.82 MG/DL (ref 0.5–1.4)
EOSINOPHIL # BLD AUTO: 0.46 K/UL (ref 0–0.51)
EOSINOPHIL NFR BLD: 10.4 % (ref 0–6.9)
ERYTHROCYTE [DISTWIDTH] IN BLOOD BY AUTOMATED COUNT: 51.1 FL (ref 35.9–50)
GFR SERPLBLD CREATININE-BSD FMLA CKD-EPI: 103 ML/MIN/1.73 M 2
GLOBULIN SER CALC-MCNC: 2.9 G/DL (ref 1.9–3.5)
GLUCOSE SERPL-MCNC: 103 MG/DL (ref 65–99)
HCT VFR BLD AUTO: 41.3 % (ref 42–52)
HGB BLD-MCNC: 13.4 G/DL (ref 14–18)
IMM GRANULOCYTES # BLD AUTO: 0.04 K/UL (ref 0–0.11)
IMM GRANULOCYTES NFR BLD AUTO: 0.9 % (ref 0–0.9)
LYMPHOCYTES # BLD AUTO: 0.85 K/UL (ref 1–4.8)
LYMPHOCYTES NFR BLD: 19.2 % (ref 22–41)
MCH RBC QN AUTO: 26.9 PG (ref 27–33)
MCHC RBC AUTO-ENTMCNC: 32.4 G/DL (ref 33.7–35.3)
MCV RBC AUTO: 82.8 FL (ref 81.4–97.8)
MONOCYTES # BLD AUTO: 0.62 K/UL (ref 0–0.85)
MONOCYTES NFR BLD AUTO: 14 % (ref 0–13.4)
NEUTROPHILS # BLD AUTO: 2.38 K/UL (ref 1.82–7.42)
NEUTROPHILS NFR BLD: 53.9 % (ref 44–72)
NRBC # BLD AUTO: 0 K/UL
NRBC BLD-RTO: 0 /100 WBC
OUTPT INFUS CBC COMMENT OICOM: ABNORMAL
PLATELET # BLD AUTO: 282 K/UL (ref 164–446)
PMV BLD AUTO: 9.3 FL (ref 9–12.9)
POTASSIUM SERPL-SCNC: 3.8 MMOL/L (ref 3.6–5.5)
PROT SERPL-MCNC: 6.8 G/DL (ref 6–8.2)
RBC # BLD AUTO: 4.99 M/UL (ref 4.7–6.1)
SODIUM SERPL-SCNC: 143 MMOL/L (ref 135–145)
WBC # BLD AUTO: 4.4 K/UL (ref 4.8–10.8)

## 2023-02-28 PROCEDURE — 700105 HCHG RX REV CODE 258: Performed by: INTERNAL MEDICINE

## 2023-02-28 PROCEDURE — 96413 CHEMO IV INFUSION 1 HR: CPT

## 2023-02-28 PROCEDURE — 80053 COMPREHEN METABOLIC PANEL: CPT

## 2023-02-28 PROCEDURE — 700111 HCHG RX REV CODE 636 W/ 250 OVERRIDE (IP): Performed by: INTERNAL MEDICINE

## 2023-02-28 PROCEDURE — 96417 CHEMO IV INFUS EACH ADDL SEQ: CPT

## 2023-02-28 PROCEDURE — 85025 COMPLETE CBC W/AUTO DIFF WBC: CPT

## 2023-02-28 PROCEDURE — G0498 CHEMO EXTEND IV INFUS W/PUMP: HCPCS

## 2023-02-28 PROCEDURE — A4212 NON CORING NEEDLE OR STYLET: HCPCS

## 2023-02-28 PROCEDURE — 96375 TX/PRO/DX INJ NEW DRUG ADDON: CPT

## 2023-02-28 RX ADMIN — SODIUM CHLORIDE 500 MG: 9 INJECTION, SOLUTION INTRAVENOUS at 11:33

## 2023-02-28 RX ADMIN — DEXAMETHASONE SODIUM PHOSPHATE 12 MG: 4 INJECTION, SOLUTION INTRA-ARTICULAR; INTRALESIONAL; INTRAMUSCULAR; INTRAVENOUS; SOFT TISSUE at 08:48

## 2023-02-28 RX ADMIN — ATROPINE SULFATE 0.5 MG: 1 INJECTION, SOLUTION INTRAVENOUS at 09:31

## 2023-02-28 RX ADMIN — ONDANSETRON 16 MG: 2 INJECTION INTRAMUSCULAR; INTRAVENOUS at 09:02

## 2023-02-28 RX ADMIN — FLUOROURACIL 4280 MG: 50 INJECTION, SOLUTION INTRAVENOUS at 12:23

## 2023-02-28 RX ADMIN — IRINOTECAN HYDROCHLORIDE 321 MG: 20 INJECTION, SOLUTION INTRAVENOUS at 09:35

## 2023-02-28 ASSESSMENT — FIBROSIS 4 INDEX: FIB4 SCORE: 0.88

## 2023-02-28 NOTE — PROGRESS NOTES
Dre presents today to [facility]  D1C22 Folfiri/ Zirabev.  Dre denies s/s active infections, voices no new complaints.  Right upper chest port accessed in sterile fashion, + blood return observed.  Labs drawn and reviewed, UA completed, results within parameters to receive treatment.         Pre-medications administered.  Atropine given prior to irinotecan. Irintotecan administered per MAR.  Zirabev adminsitered per MAR with NS.  Port flushed with NS, + blood return observed.  5FU CADD pump connected, all connections secure, all clamps unclamped.  Pump in RUN mode.  Dre discharged in NAD, returns in 46 hours for port de-access.

## 2023-02-28 NOTE — PROGRESS NOTES
Chemotherapy Verification - PRIMARY RN      Height = 171 cm  Weight = 96.4 kg  BSA = 2.14 m2       Medication: irinotecan  Dose: 150 mg/m2  Calculated Dose:   321 mg                             (In mg/m2, AUC, mg/kg)     Medication: zirabev  Dose: 5 mg/kg  Calculated Dose:   482 mg                             (In mg/m2, AUC, mg/kg)    Medication: fluorouracil  Dose: 2000 mg/m2  Calculated Dose:   4280 mg                             I confirm this process was performed independently with the BSA and all final chemotherapy dosing calculations congruent.  Any discrepancies of 10% or greater have been addressed with the chemotherapy pharmacist. The resolution of the discrepancy has been documented in the EPIC progress notes.

## 2023-02-28 NOTE — PROGRESS NOTES
Chemotherapy Verification - SECONDARY RN       Height = 1.71 m  Weight = 96.4 kg  BSA = 2.14 m2       Medication: Irinotecan  Dose: 150mg/m2  Calculated Dose: 321 mg                             (In mg/m2, AUC, mg/kg)     Medication: Zirabev  Dose: 5mg/kg  Calculated Dose: 482 mg                             (In mg/m2, AUC, mg/kg)    Medication: fluorouracil  Dose: 2000mg/m2  Calculated Dose: 4280 mg                             (In mg/m2, AUC, mg/kg)    I confirm that this process was performed independently.

## 2023-03-02 ENCOUNTER — OUTPATIENT INFUSION SERVICES (OUTPATIENT)
Dept: ONCOLOGY | Facility: MEDICAL CENTER | Age: 58
End: 2023-03-02
Attending: INTERNAL MEDICINE
Payer: MEDICARE

## 2023-03-02 VITALS
TEMPERATURE: 97 F | RESPIRATION RATE: 18 BRPM | BODY MASS INDEX: 33.91 KG/M2 | WEIGHT: 216.05 LBS | HEART RATE: 97 BPM | HEIGHT: 67 IN | DIASTOLIC BLOOD PRESSURE: 91 MMHG | OXYGEN SATURATION: 97 % | SYSTOLIC BLOOD PRESSURE: 121 MMHG

## 2023-03-02 DIAGNOSIS — C20 RECTAL CARCINOMA (HCC): ICD-10-CM

## 2023-03-02 PROCEDURE — 96523 IRRIG DRUG DELIVERY DEVICE: CPT

## 2023-03-02 PROCEDURE — 700111 HCHG RX REV CODE 636 W/ 250 OVERRIDE (IP): Performed by: INTERNAL MEDICINE

## 2023-03-02 RX ORDER — HEPARIN SODIUM (PORCINE) LOCK FLUSH IV SOLN 100 UNIT/ML 100 UNIT/ML
500 SOLUTION INTRAVENOUS PRN
Status: DISCONTINUED | OUTPATIENT
Start: 2023-03-02 | End: 2023-03-02 | Stop reason: HOSPADM

## 2023-03-02 RX ADMIN — HEPARIN 500 UNITS: 100 SYRINGE at 14:06

## 2023-03-02 ASSESSMENT — FIBROSIS 4 INDEX: FIB4 SCORE: 0.99

## 2023-03-02 ASSESSMENT — PAIN DESCRIPTION - PAIN TYPE: TYPE: CHRONIC PAIN

## 2023-03-02 NOTE — PROGRESS NOTES
Here for 5 FU pump disconnect after 46 hrs of continuous infusion. See chemotherapy flow sheet for volume / dose administration. Port flushed per protocol, site d-accessed, needle intact compression dressing applied. Patient discharge home in [facility]. Next appointment confirmed.

## 2023-03-14 ENCOUNTER — PATIENT OUTREACH (OUTPATIENT)
Dept: ONCOLOGY | Facility: MEDICAL CENTER | Age: 58
End: 2023-03-14
Payer: MEDICARE

## 2023-03-14 NOTE — PROGRESS NOTES
Oncology nurse navigator called patient to follow up on a distress/referral. LINA introduced myself my role and services and our entire support team.  Patient reports that he is okay and has been dealing with his cancer for awhile now.  He reports that he is doing good enjoying his family and his children and keeps very positive thoughts.  LINA sent my contact information thorough my chart.

## 2023-03-27 RX ORDER — ONDANSETRON 8 MG/1
8 TABLET, ORALLY DISINTEGRATING ORAL PRN
Status: CANCELLED | OUTPATIENT
Start: 2023-03-27

## 2023-03-27 RX ORDER — LOPERAMIDE HYDROCHLORIDE 2 MG/1
2 CAPSULE ORAL
Status: CANCELLED | OUTPATIENT
Start: 2023-03-27

## 2023-03-27 RX ORDER — 0.9 % SODIUM CHLORIDE 0.9 %
10 VIAL (ML) INJECTION PRN
Status: CANCELLED | OUTPATIENT
Start: 2023-03-27

## 2023-03-27 RX ORDER — 0.9 % SODIUM CHLORIDE 0.9 %
3 VIAL (ML) INJECTION PRN
Status: CANCELLED | OUTPATIENT
Start: 2023-03-27

## 2023-03-27 RX ORDER — DIPHENHYDRAMINE HYDROCHLORIDE 50 MG/ML
50 INJECTION INTRAMUSCULAR; INTRAVENOUS PRN
Status: CANCELLED | OUTPATIENT
Start: 2023-03-27

## 2023-03-27 RX ORDER — LOPERAMIDE HYDROCHLORIDE 2 MG/1
4 CAPSULE ORAL PRN
Status: CANCELLED | OUTPATIENT
Start: 2023-03-27

## 2023-03-27 RX ORDER — HEPARIN SODIUM (PORCINE) LOCK FLUSH IV SOLN 100 UNIT/ML 100 UNIT/ML
500 SOLUTION INTRAVENOUS PRN
Status: CANCELLED | OUTPATIENT
Start: 2023-03-29

## 2023-03-27 RX ORDER — DEXTROSE MONOHYDRATE 50 MG/ML
INJECTION, SOLUTION INTRAVENOUS CONTINUOUS
Status: CANCELLED | OUTPATIENT
Start: 2023-03-27

## 2023-03-27 RX ORDER — HEPARIN SODIUM (PORCINE) LOCK FLUSH IV SOLN 100 UNIT/ML 100 UNIT/ML
500 SOLUTION INTRAVENOUS PRN
Status: CANCELLED | OUTPATIENT
Start: 2023-03-27

## 2023-03-27 RX ORDER — SODIUM CHLORIDE 9 MG/ML
INJECTION, SOLUTION INTRAVENOUS CONTINUOUS
Status: CANCELLED | OUTPATIENT
Start: 2023-03-27

## 2023-03-27 RX ORDER — EPINEPHRINE 1 MG/ML(1)
0.5 AMPUL (ML) INJECTION PRN
Status: CANCELLED | OUTPATIENT
Start: 2023-03-27

## 2023-03-27 RX ORDER — 0.9 % SODIUM CHLORIDE 0.9 %
VIAL (ML) INJECTION PRN
Status: CANCELLED | OUTPATIENT
Start: 2023-03-27

## 2023-03-27 RX ORDER — METHYLPREDNISOLONE SODIUM SUCCINATE 125 MG/2ML
125 INJECTION, POWDER, LYOPHILIZED, FOR SOLUTION INTRAMUSCULAR; INTRAVENOUS PRN
Status: CANCELLED | OUTPATIENT
Start: 2023-03-27

## 2023-03-27 RX ORDER — 0.9 % SODIUM CHLORIDE 0.9 %
20 VIAL (ML) INJECTION PRN
Status: CANCELLED | OUTPATIENT
Start: 2023-03-29

## 2023-03-27 RX ORDER — ONDANSETRON 2 MG/ML
4 INJECTION INTRAMUSCULAR; INTRAVENOUS PRN
Status: CANCELLED | OUTPATIENT
Start: 2023-03-27

## 2023-03-27 RX ORDER — PROCHLORPERAZINE MALEATE 10 MG
10 TABLET ORAL EVERY 6 HOURS PRN
Status: CANCELLED | OUTPATIENT
Start: 2023-03-27

## 2023-03-27 NOTE — PROGRESS NOTES
"Pharmacy Chemotherapy Verification    DX:  Metastatic Rectal Adenocarcinoma    Cycle 23 (delayed 1 week, unknown reason)  Previous treatment: C22 2/28/23    Protocol: FOLFiri + bevacizumab  Irinotecan  IV on day 1   -C6 10/25/22 Remove from the plan per  Dr. Aguila  -Added back at 150 mg/m2 starting Cycle 17   -Discontinued for tolerance/heavily pretreated  5-FU iv over 46 hrs   -Dose reduced to 2000 mg/m2 for tolerance/heavily pretreated   -Bolus discontinued for tolerance/heavily pretreated  Bevacizumab 5 mg/kg V on day 1   -3/22/22- HOLD for cycle 1 per MD   -11/15/22 HOLD for Cycle 17 due to proteinuria   for 8-12 cycles  Q21 day cycle  starting C16 10/25/22 per MD  NCCN Guidelines for Colon Cancer V.2.2018  Bradly V, Et al - Lancet Oncol. 2014 Sep;15(10):1067-75. Doi:  10.1016/-[id number]. Epub 2014 Jul 31.  Bulmaro MARRERO, et al - J Clin Oncol. 2007 Oct 20;25(30):9851-33.    Allergies:Patient has no known allergies  BP (!) 137/98   Pulse 82   Temp 36 °C (96.8 °F) (Temporal)   Resp 18   Ht 1.71 m (5' 7.32\")   Wt 96.4 kg (212 lb 8.4 oz)   SpO2 97%   BMI 32.97 kg/m²  Body surface area is 2.14 meters squared.    Labs 3/28/23:  ANC~ 5620 Plt = 286k   Hgb = 13.0  SCr = 0.71 mg/dL CrCl >125 mL/min  AST/ALT/AP = 19/16/82 TBili = 0.4  Urine protein = negative BP = 137/98*  *Ok to proceed with treatment today per MD    Irinotecan 150 mg/m2 x 2.14 m2 = 321 mg   <10% difference, OK to treat with final dose = 321 mg IV    Bevacizumab-bvzr (Zirabev) 5 mg/kg x 96.4 kg = 482 mg   Rounded to vial size (within 10%) per dose rounding protocol.               OK to treat with final dose = 500 mg IV    Fluorouracil 2000 mg/m2 x 2.14 m2 = 4280 mg    <10% difference, ok to treat with final dose = 4280 mg CIVI over 46 hours   Via CADD pump for home infusion over 46 hours @ 1.9 mL/hr    Kj Winston, PharmD  "

## 2023-03-27 NOTE — PROGRESS NOTES
"Pharmacy Chemotherapy Verification    DX:  Metastatic Rectal Adenocarcinoma    Protocol: FOLFiri + bevacizumab  Irinotecan  IV on day 1  -Dose reduced to 150 mg/m2 for tolerance/ heavily pretreated  -10/25/22 d/c'ed from treatment plan per MD  -11/15/22 added back to treatment plan per MD   -Discontinued for tolerance/ heavily pretreated  5-FU iv over 46 hrs  -Dose reduced to 2000 mg/m2 for tolerance/ heavily pretreated  -Bolus discontinued for tolerance/ heavily pretreated  evacizumab 5 mg/kg IV on day 1  -3/22/22- HOLD for cycle 1 per MD  -11/15/22 HOLD for cycle 17 due to urine protein    8-12 cycles - Pt receiving every 21 days for better toleration per Dr. Ayla Abdullahi V, Et al - Lancet Oncol. 2014 Sep;15(10):106-75. Doi:  10.1016/-[id number]. Epub 2014 Jul 31.  Bulmaro MARRERO, et al - J Clin Oncol. 2007 Oct 20;25(30):0207-56.    Allergies:Patient has no known allergies.  BP (!) 137/98   Pulse 82   Temp 36 °C (96.8 °F) (Temporal)   Resp 18   Ht 1.71 m (5' 7.32\")   Wt 96.4 kg (212 lb 8.4 oz)   SpO2 97%   BMI 32.97 kg/m²     Body surface area is 2.14 meters squared.     Labs 3/28/23:  ANC~ 5620 Plt = 286k   Hgb = 13     SCr = 0.71 mg/dL CrCl > 125 mL/min   AST/ALT/AP = WNL TBili = 0.4     BP = 134/96 - ok per Ayla     Urine protein = negative     Drug Order   (Drug name, dose, route, IV Fluid & volume, frequency, number of doses) Cycle 23 (delayed 1 wk, pt out of town)  Previous treatment: C22 on 2/28/23     Medication = Irinotecan   Base Dose = 150 mg/m²   Calc Dose: Base Dose x 2.14 m² = 321 mg  Final Dose = 321 mg  Route = IV  Fluid & Volume = D5W 500 mL  Admin Duration = Over 90 minutes          <10% difference, okay to treat with final dose   Medication = Bevacizumab-bvzr (Zirabev)  Base Dose= 5 mg/kg   Calc Dose: Base Dose x 96.4 kg = 482 mg  Final Dose = 500 mg  Route = IV  Fluid & Volume =  mL  Admin Duration = Over 30 minutes          Rounded to nearest vial size (within 10%) per " rounding protocol, okay to treat with final dose   Medication = Fluorouracil   Base Dose = 2000 mg/m²   Calc Dose:Base Dose x 2.14 m² =4280 mg  Final Dose = 4280 mg  Route = CIVI  Conc = 50 mg/mL  Fluid & Volume = 85.6 mL (+3 mL of overfill) = 88.6 mL  Admin Duration = Over 46 hours @ 1.9 mL/hr   Via CADD pump for home infusion       <10% difference, okay to treat with final dose     By my signature below, I confirm this process was performed independently with the BSA and all final chemotherapy dosing calculations congruent. I have reviewed the above chemotherapy order and that my calculation of the final dose and BSA (when applicable) corroborate those calculations of the  pharmacist. Discrepancies of 10% or greater in the written dose have been addressed and documented within the [facility] Progress notes.    Chen Riley, PharmD, BCOP

## 2023-03-28 ENCOUNTER — OUTPATIENT INFUSION SERVICES (OUTPATIENT)
Dept: ONCOLOGY | Facility: MEDICAL CENTER | Age: 58
End: 2023-03-28
Attending: INTERNAL MEDICINE
Payer: MEDICARE

## 2023-03-28 VITALS
OXYGEN SATURATION: 97 % | TEMPERATURE: 96.8 F | RESPIRATION RATE: 18 BRPM | BODY MASS INDEX: 33.36 KG/M2 | SYSTOLIC BLOOD PRESSURE: 137 MMHG | HEIGHT: 67 IN | DIASTOLIC BLOOD PRESSURE: 98 MMHG | WEIGHT: 212.52 LBS | HEART RATE: 82 BPM

## 2023-03-28 DIAGNOSIS — C20 RECTAL CARCINOMA (HCC): ICD-10-CM

## 2023-03-28 LAB
ALBUMIN SERPL BCP-MCNC: 4 G/DL (ref 3.2–4.9)
ALBUMIN/GLOB SERPL: 1.5 G/DL
ALP SERPL-CCNC: 82 U/L (ref 30–99)
ALT SERPL-CCNC: 16 U/L (ref 2–50)
ANION GAP SERPL CALC-SCNC: 13 MMOL/L (ref 7–16)
APPEARANCE UR: CLEAR
AST SERPL-CCNC: 19 U/L (ref 12–45)
BASOPHILS # BLD AUTO: 1 % (ref 0–1.8)
BASOPHILS # BLD: 0.08 K/UL (ref 0–0.12)
BILIRUB SERPL-MCNC: 0.4 MG/DL (ref 0.1–1.5)
BUN SERPL-MCNC: 16 MG/DL (ref 8–22)
CALCIUM ALBUM COR SERPL-MCNC: 8.8 MG/DL (ref 8.5–10.5)
CALCIUM SERPL-MCNC: 8.8 MG/DL (ref 8.5–10.5)
CHLORIDE SERPL-SCNC: 103 MMOL/L (ref 96–112)
CO2 SERPL-SCNC: 24 MMOL/L (ref 20–33)
COLOR UR AUTO: YELLOW
CREAT SERPL-MCNC: 0.71 MG/DL (ref 0.5–1.4)
EOSINOPHIL # BLD AUTO: 0.31 K/UL (ref 0–0.51)
EOSINOPHIL NFR BLD: 4 % (ref 0–6.9)
ERYTHROCYTE [DISTWIDTH] IN BLOOD BY AUTOMATED COUNT: 49.8 FL (ref 35.9–50)
GFR SERPLBLD CREATININE-BSD FMLA CKD-EPI: 108 ML/MIN/1.73 M 2
GLOBULIN SER CALC-MCNC: 2.6 G/DL (ref 1.9–3.5)
GLUCOSE SERPL-MCNC: 105 MG/DL (ref 65–99)
GLUCOSE UR QL STRIP.AUTO: NEGATIVE MG/DL
HCT VFR BLD AUTO: 40.2 % (ref 42–52)
HGB BLD-MCNC: 13 G/DL (ref 14–18)
IMM GRANULOCYTES # BLD AUTO: 0.05 K/UL (ref 0–0.11)
IMM GRANULOCYTES NFR BLD AUTO: 0.6 % (ref 0–0.9)
KETONES UR QL STRIP.AUTO: NEGATIVE MG/DL
LEUKOCYTE ESTERASE UR QL STRIP.AUTO: NEGATIVE
LYMPHOCYTES # BLD AUTO: 1.02 K/UL (ref 1–4.8)
LYMPHOCYTES NFR BLD: 13.1 % (ref 22–41)
MCH RBC QN AUTO: 26.6 PG (ref 27–33)
MCHC RBC AUTO-ENTMCNC: 32.3 G/DL (ref 33.7–35.3)
MCV RBC AUTO: 82.4 FL (ref 81.4–97.8)
MONOCYTES # BLD AUTO: 0.72 K/UL (ref 0–0.85)
MONOCYTES NFR BLD AUTO: 9.2 % (ref 0–13.4)
NEUTROPHILS # BLD AUTO: 5.62 K/UL (ref 1.82–7.42)
NEUTROPHILS NFR BLD: 72.1 % (ref 44–72)
NITRITE UR QL STRIP.AUTO: NEGATIVE
NRBC # BLD AUTO: 0 K/UL
NRBC BLD-RTO: 0 /100 WBC
OUTPT INFUS CBC COMMENT OICOM: ABNORMAL
PH UR STRIP.AUTO: 6.5 [PH] (ref 5–8)
PLATELET # BLD AUTO: 286 K/UL (ref 164–446)
PMV BLD AUTO: 9.9 FL (ref 9–12.9)
POTASSIUM SERPL-SCNC: 3.9 MMOL/L (ref 3.6–5.5)
PROT SERPL-MCNC: 6.6 G/DL (ref 6–8.2)
PROT UR QL STRIP: NEGATIVE MG/DL
RBC # BLD AUTO: 4.88 M/UL (ref 4.7–6.1)
RBC UR QL AUTO: ABNORMAL
SODIUM SERPL-SCNC: 140 MMOL/L (ref 135–145)
SP GR UR STRIP.AUTO: 1.02 (ref 1–1.03)
WBC # BLD AUTO: 7.8 K/UL (ref 4.8–10.8)

## 2023-03-28 PROCEDURE — 85025 COMPLETE CBC W/AUTO DIFF WBC: CPT

## 2023-03-28 PROCEDURE — G0498 CHEMO EXTEND IV INFUS W/PUMP: HCPCS

## 2023-03-28 PROCEDURE — 96417 CHEMO IV INFUS EACH ADDL SEQ: CPT

## 2023-03-28 PROCEDURE — 96376 TX/PRO/DX INJ SAME DRUG ADON: CPT

## 2023-03-28 PROCEDURE — 80053 COMPREHEN METABOLIC PANEL: CPT

## 2023-03-28 PROCEDURE — 96375 TX/PRO/DX INJ NEW DRUG ADDON: CPT

## 2023-03-28 PROCEDURE — 700105 HCHG RX REV CODE 258: Performed by: INTERNAL MEDICINE

## 2023-03-28 PROCEDURE — 81002 URINALYSIS NONAUTO W/O SCOPE: CPT

## 2023-03-28 PROCEDURE — 700102 HCHG RX REV CODE 250 W/ 637 OVERRIDE(OP): Performed by: INTERNAL MEDICINE

## 2023-03-28 PROCEDURE — 96413 CHEMO IV INFUSION 1 HR: CPT

## 2023-03-28 PROCEDURE — 96367 TX/PROPH/DG ADDL SEQ IV INF: CPT

## 2023-03-28 PROCEDURE — 700111 HCHG RX REV CODE 636 W/ 250 OVERRIDE (IP): Performed by: INTERNAL MEDICINE

## 2023-03-28 PROCEDURE — A4212 NON CORING NEEDLE OR STYLET: HCPCS

## 2023-03-28 RX ORDER — PROCHLORPERAZINE MALEATE 10 MG
10 TABLET ORAL EVERY 6 HOURS PRN
Status: DISCONTINUED | OUTPATIENT
Start: 2023-03-28 | End: 2023-03-28 | Stop reason: HOSPADM

## 2023-03-28 RX ADMIN — ATROPINE SULFATE 0.5 MG: 1 INJECTION, SOLUTION INTRAMUSCULAR; INTRAVENOUS; SUBCUTANEOUS at 12:00

## 2023-03-28 RX ADMIN — IRINOTECAN HYDROCHLORIDE 321 MG: 20 INJECTION, SOLUTION INTRAVENOUS at 09:10

## 2023-03-28 RX ADMIN — SODIUM CHLORIDE 500 MG: 9 INJECTION, SOLUTION INTRAVENOUS at 11:08

## 2023-03-28 RX ADMIN — ATROPINE SULFATE 0.5 MG: 1 INJECTION, SOLUTION INTRAMUSCULAR; INTRAVENOUS; SUBCUTANEOUS at 09:05

## 2023-03-28 RX ADMIN — PROCHLORPERAZINE MALEATE 10 MG: 10 TABLET ORAL at 12:08

## 2023-03-28 RX ADMIN — ONDANSETRON 16 MG: 2 INJECTION INTRAMUSCULAR; INTRAVENOUS at 08:37

## 2023-03-28 RX ADMIN — FLUOROURACIL 4280 MG: 50 INJECTION, SOLUTION INTRAVENOUS at 12:12

## 2023-03-28 RX ADMIN — DEXAMETHASONE SODIUM PHOSPHATE 12 MG: 4 INJECTION, SOLUTION INTRA-ARTICULAR; INTRALESIONAL; INTRAMUSCULAR; INTRAVENOUS; SOFT TISSUE at 08:20

## 2023-03-28 ASSESSMENT — PAIN DESCRIPTION - PAIN TYPE: TYPE: CHRONIC PAIN

## 2023-03-28 ASSESSMENT — FIBROSIS 4 INDEX: FIB4 SCORE: 0.99

## 2023-03-28 NOTE — PROGRESS NOTES
Chemotherapy Verification - SECONDARY RN       Height = 1.71 m  Weight = 96.4 kg  BSA = 2.14 m^2       Medication: irinotecan  Dose: 150 mg/m^2  Calculated Dose: 321 mg                             (In mg/m2, AUC, mg/kg)     Medication: bevacizumab-bvzr  Dose: 5 mg/kg  Calculated Dose: 482 mg                             (In mg/m2, AUC, mg/kg)    Medication: fluorouracil CADD  Dose: 2000 mg/m^2  Calculated Dose: 4280 mg                             (In mg/m2, AUC, mg/kg)    I confirm that this process was performed independently.

## 2023-03-28 NOTE — PROGRESS NOTES
Dre to infusion for C23 of FOLFIRI + bevacizumab. Reports tolerating past treatment well aside from fatigue and a few days of nausea and loss of appetite after treatment. Port accessed using sterile technique, flushed with NS with positive blood and labs drawn. Labs reviewed by RN, within parameters for treatment. /98, received okay to treat today from Dr. Aguila. Pre-treatment meds and PRN atropine given as ordered. Irinotecan infused as ordered followed by bevacizumab. Towards the end of Zirabev infusion patient began complaining of abdominal cramping and nausea that started at the end of irinotecan infusion, denies SOB, constipation, chills or dizziness. Second dose of PRN atropine and compazine given and Zirabev infusion completed. Patient reports minor improvement in symptoms, still having some abdominal cramping. Patient connected to CADD pump programmed for 46 hour 5-FU infusion, verified blood return from port and that pump is running prior to patient departure. Patient declines further observation or going to ER, states he would like to go home and lie down.  RN advised patient to come to ER if abdominal cramping does not resolve or if it worsens or is accompanied by any chest pain, shortness of breath, itchiness or fever, patient verbalized understanding. Patient left in stable condition to home, knows when to return for next appt.

## 2023-03-28 NOTE — PROGRESS NOTES
Chemotherapy Verification - PRIMARY RN      Height = 1.71m  Weight = 96.4kg  BSA = 2.14m2       Medication: irinotecan  Dose: 150mg/m2  Calculated Dose: 321 mg                             (In mg/m2, AUC, mg/kg)     Medication: bevacizumab-bvzr  Dose: 5mg/kg  Calculated Dose: 482mg                             (In mg/m2, AUC, mg/kg)    Medication: fluorouracil  Dose: 2000mg/m2  Calculated Dose: 4280mg                             (In mg/m2, AUC, mg/kg)        I confirm this process was performed independently with the BSA and all final chemotherapy dosing calculations congruent.  Any discrepancies of 10% or greater have been addressed with the chemotherapy pharmacist. The resolution of the discrepancy has been documented in the EPIC progress notes.

## 2023-03-30 ENCOUNTER — OUTPATIENT INFUSION SERVICES (OUTPATIENT)
Dept: ONCOLOGY | Facility: MEDICAL CENTER | Age: 58
End: 2023-03-30
Attending: INTERNAL MEDICINE
Payer: MEDICARE

## 2023-03-30 VITALS
OXYGEN SATURATION: 97 % | HEART RATE: 82 BPM | TEMPERATURE: 97.3 F | RESPIRATION RATE: 18 BRPM | SYSTOLIC BLOOD PRESSURE: 116 MMHG | DIASTOLIC BLOOD PRESSURE: 98 MMHG

## 2023-03-30 DIAGNOSIS — C20 RECTAL CARCINOMA (HCC): ICD-10-CM

## 2023-03-30 PROCEDURE — 96523 IRRIG DRUG DELIVERY DEVICE: CPT

## 2023-03-30 PROCEDURE — 700111 HCHG RX REV CODE 636 W/ 250 OVERRIDE (IP): Performed by: INTERNAL MEDICINE

## 2023-03-30 RX ORDER — HEPARIN SODIUM (PORCINE) LOCK FLUSH IV SOLN 100 UNIT/ML 100 UNIT/ML
500 SOLUTION INTRAVENOUS PRN
Status: DISCONTINUED | OUTPATIENT
Start: 2023-03-30 | End: 2023-03-30 | Stop reason: HOSPADM

## 2023-03-30 RX ADMIN — HEPARIN 500 UNITS: 100 SYRINGE at 16:24

## 2023-03-30 ASSESSMENT — PAIN DESCRIPTION - PAIN TYPE: TYPE: CHRONIC PAIN

## 2023-03-30 NOTE — PROGRESS NOTES
[facility] de-access. Did well with chemo, nothing new to report. Pump had 0.0 ml in reservoir and total ordered dose plus overfill delivered. Pump disconnected, cleaned and returned to pharmacy. Port flushed per protocol, brisk blood return observed, heparinized and de-accessed with needle intact, gauze dressing placed. Pt has next appt, left on foot to self care.

## 2023-04-02 ENCOUNTER — APPOINTMENT (OUTPATIENT)
Dept: RADIOLOGY | Facility: MEDICAL CENTER | Age: 58
End: 2023-04-02
Attending: UROLOGY
Payer: MEDICARE

## 2023-04-10 ENCOUNTER — HOSPITAL ENCOUNTER (OUTPATIENT)
Dept: RADIOLOGY | Facility: MEDICAL CENTER | Age: 58
End: 2023-04-10
Attending: INTERNAL MEDICINE
Payer: MEDICARE

## 2023-04-10 ENCOUNTER — HOSPITAL ENCOUNTER (OUTPATIENT)
Dept: RADIOLOGY | Facility: MEDICAL CENTER | Age: 58
End: 2023-04-10
Attending: UROLOGY
Payer: MEDICARE

## 2023-04-10 DIAGNOSIS — C20 MALIGNANT NEOPLASM OF RECTUM (HCC): ICD-10-CM

## 2023-04-10 DIAGNOSIS — R31.29 OTHER MICROSCOPIC HEMATURIA: ICD-10-CM

## 2023-04-10 PROCEDURE — 700117 HCHG RX CONTRAST REV CODE 255: Performed by: UROLOGY

## 2023-04-10 PROCEDURE — 71260 CT THORAX DX C+: CPT

## 2023-04-10 PROCEDURE — 74178 CT ABD&PLV WO CNTR FLWD CNTR: CPT

## 2023-04-10 PROCEDURE — 700117 HCHG RX CONTRAST REV CODE 255: Performed by: INTERNAL MEDICINE

## 2023-04-10 RX ADMIN — IOHEXOL 50 ML: 350 INJECTION, SOLUTION INTRAVENOUS at 10:42

## 2023-04-10 RX ADMIN — IOHEXOL 50 ML: 350 INJECTION, SOLUTION INTRAVENOUS at 10:44

## 2023-04-17 RX ORDER — EPINEPHRINE 1 MG/ML(1)
0.5 AMPUL (ML) INJECTION PRN
Status: CANCELLED | OUTPATIENT
Start: 2023-04-19

## 2023-04-17 RX ORDER — ONDANSETRON 8 MG/1
8 TABLET, ORALLY DISINTEGRATING ORAL PRN
Status: CANCELLED | OUTPATIENT
Start: 2023-04-19

## 2023-04-17 RX ORDER — HEPARIN SODIUM (PORCINE) LOCK FLUSH IV SOLN 100 UNIT/ML 100 UNIT/ML
500 SOLUTION INTRAVENOUS PRN
Status: CANCELLED | OUTPATIENT
Start: 2023-04-21

## 2023-04-17 RX ORDER — 0.9 % SODIUM CHLORIDE 0.9 %
10 VIAL (ML) INJECTION PRN
Status: CANCELLED | OUTPATIENT
Start: 2023-04-19

## 2023-04-17 RX ORDER — 0.9 % SODIUM CHLORIDE 0.9 %
10 VIAL (ML) INJECTION PRN
Status: CANCELLED | OUTPATIENT
Start: 2023-04-17

## 2023-04-17 RX ORDER — METHYLPREDNISOLONE SODIUM SUCCINATE 125 MG/2ML
125 INJECTION, POWDER, LYOPHILIZED, FOR SOLUTION INTRAMUSCULAR; INTRAVENOUS PRN
Status: CANCELLED | OUTPATIENT
Start: 2023-04-19

## 2023-04-17 RX ORDER — LOPERAMIDE HYDROCHLORIDE 2 MG/1
4 CAPSULE ORAL PRN
Status: CANCELLED | OUTPATIENT
Start: 2023-04-19

## 2023-04-17 RX ORDER — LOPERAMIDE HYDROCHLORIDE 2 MG/1
2 CAPSULE ORAL
Status: CANCELLED | OUTPATIENT
Start: 2023-04-19

## 2023-04-17 RX ORDER — DEXTROSE MONOHYDRATE 50 MG/ML
INJECTION, SOLUTION INTRAVENOUS CONTINUOUS
Status: CANCELLED | OUTPATIENT
Start: 2023-04-19

## 2023-04-17 RX ORDER — DIPHENHYDRAMINE HYDROCHLORIDE 50 MG/ML
50 INJECTION INTRAMUSCULAR; INTRAVENOUS PRN
Status: CANCELLED | OUTPATIENT
Start: 2023-04-19

## 2023-04-17 RX ORDER — 0.9 % SODIUM CHLORIDE 0.9 %
VIAL (ML) INJECTION PRN
Status: CANCELLED | OUTPATIENT
Start: 2023-04-19

## 2023-04-17 RX ORDER — HEPARIN SODIUM (PORCINE) LOCK FLUSH IV SOLN 100 UNIT/ML 100 UNIT/ML
500 SOLUTION INTRAVENOUS PRN
Status: CANCELLED | OUTPATIENT
Start: 2023-04-19

## 2023-04-17 RX ORDER — PROCHLORPERAZINE MALEATE 10 MG
10 TABLET ORAL EVERY 6 HOURS PRN
Status: CANCELLED | OUTPATIENT
Start: 2023-04-19

## 2023-04-17 RX ORDER — 0.9 % SODIUM CHLORIDE 0.9 %
3 VIAL (ML) INJECTION PRN
Status: CANCELLED | OUTPATIENT
Start: 2023-04-17

## 2023-04-17 RX ORDER — HEPARIN SODIUM (PORCINE) LOCK FLUSH IV SOLN 100 UNIT/ML 100 UNIT/ML
500 SOLUTION INTRAVENOUS PRN
Status: CANCELLED | OUTPATIENT
Start: 2023-04-17

## 2023-04-17 RX ORDER — 0.9 % SODIUM CHLORIDE 0.9 %
VIAL (ML) INJECTION PRN
Status: CANCELLED | OUTPATIENT
Start: 2023-04-17

## 2023-04-17 RX ORDER — 0.9 % SODIUM CHLORIDE 0.9 %
3 VIAL (ML) INJECTION PRN
Status: CANCELLED | OUTPATIENT
Start: 2023-04-19

## 2023-04-17 RX ORDER — 0.9 % SODIUM CHLORIDE 0.9 %
20 VIAL (ML) INJECTION PRN
Status: CANCELLED | OUTPATIENT
Start: 2023-04-21

## 2023-04-17 RX ORDER — ONDANSETRON 2 MG/ML
4 INJECTION INTRAMUSCULAR; INTRAVENOUS PRN
Status: CANCELLED | OUTPATIENT
Start: 2023-04-19

## 2023-04-17 RX ORDER — SODIUM CHLORIDE 9 MG/ML
INJECTION, SOLUTION INTRAVENOUS CONTINUOUS
Status: CANCELLED | OUTPATIENT
Start: 2023-04-19

## 2023-04-17 NOTE — PROGRESS NOTES
"Pharmacy Chemotherapy Verification    DX:  Metastatic Rectal Adenocarcinoma    Cycle 24  Previous treatment: C23 3/28/23    Protocol: FOLFiri + bevacizumab  Irinotecan  IV on day 1   -C6 10/25/22 Remove from the plan per  Dr. Aguila  -Added back at 150 mg/m2 starting Cycle 17   -Discontinued for tolerance/heavily pretreated  5-FU iv over 46 hrs   -Dose reduced to 2000 mg/m2 for tolerance/heavily pretreated   -Bolus discontinued for tolerance/heavily pretreated  Bevacizumab 5 mg/kg V on day 1   -3/22/22- HOLD for cycle 1 per MD   -11/15/22 HOLD for Cycle 17 due to proteinuria   for 8-12 cycles  Q21 day cycle  starting C16 10/25/22 per MD  NCCN Guidelines for Colon Cancer V.2.2018  Bradly V, Et al - Lancet Oncol. 2014 Sep;15(10):1068-75. Doi:  [id number]. Epub 2014 Jul 31.  Bulmaro MARRERO, et al - J Clin Oncol. 2007 Oct 20;25(30):3519-80.    Allergies:Patient has no known allergies  /85   Pulse 89   Temp 36.7 °C (98 °F) (Temporal)   Resp 18   Ht 1.71 m (5' 7.32\")   Wt 95.5 kg (210 lb 8.6 oz)   SpO2 97%   BMI 32.66 kg/m²  Body surface area is 2.13 meters squared.    Labs 4/18/23:  ANC~ 2900 Plt = 291k   Hgb = 13.0     SCr = 0.76 mg/dL CrCl >125 mL/min   AST/ALT/AP = 22/18/78 TBili = 0.4   Urine protein = negative BP = 129/85    Irinotecan 150 mg/m2 x 2.13 m2 = 319.5 mg   <10% difference, OK to treat with final dose = 319.6 mg IV    Bevacizumab-bvzr (Zirabev) 5 mg/kg x 95.5 kg = 477.5 mg   Rounded to vial size (within 10%) per dose rounding protocol.               OK to treat with final dose = 500 mg IV    Fluorouracil 2000 mg/m2 x 2.13 m2 = 4260 mg    <10% difference, ok to treat with final dose = 3850 mg CIVI over 46 hours   Via CADD pump for home infusion over 46 hours @ 1.7 mL/hr    Kj Winston, PharmD  "

## 2023-04-18 ENCOUNTER — OUTPATIENT INFUSION SERVICES (OUTPATIENT)
Dept: ONCOLOGY | Facility: MEDICAL CENTER | Age: 58
End: 2023-04-18
Attending: INTERNAL MEDICINE
Payer: MEDICARE

## 2023-04-18 VITALS
TEMPERATURE: 98 F | OXYGEN SATURATION: 97 % | HEIGHT: 67 IN | SYSTOLIC BLOOD PRESSURE: 129 MMHG | DIASTOLIC BLOOD PRESSURE: 85 MMHG | HEART RATE: 89 BPM | WEIGHT: 210.54 LBS | RESPIRATION RATE: 18 BRPM | BODY MASS INDEX: 33.04 KG/M2

## 2023-04-18 DIAGNOSIS — C20 RECTAL CARCINOMA (HCC): ICD-10-CM

## 2023-04-18 LAB
ALBUMIN SERPL BCP-MCNC: 4 G/DL (ref 3.2–4.9)
ALBUMIN/GLOB SERPL: 1.5 G/DL
ALP SERPL-CCNC: 78 U/L (ref 30–99)
ALT SERPL-CCNC: 18 U/L (ref 2–50)
ANION GAP SERPL CALC-SCNC: 12 MMOL/L (ref 7–16)
APPEARANCE UR: CLEAR
AST SERPL-CCNC: 22 U/L (ref 12–45)
BASOPHILS # BLD AUTO: 1.7 % (ref 0–1.8)
BASOPHILS # BLD: 0.08 K/UL (ref 0–0.12)
BILIRUB SERPL-MCNC: 0.4 MG/DL (ref 0.1–1.5)
BUN SERPL-MCNC: 17 MG/DL (ref 8–22)
CALCIUM ALBUM COR SERPL-MCNC: 9.2 MG/DL (ref 8.5–10.5)
CALCIUM SERPL-MCNC: 9.2 MG/DL (ref 8.5–10.5)
CHLORIDE SERPL-SCNC: 104 MMOL/L (ref 96–112)
CO2 SERPL-SCNC: 25 MMOL/L (ref 20–33)
COLOR UR AUTO: YELLOW
CREAT SERPL-MCNC: 0.76 MG/DL (ref 0.5–1.4)
EOSINOPHIL # BLD AUTO: 0.47 K/UL (ref 0–0.51)
EOSINOPHIL NFR BLD: 9.8 % (ref 0–6.9)
ERYTHROCYTE [DISTWIDTH] IN BLOOD BY AUTOMATED COUNT: 52.8 FL (ref 35.9–50)
GFR SERPLBLD CREATININE-BSD FMLA CKD-EPI: 106 ML/MIN/1.73 M 2
GLOBULIN SER CALC-MCNC: 2.6 G/DL (ref 1.9–3.5)
GLUCOSE SERPL-MCNC: 103 MG/DL (ref 65–99)
GLUCOSE UR QL STRIP.AUTO: NEGATIVE MG/DL
HCT VFR BLD AUTO: 41 % (ref 42–52)
HGB BLD-MCNC: 13 G/DL (ref 14–18)
IMM GRANULOCYTES # BLD AUTO: 0.02 K/UL (ref 0–0.11)
IMM GRANULOCYTES NFR BLD AUTO: 0.4 % (ref 0–0.9)
KETONES UR QL STRIP.AUTO: NEGATIVE MG/DL
LEUKOCYTE ESTERASE UR QL STRIP.AUTO: NEGATIVE
LYMPHOCYTES # BLD AUTO: 0.71 K/UL (ref 1–4.8)
LYMPHOCYTES NFR BLD: 14.9 % (ref 22–41)
MCH RBC QN AUTO: 26.3 PG (ref 27–33)
MCHC RBC AUTO-ENTMCNC: 31.7 G/DL (ref 33.7–35.3)
MCV RBC AUTO: 83 FL (ref 81.4–97.8)
MONOCYTES # BLD AUTO: 0.6 K/UL (ref 0–0.85)
MONOCYTES NFR BLD AUTO: 12.6 % (ref 0–13.4)
NEUTROPHILS # BLD AUTO: 2.9 K/UL (ref 1.82–7.42)
NEUTROPHILS NFR BLD: 60.6 % (ref 44–72)
NITRITE UR QL STRIP.AUTO: NEGATIVE
NRBC # BLD AUTO: 0 K/UL
NRBC BLD-RTO: 0 /100 WBC
OUTPT INFUS CBC COMMENT OICOM: ABNORMAL
PH UR STRIP.AUTO: 7 [PH] (ref 5–8)
PLATELET # BLD AUTO: 291 K/UL (ref 164–446)
PMV BLD AUTO: 9.9 FL (ref 9–12.9)
POTASSIUM SERPL-SCNC: 3.9 MMOL/L (ref 3.6–5.5)
PROT SERPL-MCNC: 6.6 G/DL (ref 6–8.2)
PROT UR QL STRIP: NEGATIVE MG/DL
RBC # BLD AUTO: 4.94 M/UL (ref 4.7–6.1)
RBC UR QL AUTO: ABNORMAL
SODIUM SERPL-SCNC: 141 MMOL/L (ref 135–145)
SP GR UR STRIP.AUTO: 1.02 (ref 1–1.03)
WBC # BLD AUTO: 4.8 K/UL (ref 4.8–10.8)

## 2023-04-18 PROCEDURE — 96365 THER/PROPH/DIAG IV INF INIT: CPT

## 2023-04-18 PROCEDURE — 85025 COMPLETE CBC W/AUTO DIFF WBC: CPT

## 2023-04-18 PROCEDURE — 96417 CHEMO IV INFUS EACH ADDL SEQ: CPT

## 2023-04-18 PROCEDURE — 80053 COMPREHEN METABOLIC PANEL: CPT

## 2023-04-18 PROCEDURE — 700105 HCHG RX REV CODE 258: Performed by: INTERNAL MEDICINE

## 2023-04-18 PROCEDURE — 96413 CHEMO IV INFUSION 1 HR: CPT

## 2023-04-18 PROCEDURE — 700111 HCHG RX REV CODE 636 W/ 250 OVERRIDE (IP): Performed by: INTERNAL MEDICINE

## 2023-04-18 PROCEDURE — G0498 CHEMO EXTEND IV INFUS W/PUMP: HCPCS

## 2023-04-18 PROCEDURE — A4212 NON CORING NEEDLE OR STYLET: HCPCS

## 2023-04-18 PROCEDURE — 96366 THER/PROPH/DIAG IV INF ADDON: CPT

## 2023-04-18 PROCEDURE — 81002 URINALYSIS NONAUTO W/O SCOPE: CPT

## 2023-04-18 PROCEDURE — 96375 TX/PRO/DX INJ NEW DRUG ADDON: CPT

## 2023-04-18 PROCEDURE — 96368 THER/DIAG CONCURRENT INF: CPT

## 2023-04-18 PROCEDURE — 96367 TX/PROPH/DG ADDL SEQ IV INF: CPT

## 2023-04-18 RX ORDER — DEXTROSE MONOHYDRATE 50 MG/ML
INJECTION, SOLUTION INTRAVENOUS CONTINUOUS
Status: DISCONTINUED | OUTPATIENT
Start: 2023-04-18 | End: 2023-04-18 | Stop reason: HOSPADM

## 2023-04-18 RX ORDER — POTASSIUM CHLORIDE 20 MEQ/1
TABLET, EXTENDED RELEASE ORAL
COMMUNITY

## 2023-04-18 RX ORDER — MAGNESIUM SULFATE HEPTAHYDRATE 40 MG/ML
2 INJECTION, SOLUTION INTRAVENOUS ONCE
Status: COMPLETED | OUTPATIENT
Start: 2023-04-18 | End: 2023-04-18

## 2023-04-18 RX ADMIN — ATROPINE SULFATE 0.5 MG: 1 INJECTION, SOLUTION INTRAVENOUS at 10:14

## 2023-04-18 RX ADMIN — IRINOTECAN HYDROCHLORIDE 319.6 MG: 20 INJECTION, SOLUTION INTRAVENOUS at 10:17

## 2023-04-18 RX ADMIN — SODIUM CHLORIDE 500 MG: 9 INJECTION, SOLUTION INTRAVENOUS at 12:28

## 2023-04-18 RX ADMIN — MAGNESIUM SULFATE 2 G: 2 INJECTION INTRAVENOUS at 09:53

## 2023-04-18 RX ADMIN — ONDANSETRON 16 MG: 2 INJECTION INTRAMUSCULAR; INTRAVENOUS at 09:35

## 2023-04-18 RX ADMIN — FLUOROURACIL 3850 MG: 50 INJECTION, SOLUTION INTRAVENOUS at 13:08

## 2023-04-18 RX ADMIN — DEXAMETHASONE SODIUM PHOSPHATE 12 MG: 4 INJECTION, SOLUTION INTRA-ARTICULAR; INTRALESIONAL; INTRAMUSCULAR; INTRAVENOUS; SOFT TISSUE at 09:21

## 2023-04-18 ASSESSMENT — FIBROSIS 4 INDEX: FIB4 SCORE: 0.91

## 2023-04-18 ASSESSMENT — PAIN DESCRIPTION - PAIN TYPE: TYPE: CHRONIC PAIN

## 2023-04-18 NOTE — PROGRESS NOTES
Chemotherapy Verification - PRIMARY RN    Height = 171 cm  Weight = 95.5  BSA = 2.13 m^2       Medication: irinotecan (Camptosar)  Dose: 150 mg/m^2  Calculated Dose: 319.5 mg                             (In mg/m2, AUC, mg/kg)     Medication: bavacizumab-bvzr (Zirabev)  Dose: 5 mg/kg  Calculated Dose: 477.5 mg                             (In mg/m2, AUC, mg/kg)    Medication: fluorouracil (Adrucil)  Dose: 2000 mg/m^2  Calculated Dose: 4260 mg via CADD pumpover 46 hours                             (In mg/m2, AUC, mg/kg)    I confirm this process was performed independently with the BSA and all final chemotherapy dosing calculations congruent.  Any discrepancies of 10% or greater have been addressed with the chemotherapy pharmacist. The resolution of the discrepancy has been documented in the EPIC progress notes.

## 2023-04-18 NOTE — PROGRESS NOTES
"Pharmacy Chemotherapy Verification    DX:  Metastatic Rectal Adenocarcinoma    Protocol: FOLFiri + bevacizumab  Irinotecan  IV on day 1  -Dose reduced to 150 mg/m2 for tolerance/ heavily pretreated  -10/25/22 d/c'ed from treatment plan per MD  -11/15/22 added back to treatment plan per MD   -Discontinued for tolerance/ heavily pretreated  5-FU iv over 46 hrs  -Dose reduced to 2000 mg/m2 for tolerance/ heavily pretreated  -Bolus discontinued for tolerance/ heavily pretreated  evacizumab 5 mg/kg IV on day 1  -3/22/22- HOLD for cycle 1 per MD  -11/15/22 HOLD for cycle 17 due to urine protein    8-12 cycles - Pt receiving every 21 days for better toleration per Dr. Ayla Abdullahi V, Et al - Lancet Oncol. 2014 Sep;15(10):1061-75. Doi:  [id number]. Epub 2014 Jul 31.  Bulmaro MARRERO, et al - J Clin Oncol. 2007 Oct 20;25(30):8372-08.    Allergies:Patient has no known allergies.  /85   Pulse 89   Temp 36.7 °C (98 °F) (Temporal)   Resp 18   Ht 1.71 m (5' 7.32\")   Wt 95.5 kg (210 lb 8.6 oz)   SpO2 97%   BMI 32.66 kg/m²     Body surface area is 2.13 meters squared.     Labs 4/18/23:  ANC~ 2900 Plt = 291k   Hgb = 13     SCr = 0.76 mg/dL CrCl >125 mL/min   LFT's = WNL TBili = 0.4     BP = 129/85    Urine protein = negative     Drug Order   (Drug name, dose, route, IV Fluid & volume, frequency, number of doses) Cycle 24   Previous treatment: C23 on 3/28/23     Medication = Irinotecan   Base Dose = 150 mg/m²   Calc Dose: Base Dose x 2.13 m² = 319.5 mg  Final Dose = 319.6 mg  Route = IV  Fluid & Volume = D5W 500 mL  Admin Duration = Over 90 minutes          <10% difference, okay to treat with final dose   Medication = Bevacizumab-bvzr (Zirabev)  Base Dose= 5 mg/kg   Calc Dose: Base Dose x 95.5 kg = 477.5 mg  Final Dose = 500 mg  Route = IV  Fluid & Volume =  mL  Admin Duration = Over 30 minutes          Rounded to nearest vial size (within 10%) per rounding protocol, okay to treat with final dose "   Medication = Fluorouracil   Base Dose = 2000 mg/m²   Calc Dose:Base Dose x 2.13 m² =4260 mg  Final Dose = 3850 mg  Route = CIVI  Conc = 50 mg/mL  Fluid & Volume = 77 mL (+3 mL of overfill) = 80 mL  Admin Duration = Over 46 hours @ 1.7 mL/hr   Via CADD pump for home infusion       <10% difference, okay to treat with final dose     By my signature below, I confirm this process was performed independently with the BSA and all final chemotherapy dosing calculations congruent. I have reviewed the above chemotherapy order and that my calculation of the final dose and BSA (when applicable) corroborate those calculations of the  pharmacist. Discrepancies of 10% or greater in the written dose have been addressed and documented within the EPIC Progress notes.    Angelina Garay, PharmD, BCOP

## 2023-04-18 NOTE — PROGRESS NOTES
[facility] for Day 1/ Cycle 24 of  for colorectal cancer. Pt denied having any new or acute complaints today. Pt reports having increased stomach cramping with his last treatments. Port accessed in a sterile manner, had positive blood return and flushed briskly. Labs drawn as ordered and reviewed. Pt meets parameters for treatment today. Pt also had labs drawn in the office and his magnesium was 1.3. Pt given magnesium replacement per protocol. Premeds given as ordered. Pt given irinotecan, bevacizumab-bvzr and 5 FU pump as prescribed, tolerated well, denied having any complaints during or after infusion. Pt with positive blood return after chemotherapy and prior to 5 FU CADD pump connection. 5 FU CADD pump placed in the RUN position and verified with the patient. Pt has future appointments. Pt discharged to home in good condition.

## 2023-04-18 NOTE — PROGRESS NOTES
Report received from GLENROY Funes. Michelle accessed and infusing. Introduced myself to patient and reviewed plan of care.

## 2023-04-18 NOTE — PROGRESS NOTES
Chemotherapy Verification - SECONDARY RN       Height = 171 cm  Weight = 95.5 kg  BSA = 2.13 m2       Medication: Irinotecan  Dose: 150 mg/m2  Calculated Dose: 319.5 mg                             (In mg/m2, AUC, mg/kg)     Medication: Zirabev  Dose: 5 mg/kg  Calculated Dose: 477.5 mg                             (In mg/m2, AUC, mg/kg)    Medication: Fluorouracil  Dose: 2,000 mg/m2  Calculated Dose: 4,260 mg                             (In mg/m2, AUC, mg/kg)      I confirm that this process was performed independently.

## 2023-04-20 ENCOUNTER — OUTPATIENT INFUSION SERVICES (OUTPATIENT)
Dept: ONCOLOGY | Facility: MEDICAL CENTER | Age: 58
End: 2023-04-20
Attending: INTERNAL MEDICINE
Payer: MEDICARE

## 2023-04-20 DIAGNOSIS — C20 RECTAL CARCINOMA (HCC): ICD-10-CM

## 2023-04-20 PROCEDURE — 96523 IRRIG DRUG DELIVERY DEVICE: CPT

## 2023-04-20 PROCEDURE — 700111 HCHG RX REV CODE 636 W/ 250 OVERRIDE (IP): Performed by: INTERNAL MEDICINE

## 2023-04-20 RX ADMIN — HEPARIN 500 UNITS: 100 SYRINGE at 13:11

## 2023-04-20 NOTE — PROGRESS NOTES
Patient to [facility] for CADD pump disconnect. CADD pump turned off and had 0ml. CADD pump disconnected from patient. [facility] performed and port de-accessed. Gauze and paper tape applied as a dressing. Patient to home in care of self.

## 2023-05-02 NOTE — PROGRESS NOTES
"Pharmacy Chemotherapy Verification    DX:  Metastatic Rectal Adenocarcinoma    Protocol: FOLFiri + bevacizumab  Irinotecan  IV on day 1  -Dose reduced to 150 mg/m2 for tolerance/ heavily pretreated  -10/25/22 d/c'ed from treatment plan per MD  -11/15/22 added back to treatment plan per MD   -Discontinued for tolerance/ heavily pretreated  5-FU iv over 46 hrs  -Dose reduced to 2000 mg/m2 for tolerance/ heavily pretreated  -Bolus discontinued for tolerance/ heavily pretreated  evacizumab 5 mg/kg IV on day 1  -3/22/22- HOLD for cycle 1 per MD  -11/15/22 HOLD for cycle 17 due to urine protein    8-12 cycles - Pt receiving every 21 days for better toleration per Dr. Ayla Abdullahi V, Et al - Lancet Oncol. 2014 Sep;15(10):1066-75. Doi:  10.1016/-[id number]. Epub 2014 Jul 31.  Bulmaro MARRERO, et al - J Clin Oncol. 2007 Oct 20;25(30):7569-61.    Allergies:Patient has no known allergies.  BP (!) 149/85   Pulse 85   Temp 36 °C (96.8 °F) (Temporal)   Resp 18   Ht 1.71 m (5' 7.32\")   Wt 94.8 kg (208 lb 15.9 oz)   SpO2 95%   BMI 32.42 kg/m²     Body surface area is 2.12 meters squared.     Labs 5/9/23:  ANC~ 3070 Plt = 311k   Hgb = 13.3    SCr = 0.7 mg/dL CrCl >125 mL/min   LFT's = 22/20/80 TBili = 0.3     BP = 149/85    Urine protein = negative     Drug Order   (Drug name, dose, route, IV Fluid & volume, frequency, number of doses) Cycle 25  Previous treatment: C24 on 4/18/23     Medication = Irinotecan   Base Dose = 150 mg/m²   Calc Dose: Base Dose x 2.12 m² = 318 mg  Final Dose = 318 mg  Route = IV  Fluid & Volume = D5W 500 mL  Admin Duration = Over 90 minutes          <10% difference, okay to treat with final dose   Medication = Bevacizumab-bvzr (Zirabev)  Base Dose= 5 mg/kg   Calc Dose: Base Dose x 94.8 kg = 474 mg  Final Dose = 500 mg  Route = IV  Fluid & Volume =  mL  Admin Duration = Over 30 minutes          Rounded to nearest vial size (within 10%) per rounding protocol, okay to treat with final dose "   Medication = Fluorouracil   Base Dose = 2000 mg/m²   Calc Dose:Base Dose x 2.12 m² =4240 mg  Final Dose = 3850 mg  Route = CIVI  Conc = 50 mg/mL  Fluid & Volume = 77 mL (+3 mL of overfill) = 80 mL  Admin Duration = Over 46 hours @ 1.7 mL/hr   Via CADD pump for home infusion       <10% difference, okay to treat with final dose     By my signature below, I confirm this process was performed independently with the BSA and all final chemotherapy dosing calculations congruent. I have reviewed the above chemotherapy order and that my calculation of the final dose and BSA (when applicable) corroborate those calculations of the  pharmacist. Discrepancies of 10% or greater in the written dose have been addressed and documented within the EPIC Progress notes.    Miriam Stark, PharmD, BCOP

## 2023-05-04 RX ORDER — EPINEPHRINE 1 MG/ML(1)
0.5 AMPUL (ML) INJECTION PRN
Status: CANCELLED | OUTPATIENT
Start: 2023-05-11

## 2023-05-04 RX ORDER — SODIUM CHLORIDE 9 MG/ML
INJECTION, SOLUTION INTRAVENOUS CONTINUOUS
Status: CANCELLED | OUTPATIENT
Start: 2023-05-11

## 2023-05-04 RX ORDER — DIPHENHYDRAMINE HYDROCHLORIDE 50 MG/ML
50 INJECTION INTRAMUSCULAR; INTRAVENOUS PRN
Status: CANCELLED | OUTPATIENT
Start: 2023-05-11

## 2023-05-04 RX ORDER — LOPERAMIDE HYDROCHLORIDE 2 MG/1
4 CAPSULE ORAL PRN
Status: CANCELLED | OUTPATIENT
Start: 2023-05-11

## 2023-05-04 RX ORDER — HEPARIN SODIUM (PORCINE) LOCK FLUSH IV SOLN 100 UNIT/ML 100 UNIT/ML
500 SOLUTION INTRAVENOUS PRN
Status: CANCELLED | OUTPATIENT
Start: 2023-05-11

## 2023-05-04 RX ORDER — 0.9 % SODIUM CHLORIDE 0.9 %
10 VIAL (ML) INJECTION PRN
Status: CANCELLED | OUTPATIENT
Start: 2023-05-11

## 2023-05-04 RX ORDER — METHYLPREDNISOLONE SODIUM SUCCINATE 125 MG/2ML
125 INJECTION, POWDER, LYOPHILIZED, FOR SOLUTION INTRAMUSCULAR; INTRAVENOUS PRN
Status: CANCELLED | OUTPATIENT
Start: 2023-05-11

## 2023-05-04 RX ORDER — 0.9 % SODIUM CHLORIDE 0.9 %
3 VIAL (ML) INJECTION PRN
Status: CANCELLED | OUTPATIENT
Start: 2023-05-11

## 2023-05-04 RX ORDER — LOPERAMIDE HYDROCHLORIDE 2 MG/1
2 CAPSULE ORAL
Status: CANCELLED | OUTPATIENT
Start: 2023-05-11

## 2023-05-04 RX ORDER — 0.9 % SODIUM CHLORIDE 0.9 %
20 VIAL (ML) INJECTION PRN
Status: CANCELLED | OUTPATIENT
Start: 2023-05-13

## 2023-05-04 RX ORDER — 0.9 % SODIUM CHLORIDE 0.9 %
10 VIAL (ML) INJECTION PRN
Status: CANCELLED | OUTPATIENT
Start: 2023-05-08

## 2023-05-04 RX ORDER — ONDANSETRON 2 MG/ML
4 INJECTION INTRAMUSCULAR; INTRAVENOUS PRN
Status: CANCELLED | OUTPATIENT
Start: 2023-05-11

## 2023-05-04 RX ORDER — DEXTROSE MONOHYDRATE 50 MG/ML
INJECTION, SOLUTION INTRAVENOUS CONTINUOUS
Status: CANCELLED | OUTPATIENT
Start: 2023-05-11

## 2023-05-04 RX ORDER — 0.9 % SODIUM CHLORIDE 0.9 %
VIAL (ML) INJECTION PRN
Status: CANCELLED | OUTPATIENT
Start: 2023-05-08

## 2023-05-04 RX ORDER — 0.9 % SODIUM CHLORIDE 0.9 %
3 VIAL (ML) INJECTION PRN
Status: CANCELLED | OUTPATIENT
Start: 2023-05-08

## 2023-05-04 RX ORDER — ONDANSETRON 8 MG/1
8 TABLET, ORALLY DISINTEGRATING ORAL PRN
Status: CANCELLED | OUTPATIENT
Start: 2023-05-11

## 2023-05-04 RX ORDER — PROCHLORPERAZINE MALEATE 10 MG
10 TABLET ORAL EVERY 6 HOURS PRN
Status: CANCELLED | OUTPATIENT
Start: 2023-05-11

## 2023-05-04 RX ORDER — 0.9 % SODIUM CHLORIDE 0.9 %
VIAL (ML) INJECTION PRN
Status: CANCELLED | OUTPATIENT
Start: 2023-05-11

## 2023-05-08 NOTE — PROGRESS NOTES
"Pharmacy Chemotherapy Verification    DX:  Metastatic Rectal Adenocarcinoma    Cycle 25  Previous treatment: C24 4/18/23    Protocol: FOLFiri + bevacizumab  Irinotecan  IV on day 1   -C6 10/25/22 Remove from the plan per  Dr. Aguila  -Added back at 150 mg/m2 starting Cycle 17   -Discontinued for tolerance/heavily pretreated  5-FU iv over 46 hrs   -Dose reduced to 2000 mg/m2 for tolerance/heavily pretreated   -Bolus discontinued for tolerance/heavily pretreated  Bevacizumab 5 mg/kg V on day 1   -3/22/22- HOLD for cycle 1 per MD   -11/15/22 HOLD for Cycle 17 due to proteinuria   for 8-12 cycles  Q21 day cycle  starting C16 10/25/22 per MD  NCCN Guidelines for Colon Cancer V.2.2018  Bradly V, Et al - Lancet Oncol. 2014 Sep;15(10):1064-75. Doi:  10.1016/-[id number]. Epub 2014 Jul 31.  Bulmaro MARRERO, et al - J Clin Oncol. 2007 Oct 20;25(30):2591-30.    Allergies:Patient has no known allergies  BP (!) 149/85   Pulse 85   Temp 36 °C (96.8 °F) (Temporal)   Resp 18   Ht 1.71 m (5' 7.32\")   Wt 94.8 kg (208 lb 15.9 oz)   SpO2 95%   BMI 32.42 kg/m²  Body surface area is 2.12 meters squared.    Labs 5/9/23:  ANC~ 3070 Plt = 311k   Hgb = 13.3     SCr = 0.7 mg/dL CrCl >125 mL/min   AST/ALT/AP = 22/20/80 TBili = 0.3   Urine protein = negative BP = 149/85    Irinotecan 150 mg/m2 x 2.12 m2 = 318 mg   <10% difference, OK to treat with final dose = 318 mg IV    Bevacizumab-bvzr (Zirabev) 5 mg/kg x 94.8 kg = 474 mg   Rounded to vial size (within 10%) per dose rounding protocol.               OK to treat with final dose = 500 mg IV    Fluorouracil 2000 mg/m2 x 2.12 m2 = 4240 mg    <10% difference, ok to treat with final dose = 3850 mg CIVI over 46 hours   Via CADD pump for home infusion over 46 hours @ 1.7 mL/hr    Kj Winston, PharmD  "

## 2023-05-09 ENCOUNTER — OUTPATIENT INFUSION SERVICES (OUTPATIENT)
Dept: ONCOLOGY | Facility: MEDICAL CENTER | Age: 58
End: 2023-05-09
Attending: INTERNAL MEDICINE
Payer: MEDICARE

## 2023-05-09 VITALS
OXYGEN SATURATION: 95 % | RESPIRATION RATE: 18 BRPM | SYSTOLIC BLOOD PRESSURE: 149 MMHG | DIASTOLIC BLOOD PRESSURE: 85 MMHG | TEMPERATURE: 96.8 F | HEART RATE: 85 BPM | WEIGHT: 209 LBS | BODY MASS INDEX: 32.8 KG/M2 | HEIGHT: 67 IN

## 2023-05-09 DIAGNOSIS — C20 RECTAL CARCINOMA (HCC): ICD-10-CM

## 2023-05-09 LAB
ALBUMIN SERPL BCP-MCNC: 4 G/DL (ref 3.2–4.9)
ALBUMIN/GLOB SERPL: 1.4 G/DL
ALP SERPL-CCNC: 80 U/L (ref 30–99)
ALT SERPL-CCNC: 20 U/L (ref 2–50)
ANION GAP SERPL CALC-SCNC: 14 MMOL/L (ref 7–16)
APPEARANCE UR: CLEAR
AST SERPL-CCNC: 22 U/L (ref 12–45)
BASOPHILS # BLD AUTO: 1.2 % (ref 0–1.8)
BASOPHILS # BLD: 0.06 K/UL (ref 0–0.12)
BILIRUB SERPL-MCNC: 0.3 MG/DL (ref 0.1–1.5)
BUN SERPL-MCNC: 17 MG/DL (ref 8–22)
CALCIUM ALBUM COR SERPL-MCNC: 8.6 MG/DL (ref 8.5–10.5)
CALCIUM SERPL-MCNC: 8.6 MG/DL (ref 8.5–10.5)
CHLORIDE SERPL-SCNC: 106 MMOL/L (ref 96–112)
CO2 SERPL-SCNC: 23 MMOL/L (ref 20–33)
COLOR UR AUTO: YELLOW
CREAT SERPL-MCNC: 0.7 MG/DL (ref 0.5–1.4)
EOSINOPHIL # BLD AUTO: 0.33 K/UL (ref 0–0.51)
EOSINOPHIL NFR BLD: 6.8 % (ref 0–6.9)
ERYTHROCYTE [DISTWIDTH] IN BLOOD BY AUTOMATED COUNT: 53.2 FL (ref 35.9–50)
GFR SERPLBLD CREATININE-BSD FMLA CKD-EPI: 108 ML/MIN/1.73 M 2
GLOBULIN SER CALC-MCNC: 2.8 G/DL (ref 1.9–3.5)
GLUCOSE SERPL-MCNC: 100 MG/DL (ref 65–99)
GLUCOSE UR QL STRIP.AUTO: NEGATIVE MG/DL
HCT VFR BLD AUTO: 42.1 % (ref 42–52)
HGB BLD-MCNC: 13.3 G/DL (ref 14–18)
IMM GRANULOCYTES # BLD AUTO: 0.01 K/UL (ref 0–0.11)
IMM GRANULOCYTES NFR BLD AUTO: 0.2 % (ref 0–0.9)
KETONES UR QL STRIP.AUTO: NEGATIVE MG/DL
LEUKOCYTE ESTERASE UR QL STRIP.AUTO: NEGATIVE
LYMPHOCYTES # BLD AUTO: 0.8 K/UL (ref 1–4.8)
LYMPHOCYTES NFR BLD: 16.4 % (ref 22–41)
MCH RBC QN AUTO: 26.8 PG (ref 27–33)
MCHC RBC AUTO-ENTMCNC: 31.6 G/DL (ref 33.7–35.3)
MCV RBC AUTO: 84.7 FL (ref 81.4–97.8)
MONOCYTES # BLD AUTO: 0.61 K/UL (ref 0–0.85)
MONOCYTES NFR BLD AUTO: 12.5 % (ref 0–13.4)
NEUTROPHILS # BLD AUTO: 3.07 K/UL (ref 1.82–7.42)
NEUTROPHILS NFR BLD: 62.9 % (ref 44–72)
NITRITE UR QL STRIP.AUTO: NEGATIVE
NRBC # BLD AUTO: 0 K/UL
NRBC BLD-RTO: 0 /100 WBC
OUTPT INFUS CBC COMMENT OICOM: ABNORMAL
PH UR STRIP.AUTO: 6.5 [PH] (ref 5–8)
PLATELET # BLD AUTO: 311 K/UL (ref 164–446)
PMV BLD AUTO: 9.8 FL (ref 9–12.9)
POTASSIUM SERPL-SCNC: 3.4 MMOL/L (ref 3.6–5.5)
PROT SERPL-MCNC: 6.8 G/DL (ref 6–8.2)
PROT UR QL STRIP: NEGATIVE MG/DL
RBC # BLD AUTO: 4.97 M/UL (ref 4.7–6.1)
RBC UR QL AUTO: ABNORMAL
SODIUM SERPL-SCNC: 143 MMOL/L (ref 135–145)
SP GR UR STRIP.AUTO: >=1.03 (ref 1–1.03)
WBC # BLD AUTO: 4.9 K/UL (ref 4.8–10.8)

## 2023-05-09 PROCEDURE — A4212 NON CORING NEEDLE OR STYLET: HCPCS

## 2023-05-09 PROCEDURE — 700102 HCHG RX REV CODE 250 W/ 637 OVERRIDE(OP): Mod: UD | Performed by: INTERNAL MEDICINE

## 2023-05-09 PROCEDURE — 96413 CHEMO IV INFUSION 1 HR: CPT

## 2023-05-09 PROCEDURE — 96411 CHEMO IV PUSH ADDL DRUG: CPT

## 2023-05-09 PROCEDURE — G0498 CHEMO EXTEND IV INFUS W/PUMP: HCPCS

## 2023-05-09 PROCEDURE — 85025 COMPLETE CBC W/AUTO DIFF WBC: CPT

## 2023-05-09 PROCEDURE — 96417 CHEMO IV INFUS EACH ADDL SEQ: CPT

## 2023-05-09 PROCEDURE — 81002 URINALYSIS NONAUTO W/O SCOPE: CPT

## 2023-05-09 PROCEDURE — 80053 COMPREHEN METABOLIC PANEL: CPT

## 2023-05-09 PROCEDURE — 96415 CHEMO IV INFUSION ADDL HR: CPT

## 2023-05-09 PROCEDURE — 700105 HCHG RX REV CODE 258: Mod: UD | Performed by: INTERNAL MEDICINE

## 2023-05-09 PROCEDURE — 700111 HCHG RX REV CODE 636 W/ 250 OVERRIDE (IP): Mod: TB,UD | Performed by: INTERNAL MEDICINE

## 2023-05-09 PROCEDURE — 96375 TX/PRO/DX INJ NEW DRUG ADDON: CPT

## 2023-05-09 PROCEDURE — A9270 NON-COVERED ITEM OR SERVICE: HCPCS | Mod: UD | Performed by: INTERNAL MEDICINE

## 2023-05-09 RX ORDER — POTASSIUM CHLORIDE 20 MEQ/1
40 TABLET, EXTENDED RELEASE ORAL ONCE
Status: COMPLETED | OUTPATIENT
Start: 2023-05-09 | End: 2023-05-09

## 2023-05-09 RX ADMIN — ATROPINE SULFATE 0.5 MG: 1 INJECTION, SOLUTION INTRAVENOUS at 08:49

## 2023-05-09 RX ADMIN — DEXAMETHASONE SODIUM PHOSPHATE 12 MG: 4 INJECTION, SOLUTION INTRA-ARTICULAR; INTRALESIONAL; INTRAMUSCULAR; INTRAVENOUS; SOFT TISSUE at 08:15

## 2023-05-09 RX ADMIN — FLUOROURACIL 3850 MG: 50 INJECTION, SOLUTION INTRAVENOUS at 11:18

## 2023-05-09 RX ADMIN — POTASSIUM CHLORIDE 40 MEQ: 1500 TABLET, EXTENDED RELEASE ORAL at 08:58

## 2023-05-09 RX ADMIN — SODIUM CHLORIDE 500 MG: 9 INJECTION, SOLUTION INTRAVENOUS at 10:33

## 2023-05-09 RX ADMIN — ONDANSETRON 16 MG: 2 INJECTION INTRAMUSCULAR; INTRAVENOUS at 08:30

## 2023-05-09 RX ADMIN — IRINOTECAN HYDROCHLORIDE 318 MG: 20 INJECTION, SOLUTION INTRAVENOUS at 08:52

## 2023-05-09 ASSESSMENT — FIBROSIS 4 INDEX: FIB4 SCORE: 0.98

## 2023-05-09 NOTE — PROGRESS NOTES
Chemotherapy Verification - SECONDARY RN   C25 D1    Height = 171 cm  Weight = 94.8 kg  BSA = 2.12 m^2       Medication: irinotecan (CAMPTOSAR)  Dose: 150 mg/m2  Calculated Dose: 318 mg                             (In mg/m2, AUC, mg/kg)     Medication: bevacizumab-bvzr (ZIRABEV)  Dose: 5 mg/kg  Calculated Dose: 474 mg                             (In mg/m2, AUC, mg/kg)    Medication: fluorouracil (ADRUCIL)  Dose: 2000 mg/m2  Calculated Dose: 4,240 mg in CADD to be infused over 46 hours                            (In mg/m2, AUC, mg/kg)    I confirm that this process was performed independently.

## 2023-05-09 NOTE — PROGRESS NOTES
Chemotherapy Verification - PRIMARY RN      Height = 1.71 m  Weight = 94.8 kg  BSA = 2.12 m2       Medication: Irinotecan  Dose: 150mg/m2  Calculated Dose: 318 mg                             (In mg/m2, AUC, mg/kg)     Medication: zirabev  Dose: 5mg/kg  Calculated Dose: 474 mg                             (In mg/m2, AUC, mg/kg)    Medication: fluorouracil  Dose: 2000/m2  Calculated Dose: 4240 mg                             (In mg/m2, AUC, mg/kg)        I confirm this process was performed independently with the BSA and all final chemotherapy dosing calculations congruent.  Any discrepancies of 10% or greater have been addressed with the chemotherapy pharmacist. The resolution of the discrepancy has been documented in the EPIC progress notes.

## 2023-05-09 NOTE — PROGRESS NOTES
Mr Eaton is here today for Day 1 cycle 25 of FolFiri/Zirabev.    He has no new concerns today.     Medi port to the right upper chest was accessed easily, flushed well with good blood return.   Labs were drawn today and are within parameters for treatment.  Potassium is 3.4 40meq of replacement given orally.     Pre medications were given and were tolerated well.     Chemotherapy given and tolerated well.      CADD pump connected, placed in RUN mode for 46 hours.    Mr Eaton will return on Thursday for pump removal.

## 2023-05-11 ENCOUNTER — OUTPATIENT INFUSION SERVICES (OUTPATIENT)
Dept: ONCOLOGY | Facility: MEDICAL CENTER | Age: 58
End: 2023-05-11
Attending: INTERNAL MEDICINE
Payer: MEDICARE

## 2023-05-11 VITALS
SYSTOLIC BLOOD PRESSURE: 118 MMHG | HEART RATE: 90 BPM | OXYGEN SATURATION: 98 % | DIASTOLIC BLOOD PRESSURE: 91 MMHG | RESPIRATION RATE: 18 BRPM | TEMPERATURE: 97.1 F

## 2023-05-11 DIAGNOSIS — C20 RECTAL CARCINOMA (HCC): ICD-10-CM

## 2023-05-11 PROCEDURE — 96523 IRRIG DRUG DELIVERY DEVICE: CPT

## 2023-05-11 PROCEDURE — 700111 HCHG RX REV CODE 636 W/ 250 OVERRIDE (IP): Mod: UD | Performed by: INTERNAL MEDICINE

## 2023-05-11 RX ADMIN — HEPARIN 500 UNITS: 100 SYRINGE at 10:35

## 2023-05-12 NOTE — PROGRESS NOTES
Here for 5 FU pump disconnect after 46 hrs of continuous infusion. See chemotherapy flow sheet for volume / dose administration. Port flushed per protocol, site d-accessed, needle intact compression dressing applied. Patient discharge home in good condition and in NAD. Next appointment confirmed.

## 2023-05-23 ENCOUNTER — HOSPITAL ENCOUNTER (INPATIENT)
Facility: MEDICAL CENTER | Age: 58
LOS: 2 days | DRG: 683 | End: 2023-05-25
Attending: EMERGENCY MEDICINE | Admitting: INTERNAL MEDICINE
Payer: MEDICARE

## 2023-05-23 ENCOUNTER — APPOINTMENT (OUTPATIENT)
Dept: RADIOLOGY | Facility: MEDICAL CENTER | Age: 58
DRG: 683 | End: 2023-05-23
Attending: EMERGENCY MEDICINE
Payer: MEDICARE

## 2023-05-23 DIAGNOSIS — N32.0 BLADDER OBSTRUCTION: ICD-10-CM

## 2023-05-23 DIAGNOSIS — N17.9 ACUTE RENAL FAILURE, UNSPECIFIED ACUTE RENAL FAILURE TYPE (HCC): ICD-10-CM

## 2023-05-23 DIAGNOSIS — C20 RECTAL CANCER (HCC): ICD-10-CM

## 2023-05-23 DIAGNOSIS — E87.5 HYPERKALEMIA: ICD-10-CM

## 2023-05-23 LAB
ALBUMIN SERPL BCP-MCNC: 3.3 G/DL (ref 3.2–4.9)
ALBUMIN/GLOB SERPL: 0.9 G/DL
ALP SERPL-CCNC: 104 U/L (ref 30–99)
ALT SERPL-CCNC: 22 U/L (ref 2–50)
ANION GAP SERPL CALC-SCNC: 26 MMOL/L (ref 7–16)
APPEARANCE UR: ABNORMAL
AST SERPL-CCNC: 13 U/L (ref 12–45)
BACTERIA #/AREA URNS HPF: NEGATIVE /HPF
BASOPHILS # BLD AUTO: 0.5 % (ref 0–1.8)
BASOPHILS # BLD: 0.05 K/UL (ref 0–0.12)
BILIRUB SERPL-MCNC: 0.4 MG/DL (ref 0.1–1.5)
BILIRUB UR QL STRIP.AUTO: NEGATIVE
BUN SERPL-MCNC: 148 MG/DL (ref 8–22)
CALCIUM ALBUM COR SERPL-MCNC: 9.9 MG/DL (ref 8.5–10.5)
CALCIUM SERPL-MCNC: 9.3 MG/DL (ref 8.5–10.5)
CHLORIDE SERPL-SCNC: 89 MMOL/L (ref 96–112)
CO2 SERPL-SCNC: 18 MMOL/L (ref 20–33)
COLOR UR: ABNORMAL
CREAT SERPL-MCNC: 16.07 MG/DL (ref 0.5–1.4)
EKG IMPRESSION: NORMAL
EOSINOPHIL # BLD AUTO: 0.1 K/UL (ref 0–0.51)
EOSINOPHIL NFR BLD: 1 % (ref 0–6.9)
EPI CELLS #/AREA URNS HPF: NEGATIVE /HPF
ERYTHROCYTE [DISTWIDTH] IN BLOOD BY AUTOMATED COUNT: 50.2 FL (ref 35.9–50)
GFR SERPLBLD CREATININE-BSD FMLA CKD-EPI: 3 ML/MIN/1.73 M 2
GLOBULIN SER CALC-MCNC: 3.8 G/DL (ref 1.9–3.5)
GLUCOSE BLD STRIP.AUTO-MCNC: 161 MG/DL (ref 65–99)
GLUCOSE BLD STRIP.AUTO-MCNC: 93 MG/DL (ref 65–99)
GLUCOSE SERPL-MCNC: 113 MG/DL (ref 65–99)
GLUCOSE UR STRIP.AUTO-MCNC: NEGATIVE MG/DL
HCT VFR BLD AUTO: 38.6 % (ref 42–52)
HGB BLD-MCNC: 13 G/DL (ref 14–18)
HYALINE CASTS #/AREA URNS LPF: ABNORMAL /LPF
IMM GRANULOCYTES # BLD AUTO: 0.1 K/UL (ref 0–0.11)
IMM GRANULOCYTES NFR BLD AUTO: 1 % (ref 0–0.9)
KETONES UR STRIP.AUTO-MCNC: NEGATIVE MG/DL
LACTATE SERPL-SCNC: 1.1 MMOL/L (ref 0.5–2)
LACTATE SERPL-SCNC: 1.1 MMOL/L (ref 0.5–2)
LEUKOCYTE ESTERASE UR QL STRIP.AUTO: ABNORMAL
LIPASE SERPL-CCNC: 40 U/L (ref 11–82)
LYMPHOCYTES # BLD AUTO: 0.46 K/UL (ref 1–4.8)
LYMPHOCYTES NFR BLD: 4.5 % (ref 22–41)
MCH RBC QN AUTO: 26.5 PG (ref 27–33)
MCHC RBC AUTO-ENTMCNC: 33.7 G/DL (ref 32.3–36.5)
MCV RBC AUTO: 78.8 FL (ref 81.4–97.8)
MICRO URNS: ABNORMAL
MONOCYTES # BLD AUTO: 1.02 K/UL (ref 0–0.85)
MONOCYTES NFR BLD AUTO: 9.9 % (ref 0–13.4)
NEUTROPHILS # BLD AUTO: 8.53 K/UL (ref 1.82–7.42)
NEUTROPHILS NFR BLD: 83.1 % (ref 44–72)
NITRITE UR QL STRIP.AUTO: NEGATIVE
NRBC # BLD AUTO: 0 K/UL
NRBC BLD-RTO: 0 /100 WBC (ref 0–0.2)
PH UR STRIP.AUTO: 6 [PH] (ref 5–8)
PLATELET # BLD AUTO: 342 K/UL (ref 164–446)
PMV BLD AUTO: 9.7 FL (ref 9–12.9)
POTASSIUM SERPL-SCNC: 5.7 MMOL/L (ref 3.6–5.5)
PROT SERPL-MCNC: 7.1 G/DL (ref 6–8.2)
PROT UR QL STRIP: NEGATIVE MG/DL
RBC # BLD AUTO: 4.9 M/UL (ref 4.7–6.1)
RBC # URNS HPF: >150 /HPF
RBC UR QL AUTO: ABNORMAL
SODIUM SERPL-SCNC: 133 MMOL/L (ref 135–145)
SP GR UR STRIP.AUTO: 1.01
URATE CRY #/AREA URNS HPF: POSITIVE /HPF
UROBILINOGEN UR STRIP.AUTO-MCNC: 0.2 MG/DL
WBC # BLD AUTO: 10.3 K/UL (ref 4.8–10.8)
WBC #/AREA URNS HPF: ABNORMAL /HPF

## 2023-05-23 PROCEDURE — 74176 CT ABD & PELVIS W/O CONTRAST: CPT

## 2023-05-23 PROCEDURE — 700111 HCHG RX REV CODE 636 W/ 250 OVERRIDE (IP): Performed by: EMERGENCY MEDICINE

## 2023-05-23 PROCEDURE — 99285 EMERGENCY DEPT VISIT HI MDM: CPT

## 2023-05-23 PROCEDURE — 700105 HCHG RX REV CODE 258: Mod: UD | Performed by: EMERGENCY MEDICINE

## 2023-05-23 PROCEDURE — 303105 HCHG CATHETER EXTRA

## 2023-05-23 PROCEDURE — 83605 ASSAY OF LACTIC ACID: CPT | Mod: 91

## 2023-05-23 PROCEDURE — 700102 HCHG RX REV CODE 250 W/ 637 OVERRIDE(OP): Performed by: INTERNAL MEDICINE

## 2023-05-23 PROCEDURE — 81001 URINALYSIS AUTO W/SCOPE: CPT

## 2023-05-23 PROCEDURE — 51702 INSERT TEMP BLADDER CATH: CPT

## 2023-05-23 PROCEDURE — 83690 ASSAY OF LIPASE: CPT

## 2023-05-23 PROCEDURE — 87040 BLOOD CULTURE FOR BACTERIA: CPT | Mod: 91

## 2023-05-23 PROCEDURE — 87086 URINE CULTURE/COLONY COUNT: CPT

## 2023-05-23 PROCEDURE — 80053 COMPREHEN METABOLIC PANEL: CPT

## 2023-05-23 PROCEDURE — 82962 GLUCOSE BLOOD TEST: CPT

## 2023-05-23 PROCEDURE — 700102 HCHG RX REV CODE 250 W/ 637 OVERRIDE(OP): Performed by: EMERGENCY MEDICINE

## 2023-05-23 PROCEDURE — A9270 NON-COVERED ITEM OR SERVICE: HCPCS | Performed by: INTERNAL MEDICINE

## 2023-05-23 PROCEDURE — 770020 HCHG ROOM/CARE - TELE (206)

## 2023-05-23 PROCEDURE — 93005 ELECTROCARDIOGRAM TRACING: CPT | Performed by: EMERGENCY MEDICINE

## 2023-05-23 PROCEDURE — 700105 HCHG RX REV CODE 258: Performed by: INTERNAL MEDICINE

## 2023-05-23 PROCEDURE — 85025 COMPLETE CBC W/AUTO DIFF WBC: CPT

## 2023-05-23 PROCEDURE — 700111 HCHG RX REV CODE 636 W/ 250 OVERRIDE (IP): Performed by: INTERNAL MEDICINE

## 2023-05-23 PROCEDURE — 302140 GU CART: Performed by: EMERGENCY MEDICINE

## 2023-05-23 PROCEDURE — 99223 1ST HOSP IP/OBS HIGH 75: CPT | Mod: AI | Performed by: INTERNAL MEDICINE

## 2023-05-23 PROCEDURE — 0T9B70Z DRAINAGE OF BLADDER WITH DRAINAGE DEVICE, VIA NATURAL OR ARTIFICIAL OPENING: ICD-10-PCS | Performed by: STUDENT IN AN ORGANIZED HEALTH CARE EDUCATION/TRAINING PROGRAM

## 2023-05-23 PROCEDURE — 36415 COLL VENOUS BLD VENIPUNCTURE: CPT

## 2023-05-23 RX ORDER — SODIUM BICARBONATE IN D5W 150/1000ML
PLASTIC BAG, INJECTION (ML) INTRAVENOUS CONTINUOUS
Status: DISCONTINUED | OUTPATIENT
Start: 2023-05-23 | End: 2023-05-25 | Stop reason: HOSPADM

## 2023-05-23 RX ORDER — CALCIUM GLUCONATE 20 MG/ML
2 INJECTION, SOLUTION INTRAVENOUS ONCE
Status: COMPLETED | OUTPATIENT
Start: 2023-05-23 | End: 2023-05-23

## 2023-05-23 RX ORDER — PROMETHAZINE HYDROCHLORIDE 25 MG/1
12.5-25 TABLET ORAL EVERY 4 HOURS PRN
Status: DISCONTINUED | OUTPATIENT
Start: 2023-05-23 | End: 2023-05-25 | Stop reason: HOSPADM

## 2023-05-23 RX ORDER — LABETALOL HYDROCHLORIDE 5 MG/ML
10 INJECTION, SOLUTION INTRAVENOUS EVERY 4 HOURS PRN
Status: DISCONTINUED | OUTPATIENT
Start: 2023-05-23 | End: 2023-05-25 | Stop reason: HOSPADM

## 2023-05-23 RX ORDER — BISACODYL 10 MG
10 SUPPOSITORY, RECTAL RECTAL
Status: DISCONTINUED | OUTPATIENT
Start: 2023-05-23 | End: 2023-05-25 | Stop reason: HOSPADM

## 2023-05-23 RX ORDER — PROMETHAZINE HYDROCHLORIDE 25 MG/1
12.5-25 SUPPOSITORY RECTAL EVERY 4 HOURS PRN
Status: DISCONTINUED | OUTPATIENT
Start: 2023-05-23 | End: 2023-05-25 | Stop reason: HOSPADM

## 2023-05-23 RX ORDER — SODIUM CHLORIDE, SODIUM LACTATE, POTASSIUM CHLORIDE, AND CALCIUM CHLORIDE .6; .31; .03; .02 G/100ML; G/100ML; G/100ML; G/100ML
30 INJECTION, SOLUTION INTRAVENOUS ONCE
Status: ACTIVE | OUTPATIENT
Start: 2023-05-23 | End: 2023-05-24

## 2023-05-23 RX ORDER — ACETAMINOPHEN 325 MG/1
650 TABLET ORAL EVERY 6 HOURS PRN
Status: DISCONTINUED | OUTPATIENT
Start: 2023-05-23 | End: 2023-05-25 | Stop reason: HOSPADM

## 2023-05-23 RX ORDER — ONDANSETRON 2 MG/ML
4 INJECTION INTRAMUSCULAR; INTRAVENOUS EVERY 4 HOURS PRN
Status: DISCONTINUED | OUTPATIENT
Start: 2023-05-23 | End: 2023-05-25 | Stop reason: HOSPADM

## 2023-05-23 RX ORDER — PROCHLORPERAZINE EDISYLATE 5 MG/ML
5-10 INJECTION INTRAMUSCULAR; INTRAVENOUS EVERY 4 HOURS PRN
Status: DISCONTINUED | OUTPATIENT
Start: 2023-05-23 | End: 2023-05-25 | Stop reason: HOSPADM

## 2023-05-23 RX ORDER — POLYETHYLENE GLYCOL 3350 17 G/17G
1 POWDER, FOR SOLUTION ORAL
Status: DISCONTINUED | OUTPATIENT
Start: 2023-05-23 | End: 2023-05-25 | Stop reason: HOSPADM

## 2023-05-23 RX ORDER — ONDANSETRON 4 MG/1
4 TABLET, ORALLY DISINTEGRATING ORAL EVERY 4 HOURS PRN
Status: DISCONTINUED | OUTPATIENT
Start: 2023-05-23 | End: 2023-05-25 | Stop reason: HOSPADM

## 2023-05-23 RX ORDER — VITAMIN B COMPLEX
1000 TABLET ORAL DAILY
Status: DISCONTINUED | OUTPATIENT
Start: 2023-05-24 | End: 2023-05-25 | Stop reason: HOSPADM

## 2023-05-23 RX ORDER — AMOXICILLIN 250 MG
2 CAPSULE ORAL 2 TIMES DAILY
Status: DISCONTINUED | OUTPATIENT
Start: 2023-05-23 | End: 2023-05-25 | Stop reason: HOSPADM

## 2023-05-23 RX ORDER — HYDROMORPHONE HYDROCHLORIDE 1 MG/ML
0.25 INJECTION, SOLUTION INTRAMUSCULAR; INTRAVENOUS; SUBCUTANEOUS
Status: DISCONTINUED | OUTPATIENT
Start: 2023-05-23 | End: 2023-05-25 | Stop reason: HOSPADM

## 2023-05-23 RX ORDER — HEPARIN SODIUM 5000 [USP'U]/ML
5000 INJECTION, SOLUTION INTRAVENOUS; SUBCUTANEOUS EVERY 8 HOURS
Status: DISCONTINUED | OUTPATIENT
Start: 2023-05-23 | End: 2023-05-25 | Stop reason: HOSPADM

## 2023-05-23 RX ORDER — OXYCODONE HYDROCHLORIDE 5 MG/1
5 TABLET ORAL
Status: DISCONTINUED | OUTPATIENT
Start: 2023-05-23 | End: 2023-05-25 | Stop reason: HOSPADM

## 2023-05-23 RX ORDER — CHOLECALCIFEROL (VITAMIN D3) 125 MCG
500 CAPSULE ORAL DAILY
Status: DISCONTINUED | OUTPATIENT
Start: 2023-05-24 | End: 2023-05-25 | Stop reason: HOSPADM

## 2023-05-23 RX ORDER — OXYCODONE HYDROCHLORIDE 5 MG/1
2.5 TABLET ORAL
Status: DISCONTINUED | OUTPATIENT
Start: 2023-05-23 | End: 2023-05-25 | Stop reason: HOSPADM

## 2023-05-23 RX ADMIN — CALCIUM GLUCONATE 2 G: 20 INJECTION, SOLUTION INTRAVENOUS at 21:22

## 2023-05-23 RX ADMIN — SODIUM BICARBONATE: 84 INJECTION, SOLUTION INTRAVENOUS at 21:35

## 2023-05-23 RX ADMIN — SENNOSIDES AND DOCUSATE SODIUM 2 TABLET: 50; 8.6 TABLET ORAL at 21:14

## 2023-05-23 RX ADMIN — HEPARIN SODIUM 5000 UNITS: 5000 INJECTION, SOLUTION INTRAVENOUS; SUBCUTANEOUS at 21:14

## 2023-05-23 RX ADMIN — DEXTROSE MONOHYDRATE 25 G: 100 INJECTION, SOLUTION INTRAVENOUS at 20:58

## 2023-05-23 ASSESSMENT — LIFESTYLE VARIABLES
HAVE YOU EVER FELT YOU SHOULD CUT DOWN ON YOUR DRINKING: NO
DOES PATIENT WANT TO STOP DRINKING: NO
CONSUMPTION TOTAL: NEGATIVE
TOTAL SCORE: 0
HAVE PEOPLE ANNOYED YOU BY CRITICIZING YOUR DRINKING: NO
ALCOHOL_USE: YES
TOTAL SCORE: 0
HOW MANY TIMES IN THE PAST YEAR HAVE YOU HAD 5 OR MORE DRINKS IN A DAY: 0
EVER HAD A DRINK FIRST THING IN THE MORNING TO STEADY YOUR NERVES TO GET RID OF A HANGOVER: NO
ON A TYPICAL DAY WHEN YOU DRINK ALCOHOL HOW MANY DRINKS DO YOU HAVE: 1
AVERAGE NUMBER OF DAYS PER WEEK YOU HAVE A DRINK CONTAINING ALCOHOL: 1
EVER FELT BAD OR GUILTY ABOUT YOUR DRINKING: NO
TOTAL SCORE: 0

## 2023-05-23 ASSESSMENT — COGNITIVE AND FUNCTIONAL STATUS - GENERAL
SUGGESTED CMS G CODE MODIFIER DAILY ACTIVITY: CH
CLIMB 3 TO 5 STEPS WITH RAILING: A LITTLE
DAILY ACTIVITIY SCORE: 24
SUGGESTED CMS G CODE MODIFIER MOBILITY: CJ
MOBILITY SCORE: 22
WALKING IN HOSPITAL ROOM: A LITTLE

## 2023-05-23 ASSESSMENT — PATIENT HEALTH QUESTIONNAIRE - PHQ9
1. LITTLE INTEREST OR PLEASURE IN DOING THINGS: NOT AT ALL
2. FEELING DOWN, DEPRESSED, IRRITABLE, OR HOPELESS: NOT AT ALL
SUM OF ALL RESPONSES TO PHQ9 QUESTIONS 1 AND 2: 0

## 2023-05-23 ASSESSMENT — FIBROSIS 4 INDEX
FIB4 SCORE: 0.87
FIB4 SCORE: 0.45

## 2023-05-23 ASSESSMENT — PAIN DESCRIPTION - PAIN TYPE: TYPE: ACUTE PAIN

## 2023-05-23 NOTE — ED NOTES
.  Chief Complaint   Patient presents with    Sent by MD     Sent by Oncologist    Abdominal Pain     Abdominal cramping and bloating x > 1 week    Rectal Pain     BP (!) 136/98   Pulse (!) 112   Temp 36.4 °C (97.5 °F) (Temporal)   Resp 16   Wt 92.8 kg (204 lb 9.4 oz)   SpO2 95%   BMI 31.74 kg/m²     Ambulatory to triage for above, currently being treated for rectal cancer, placed in family waiting room.

## 2023-05-24 PROBLEM — E87.5 HYPERKALEMIA: Status: ACTIVE | Noted: 2023-05-24

## 2023-05-24 LAB
ALBUMIN SERPL BCP-MCNC: 3.2 G/DL (ref 3.2–4.9)
ALBUMIN/GLOB SERPL: 0.9 G/DL
ALP SERPL-CCNC: 90 U/L (ref 30–99)
ALT SERPL-CCNC: 23 U/L (ref 2–50)
ANION GAP SERPL CALC-SCNC: 15 MMOL/L (ref 7–16)
AST SERPL-CCNC: 14 U/L (ref 12–45)
BASOPHILS # BLD AUTO: 0.4 % (ref 0–1.8)
BASOPHILS # BLD: 0.03 K/UL (ref 0–0.12)
BILIRUB SERPL-MCNC: 0.6 MG/DL (ref 0.1–1.5)
BUN SERPL-MCNC: 80 MG/DL (ref 8–22)
CALCIUM ALBUM COR SERPL-MCNC: 10.6 MG/DL (ref 8.5–10.5)
CALCIUM SERPL-MCNC: 10 MG/DL (ref 8.5–10.5)
CHLORIDE SERPL-SCNC: 101 MMOL/L (ref 96–112)
CK SERPL-CCNC: 31 U/L (ref 0–154)
CO2 SERPL-SCNC: 28 MMOL/L (ref 20–33)
CREAT SERPL-MCNC: 4.5 MG/DL (ref 0.5–1.4)
EOSINOPHIL # BLD AUTO: 0.11 K/UL (ref 0–0.51)
EOSINOPHIL NFR BLD: 1.5 % (ref 0–6.9)
ERYTHROCYTE [DISTWIDTH] IN BLOOD BY AUTOMATED COUNT: 50.5 FL (ref 35.9–50)
GFR SERPLBLD CREATININE-BSD FMLA CKD-EPI: 15 ML/MIN/1.73 M 2
GLOBULIN SER CALC-MCNC: 3.6 G/DL (ref 1.9–3.5)
GLUCOSE SERPL-MCNC: 144 MG/DL (ref 65–99)
HCT VFR BLD AUTO: 37.5 % (ref 42–52)
HGB BLD-MCNC: 12.3 G/DL (ref 14–18)
IMM GRANULOCYTES # BLD AUTO: 0.06 K/UL (ref 0–0.11)
IMM GRANULOCYTES NFR BLD AUTO: 0.8 % (ref 0–0.9)
LYMPHOCYTES # BLD AUTO: 0.47 K/UL (ref 1–4.8)
LYMPHOCYTES NFR BLD: 6.5 % (ref 22–41)
MCH RBC QN AUTO: 26.1 PG (ref 27–33)
MCHC RBC AUTO-ENTMCNC: 32.8 G/DL (ref 32.3–36.5)
MCV RBC AUTO: 79.4 FL (ref 81.4–97.8)
MONOCYTES # BLD AUTO: 0.89 K/UL (ref 0–0.85)
MONOCYTES NFR BLD AUTO: 12.4 % (ref 0–13.4)
NEUTROPHILS # BLD AUTO: 5.62 K/UL (ref 1.82–7.42)
NEUTROPHILS NFR BLD: 78.4 % (ref 44–72)
NRBC # BLD AUTO: 0 K/UL
NRBC BLD-RTO: 0 /100 WBC (ref 0–0.2)
PLATELET # BLD AUTO: 331 K/UL (ref 164–446)
PMV BLD AUTO: 9.6 FL (ref 9–12.9)
POTASSIUM SERPL-SCNC: 4.3 MMOL/L (ref 3.6–5.5)
PROT SERPL-MCNC: 6.8 G/DL (ref 6–8.2)
RBC # BLD AUTO: 4.72 M/UL (ref 4.7–6.1)
SODIUM SERPL-SCNC: 144 MMOL/L (ref 135–145)
URATE SERPL-MCNC: 12 MG/DL (ref 2.5–8.3)
WBC # BLD AUTO: 7.2 K/UL (ref 4.8–10.8)

## 2023-05-24 PROCEDURE — 93005 ELECTROCARDIOGRAM TRACING: CPT | Performed by: STUDENT IN AN ORGANIZED HEALTH CARE EDUCATION/TRAINING PROGRAM

## 2023-05-24 PROCEDURE — 84550 ASSAY OF BLOOD/URIC ACID: CPT

## 2023-05-24 PROCEDURE — 85025 COMPLETE CBC W/AUTO DIFF WBC: CPT

## 2023-05-24 PROCEDURE — 700101 HCHG RX REV CODE 250: Performed by: STUDENT IN AN ORGANIZED HEALTH CARE EDUCATION/TRAINING PROGRAM

## 2023-05-24 PROCEDURE — 700111 HCHG RX REV CODE 636 W/ 250 OVERRIDE (IP): Performed by: STUDENT IN AN ORGANIZED HEALTH CARE EDUCATION/TRAINING PROGRAM

## 2023-05-24 PROCEDURE — A9270 NON-COVERED ITEM OR SERVICE: HCPCS | Performed by: INTERNAL MEDICINE

## 2023-05-24 PROCEDURE — 700105 HCHG RX REV CODE 258: Performed by: INTERNAL MEDICINE

## 2023-05-24 PROCEDURE — 770001 HCHG ROOM/CARE - MED/SURG/GYN PRIV*

## 2023-05-24 PROCEDURE — 700102 HCHG RX REV CODE 250 W/ 637 OVERRIDE(OP): Performed by: INTERNAL MEDICINE

## 2023-05-24 PROCEDURE — 700111 HCHG RX REV CODE 636 W/ 250 OVERRIDE (IP): Performed by: INTERNAL MEDICINE

## 2023-05-24 PROCEDURE — 700105 HCHG RX REV CODE 258: Performed by: STUDENT IN AN ORGANIZED HEALTH CARE EDUCATION/TRAINING PROGRAM

## 2023-05-24 PROCEDURE — 82550 ASSAY OF CK (CPK): CPT

## 2023-05-24 PROCEDURE — 80053 COMPREHEN METABOLIC PANEL: CPT

## 2023-05-24 PROCEDURE — 99232 SBSQ HOSP IP/OBS MODERATE 35: CPT | Performed by: STUDENT IN AN ORGANIZED HEALTH CARE EDUCATION/TRAINING PROGRAM

## 2023-05-24 RX ORDER — SODIUM CHLORIDE, SODIUM LACTATE, POTASSIUM CHLORIDE, CALCIUM CHLORIDE 600; 310; 30; 20 MG/100ML; MG/100ML; MG/100ML; MG/100ML
INJECTION, SOLUTION INTRAVENOUS CONTINUOUS
Status: DISCONTINUED | OUTPATIENT
Start: 2023-05-24 | End: 2023-05-24

## 2023-05-24 RX ADMIN — HEPARIN SODIUM 5000 UNITS: 5000 INJECTION, SOLUTION INTRAVENOUS; SUBCUTANEOUS at 05:15

## 2023-05-24 RX ADMIN — CYANOCOBALAMIN TAB 500 MCG 500 MCG: 500 TAB at 05:15

## 2023-05-24 RX ADMIN — SODIUM CHLORIDE, POTASSIUM CHLORIDE, SODIUM LACTATE AND CALCIUM CHLORIDE: 600; 310; 30; 20 INJECTION, SOLUTION INTRAVENOUS at 01:29

## 2023-05-24 RX ADMIN — SODIUM BICARBONATE: 84 INJECTION, SOLUTION INTRAVENOUS at 16:33

## 2023-05-24 RX ADMIN — HEPARIN SODIUM 5000 UNITS: 5000 INJECTION, SOLUTION INTRAVENOUS; SUBCUTANEOUS at 14:25

## 2023-05-24 RX ADMIN — SODIUM BICARBONATE: 84 INJECTION, SOLUTION INTRAVENOUS at 08:12

## 2023-05-24 RX ADMIN — SENNOSIDES AND DOCUSATE SODIUM 2 TABLET: 50; 8.6 TABLET ORAL at 05:15

## 2023-05-24 RX ADMIN — SODIUM BICARBONATE: 84 INJECTION, SOLUTION INTRAVENOUS at 21:40

## 2023-05-24 RX ADMIN — Medication 1000 UNITS: at 05:15

## 2023-05-24 ASSESSMENT — LIFESTYLE VARIABLES: SUBSTANCE_ABUSE: 0

## 2023-05-24 ASSESSMENT — ENCOUNTER SYMPTOMS
BLURRED VISION: 0
CHILLS: 0
NECK PAIN: 0
PHOTOPHOBIA: 0
WEAKNESS: 1
SENSORY CHANGE: 0
HEADACHES: 0
HALLUCINATIONS: 0
FEVER: 0
FLANK PAIN: 0
EYE PAIN: 0
SPEECH CHANGE: 0
INSOMNIA: 0
DOUBLE VISION: 0
HEARTBURN: 0
HEMOPTYSIS: 0
ORTHOPNEA: 0
VOMITING: 0
NAUSEA: 0
POLYDIPSIA: 0
WEIGHT LOSS: 0
BACK PAIN: 0
ABDOMINAL PAIN: 0
SPUTUM PRODUCTION: 0
TREMORS: 0
NERVOUS/ANXIOUS: 0
DIZZINESS: 0
FOCAL WEAKNESS: 0
SHORTNESS OF BREATH: 0
BRUISES/BLEEDS EASILY: 0
PALPITATIONS: 0
COUGH: 0

## 2023-05-24 ASSESSMENT — FIBROSIS 4 INDEX: FIB4 SCORE: 0.49

## 2023-05-24 ASSESSMENT — PAIN DESCRIPTION - PAIN TYPE: TYPE: ACUTE PAIN

## 2023-05-24 NOTE — ED NOTES
Urine specimen sent to lab. FBC inserted by urologist. Drained 1300 cc from urine bag, urologist aware

## 2023-05-24 NOTE — H&P
Hospital Medicine History & Physical Note    Date of Service  5/23/2023    Primary Care Physician  Jerry Aguila M.D.    Urology, nephrology    Code Status  Full code    Chief Complaint  Chief Complaint   Patient presents with    Sent by MD     Sent by Oncologist    Abdominal Pain     Abdominal cramping and bloating x > 1 week    Rectal Pain       History of Presenting Illness  Dre Eaton is a 57 y.o. male with past medical history of rectal cancer treated with radiation 2 years ago, colostomy, currently on oral chemotherapy every 3 weeks who presented 5/23/2023 with generalized weakness, abdominal discomfort.  Patient was sent to ER by his oncologist to rule out sepsis.  Upon evaluation, patient noted to be in acute kidney failure with creatinine 16.07, , potassium 5.7 CT renal colic showed new bladder outlet obstruction, bladder distention to the level of the umbilicus, moderate hydronephrosis, known rectal carcinoma with possible invasion of seminal vesicles.  Garber placement attempted in ER unsuccessful.  It was placed by urologist.  ERP spoke to nephrology on-call as well.  Hyperkalemia treated with dextrose/insulin, calcium gluconate.  UA showed small leukocyte esterase, large blood, 10-20 WBC, greater than 150 RBC, hyaline cast.  There are uric crystals.    I discussed the plan of care with patient,  Urology, nephrology.    Review of Systems  Review of Systems   Constitutional:  Negative for chills, fever and weight loss.   HENT:  Negative for ear pain, hearing loss and tinnitus.    Eyes:  Negative for blurred vision, double vision and photophobia.   Respiratory:  Negative for cough, hemoptysis and sputum production.    Cardiovascular:  Negative for chest pain, palpitations and orthopnea.   Gastrointestinal:  Negative for heartburn, nausea and vomiting.   Genitourinary:  Negative for dysuria, flank pain, frequency and hematuria.   Musculoskeletal:  Negative for back pain, joint pain and  neck pain.   Skin:  Negative for itching and rash.   Neurological:  Positive for weakness. Negative for tremors, speech change, focal weakness and headaches.   Endo/Heme/Allergies:  Negative for environmental allergies and polydipsia. Does not bruise/bleed easily.   Psychiatric/Behavioral:  Negative for hallucinations and substance abuse. The patient is not nervous/anxious.        Past Medical History   has a past medical history of Cancer (HCC), Hemorrhoid, and Malignant neoplasm of rectum (HCC).    Surgical History   has a past surgical history that includes colonoscopy with biopsy (10/14/2013).     Family History  family history includes Cancer in his father.   Family history reviewed with patient. There is no family history that is pertinent to the chief complaint.     Social History   reports that he has never smoked. He has never used smokeless tobacco. He reports that he does not currently use alcohol. He reports that he does not use drugs.    Allergies  No Known Allergies    Medications  Prior to Admission Medications   Prescriptions Last Dose Informant Patient Reported? Taking?   Ascorbic Acid (VITAMIN C PO)   Yes No   Sig: Take 1,000 mg by mouth every day. Powder form   HOMEOPATHIC PRODUCTS PO   Yes No   Sig: Take  by mouth.   cyanocobalamin (VITAMIN B-12) 500 MCG Tab   Yes No   Sig: Take 500 mcg by mouth every day.   potassium chloride SA (KLOR-CON M20) 20 MEQ Tab CR   Yes No   Sig: Klor-Con M20 mEq tablet,extended release   vitamin D (CHOLECALCIFEROL) 1000 UNIT Tab   Yes No   Sig: Take 1,000 Units by mouth every day. Pt takes 3,000 units every other day      Facility-Administered Medications: None       Physical Exam  Temp:  [36.4 °C (97.5 °F)] 36.4 °C (97.5 °F)  Pulse:  [112] 112  Resp:  [16] 16  BP: (136)/(98) 136/98  SpO2:  [95 %] 95 %  Blood Pressure: (!) 136/98   Temperature: 36.4 °C (97.5 °F)   Pulse: (!) 112   Respiration: 16   Pulse Oximetry: 95 %       Physical Exam  Vitals and nursing note  reviewed.   Constitutional:       General: He is not in acute distress.     Appearance: Normal appearance.   HENT:      Head: Normocephalic and atraumatic.      Nose: Nose normal.      Mouth/Throat:      Mouth: Mucous membranes are moist.   Eyes:      Extraocular Movements: Extraocular movements intact.      Pupils: Pupils are equal, round, and reactive to light.   Cardiovascular:      Rate and Rhythm: Normal rate and regular rhythm.   Pulmonary:      Effort: Pulmonary effort is normal.      Breath sounds: Normal breath sounds.   Abdominal:      General: Abdomen is flat. There is distension.      Tenderness: There is no abdominal tenderness.      Comments: Colostomy with semisolid greenish content   Musculoskeletal:         General: No swelling or deformity. Normal range of motion.      Cervical back: Normal range of motion and neck supple.   Skin:     General: Skin is warm and dry.   Neurological:      General: No focal deficit present.      Mental Status: He is alert and oriented to person, place, and time.   Psychiatric:         Mood and Affect: Mood normal.         Behavior: Behavior normal.         Laboratory:  Recent Labs     05/23/23  1546   WBC 10.3   RBC 4.90   HEMOGLOBIN 13.0*   HEMATOCRIT 38.6*   MCV 78.8*   MCH 26.5*   MCHC 33.7   RDW 50.2*   PLATELETCT 342   MPV 9.7     Recent Labs     05/23/23  1546   SODIUM 133*   POTASSIUM 5.7*   CHLORIDE 89*   CO2 18*   GLUCOSE 113*   *   CREATININE 16.07*   CALCIUM 9.3     Recent Labs     05/23/23  1546   ALTSGPT 22   ASTSGOT 13   ALKPHOSPHAT 104*   TBILIRUBIN 0.4   LIPASE 40   GLUCOSE 113*         No results for input(s): NTPROBNP in the last 72 hours.      No results for input(s): TROPONINT in the last 72 hours.    Imaging:  CT-RENAL COLIC EVALUATION(A/P W/O)   Final Result      New bladder outlet obstruction with the urinary bladder distended to the level of the umbilicus and moderate hydroureteronephrosis with perinephric fat stranding      Otherwise  stable evaluation with abnormal pelvic density compatible with treated rectal carcinoma. Possible communication with the perineum and possible invasion of the seminal vesicles              Assessment/Plan:  Justification for Admission Status  I anticipate this patient will require at least two midnights for appropriate medical management, necessitating inpatient admission because acute renal failure    Patient will need a Telemetry bed on EMERGENCY service .  The need is secondary to acute renal failure.    * SADIA (acute kidney injury) (HCC) obstructive nephropathy- (present on admission)  Assessment & Plan  In this case probably secondary to obstruction of the bladder by rectal cancer.  Patient   urinated last time 2 days ago.  Garber placed in ER by urologist.  UA noted with uric acid crystals and hyaline cast   plan: Monitor input and output.  If urine output greater than 200 cc/h (postobstructive diuresis), will need to increase IV fluid rate correspondingly  Avoid nephrotoxins  Monitor creatinine/BUN, electrolytes    Hyperkalemia  Assessment & Plan  Potassium 5.7, Peaked T waves noted..    treated with calcium gluconate and dextrose/insulin.      Monitor on telemetry.  Hold home potassium supplementation  IV bicarb  Monitor/repeat potassium level    Rectal carcinoma (HCC)- (present on admission)  Assessment & Plan  Follow-up with medical oncology        VTE prophylaxis: SCDs/TEDs

## 2023-05-24 NOTE — PROGRESS NOTES
4 Eyes Skin Assessment Completed by Dianne RN and Stanley RN.    Head WDL  Ears WDL  Nose WDL  Mouth WDL  Neck WDL  Breast/Chest Right upper chest implantable port  Shoulder Blades WDL  Spine WDL  (R) Arm/Elbow/Hand WDL  (L) Arm/Elbow/Hand WDL  Abdomen Ostomy  Groin traumatic entry from choi placement, no wound, blood in the area  Scrotum/Coccyx/Buttocks WDL  (R) Leg WDL  (L) Leg WDL  (R) Heel/Foot/Toe WDL  (L) Heel/Foot/Toe WDL          Devices In Places Tele Box, Blood Pressure Cuff, and Pulse Ox      Interventions In Place Pillows    Possible Skin Injury No    Pictures Uploaded Into Epic N/A  Wound Consult Placed N/A  RN Wound Prevention Protocol Ordered No

## 2023-05-24 NOTE — ASSESSMENT & PLAN NOTE
Potassium 5.7, Peaked T waves noted..    treated with calcium gluconate and dextrose/insulin.      Monitor on telemetry.  Hold home potassium supplementation  IV bicarb  K level improved

## 2023-05-24 NOTE — PROGRESS NOTES
Hospital Medicine Daily Progress Note    Date of Service  5/24/2023    Chief Complaint  Dre Eaton is a 57 y.o. male admitted 5/23/2023 with abdominal pain.    Hospital Course  Dre Eaton is a 57 y.o. male with past medical history of rectal cancer treated with radiation 2 years ago, colostomy, currently on oral chemotherapy every 3 weeks who presented 5/23/2023 with generalized weakness, abdominal discomfort.  Patient was sent to ER by his oncologist to rule out sepsis.  Upon evaluation, patient noted to be in acute kidney failure with creatinine 16.07, , potassium 5.7 CT renal colic showed new bladder outlet obstruction, bladder distention to the level of the umbilicus, moderate hydronephrosis, known rectal carcinoma with possible invasion of seminal vesicles.  Choi placement attempted in ER unsuccessful.  It was placed by urologist.  ERP spoke to nephrology on-call as well.  Hyperkalemia treated with dextrose/insulin, calcium gluconate.  UA showed small leukocyte esterase, large blood, 10-20 WBC, greater than 150 RBC, hyaline cast.  There are uric crystals.    Interval Problem Update  No acute events overnight.  Hyperkalemia improved, down to 4.3. EKG personally reviewed showing no tented T waves. Okay to stop cardiac monitoring and transfer to medical floor.  Kidney function improving after choi catheter placement. Cr 4.5 today. Monitor daily.  Urology following, appreciate recommendations.  Nephrology consulted by ERP, appreciate recommendations.    I have discussed this patient's plan of care and discharge plan at IDT rounds today with Case Management, Nursing, Nursing leadership, and other members of the IDT team.    Consultants/Specialty  nephrology and urology    Code Status  Full Code    Disposition  Medically Cleared  I have placed the appropriate orders for post-discharge needs.    Review of Systems  Review of Systems   Constitutional:  Negative for chills and fever.    Eyes:  Negative for blurred vision and pain.   Respiratory:  Negative for cough and shortness of breath.    Cardiovascular:  Negative for chest pain, palpitations and leg swelling.   Gastrointestinal:  Negative for abdominal pain, nausea and vomiting.   Genitourinary:  Negative for dysuria and urgency.   Musculoskeletal:  Negative for back pain.   Skin:  Negative for itching and rash.   Neurological:  Negative for dizziness, sensory change and headaches.   Psychiatric/Behavioral:  Negative for substance abuse. The patient does not have insomnia.         Physical Exam  Temp:  [36.4 °C (97.5 °F)-37 °C (98.6 °F)] 37 °C (98.6 °F)  Pulse:  [] 113  Resp:  [16-18] 16  BP: (136-144)/(81-98) 136/89  SpO2:  [95 %] 95 %    Physical Exam  Constitutional:       General: He is not in acute distress.     Appearance: He is not ill-appearing.   HENT:      Head: Normocephalic and atraumatic.      Right Ear: External ear normal.      Left Ear: External ear normal.      Mouth/Throat:      Pharynx: No oropharyngeal exudate or posterior oropharyngeal erythema.   Eyes:      Extraocular Movements: Extraocular movements intact.      Pupils: Pupils are equal, round, and reactive to light.   Cardiovascular:      Rate and Rhythm: Normal rate and regular rhythm.      Pulses: Normal pulses.      Heart sounds: Normal heart sounds.   Pulmonary:      Effort: Pulmonary effort is normal. No respiratory distress.      Breath sounds: Normal breath sounds. No wheezing.   Abdominal:      General: Bowel sounds are normal. There is no distension.      Palpations: Abdomen is soft.      Tenderness: There is no abdominal tenderness. There is no guarding or rebound.      Comments: +rectum with liquid stool output   Musculoskeletal:         General: No swelling or tenderness.      Cervical back: Normal range of motion and neck supple.   Skin:     General: Skin is warm and dry.   Neurological:      General: No focal deficit present.      Mental Status:  He is oriented to person, place, and time.      Cranial Nerves: No cranial nerve deficit.      Sensory: No sensory deficit.      Motor: No weakness.   Psychiatric:         Mood and Affect: Mood normal.         Behavior: Behavior normal.         Fluids    Intake/Output Summary (Last 24 hours) at 5/24/2023 1408  Last data filed at 5/24/2023 1200  Gross per 24 hour   Intake 2754.77 ml   Output 86627 ml   Net -7445.23 ml       Laboratory  Recent Labs     05/23/23  1546 05/24/23  0607   WBC 10.3 7.2   RBC 4.90 4.72   HEMOGLOBIN 13.0* 12.3*   HEMATOCRIT 38.6* 37.5*   MCV 78.8* 79.4*   MCH 26.5* 26.1*   MCHC 33.7 32.8   RDW 50.2* 50.5*   PLATELETCT 342 331   MPV 9.7 9.6     Recent Labs     05/23/23  1546 05/24/23  0607   SODIUM 133* 144   POTASSIUM 5.7* 4.3   CHLORIDE 89* 101   CO2 18* 28   GLUCOSE 113* 144*   * 80*   CREATININE 16.07* 4.50*   CALCIUM 9.3 10.0                   Imaging  CT-RENAL COLIC EVALUATION(A/P W/O)   Final Result      New bladder outlet obstruction with the urinary bladder distended to the level of the umbilicus and moderate hydroureteronephrosis with perinephric fat stranding      Otherwise stable evaluation with abnormal pelvic density compatible with treated rectal carcinoma. Possible communication with the perineum and possible invasion of the seminal vesicles           Assessment/Plan  * SADIA (acute kidney injury) (HCC) obstructive nephropathy- (present on admission)  Assessment & Plan  In this case probably secondary to obstruction of the bladder by rectal cancer.  Patient   urinated last time 2 days ago.  Garber placed in ER by urologist.  UA noted with uric acid crystals and hyaline cast   plan: Monitor input and output.  If urine output greater than 200 cc/h (postobstructive diuresis), will need to increase IV fluid rate correspondingly  Avoid nephrotoxins  Monitor creatinine/BUN, electrolytes    Hyperkalemia  Assessment & Plan  Potassium 5.7, Peaked T waves noted..    treated with  calcium gluconate and dextrose/insulin.      Monitor on telemetry.  Hold home potassium supplementation  IV bicarb  K level improved    Rectal carcinoma (HCC)- (present on admission)  Assessment & Plan  Follow-up with medical oncology         VTE prophylaxis: heparin ppx    I have performed a physical exam and reviewed and updated ROS and Plan today (5/24/2023). In review of yesterday's note (5/23/2023), there are no changes except as documented above.

## 2023-05-24 NOTE — ED PROVIDER NOTES
ER Provider Note    Scribed for Gianluca Ley M.D. by Cristine Julian. 5/23/2023  5:10 PM    Primary Care Provider: Jerry Aguila M.D.    CHIEF COMPLAINT  Chief Complaint   Patient presents with    Sent by MD     Sent by Oncologist    Abdominal Pain     Abdominal cramping and bloating x > 1 week    Rectal Pain     EXTERNAL RECORDS REVIEWED  Review of patient's past medical records show that the patient has rectal cancer status post radiation and is undergoing radiation. He was seen by his oncologist, Dr. Aguila, and sent here for possible sepsis.      HPI/ROS  OUTSIDE HISTORIAN(S):  Significant other (Wife)    LIMITATION TO HISTORY   None    Dre Eaton is a 57 y.o. male with rectal cancer who presents to the Emergency Department for abdominal pain onset one week ago. The patient was diagnosed with rectal cancer two years ago and has gone through radiation and is currently undergoing chemotherapy every three weeks. He had a chemotherapy session a few weeks ago and then traveled to visit his mother for Mother's Day. When he returned home, he felt extremely fatigued and has been laying in bed for most of the day. He then started having abdominal cramping and bloating. The patient's wife states that he has not eaten much since he started chemotherapy, but has had decreased PO intake since coming back from his trip. He has not had much output in his colostomy bag due to decreased PO intake. He has also had decreased fluid intake and states that he noticed that he has been urinating less in the last few days despite having urinary urgency. He states that he has not had issues with chemotherapy until the last few sessions. No cough.     PAST MEDICAL HISTORY  Past Medical History:   Diagnosis Date    Cancer (HCC)     Hemorrhoid     Malignant neoplasm of rectum (HCC)        SURGICAL HISTORY  Past Surgical History:   Procedure Laterality Date    COLONOSCOPY WITH BIOPSY  10/14/2013    Performed by Julio Cesar PEREZ  JACQUES Sosa. at ENDOSCOPY [facility] ORS       FAMILY HISTORY  Family History   Problem Relation Age of Onset    Cancer Father         Unk        SOCIAL HISTORY   reports that he has never smoked. He has never used smokeless tobacco. He reports that he does not currently use alcohol. He reports that he does not use drugs.    CURRENT MEDICATIONS  Previous Medications    ASCORBIC ACID (VITAMIN C PO)    Take 1,000 mg by mouth every day. Powder form    CYANOCOBALAMIN (VITAMIN B-12) 500 MCG TAB    Take 500 mcg by mouth every day.    HOMEOPATHIC PRODUCTS PO    Take  by mouth.    POTASSIUM CHLORIDE SA (KLOR-CON M20) 20 MEQ TAB CR    Klor-Con M20 mEq tablet,extended release    VITAMIN D (CHOLECALCIFEROL) 1000 UNIT TAB    Take 1,000 Units by mouth every day. Pt takes 3,000 units every other day     ALLERGIES  Patient has no known allergies.    PHYSICAL EXAM  BP (!) 136/98   Pulse (!) 112   Temp 36.4 °C (97.5 °F) (Temporal)   Resp 16   Wt 92.8 kg (204 lb 9.4 oz)   SpO2 95%   BMI 31.74 kg/m²   Constitutional: Well developed, Well nourished, Mild distress.   HENT: Normocephalic, Atraumatic, Oropharynx moist, No oral exudates.   Eyes: Conjunctiva normal, No discharge.   Cardiovascular: Normal heart rate, Normal rhythm, No murmurs, equal pulses.   Pulmonary: Normal breath sounds, No respiratory distress, No wheezing, No rales, No rhonchi.  Abdomen: Soft, Very tender in the suprapubic area with what looks like a full bladder, Colostomy to the left lower quadrant, No masses, No rebound, No guarding.   Back: Slight left sided CVA tenderness.   Musculoskeletal: No major deformities noted, No tenderness.   Skin: Warm, Dry, No erythema, No rash.   Neurologic: Alert & oriented x 3, Normal motor function, No focal deficits noted.   Psychiatric: Affect normal, Judgment normal, Mood normal.      DIAGNOSTIC STUDIES & PROCEDURES    Labs:   Results for orders placed or performed during the hospital encounter of 05/23/23   CBC WITH  DIFFERENTIAL   Result Value Ref Range    WBC 10.3 4.8 - 10.8 K/uL    RBC 4.90 4.70 - 6.10 M/uL    Hemoglobin 13.0 (L) 14.0 - 18.0 g/dL    Hematocrit 38.6 (L) 42.0 - 52.0 %    MCV 78.8 (L) 81.4 - 97.8 fL    MCH 26.5 (L) 27.0 - 33.0 pg    MCHC 33.7 32.3 - 36.5 g/dL    RDW 50.2 (H) 35.9 - 50.0 fL    Platelet Count 342 164 - 446 K/uL    MPV 9.7 9.0 - 12.9 fL    Neutrophils-Polys 83.10 (H) 44.00 - 72.00 %    Lymphocytes 4.50 (L) 22.00 - 41.00 %    Monocytes 9.90 0.00 - 13.40 %    Eosinophils 1.00 0.00 - 6.90 %    Basophils 0.50 0.00 - 1.80 %    Immature Granulocytes 1.00 (H) 0.00 - 0.90 %    Nucleated RBC 0.00 0.00 - 0.20 /100 WBC    Neutrophils (Absolute) 8.53 (H) 1.82 - 7.42 K/uL    Lymphs (Absolute) 0.46 (L) 1.00 - 4.80 K/uL    Monos (Absolute) 1.02 (H) 0.00 - 0.85 K/uL    Eos (Absolute) 0.10 0.00 - 0.51 K/uL    Baso (Absolute) 0.05 0.00 - 0.12 K/uL    Immature Granulocytes (abs) 0.10 0.00 - 0.11 K/uL    NRBC (Absolute) 0.00 K/uL   COMP METABOLIC PANEL   Result Value Ref Range    Sodium 133 (L) 135 - 145 mmol/L    Potassium 5.7 (H) 3.6 - 5.5 mmol/L    Chloride 89 (L) 96 - 112 mmol/L    Co2 18 (L) 20 - 33 mmol/L    Anion Gap 26.0 (H) 7.0 - 16.0    Glucose 113 (H) 65 - 99 mg/dL    Bun 148 (H) 8 - 22 mg/dL    Creatinine 16.07 (HH) 0.50 - 1.40 mg/dL    Calcium 9.3 8.5 - 10.5 mg/dL    AST(SGOT) 13 12 - 45 U/L    ALT(SGPT) 22 2 - 50 U/L    Alkaline Phosphatase 104 (H) 30 - 99 U/L    Total Bilirubin 0.4 0.1 - 1.5 mg/dL    Albumin 3.3 3.2 - 4.9 g/dL    Total Protein 7.1 6.0 - 8.2 g/dL    Globulin 3.8 (H) 1.9 - 3.5 g/dL    A-G Ratio 0.9 g/dL   LIPASE   Result Value Ref Range    Lipase 40 11 - 82 U/L   URINALYSIS    Specimen: Urine   Result Value Ref Range    Color Red (A)     Character Cloudy (A)     Specific Gravity 1.010 <1.035    Ph 6.0 5.0 - 8.0    Glucose Negative Negative mg/dL    Ketones Negative Negative mg/dL    Protein Negative Negative mg/dL    Bilirubin Negative Negative    Urobilinogen, Urine 0.2 Negative     Nitrite Negative Negative    Leukocyte Esterase Small (A) Negative    Occult Blood Large (A) Negative    Micro Urine Req Microscopic    CORRECTED CALCIUM   Result Value Ref Range    Correct Calcium 9.9 8.5 - 10.5 mg/dL   ESTIMATED GFR   Result Value Ref Range    GFR (CKD-EPI) 3 (A) >60 mL/min/1.73 m 2   Lactic Acid   Result Value Ref Range    Lactic Acid 1.1 0.5 - 2.0 mmol/L   Lactic Acid   Result Value Ref Range    Lactic Acid 1.1 0.5 - 2.0 mmol/L   URINE MICROSCOPIC (W/UA)   Result Value Ref Range    WBC 10-20 (A) /hpf    RBC >150 (A) /hpf    Bacteria Negative None /hpf    Epithelial Cells Negative /hpf    Uric Acid Crystal Positive /hpf    Hyaline Cast 3-5 (A) /lpf   EKG (NOW)   Result Value Ref Range    Report       [facility] Emergency Dept.    Test Date:  2023  Pt Name:    MAGNO MICHAELS              Department: ER  MRN:        [id number]                      Room:       [facility]  Gender:     Male                         Technician: [id number]  :        1965                   Requested By:DALE GONZALEZ  Order #:    [id number]                    Reading MD: DALE GONZALEZ MD    Measurements  Intervals                                Axis  Rate:       99                           P:          57  WA:         125                          QRS:        70  QRSD:       134                          T:          67  QT:         345  QTc:        443    Interpretive Statements  Sinus rhythm, Rate of 99, normal axis, no ST elevation, no T wave inversion,  no  significant peaked T waves  Nonspecific intraventricular conduction delay  No previous ECG available for comparison  Electronically Signed On 2023 19:36:48 PDT by DALE GONZALEZ MD        All labs reviewed by me.    Radiology:   The attending Emergency Physician has independently interpreted the diagnostic imaging associated with this visit and is awaiting the final reading from the radiologist, which will be displayed  "below.    Preliminary interpretation is a follows: Very enlarged bladder.  On CT  Radiologist interpretation:     CT-RENAL COLIC EVALUATION(A/P W/O)   Final Result      New bladder outlet obstruction with the urinary bladder distended to the level of the umbilicus and moderate hydroureteronephrosis with perinephric fat stranding      Otherwise stable evaluation with abnormal pelvic density compatible with treated rectal carcinoma. Possible communication with the perineum and possible invasion of the seminal vesicles           COURSE & MEDICAL DECISION MAKING    ED Observation Status? No; The patient does not meet criteria for ED Observation.     INITIAL ASSESSMENT AND PLAN  Care Narrative:     5:10 PM - Patient seen and examined at bedside. I informed the patient that his labs show that he is in acute renal failure. Discussed plan of care, including labs, imaging, and admission. Patient agrees to the plan of care. The patient will be resuscitated with LR Bolus. Ordered for CT-Renal Colic Evaluation, UA, Lipase, CMP, CBC w/ Diff, Urine Culture, Blood Culture x2, and LA to evaluate his symptoms. Differential diagnoses include but are not limited to: bladder obstruction, acute renal failure, sepsis, metastases.    6:52 PM - Patient was unable urinate. CT-Renal Colic shows bladder obstruction. Paged Nephrology.     7:01 PM - I discussed the patient's case and the above findings with Dr. Reyes (Nephrology) who will see the patient. They also recommend a choi catheter placement and states that these cases will usually improve to the point the patient will not need dialysis.     7:08 PM - I discussed the patient's case and the above findings with Dr. Zuleta (Oncology).     7:23 PM - RN unable to place a choi catheter, states that it felt like it \"hit a wall\". Paged Urology.     7:30 PM discussed case with Dr. Anderson urology he asked that a urology cart be brought to the bedside and he will come see the patient.    Dr." Justin was able to place a Garber catheter.     HYDRATION: Based on the patient's presentation of Acute Kidney Injury the patient was given IV fluids. IV Hydration was used because oral hydration was not adequate alone. Upon recheck following hydration, the patient was improving.     PROBLEM LIST AND DISPOSITION  Problem #1 acute renal failure at this point time this is appears to be secondary to bladder outlet obstruction.  I think this is causing the patient to feel ill and cause his abdominal pain.  Unfortunately we were unable to place a Garber catheter by nursing staff.  Urology has been consulted    Problem #2 hyperkalemia patient is mildly hyperkalemic with no significant EKG changes we will go ahead and give him medications to shift his potassium until a Garber catheter can be placed or suprapubic catheter.               DISPOSITION AND DISCUSSIONS  I have discussed management of the patient with the following physicians and MICHELLE's: Dr. Reyes (Nephrology), Dr. Zuleta (Oncology), Dr. Anderson (Urology) Dr. Gibson    Discussion of management with other \[facility] [place] [facility]\"" or appropriate source(s): None       Decision tools and prescription drugs considered including, but not limited to:  Patient was given glucose and insulin to shift his potassium .    CRITICAL CARE  I provided critical care services, which included medication orders, frequent reevaluations of the patient's condition and response to treatment, ordering and reviewing test results, and discussing the case with various consultants.  The critical care time associated with the care of the patient was 35 minutes. Review chart for interventions. This time is exclusive of any other billable procedures. \    DISPOSITION:  Patient will be hospitalized by Dr. Gibson in guarded condition.     FINAL IMPRESSION   1. Acute renal failure, unspecified acute renal failure type (HCC)    2. Bladder obstruction    3. Rectal cancer (HCC)    4. Hyperkalemia    5.  Critical care of 35  minutes  ICristine (Scribe), am scribing for, and in the presence of, Gianluca Ley M.D.    Electronically signed by: Cristine Julian (Oscar), 4/11/2023    Gianluca STUART M.D personally performed the services described in this documentation, as scribed by Cristine Julian in my presence, and it is both accurate and complete.    The note accurately reflects work and decisions made by me.  Gianluca Ley M.D.  5/23/2023  9:10 PM

## 2023-05-24 NOTE — ASSESSMENT & PLAN NOTE
In this case probably secondary to obstruction of the bladder by rectal cancer.  Patient   urinated last time 2 days ago.  Garber placed in ER by urologist.  UA noted with uric acid crystals and hyaline cast   plan: Monitor input and output.  If urine output greater than 200 cc/h (postobstructive diuresis), will need to increase IV fluid rate correspondingly  Avoid nephrotoxins  Monitor creatinine/BUN, electrolytes

## 2023-05-24 NOTE — PROGRESS NOTES
Report received, pt care assumed, tele box on. VSS, pt assessment complete. Pt aaox4, no signs of distress noted at this time. POC discussed with pt and verbalizes no questions.  Pt denies any additional needs at this time. Bed in lowest position, pt educated on fall risk and verbalized understanding, call light within reach, will continue to monitor.

## 2023-05-24 NOTE — ED NOTES
Bedside report received from previous shift. Assumed patient care. Verified patient identification. Checked on bed, connected to monitor,  with unlabored respirations. Discussed plan of care. Vital signs is stable. Denied any new complaints. Gurney in low position, side rail up for pt safety. Call light within reach. Will continue to monitor for any complications.     Pt alert and oriented. Pt explained poc and to continue fluids after iv access is placed

## 2023-05-24 NOTE — PROGRESS NOTES
Note to reader: this note follows the APSO format rather than the historical SOAP format. Assessment and plan located at the top of the note for ease of use.    Chief Complaint  57 y.o. year old male here with rectal cancer history. Admit with SADIA, urinary retention and bilateral hydronephrosis.     Assessment/Plan  Interval History   Urinary retention with bilateral hydronephrosis and SADIA  History rectal cancer      Garber to remain  We will plan for outpatient follow up in 4 weeks. Our office to arrange  Nothing further from  standpoint. Urology signing off      Case discussed with patient and Urology-Dr. Justin SILVA  Patient seen and examined    5/24. Garber draining clear. Creat improved. Denies pains.            Review of Systems  Physical Exam   Review of Systems   Constitutional:  Negative for chills and fever.   Gastrointestinal:  Negative for abdominal pain, nausea and vomiting.   Genitourinary:  Negative for flank pain and hematuria.     Vitals:    05/24/23 0347 05/24/23 0715 05/24/23 1118 05/24/23 1513   BP: (!) 142/86 (!) 140/87 136/89 112/74   Pulse: 98 (!) 103 (!) 113 95   Resp: 18 16 16 16   Temp:  37 °C (98.6 °F) 37 °C (98.6 °F) 36.6 °C (97.9 °F)   TempSrc: Temporal Temporal Temporal Temporal   SpO2: 95% 95% 95% 93%   Weight:       Height:         Physical Exam  Vitals and nursing note reviewed.   Constitutional:       Appearance: Normal appearance.   HENT:      Head: Normocephalic and atraumatic.      Nose: Nose normal.      Mouth/Throat:      Mouth: Mucous membranes are moist.   Eyes:      Pupils: Pupils are equal, round, and reactive to light.   Pulmonary:      Effort: Pulmonary effort is normal.   Abdominal:      General: Abdomen is flat. There is no distension.      Palpations: Abdomen is soft.      Tenderness: There is no abdominal tenderness.   Genitourinary:     Comments: Garber draining clear  Skin:     General: Skin is warm and dry.   Neurological:      General: No focal deficit present.       Mental Status: He is alert and oriented to person, place, and time.   Psychiatric:         Mood and Affect: Mood normal.         Behavior: Behavior normal.          Hematology Chemistry   Lab Results   Component Value Date/Time    WBC 7.2 05/24/2023 06:07 AM    HEMOGLOBIN 12.3 (L) 05/24/2023 06:07 AM    HEMATOCRIT 37.5 (L) 05/24/2023 06:07 AM    PLATELETCT 331 05/24/2023 06:07 AM     Lab Results   Component Value Date/Time    SODIUM 144 05/24/2023 06:07 AM    POTASSIUM 4.3 05/24/2023 06:07 AM    CHLORIDE 101 05/24/2023 06:07 AM    CO2 28 05/24/2023 06:07 AM    GLUCOSE 144 (H) 05/24/2023 06:07 AM    BUN 80 (H) 05/24/2023 06:07 AM    CREATININE 4.50 (HH) 05/24/2023 06:07 AM         Labs not explicitly included in this progress note were reviewed by the author.   Radiology/imaging not explicitly included in this progress note was reviewed by the author.     Labs reviewed and Medications reviewed

## 2023-05-24 NOTE — DISCHARGE PLANNING
Care Transition Team Assessment    RN MARIXA spoke with patient at bedside. Role of CM explained. Demographic info verified. Patient lives with wife in [place]. Independent with ADL's. No DME or HH services. Patient does go to [facility] outpatient infusion center every 3 weeks for chemotherapy. His next session is this upcoming week. Dr. Aguila with [facility]. Patient states wife to p/u upon discharge.     Information Source  Orientation Level: Oriented X4  Information Given By: Patient  Who is responsible for making decisions for patient? : Patient    Elopement Risk  Legal Hold: No  Ambulatory or Self Mobile in Wheelchair: Yes  Disoriented: No  Psychiatric Symptoms: None  History of Wandering: No  Elopement this Admit: No  Vocalizing Wanting to Leave: No  Displays Behaviors, Body Language Wanting to Leave: No-Not at Risk for Elopement  Elopement Risk: Not at Risk for Elopement    Interdisciplinary Discharge Planning  Lives with - Patient's Self Care Capacity: Spouse, Child Less than 18 Years of Age  Patient or legal guardian wants to designate a caregiver: No  Support Systems: Children, Friends / Neighbors, Spouse / Significant Other  Housing / Facility: [address]  Prior Services: Home-Independent  Durable Medical Equipment: Not Applicable    Discharge Preparedness  What is your plan after discharge?: Home with help  What are your discharge supports?: Spouse  Prior Functional Level: Independent with Activities of Daily Living  Difficulity with ADLs: None  Difficulity with IADLs: None    Functional Assesment  Prior Functional Level: Independent with Activities of Daily Living    Vision / Hearing Impairment  Vision Impairment : No  Hearing Impairment : No    Domestic Abuse  Have you ever been the victim of abuse or violence?: No  Physical Abuse or Sexual Abuse: No  Verbal Abuse or Emotional Abuse: No  Possible Abuse/Neglect Reported to:: Not Applicable    Anticipated Discharge Information  Discharge  Disposition: Discharged to home/self care (01)

## 2023-05-24 NOTE — CONSULTS
[facility] [place] Consult/H&P Note    Primary Service: Medicine  Attending: Joshua Gibson M.D.  Patient's Name/MRN: Dre Eaton, [id number]    Admit Date:5/23/2023  Today's Date: 5/23/2023   Length of stay:  LOS: 0 days   Room #: YE 63/63 YEL      Reason for consult/chief complaint: Acute urinary retention, difficult catheter placement  ID/HPI: Dre Eaton is a 57 y.o. male patient w/ hx rectal cancer s/p radiation/chemotherapy who presents to the ED in acute renal failure. Found to have distended bladder w/ bilateral hydroureteronephrosis. Nursing staff unable to place catheter.       Past Medical History:   Past Medical History:   Diagnosis Date    Cancer (HCC)     Hemorrhoid     Malignant neoplasm of rectum (HCC)         Past Surgical History:   Past Surgical History:   Procedure Laterality Date    COLONOSCOPY WITH BIOPSY  10/14/2013    Performed by Julio Cesar Sosa M.D. at ENDOSCOPY [facility]        Family History:   Family History   Problem Relation Age of Onset    Cancer Father         Unk          Social History:   Social History     Tobacco Use    Smoking status: Never    Smokeless tobacco: Never   Vaping Use    Vaping Use: Never used   Substance Use Topics    Alcohol use: Not Currently     Comment: 3 glasses of wine weekly    Drug use: No      Social History     Social History Narrative    Not on file        Allergies: he Patient has no known allergies.    Medications:   (Not in a hospital admission)        Review of Systems  As per HPI     Physical Exam  VITAL SIGNS: BP (!) 142/81   Pulse (!) 106   Temp 36.4 °C (97.5 °F) (Temporal)   Resp 16   Wt 92.8 kg (204 lb 9.4 oz)   SpO2 95%   BMI 31.74 kg/m²   Gen: no acute distress  Card: tachycardic  Pulm: breathing comfortably  : circumcised penis     Labs:  Recent Labs     05/23/23  1546   WBC 10.3   RBC 4.90   HEMOGLOBIN 13.0*   HEMATOCRIT 38.6*   MCV 78.8*   MCH 26.5*   MCHC 33.7   RDW 50.2*   PLATELETCT 342   MPV 9.7     Recent  Labs     05/23/23  1546   SODIUM 133*   POTASSIUM 5.7*   CHLORIDE 89*   CO2 18*   GLUCOSE 113*   *   CREATININE 16.07*   CALCIUM 9.3         Glucose:  Recent Labs     05/23/23  1546   GLUCOSE 113*     Coags:  No results for input(s): INR in the last 72 hours.      Urinalysis:   No results for input(s): COLORURINE, CLARITY, SPECGRAVITY, PHURINE, GLUCOSEUR, KETONES, NITRITE, OCCULTBLOOD, RBCURINE, BACTERIA, EPITHELCELL in the last 72 hours.    Invalid input(s): BILRUBINUR, LEUESTERASE    Imaging:  CT-RENAL COLIC EVALUATION(A/P W/O)   Final Result      New bladder outlet obstruction with the urinary bladder distended to the level of the umbilicus and moderate hydroureteronephrosis with perinephric fat stranding      Otherwise stable evaluation with abnormal pelvic density compatible with treated rectal carcinoma. Possible communication with the perineum and possible invasion of the seminal vesicles           Assessment/Recommendation   57 y.o. male with hx rectal cancer s/p radiation/chemotherapy who presents w/ acute renal failure in setting of acute urinary retention. Urology consulted for catheter placement.    Using sterile technique, a zipwire was easily passed into the bladder. We attempted a 16 Fr Councill tip catheter however met resistance at the level of the prostate. 14 Fr silicone catheter was threaded over the wire and into the bladder with mild resistance. There was immediate return of 1+ liter of clear urine. 10 cc was instilled into the balloon. Patient tolerated procedure well.    Leave catheter in place.  Trend renal function and urine output. Monitor for post-obstructive diuresis.  Will need outpatient urology f/u.  Will continue to follow.

## 2023-05-25 VITALS
SYSTOLIC BLOOD PRESSURE: 116 MMHG | RESPIRATION RATE: 18 BRPM | BODY MASS INDEX: 28.03 KG/M2 | DIASTOLIC BLOOD PRESSURE: 83 MMHG | HEART RATE: 76 BPM | OXYGEN SATURATION: 95 % | TEMPERATURE: 96.9 F | WEIGHT: 195.77 LBS | HEIGHT: 70 IN

## 2023-05-25 LAB
ANION GAP SERPL CALC-SCNC: 12 MMOL/L (ref 7–16)
BACTERIA UR CULT: NORMAL
BUN SERPL-MCNC: 34 MG/DL (ref 8–22)
CALCIUM SERPL-MCNC: 8.4 MG/DL (ref 8.5–10.5)
CHLORIDE SERPL-SCNC: 97 MMOL/L (ref 96–112)
CO2 SERPL-SCNC: 33 MMOL/L (ref 20–33)
CREAT SERPL-MCNC: 1.61 MG/DL (ref 0.5–1.4)
EKG IMPRESSION: NORMAL
ERYTHROCYTE [DISTWIDTH] IN BLOOD BY AUTOMATED COUNT: 52.7 FL (ref 35.9–50)
GFR SERPLBLD CREATININE-BSD FMLA CKD-EPI: 50 ML/MIN/1.73 M 2
GLUCOSE SERPL-MCNC: 129 MG/DL (ref 65–99)
HCT VFR BLD AUTO: 35.5 % (ref 42–52)
HGB BLD-MCNC: 11.3 G/DL (ref 14–18)
MCH RBC QN AUTO: 26.5 PG (ref 27–33)
MCHC RBC AUTO-ENTMCNC: 31.8 G/DL (ref 32.3–36.5)
MCV RBC AUTO: 83.1 FL (ref 81.4–97.8)
PLATELET # BLD AUTO: 272 K/UL (ref 164–446)
PMV BLD AUTO: 10.1 FL (ref 9–12.9)
POTASSIUM SERPL-SCNC: 3.5 MMOL/L (ref 3.6–5.5)
RBC # BLD AUTO: 4.27 M/UL (ref 4.7–6.1)
SIGNIFICANT IND 70042: NORMAL
SITE SITE: NORMAL
SODIUM SERPL-SCNC: 142 MMOL/L (ref 135–145)
SOURCE SOURCE: NORMAL
WBC # BLD AUTO: 7.4 K/UL (ref 4.8–10.8)

## 2023-05-25 PROCEDURE — 700102 HCHG RX REV CODE 250 W/ 637 OVERRIDE(OP): Performed by: INTERNAL MEDICINE

## 2023-05-25 PROCEDURE — 99239 HOSP IP/OBS DSCHRG MGMT >30: CPT | Performed by: STUDENT IN AN ORGANIZED HEALTH CARE EDUCATION/TRAINING PROGRAM

## 2023-05-25 PROCEDURE — 700111 HCHG RX REV CODE 636 W/ 250 OVERRIDE (IP): Performed by: STUDENT IN AN ORGANIZED HEALTH CARE EDUCATION/TRAINING PROGRAM

## 2023-05-25 PROCEDURE — 700105 HCHG RX REV CODE 258: Performed by: STUDENT IN AN ORGANIZED HEALTH CARE EDUCATION/TRAINING PROGRAM

## 2023-05-25 PROCEDURE — A9270 NON-COVERED ITEM OR SERVICE: HCPCS | Performed by: INTERNAL MEDICINE

## 2023-05-25 PROCEDURE — 85027 COMPLETE CBC AUTOMATED: CPT

## 2023-05-25 PROCEDURE — 80048 BASIC METABOLIC PNL TOTAL CA: CPT

## 2023-05-25 PROCEDURE — 93010 ELECTROCARDIOGRAM REPORT: CPT | Performed by: INTERNAL MEDICINE

## 2023-05-25 RX ADMIN — SODIUM BICARBONATE: 84 INJECTION, SOLUTION INTRAVENOUS at 08:28

## 2023-05-25 RX ADMIN — Medication 1000 UNITS: at 04:51

## 2023-05-25 RX ADMIN — CYANOCOBALAMIN TAB 500 MCG 500 MCG: 500 TAB at 04:51

## 2023-05-25 RX ADMIN — SODIUM BICARBONATE: 84 INJECTION, SOLUTION INTRAVENOUS at 02:42

## 2023-05-25 ASSESSMENT — PAIN DESCRIPTION - PAIN TYPE: TYPE: ACUTE PAIN

## 2023-05-25 NOTE — CARE PLAN
The patient is Stable - Low risk of patient condition declining or worsening    Shift Goals  Clinical Goals: Monitor Cr and urine output  Patient Goals: Sleep  Family Goals: JESSENIA    Progress made toward(s) clinical / shift goals:    Problem: Knowledge Deficit - Standard  Goal: Patient and family/care givers will demonstrate understanding of plan of care, disease process/condition, diagnostic tests and medications  Outcome: Progressing     Problem: Hemodynamics  Goal: Patient's hemodynamics, fluid balance and neurologic status will be stable or improve  Outcome: Progressing       Patient is not progressing towards the following goals:

## 2023-05-25 NOTE — CARE PLAN
Problem: Knowledge Deficit - Standard  Goal: Patient and family/care givers will demonstrate understanding of plan of care, disease process/condition, diagnostic tests and medications  Outcome: Progressing     Problem: Hemodynamics  Goal: Patient's hemodynamics, fluid balance and neurologic status will be stable or improve  Outcome: Progressing     Problem: Fluid Volume  Goal: Fluid volume balance will be maintained  Outcome: Progressing   The patient is Watcher - Medium risk of patient condition declining or worsening    Shift Goals  Clinical Goals: hemodynamic stability, I/O  Patient Goals: rest  Family Goals: JESSENIA    Progress made toward(s) clinical / shift goals:  IV fluids, monitor I/O, choi management, monitor labs    Patient is not progressing towards the following goals:

## 2023-05-25 NOTE — PROGRESS NOTES
Discharge orders acknowledged, Pt aware and compliant with new orders, D/C teaching completed, Pt able to verbalize needs and complaint with discharge orders. Personal belongings in pt possession. . PIV removed.  Pt escorted off unit via wheelchair with assistance from CNA.

## 2023-05-25 NOTE — PROGRESS NOTES
Report received, pt care assumed. VSS, pt assessment complete. Pt aaox4, no signs of distress noted at this time. POC discussed with pt and verbalizes no questions.  Pt denies any additional needs at this time. Bed in lowest position, pt educated on fall risk and verbalized understanding, call light within reach, will continue to monitor.

## 2023-05-25 NOTE — CARE PLAN
Problem: Knowledge Deficit - Standard  Goal: Patient and family/care givers will demonstrate understanding of plan of care, disease process/condition, diagnostic tests and medications  Outcome: Progressing   Updated pt. On POC, no questions or concerns at this time.  Problem: Discharge Barriers/Planning  Goal: Patient's continuum of care needs are met  Outcome: Progressing  Pending discharge   The patient is Stable - Low risk of patient condition declining or worsening    Shift Goals  Clinical Goals: VSS  Patient Goals: Rest, discharge  Family Goals: NA           Patient is not progressing towards the following goals:

## 2023-05-26 NOTE — DISCHARGE SUMMARY
Discharge Summary    CHIEF COMPLAINT ON ADMISSION  Chief Complaint   Patient presents with    Sent by MD     Sent by Oncologist    Abdominal Pain     Abdominal cramping and bloating x > 1 week    Rectal Pain       Reason for Admission  Rectal Pain     Admission Date  5/23/2023    CODE STATUS  Prior    HPI & HOSPITAL COURSE  Dre Eaton is a 57 y.o. male with past medical history of rectal cancer treated with radiation 2 years ago, colostomy, currently on oral chemotherapy who presented 5/23/2023 with generalized weakness, abdominal discomfort.  Patient found to have urinary retention with acute kidney injury. Choi catheter placed by urology with good urine output. Acute kidney injury improved with creatinine down to 1.6 on day of discharge. Unclear etiology of patient's retention, possible radiation fibrosis from previous treatments. CT imaging did not show physical cause of patients retention. Patient is discharged to home with choi cathter. He is to follow up with urology as outpatient for management of his urinary retention. He is to follow up with his PCP and oncologist as well.    Therefore, he is discharged in good and stable condition to home with close outpatient follow-up.    The patient met 2-midnight criteria for an inpatient stay at the time of discharge.    Discharge Date  5/25/2023    FOLLOW UP ITEMS POST DISCHARGE  Take medications as prescribed.  Follow up with urology, PCP, oncology.    DISCHARGE DIAGNOSES  Principal Problem:    SADIA (acute kidney injury) (HCC) obstructive nephropathy (POA: Yes)  Active Problems:    Rectal carcinoma (HCC) (POA: Yes)    Hyperkalemia (POA: Unknown)  Resolved Problems:    * No resolved hospital problems. *      FOLLOW UP  No future appointments.  UROLOGY CARISSA Bauer  [address] [place] [id number]  [phone]  Follow up in 2 week(s)        MEDICATIONS ON DISCHARGE     Medication List        CONTINUE taking these medications        Instructions    cyanocobalamin 500 MCG Tabs  Commonly known as: VITAMIN B-12   Take 500 mcg by mouth every day.  Dose: 500 mcg     HOMEOPATHIC PRODUCTS PO   Take  by mouth.     Klor-Con M20 20 MEQ Tbcr  Generic drug: potassium chloride SA   Klor-Con M20 mEq tablet,extended release     VITAMIN C PO   Take 1,000 mg by mouth every day. Powder form  Dose: 1,000 mg     vitamin D 1000 Unit (25 mcg) Tabs  Commonly known as: cholecalciferol   Take 1,000 Units by mouth every day. Pt takes 3,000 units every other day  Dose: 1,000 Units              Allergies  No Known Allergies    DIET  No orders of the defined types were placed in this encounter.      ACTIVITY  As tolerated.  Weight bearing as tolerated    CONSULTATIONS  urology    PROCEDURES  none    LABORATORY  Lab Results   Component Value Date    SODIUM 142 05/25/2023    POTASSIUM 3.5 (L) 05/25/2023    CHLORIDE 97 05/25/2023    CO2 33 05/25/2023    GLUCOSE 129 (H) 05/25/2023    BUN 34 (H) 05/25/2023    CREATININE 1.61 (H) 05/25/2023        Lab Results   Component Value Date    WBC 7.4 05/25/2023    HEMOGLOBIN 11.3 (L) 05/25/2023    HEMATOCRIT 35.5 (L) 05/25/2023    PLATELETCT 272 05/25/2023        Total time of the discharge process exceeds 33 minutes.

## 2023-05-28 LAB
BACTERIA BLD CULT: NORMAL
BACTERIA BLD CULT: NORMAL
SIGNIFICANT IND 70042: NORMAL
SIGNIFICANT IND 70042: NORMAL
SITE SITE: NORMAL
SITE SITE: NORMAL
SOURCE SOURCE: NORMAL
SOURCE SOURCE: NORMAL

## 2023-05-30 ENCOUNTER — APPOINTMENT (OUTPATIENT)
Dept: ONCOLOGY | Facility: MEDICAL CENTER | Age: 58
End: 2023-05-30
Attending: INTERNAL MEDICINE
Payer: MEDICARE

## 2023-06-01 ENCOUNTER — APPOINTMENT (OUTPATIENT)
Dept: ONCOLOGY | Facility: MEDICAL CENTER | Age: 58
End: 2023-06-01
Attending: INTERNAL MEDICINE
Payer: MEDICARE

## 2023-06-13 ENCOUNTER — HOSPITAL ENCOUNTER (OUTPATIENT)
Dept: RADIOLOGY | Facility: MEDICAL CENTER | Age: 58
End: 2023-06-13
Attending: INTERNAL MEDICINE
Payer: MEDICARE

## 2023-06-13 DIAGNOSIS — C20 MALIGNANT NEOPLASM OF RECTUM (HCC): ICD-10-CM

## 2023-06-13 DIAGNOSIS — Z51.11 ENCOUNTER FOR ANTINEOPLASTIC CHEMOTHERAPY: ICD-10-CM

## 2023-06-13 PROCEDURE — A9552 F18 FDG: HCPCS

## 2023-06-14 RX ORDER — 0.9 % SODIUM CHLORIDE 0.9 %
10 VIAL (ML) INJECTION PRN
Status: CANCELLED | OUTPATIENT
Start: 2023-06-22

## 2023-06-14 RX ORDER — 0.9 % SODIUM CHLORIDE 0.9 %
VIAL (ML) INJECTION PRN
Status: CANCELLED | OUTPATIENT
Start: 2023-07-07

## 2023-06-14 RX ORDER — 0.9 % SODIUM CHLORIDE 0.9 %
VIAL (ML) INJECTION PRN
Status: CANCELLED | OUTPATIENT
Start: 2023-06-22

## 2023-06-14 RX ORDER — PROCHLORPERAZINE MALEATE 10 MG
10 TABLET ORAL EVERY 6 HOURS PRN
Status: CANCELLED | OUTPATIENT
Start: 2023-06-23

## 2023-06-14 RX ORDER — 0.9 % SODIUM CHLORIDE 0.9 %
10 VIAL (ML) INJECTION PRN
Status: CANCELLED | OUTPATIENT
Start: 2023-07-07

## 2023-06-14 RX ORDER — EPINEPHRINE 1 MG/ML(1)
0.5 AMPUL (ML) INJECTION PRN
Status: CANCELLED | OUTPATIENT
Start: 2023-06-23

## 2023-06-14 RX ORDER — 0.9 % SODIUM CHLORIDE 0.9 %
VIAL (ML) INJECTION PRN
Status: CANCELLED | OUTPATIENT
Start: 2023-06-23

## 2023-06-14 RX ORDER — SODIUM CHLORIDE 9 MG/ML
INJECTION, SOLUTION INTRAVENOUS CONTINUOUS
Status: CANCELLED | OUTPATIENT
Start: 2023-06-23

## 2023-06-14 RX ORDER — ONDANSETRON 2 MG/ML
4 INJECTION INTRAMUSCULAR; INTRAVENOUS PRN
Status: CANCELLED | OUTPATIENT
Start: 2023-07-07

## 2023-06-14 RX ORDER — METHYLPREDNISOLONE SODIUM SUCCINATE 125 MG/2ML
125 INJECTION, POWDER, LYOPHILIZED, FOR SOLUTION INTRAMUSCULAR; INTRAVENOUS PRN
Status: CANCELLED | OUTPATIENT
Start: 2023-06-23

## 2023-06-14 RX ORDER — 0.9 % SODIUM CHLORIDE 0.9 %
3 VIAL (ML) INJECTION PRN
Status: CANCELLED | OUTPATIENT
Start: 2023-06-23

## 2023-06-14 RX ORDER — 0.9 % SODIUM CHLORIDE 0.9 %
10 VIAL (ML) INJECTION PRN
Status: CANCELLED | OUTPATIENT
Start: 2023-06-23

## 2023-06-14 RX ORDER — ONDANSETRON 8 MG/1
8 TABLET, ORALLY DISINTEGRATING ORAL PRN
Status: CANCELLED | OUTPATIENT
Start: 2023-07-07

## 2023-06-14 RX ORDER — DIPHENHYDRAMINE HYDROCHLORIDE 50 MG/ML
50 INJECTION INTRAMUSCULAR; INTRAVENOUS PRN
Status: CANCELLED | OUTPATIENT
Start: 2023-06-23

## 2023-06-14 RX ORDER — EPINEPHRINE 1 MG/ML(1)
0.5 AMPUL (ML) INJECTION PRN
Status: CANCELLED | OUTPATIENT
Start: 2023-07-07

## 2023-06-14 RX ORDER — DIPHENHYDRAMINE HYDROCHLORIDE 50 MG/ML
50 INJECTION INTRAMUSCULAR; INTRAVENOUS PRN
Status: CANCELLED | OUTPATIENT
Start: 2023-07-07

## 2023-06-14 RX ORDER — ONDANSETRON 2 MG/ML
4 INJECTION INTRAMUSCULAR; INTRAVENOUS PRN
Status: CANCELLED | OUTPATIENT
Start: 2023-06-23

## 2023-06-14 RX ORDER — 0.9 % SODIUM CHLORIDE 0.9 %
3 VIAL (ML) INJECTION PRN
Status: CANCELLED | OUTPATIENT
Start: 2023-06-22

## 2023-06-14 RX ORDER — SODIUM CHLORIDE 9 MG/ML
INJECTION, SOLUTION INTRAVENOUS CONTINUOUS
Status: CANCELLED | OUTPATIENT
Start: 2023-07-07

## 2023-06-14 RX ORDER — ONDANSETRON 8 MG/1
8 TABLET, ORALLY DISINTEGRATING ORAL PRN
Status: CANCELLED | OUTPATIENT
Start: 2023-06-23

## 2023-06-14 RX ORDER — PROCHLORPERAZINE MALEATE 10 MG
10 TABLET ORAL EVERY 6 HOURS PRN
Status: CANCELLED | OUTPATIENT
Start: 2023-07-07

## 2023-06-14 RX ORDER — 0.9 % SODIUM CHLORIDE 0.9 %
3 VIAL (ML) INJECTION PRN
Status: CANCELLED | OUTPATIENT
Start: 2023-07-07

## 2023-06-14 RX ORDER — METHYLPREDNISOLONE SODIUM SUCCINATE 125 MG/2ML
125 INJECTION, POWDER, LYOPHILIZED, FOR SOLUTION INTRAMUSCULAR; INTRAVENOUS PRN
Status: CANCELLED | OUTPATIENT
Start: 2023-07-07

## 2023-06-20 ENCOUNTER — HOSPITAL ENCOUNTER (OUTPATIENT)
Dept: RADIOLOGY | Facility: MEDICAL CENTER | Age: 58
End: 2023-06-20
Attending: INTERNAL MEDICINE
Payer: MEDICARE

## 2023-06-20 DIAGNOSIS — C20 MALIGNANT NEOPLASM OF RECTUM (HCC): ICD-10-CM

## 2023-06-20 DIAGNOSIS — Z51.11 ENCOUNTER FOR ANTINEOPLASTIC CHEMOTHERAPY: ICD-10-CM

## 2023-06-20 PROCEDURE — 700117 HCHG RX CONTRAST REV CODE 255: Performed by: INTERNAL MEDICINE

## 2023-06-20 PROCEDURE — 700111 HCHG RX REV CODE 636 W/ 250 OVERRIDE (IP): Mod: JG | Performed by: RADIOLOGY

## 2023-06-20 PROCEDURE — A9579 GAD-BASE MR CONTRAST NOS,1ML: HCPCS | Performed by: INTERNAL MEDICINE

## 2023-06-20 PROCEDURE — 72197 MRI PELVIS W/O & W/DYE: CPT

## 2023-06-20 RX ADMIN — GLUCAGON 1 MG: 1 INJECTION, POWDER, LYOPHILIZED, FOR SOLUTION INTRAMUSCULAR; INTRAVENOUS at 16:53

## 2023-06-20 RX ADMIN — GADOTERIDOL 20 ML: 279.3 INJECTION, SOLUTION INTRAVENOUS at 17:45

## 2023-06-23 ENCOUNTER — OUTPATIENT INFUSION SERVICES (OUTPATIENT)
Dept: ONCOLOGY | Facility: MEDICAL CENTER | Age: 58
End: 2023-06-23
Attending: INTERNAL MEDICINE
Payer: MEDICARE

## 2023-06-23 VITALS
SYSTOLIC BLOOD PRESSURE: 125 MMHG | DIASTOLIC BLOOD PRESSURE: 68 MMHG | TEMPERATURE: 96.6 F | HEIGHT: 68 IN | WEIGHT: 186.29 LBS | BODY MASS INDEX: 28.23 KG/M2 | HEART RATE: 89 BPM | OXYGEN SATURATION: 98 % | RESPIRATION RATE: 16 BRPM

## 2023-06-23 DIAGNOSIS — C20 RECTAL CARCINOMA (HCC): ICD-10-CM

## 2023-06-23 LAB
ALBUMIN SERPL BCP-MCNC: 3.6 G/DL (ref 3.2–4.9)
ALBUMIN/GLOB SERPL: 0.9 G/DL
ALP SERPL-CCNC: 89 U/L (ref 30–99)
ALT SERPL-CCNC: 20 U/L (ref 2–50)
ANION GAP SERPL CALC-SCNC: 15 MMOL/L (ref 7–16)
AST SERPL-CCNC: 19 U/L (ref 12–45)
BASOPHILS # BLD AUTO: 0.6 % (ref 0–1.8)
BASOPHILS # BLD: 0.05 K/UL (ref 0–0.12)
BILIRUB SERPL-MCNC: 0.3 MG/DL (ref 0.1–1.5)
BUN SERPL-MCNC: 14 MG/DL (ref 8–22)
CALCIUM ALBUM COR SERPL-MCNC: 9.3 MG/DL (ref 8.5–10.5)
CALCIUM SERPL-MCNC: 9 MG/DL (ref 8.5–10.5)
CHLORIDE SERPL-SCNC: 102 MMOL/L (ref 96–112)
CO2 SERPL-SCNC: 24 MMOL/L (ref 20–33)
CREAT SERPL-MCNC: 1.1 MG/DL (ref 0.5–1.4)
EOSINOPHIL # BLD AUTO: 0.25 K/UL (ref 0–0.51)
EOSINOPHIL NFR BLD: 2.9 % (ref 0–6.9)
ERYTHROCYTE [DISTWIDTH] IN BLOOD BY AUTOMATED COUNT: 50.3 FL (ref 35.9–50)
GFR SERPLBLD CREATININE-BSD FMLA CKD-EPI: 79 ML/MIN/1.73 M 2
GLOBULIN SER CALC-MCNC: 3.8 G/DL (ref 1.9–3.5)
GLUCOSE SERPL-MCNC: 97 MG/DL (ref 65–99)
HCT VFR BLD AUTO: 35.4 % (ref 42–52)
HGB BLD-MCNC: 10.8 G/DL (ref 14–18)
IMM GRANULOCYTES # BLD AUTO: 0.04 K/UL (ref 0–0.11)
IMM GRANULOCYTES NFR BLD AUTO: 0.5 % (ref 0–0.9)
LYMPHOCYTES # BLD AUTO: 1.21 K/UL (ref 1–4.8)
LYMPHOCYTES NFR BLD: 14.1 % (ref 22–41)
MAGNESIUM SERPL-MCNC: 1.4 MG/DL (ref 1.5–2.5)
MCH RBC QN AUTO: 24.6 PG (ref 27–33)
MCHC RBC AUTO-ENTMCNC: 30.5 G/DL (ref 32.3–36.5)
MCV RBC AUTO: 80.6 FL (ref 81.4–97.8)
MONOCYTES # BLD AUTO: 0.5 K/UL (ref 0–0.85)
MONOCYTES NFR BLD AUTO: 5.8 % (ref 0–13.4)
NEUTROPHILS # BLD AUTO: 6.52 K/UL (ref 1.82–7.42)
NEUTROPHILS NFR BLD: 76.1 % (ref 44–72)
NRBC # BLD AUTO: 0 K/UL
NRBC BLD-RTO: 0 /100 WBC (ref 0–0.2)
OUTPT INFUS CBC COMMENT OICOM: ABNORMAL
PLATELET # BLD AUTO: 301 K/UL (ref 164–446)
PMV BLD AUTO: 9.3 FL (ref 9–12.9)
POTASSIUM SERPL-SCNC: 3.5 MMOL/L (ref 3.6–5.5)
PROT SERPL-MCNC: 7.4 G/DL (ref 6–8.2)
RBC # BLD AUTO: 4.39 M/UL (ref 4.7–6.1)
SODIUM SERPL-SCNC: 141 MMOL/L (ref 135–145)
WBC # BLD AUTO: 8.6 K/UL (ref 4.8–10.8)

## 2023-06-23 PROCEDURE — 96415 CHEMO IV INFUSION ADDL HR: CPT

## 2023-06-23 PROCEDURE — 80053 COMPREHEN METABOLIC PANEL: CPT

## 2023-06-23 PROCEDURE — 83735 ASSAY OF MAGNESIUM: CPT

## 2023-06-23 PROCEDURE — 96413 CHEMO IV INFUSION 1 HR: CPT

## 2023-06-23 PROCEDURE — 85025 COMPLETE CBC W/AUTO DIFF WBC: CPT

## 2023-06-23 PROCEDURE — A4212 NON CORING NEEDLE OR STYLET: HCPCS

## 2023-06-23 PROCEDURE — 700105 HCHG RX REV CODE 258: Performed by: INTERNAL MEDICINE

## 2023-06-23 PROCEDURE — 96375 TX/PRO/DX INJ NEW DRUG ADDON: CPT

## 2023-06-23 PROCEDURE — 700111 HCHG RX REV CODE 636 W/ 250 OVERRIDE (IP): Performed by: INTERNAL MEDICINE

## 2023-06-23 RX ADMIN — HEPARIN 500 UNITS: 100 SYRINGE at 13:06

## 2023-06-23 RX ADMIN — CETUXIMAB 800 MG: 2 SOLUTION INTRAVENOUS at 10:03

## 2023-06-23 RX ADMIN — DIPHENHYDRAMINE HYDROCHLORIDE 25 MG: 50 INJECTION, SOLUTION INTRAMUSCULAR; INTRAVENOUS at 09:44

## 2023-06-23 ASSESSMENT — FIBROSIS 4 INDEX: FIB4 SCORE: 0.59

## 2023-06-23 NOTE — PROGRESS NOTES
Chemotherapy Verification - PRIMARY RN    D1C1    Height = 172cm  Weight = 84.5kg  BSA = 2.01m^2       Medication: cetuximab  Dose: 400mg/m^2  Calculated Dose: 804mg                                I confirm this process was performed independently with the BSA and all final chemotherapy dosing calculations congruent.  Any discrepancies of 10% or greater have been addressed with the chemotherapy pharmacist. The resolution of the discrepancy has been documented in the EPIC progress notes.

## 2023-06-23 NOTE — PROGRESS NOTES
Pt arrived ambulatory to [facility] for D1C1 Erbitux infusion for rectal carcinoma. POC discussed with pt and he agrees with plan. Pt c/o increased fatigue. Pt with call light in reach for safety. Pt verbalized understanding to call for needs/assist.    Port accessed in sterile fashion, brisk blood return noted. Labs drawn as ordered, ok to proceed with treatment. Pt medicated per MAR. Erbitux infused over 2 hours as it is the 1st dose. Pt monitored x 1 hour post Erbitux infusion. Pt tolerated treatment without s/s adverse reaction. Port with brisk blood return, flushed with NS then heparin. Port de-accessed, gauze dressing applied. Pt discharged to self care, NAD. Pt's next appt confirmed 7/7/2023.

## 2023-06-23 NOTE — PROGRESS NOTES
"Pharmacy Chemotherapy calculation:    DX: Metastatic rectal carcinoma with KRAS G12C mutation    Cycle 1    Day 1  Previous treatment = s/p 25 cycles of FOLFIRI + Bevacizumab, last treatment 5/9/23    Regimen: Adagrasib + Cetuximab    *Dosing Reference*  Cetuximab 400 mg/m² IV over 2 hours on Day 1 of Cycle 1   Cetuximab 500 mg/m² IV over 1-2 hours starting Day 15 of Cycle 1   Adagrasib  600 mg PO BID on Days 1-28   28-day cycles until disease progression or unacceptable toxicity  Aravind SHORT, et al. N Engl J Med 2023;388:44-54.    Allergies: Patient has no known allergies.  /68   Pulse 89   Temp 35.9 °C (96.6 °F) (Temporal)   Resp 16   Ht 1.72 m (5' 7.72\")   Wt 84.5 kg (186 lb 4.6 oz)   SpO2 98%   BMI 28.56 kg/m²   Body surface area is 2.01 meters squared.    Labs 6/23/23  ANC~ 6520 Plt = 301k   Hgb = 10.8       Cetuximab 400 mg/m2 x 2.01 m2 = 804 mg   <10% difference, okay to treat with final dose = 800 mg IV    Karsten Cannon, PharmD    "

## 2023-06-23 NOTE — PROGRESS NOTES
Patient was seen today in clinic with Dr. Strange for follow-up.  Vitals signs and weight were obtained and pain assessment was completed.  Allergies and medications were reviewed with the patient.       Vitals/Pain:  Vitals:    04/18/22 1128   BP: 122/71   BP Location: Left arm   Patient Position: Sitting   BP Cuff Size: Adult   Pulse: 80   SpO2: 99%   Weight: 86.6 kg (190 lb 14.7 oz)   Pain Score: No pain        Allergies:   Patient has no known allergies.    Current Medications:  Current Outpatient Medications   Medication Sig Dispense Refill   • HOMEOPATHIC PRODUCTS PO Take  by mouth.     • cyanocobalamin (VITAMIN B-12) 500 MCG Tab Take 500 mcg by mouth every day.     • vitamin D (CHOLECALCIFEROL) 1000 UNIT Tab Take 1,000 Units by mouth every day. Pt takes 3,000 units every other day     • Ascorbic Acid (VITAMIN C PO) Take 1,000 mg by mouth every day. Powder form       No current facility-administered medications for this encounter.         PCP:  Ayla Galicia R.N.   Patient here with right lower extremity pain redness fever concerning for cellulitis. Pt was sent in by Dr. Sepulveda for admission.

## 2023-06-23 NOTE — PROGRESS NOTES
Chemotherapy Verification - SECONDARY RN       Height = 1.72m  Weight = 84.5kg  BSA = 2.01m2       Medication: cetuximab  Dose: 400mg/m2  Calculated Dose: 804mg                             (In mg/m2, AUC, mg/kg)       I confirm that this process was performed independently.

## 2023-06-23 NOTE — PROGRESS NOTES
"Pharmacy Chemotherapy Verification    DX:  Metastatic Rectal Adenocarcinoma    Protocol: Cetuximab + Adagrasib  Cetuximab 400 mg/m² IV over 2 hours on Day 1 of Cycle 1   Cetuximab 500 mg/m² IV over 1-2 hours starting Day 15 of Cycle 1   Adagrasib  600 mg PO BID on Days 1-28   28-day cycles until disease progression or unacceptable toxicity     Aravind SHORT et al. N Engl J Med 2023;388:44-54.    Allergies:Patient has no known allergies.  /68   Pulse 89   Temp 35.9 °C (96.6 °F) (Temporal)   Resp 16   Ht 1.72 m (5' 7.72\")   Wt 84.5 kg (186 lb 4.6 oz)   SpO2 98%   BMI 28.56 kg/m²     Body surface area is 2.01 meters squared.     Labs 6/23/23:  ANC~ 6520 Plt = 301k   Hgb = 10.8       Drug Order   (Drug name, dose, route, IV Fluid & volume, frequency, number of doses) Cycle 1 Day 1  Previous treatment: s/p 25 cycles of  FOLFiri + bevacizumab     Medication = Cetuximab (Erbitux)   Base Dose = 400 mg/m²   Calc Dose: Base Dose x 2.01 m² = 804 mg  Final Dose = 800 mg  Route = IV  Conc = 2 mg/mL  Fluid & Volume = 400 mL empty bag  Admin Duration = Over 120 minutes          Rounded to nearest vial size (within 10%) per rounding protocol, okay to treat with final dose     By my signature below, I confirm this process was performed independently with the BSA and all final chemotherapy dosing calculations congruent. I have reviewed the above chemotherapy order and that my calculation of the final dose and BSA (when applicable) corroborate those calculations of the  pharmacist. Discrepancies of 10% or greater in the written dose have been addressed and documented within the [facility] Progress notes.    Angelina Garay, PharmD  "

## 2023-06-29 ENCOUNTER — APPOINTMENT (OUTPATIENT)
Dept: ADMISSIONS | Facility: MEDICAL CENTER | Age: 58
End: 2023-06-29
Attending: STUDENT IN AN ORGANIZED HEALTH CARE EDUCATION/TRAINING PROGRAM
Payer: MEDICARE

## 2023-07-10 ENCOUNTER — PRE-ADMISSION TESTING (OUTPATIENT)
Dept: ADMISSIONS | Facility: MEDICAL CENTER | Age: 58
End: 2023-07-10
Attending: STUDENT IN AN ORGANIZED HEALTH CARE EDUCATION/TRAINING PROGRAM
Payer: MEDICARE

## 2023-07-11 ENCOUNTER — APPOINTMENT (OUTPATIENT)
Dept: ADMISSIONS | Facility: MEDICAL CENTER | Age: 58
End: 2023-07-11
Attending: STUDENT IN AN ORGANIZED HEALTH CARE EDUCATION/TRAINING PROGRAM
Payer: MEDICARE

## 2023-07-11 DIAGNOSIS — Z01.812 PRE-OPERATIVE LABORATORY EXAMINATION: ICD-10-CM

## 2023-07-11 LAB
ERYTHROCYTE [DISTWIDTH] IN BLOOD BY AUTOMATED COUNT: 54 FL (ref 35.9–50)
HCT VFR BLD AUTO: 37.8 % (ref 42–52)
HGB BLD-MCNC: 11.4 G/DL (ref 14–18)
INR PPP: 1.07 (ref 0.87–1.13)
MCH RBC QN AUTO: 24.2 PG (ref 27–33)
MCHC RBC AUTO-ENTMCNC: 30.2 G/DL (ref 32.3–36.5)
MCV RBC AUTO: 80.1 FL (ref 81.4–97.8)
PLATELET # BLD AUTO: 374 K/UL (ref 164–446)
PMV BLD AUTO: 9.1 FL (ref 9–12.9)
PROTHROMBIN TIME: 13.8 SEC (ref 12–14.6)
RBC # BLD AUTO: 4.72 M/UL (ref 4.7–6.1)
WBC # BLD AUTO: 8.3 K/UL (ref 4.8–10.8)

## 2023-07-11 PROCEDURE — 85027 COMPLETE CBC AUTOMATED: CPT

## 2023-07-11 PROCEDURE — 36415 COLL VENOUS BLD VENIPUNCTURE: CPT

## 2023-07-11 PROCEDURE — 85610 PROTHROMBIN TIME: CPT

## 2023-07-12 ENCOUNTER — APPOINTMENT (OUTPATIENT)
Dept: RADIOLOGY | Facility: MEDICAL CENTER | Age: 58
End: 2023-07-12
Attending: STUDENT IN AN ORGANIZED HEALTH CARE EDUCATION/TRAINING PROGRAM
Payer: MEDICARE

## 2023-07-12 ENCOUNTER — HOSPITAL ENCOUNTER (OUTPATIENT)
Facility: MEDICAL CENTER | Age: 58
End: 2023-07-12
Attending: STUDENT IN AN ORGANIZED HEALTH CARE EDUCATION/TRAINING PROGRAM | Admitting: STUDENT IN AN ORGANIZED HEALTH CARE EDUCATION/TRAINING PROGRAM
Payer: MEDICARE

## 2023-07-12 VITALS
DIASTOLIC BLOOD PRESSURE: 56 MMHG | SYSTOLIC BLOOD PRESSURE: 113 MMHG | HEART RATE: 106 BPM | BODY MASS INDEX: 27.14 KG/M2 | OXYGEN SATURATION: 98 % | WEIGHT: 189.6 LBS | HEIGHT: 70 IN | TEMPERATURE: 97.4 F | RESPIRATION RATE: 20 BRPM

## 2023-07-12 DIAGNOSIS — R33.9 RETENTION OF URINE, UNSPECIFIED: ICD-10-CM

## 2023-07-12 PROCEDURE — 700111 HCHG RX REV CODE 636 W/ 250 OVERRIDE (IP): Mod: JZ | Performed by: RADIOLOGY

## 2023-07-12 PROCEDURE — 700111 HCHG RX REV CODE 636 W/ 250 OVERRIDE (IP)

## 2023-07-12 PROCEDURE — 160036 HCHG PACU - EA ADDL 30 MINS PHASE I

## 2023-07-12 PROCEDURE — 700105 HCHG RX REV CODE 258: Performed by: RADIOLOGY

## 2023-07-12 PROCEDURE — 51600 INJECTION FOR BLADDER X-RAY: CPT

## 2023-07-12 PROCEDURE — 51102 DRAIN BL W/CATH INSERTION: CPT

## 2023-07-12 PROCEDURE — 700117 HCHG RX CONTRAST REV CODE 255: Performed by: RADIOLOGY

## 2023-07-12 PROCEDURE — 160035 HCHG PACU - 1ST 60 MINS PHASE I

## 2023-07-12 PROCEDURE — 160002 HCHG RECOVERY MINUTES (STAT)

## 2023-07-12 PROCEDURE — 160046 HCHG PACU - 1ST 60 MINS PHASE II

## 2023-07-12 PROCEDURE — 76942 ECHO GUIDE FOR BIOPSY: CPT

## 2023-07-12 RX ORDER — IODIXANOL 270 MG/ML
75 INJECTION, SOLUTION INTRAVASCULAR ONCE
Status: COMPLETED | OUTPATIENT
Start: 2023-07-12 | End: 2023-07-12

## 2023-07-12 RX ORDER — CEFAZOLIN SODIUM 1 G/3ML
INJECTION, POWDER, FOR SOLUTION INTRAMUSCULAR; INTRAVENOUS
Status: COMPLETED
Start: 2023-07-12 | End: 2023-07-12

## 2023-07-12 RX ORDER — OXYCODONE HYDROCHLORIDE 5 MG/1
5 TABLET ORAL
Status: DISCONTINUED | OUTPATIENT
Start: 2023-07-12 | End: 2023-07-12 | Stop reason: HOSPADM

## 2023-07-12 RX ORDER — SODIUM CHLORIDE 9 MG/ML
500 INJECTION, SOLUTION INTRAVENOUS
Status: ACTIVE | OUTPATIENT
Start: 2023-07-12 | End: 2023-07-12

## 2023-07-12 RX ORDER — MIDAZOLAM HYDROCHLORIDE 1 MG/ML
INJECTION INTRAMUSCULAR; INTRAVENOUS
Status: COMPLETED
Start: 2023-07-12 | End: 2023-07-12

## 2023-07-12 RX ORDER — SODIUM CHLORIDE 9 MG/ML
1000 INJECTION, SOLUTION INTRAVENOUS
Status: COMPLETED | OUTPATIENT
Start: 2023-07-12 | End: 2023-07-12

## 2023-07-12 RX ORDER — HYDROMORPHONE HYDROCHLORIDE 1 MG/ML
0.5 INJECTION, SOLUTION INTRAMUSCULAR; INTRAVENOUS; SUBCUTANEOUS
Status: DISCONTINUED | OUTPATIENT
Start: 2023-07-12 | End: 2023-07-12 | Stop reason: HOSPADM

## 2023-07-12 RX ORDER — ONDANSETRON 2 MG/ML
4 INJECTION INTRAMUSCULAR; INTRAVENOUS EVERY 6 HOURS PRN
Status: ACTIVE | OUTPATIENT
Start: 2023-07-12 | End: 2023-07-12

## 2023-07-12 RX ORDER — MIDAZOLAM HYDROCHLORIDE 1 MG/ML
.5-2 INJECTION INTRAMUSCULAR; INTRAVENOUS PRN
Status: ACTIVE | OUTPATIENT
Start: 2023-07-12 | End: 2023-07-12

## 2023-07-12 RX ADMIN — SODIUM CHLORIDE 1000 ML: 9 INJECTION, SOLUTION INTRAVENOUS at 12:52

## 2023-07-12 RX ADMIN — MIDAZOLAM 1 MG: 1 INJECTION, SOLUTION INTRAMUSCULAR; INTRAVENOUS at 14:37

## 2023-07-12 RX ADMIN — IODIXANOL 75 ML: 270 INJECTION, SOLUTION INTRAVASCULAR at 15:30

## 2023-07-12 RX ADMIN — FENTANYL CITRATE 25 MCG: 50 INJECTION, SOLUTION INTRAMUSCULAR; INTRAVENOUS at 14:37

## 2023-07-12 RX ADMIN — MIDAZOLAM HYDROCHLORIDE 1 MG: 1 INJECTION INTRAMUSCULAR; INTRAVENOUS at 14:37

## 2023-07-12 RX ADMIN — FENTANYL CITRATE 25 MCG: 50 INJECTION, SOLUTION INTRAMUSCULAR; INTRAVENOUS at 14:41

## 2023-07-12 RX ADMIN — CEFAZOLIN 2 G: 1 INJECTION, POWDER, FOR SOLUTION INTRAMUSCULAR; INTRAVENOUS at 14:39

## 2023-07-12 ASSESSMENT — PAIN DESCRIPTION - PAIN TYPE
TYPE: SURGICAL PAIN
TYPE: SURGICAL PAIN
TYPE: ACUTE PAIN
TYPE: SURGICAL PAIN

## 2023-07-12 ASSESSMENT — FIBROSIS 4 INDEX: FIB4 SCORE: 0.62

## 2023-07-12 NOTE — PROGRESS NOTES
Pt presents to IR 1. Patient was consented by MD at bedside, confirmed by this RN and consent at bedside. Pt transferred to IR 1 table in Supine position. Patient underwent a Suprapubic catheter placement by Dr. Funez. Procedure site was marked by MD and verified using imaging guidance. Pt placed on monitor, prepped and draped in a sterile fashion. Vitals were taken every 5 minutes and remained stable during procedure (see doc flow sheet for results). CO2 waveform capnography was monitored and remained WNL throughout procedure. Report called to Jenniffer TIM. Pt transported by stretcher with RN to [facility].       [facility]  100% silicone choi catheter  16 Fr  REF: [id number]  LOT: [id number]  EXP: 02.20.2028

## 2023-07-12 NOTE — DISCHARGE INSTRUCTIONS
HOME CARE INSTRUCTIONS    ACTIVITY: Rest and take it easy for the first 24 hours.  A responsible adult is recommended to remain with you during that time.  It is normal to feel sleepy.  We encourage you to not do anything that requires balance, judgment or coordination.    FOR 24 HOURS DO NOT:  Drive, operate machinery or run household appliances.  Drink beer or alcoholic beverages.  Make important decisions or sign legal documents.    DIET: To avoid nausea, slowly advance diet as tolerated, avoiding spicy or greasy foods for the first day.  Add more substantial food to your diet according to your physician's instructions.  Babies can be fed formula or breast milk as soon as they are hungry.  INCREASE FLUIDS AND FIBER TO AVOID CONSTIPATION.    SURGICAL DRESSING/BATHING: Leave suprapubic clean, dry and intact. Ok to shower. May cover it with plastic bag or tape for shower. No submerging in water until cleared by MD    MEDICATIONS: Resume taking daily medication.  Take prescribed pain medication with food.  If no medication is prescribed, you may take non-aspirin pain medication if needed.  PAIN MEDICATION CAN BE VERY CONSTIPATING.  Take a stool softener or laxative such as senokot, pericolace, or milk of magnesia if needed.    A follow-up appointment should be arranged with your doctor in 2-4 weeks; call to schedule.    You should CALL YOUR PHYSICIAN if you develop:  Fever greater than 101 degrees F.  Pain not relieved by medication, or persistent nausea or vomiting.  Excessive bleeding (blood soaking through dressing) or unexpected drainage from the wound.  Extreme redness or swelling around the incision site, drainage of pus or foul smelling drainage.  Inability to urinate or empty your bladder within 8 hours.  Problems with breathing or chest pain.    You should call 911 if you develop problems with breathing or chest pain.  If you are unable to contact your doctor or surgical center, you should go to the  nearest emergency room or urgent care center.  Physician's telephone #: Dr Anderson [phone]    MILD FLU-LIKE SYMPTOMS ARE NORMAL.  YOU MAY EXPERIENCE GENERALIZED MUSCLE ACHES, THROAT IRRITATION, HEADACHE AND/OR SOME NAUSEA.    If any questions arise, call your doctor.  If your doctor is not available, please feel free to call the Surgical Center at [phone].  The Center is open Monday through Friday from 7AM to 7PM.      A registered nurse may call you a few days after your surgery to see how you are doing after your procedure.    You may also receive a survey in the mail within the next two weeks and we ask that you take a few moments to complete the survey and return it to us.  Our goal is to provide you with very good care and we value your comments.     Depression / Suicide Risk    As you are discharged from this [facility] facility, it is important to learn how to keep safe from harming yourself.    Recognize the warning signs:  Abrupt changes in personality, positive or negative- including increase in energy   Giving away possessions  Change in eating patterns- significant weight changes-  positive or negative  Change in sleeping patterns- unable to sleep or sleeping all the time   Unwillingness or inability to communicate  Depression  Unusual sadness, discouragement and loneliness  Talk of wanting to die  Neglect of personal appearance   Rebelliousness- reckless behavior  Withdrawal from people/activities they love  Confusion- inability to concentrate     If you or a loved one observes any of these behaviors or has concerns about self-harm, here's what you can do:  Talk about it- your feelings and reasons for harming yourself  Remove any means that you might use to hurt yourself (examples: pills, rope, extension cords, firearm)  Get professional help from the community (Mental Health, Substance Abuse, psychological counseling)  Do not be alone:Call your Safe Contact- someone whom you trust who will  be there for you.  Call your local CRISIS HOTLINE 651-6184 or 414-179-5125  Call your local Children's Mobile Crisis Response Team [place] (509) 194-9331 or www.Al[employer]Dx  Call the toll free National Suicide Prevention Hotlines   National Suicide Prevention Lifeline 099-179-DXYV (1033)  [place] [facility] Line Network 800-SUICIDE (928-4375)    I acknowledge receipt and understanding of these Home Care instructions.

## 2023-07-12 NOTE — OR SURGEON
Immediate Post- Operative Note        Findings: rectal cancer      Procedure(s): spt      Estimated Blood Loss: Less than 5 ml        Complications: None            7/12/2023     2:47 PM     Magdiel Funez M.D.

## 2023-07-12 NOTE — OR NURSING
1500- Pt arrives to PACU from IR on room air. Report received. Dressing to abdomen CDI. Suprapubic cath in place, urine blood tinged. Pt denies pain and nausea at this time.     1519- Pt wife Riddhi called and updated on pt status and POC.

## 2023-07-13 NOTE — OR NURSING
Arrived from PACU   Pt is A&O x4, VSS; denies N/V; states pain is at tolerable level. Dressing CDI to lower abdomen (at the suprapubic cath) - split gauze with tape.   Suprapubic cath connected to leg bag with some hematuria, no clots seen  D/c orders received. IV dc'd.   Pt changed into clothing with assistance.   Discharge reviewed  Pt and family verbalized understanding and questions answered.   Patient states ready to d/c home.   Pt dc'd in w/c with Riddhi - spouse.

## 2023-07-14 ENCOUNTER — APPOINTMENT (OUTPATIENT)
Dept: ONCOLOGY | Facility: MEDICAL CENTER | Age: 58
End: 2023-07-14
Attending: INTERNAL MEDICINE
Payer: MEDICARE

## 2023-09-20 ENCOUNTER — HOSPITAL ENCOUNTER (OUTPATIENT)
Dept: RADIOLOGY | Facility: MEDICAL CENTER | Age: 58
End: 2023-09-20
Attending: INTERNAL MEDICINE
Payer: MEDICARE

## 2023-09-20 DIAGNOSIS — C78.01 SECONDARY MALIGNANT NEOPLASM OF RIGHT LUNG (HCC): ICD-10-CM

## 2023-09-20 DIAGNOSIS — C20 MALIGNANT NEOPLASM OF RECTUM (HCC): ICD-10-CM

## 2023-09-20 PROCEDURE — 700111 HCHG RX REV CODE 636 W/ 250 OVERRIDE (IP): Mod: JZ,JG | Performed by: RADIOLOGY

## 2023-09-20 PROCEDURE — 72197 MRI PELVIS W/O & W/DYE: CPT

## 2023-09-20 PROCEDURE — 700117 HCHG RX CONTRAST REV CODE 255: Performed by: INTERNAL MEDICINE

## 2023-09-20 PROCEDURE — A9579 GAD-BASE MR CONTRAST NOS,1ML: HCPCS | Performed by: INTERNAL MEDICINE

## 2023-09-20 RX ADMIN — GADOTERIDOL 20 ML: 279.3 INJECTION, SOLUTION INTRAVENOUS at 17:23

## 2023-09-20 RX ADMIN — GLUCAGON 1 MG: 1 INJECTION, POWDER, LYOPHILIZED, FOR SOLUTION INTRAMUSCULAR; INTRAVENOUS at 16:25

## 2024-01-01 ENCOUNTER — HOME CARE VISIT (OUTPATIENT)
Dept: HOME HEALTH SERVICES | Facility: HOME HEALTHCARE | Age: 59
End: 2024-01-01
Payer: MEDICARE

## 2024-01-01 ENCOUNTER — HOME CARE VISIT (OUTPATIENT)
Dept: HOSPICE | Facility: HOSPICE | Age: 59
End: 2024-01-01
Payer: MEDICARE

## 2024-01-01 ENCOUNTER — APPOINTMENT (OUTPATIENT)
Dept: ONCOLOGY | Facility: MEDICAL CENTER | Age: 59
End: 2024-01-01
Attending: NURSE PRACTITIONER
Payer: MEDICARE

## 2024-01-01 ENCOUNTER — HOME HEALTH ADMISSION (OUTPATIENT)
Dept: HOME HEALTH SERVICES | Facility: HOME HEALTHCARE | Age: 59
End: 2024-01-01
Payer: MEDICARE

## 2024-01-01 ENCOUNTER — PHARMACY VISIT (OUTPATIENT)
Dept: PHARMACY | Facility: MEDICAL CENTER | Age: 59
End: 2024-01-01
Payer: COMMERCIAL

## 2024-01-01 ENCOUNTER — APPOINTMENT (OUTPATIENT)
Dept: ONCOLOGY | Facility: MEDICAL CENTER | Age: 59
End: 2024-01-01
Payer: MEDICARE

## 2024-01-01 ENCOUNTER — HOSPICE ADMISSION (OUTPATIENT)
Dept: HOSPICE | Facility: HOSPICE | Age: 59
End: 2024-01-01
Payer: MEDICARE

## 2024-01-01 VITALS — HEART RATE: 100 BPM | RESPIRATION RATE: 16 BRPM

## 2024-01-01 VITALS — HEART RATE: 104 BPM | RESPIRATION RATE: 20 BRPM

## 2024-01-01 VITALS
OXYGEN SATURATION: 95 % | SYSTOLIC BLOOD PRESSURE: 74 MMHG | DIASTOLIC BLOOD PRESSURE: 46 MMHG | TEMPERATURE: 96.3 F | RESPIRATION RATE: 24 BRPM | HEART RATE: 84 BPM

## 2024-01-01 VITALS — RESPIRATION RATE: 28 BRPM | HEART RATE: 110 BPM

## 2024-01-01 VITALS — RESPIRATION RATE: 20 BRPM | HEART RATE: 108 BPM

## 2024-01-01 DIAGNOSIS — C20 RECTAL ADENOCARCINOMA (HCC): Primary | ICD-10-CM

## 2024-01-01 PROCEDURE — 665998 HH PPS REVENUE CREDIT

## 2024-01-01 PROCEDURE — S9126 HOSPICE CARE, IN THE HOME, P: HCPCS

## 2024-01-01 PROCEDURE — 665999 HH PPS REVENUE DEBIT

## 2024-01-01 PROCEDURE — G0299 HHS/HOSPICE OF RN EA 15 MIN: HCPCS

## 2024-01-01 PROCEDURE — 665001 SOC-HOME HEALTH

## 2024-01-01 PROCEDURE — RXMED WILLOW AMBULATORY MEDICATION CHARGE: Performed by: REHABILITATION PRACTITIONER

## 2024-01-01 PROCEDURE — 665036 HSPC NOTICE OF ELECTION NOE

## 2024-01-01 PROCEDURE — G0155 HHCP-SVS OF CSW,EA 15 MIN: HCPCS

## 2024-01-01 PROCEDURE — 665005 NO-PAY RAP - HOME HEALTH

## 2024-01-01 PROCEDURE — G0156 HHCP-SVS OF AIDE,EA 15 MIN: HCPCS

## 2024-01-01 RX ORDER — BISACODYL 10 MG
10 SUPPOSITORY, RECTAL RECTAL PRN
Qty: 5 SUPPOSITORY | Refills: 10 | Status: SHIPPED | OUTPATIENT
Start: 2024-01-01 | End: 2025-11-04

## 2024-01-01 RX ORDER — ONDANSETRON 4 MG/1
4 TABLET, ORALLY DISINTEGRATING ORAL EVERY 6 HOURS PRN
Qty: 15 TABLET | Refills: 10 | Status: SHIPPED | OUTPATIENT
Start: 2024-01-01 | End: 2025-11-04

## 2024-01-01 RX ORDER — LORAZEPAM 2 MG/ML
1-2 CONCENTRATE ORAL
Qty: 360 ML | Refills: 0 | Status: SHIPPED | OUTPATIENT
Start: 2024-01-01 | End: 2025-11-04

## 2024-01-01 RX ORDER — MORPHINE SULFATE 100 MG/5ML
10-20 SOLUTION ORAL
Qty: 240 ML | Refills: 0 | Status: SHIPPED | OUTPATIENT
Start: 2024-01-01 | End: 2025-11-04

## 2024-01-01 RX ORDER — ACETAMINOPHEN 650 MG/1
650 SUPPOSITORY RECTAL EVERY 6 HOURS PRN
Qty: 5 SUPPOSITORY | Refills: 10 | Status: SHIPPED | OUTPATIENT
Start: 2024-01-01 | End: 2025-11-04

## 2024-01-01 RX ORDER — SENNA AND DOCUSATE SODIUM 50; 8.6 MG/1; MG/1
2 TABLET, FILM COATED ORAL 2 TIMES DAILY PRN
Qty: 28 TABLET | Refills: 10 | Status: SHIPPED | OUTPATIENT
Start: 2024-01-01 | End: 2025-11-04

## 2024-01-01 RX ORDER — ACETAMINOPHEN 500 MG
1000 TABLET ORAL EVERY 6 HOURS PRN
Qty: 30 TABLET | Refills: 10 | Status: SHIPPED | OUTPATIENT
Start: 2024-01-01 | End: 2025-11-04

## 2024-01-01 RX ADMIN — MORPHINE SULFATE 10 MG: 100 SOLUTION ORAL at 10:36

## 2024-01-01 SDOH — ECONOMIC STABILITY: GENERAL

## 2024-01-01 ASSESSMENT — ENCOUNTER SYMPTOMS
STOOL FREQUENCY: LESS THAN DAILY
PAIN LOCATION - PAIN FREQUENCY: INTERMITTENT
FATIGUE: 1
FATIGUE: 1
PAIN: 1
HYPOTENSION: 1
PAIN SEVERITY GOAL: 3/10
MUSCLE WEAKNESS: 1
PAIN LOCATION - PAIN FREQUENCY: CONSTANT
STOOL FREQUENCY: DAILY
SHORTNESS OF BREATH: 1
PAIN LOCATION - PAIN QUALITY: SPASM
PAIN LOCATION - PAIN QUALITY: SHARP
PAIN LOCATION - PAIN DURATION: ONGOING
SUBJECTIVE PAIN PROGRESSION: GRADUALLY WORSENING
PAIN LOCATION - PAIN QUALITY: ACHE
DYSPNEA ACTIVITY LEVEL: AT REST
LOWEST PAIN SEVERITY IN PAST 24 HOURS: 3/10
INCREASED FATIGUE: 1
MUSCLE WEAKNESS: 1
INCREASED SLEEPING: 1
INCREASED SLEEPING: 1
PAIN LOCATION: RECTAL PAIN
FATIGUES EASILY: 1
SLEEP QUALITY: ADEQUATE
NAUSEA: 1
FATIGUES EASILY: 1
MUSCLE WEAKNESS: 1
LAST BOWEL MOVEMENT: 67145
SUBJECTIVE PAIN PROGRESSION: WAXING AND WANING
PAIN LOCATION: BOTTOM
PAIN LOCATION - RELIEVING FACTORS: POSITION
SHORTNESS OF BREATH: 1
PAIN LOCATION - RELIEVING FACTORS: MEDS
DECREASED ORAL INTAKE: 1
FATIGUE: 1
FATIGUE: 1
LIMITED RANGE OF MOTION: 1
NAUSEA: 1
PAIN LOCATION - PAIN SEVERITY: 5/10
LOWEST PAIN SEVERITY IN PAST 24 HOURS: 2/10
PAIN SEVERITY GOAL: 5/10
BOWEL INCONTINENCE: 1
FATIGUES EASILY: 1
LAST BOWEL MOVEMENT: 67152
PAIN LOCATION - PAIN SEVERITY: 7/10
PAIN LOCATION - EXACERBATING FACTORS: DISEASE PROCESS
PAIN LOCATION - PAIN SEVERITY: 6/10
PAIN: 1
HIGHEST PAIN SEVERITY IN PAST 24 HOURS: 8/10
DYSPNEA ACTIVITY LEVEL: AFTER AMBULATING LESS THAN 10 FT
SUBJECTIVE PAIN PROGRESSION: GRADUALLY WORSENING
HIGHEST PAIN SEVERITY IN PAST 24 HOURS: 9/10
STOOL FREQUENCY: DAILY
BOWEL PATTERN NORMAL: 1
PAIN LOCATION - PAIN SEVERITY: 2/10
HIGHEST PAIN SEVERITY IN PAST 24 HOURS: 6/10
HIGHEST PAIN SEVERITY IN PAST 24 HOURS: 8/10
PAIN LOCATION: HIPS
PAIN LOCATION - EXACERBATING FACTORS: PRESSURE
FATIGUES EASILY: 1
MUSCLE WEAKNESS: 1
MENTAL STATUS CHANGE: 0
NAUSEA: 1
RECTAL BLEEDING: 1
HIGHEST PAIN SEVERITY IN PAST 24 HOURS: 7/10
PAIN LOCATION: BLADDER SPASMS
SUBJECTIVE PAIN PROGRESSION: WAXING AND WANING
FATIGUE: 1
DECREASED TO NO URINARY OUTPUT: 1
LOWEST PAIN SEVERITY IN PAST 24 HOURS: 1/10
PAIN LOCATION - PAIN FREQUENCY: FREQUENT
LOWEST PAIN SEVERITY IN PAST 24 HOURS: 3/10
SLEEP QUALITY: FAIR
PAIN LOCATION - EXACERBATING FACTORS: LAYING ON BACK
PAIN SEVERITY GOAL: 0/10
SUBJECTIVE PAIN PROGRESSION: WAXING AND WANING
DECREASED ORAL INTAKE: 1
LIMITED RANGE OF MOTION: 1
PAIN SEVERITY GOAL: 3/10
BOWEL INCONTINENCE: 1
DYSPNEA ACTIVITY LEVEL: AT REST
PAIN: 1
PAIN: 1
CHANGE IN LEVEL OF CONSCIOUSNESS: 1
DYSPNEA ACTIVITY LEVEL: AT REST
PAIN SEVERITY GOAL: 3/10
VOMITING: 1
PAIN LOCATION - PAIN FREQUENCY: INTERMITTENT
SLEEP QUALITY: ADEQUATE
PAIN: 1
INCREASED FATIGUE: 1
SHORTNESS OF BREATH: 1
PAIN LOCATION: RECTUM
PAIN LOCATION - PAIN SEVERITY: 3/10
PAIN: 1
SUBJECTIVE PAIN PROGRESSION: RESOLVED
SLEEP QUALITY: FAIR
SHORTNESS OF BREATH: 1
LOWEST PAIN SEVERITY IN PAST 24 HOURS: 5/10

## 2024-01-01 ASSESSMENT — SOCIAL DETERMINANTS OF HEALTH (SDOH)
ACTIVE STRESSOR - HEALTH CHANGES: 1

## 2024-01-01 ASSESSMENT — ACTIVITIES OF DAILY LIVING (ADL)
MONEY MANAGEMENT (EXPENSES/BILLS): TOTALLY DEPENDENT
AMBULATION ASSISTANCE: NON-AMBULATORY
AMBULATION ASSISTANCE: NON-AMBULATORY
BATHING_REQUIRES_ASSISTANCE: 1
AMBULATION ASSISTANCE: NON-AMBULATORY
PHYSICAL_TRANSFER_REQUIRES_ASSISTANCE: 1
OASIS_M1830: 05
CURRENT_FUNCTION: TWO PERSON
DRESSING_REQUIRES_ASSISTANCE: 1
AMBULATION_REQUIRES_ASSISTANCE: 1
CONTINENCE_REQUIRES_ASSISTANCE: 1
MONEY MANAGEMENT (EXPENSES/BILLS): TOTALLY DEPENDENT
AMBULATION ASSISTANCE: NON-AMBULATORY

## 2024-01-01 ASSESSMENT — PAIN SCALES - PAIN ASSESSMENT IN ADVANCED DEMENTIA (PAINAD)
NEGVOCALIZATION: 1 - OCCASIONAL MOAN OR GROAN. LOW-LEVEL SPEECH WITH A NEGATIVE OR DISAPPROVING QUALITY.
BODYLANGUAGE: 1 - TENSE. DISTRESSED PACING. FIDGETING.
CONSOLABILITY: 1 - DISTRACTED OR REASSURED BY VOICE OR TOUCH.
TOTALSCORE: 6
FACIALEXPRESSION: 2 - FACIAL GRIMACING.

## 2024-01-01 ASSESSMENT — PATIENT HEALTH QUESTIONNAIRE - PHQ9: CLINICAL INTERPRETATION OF PHQ2 SCORE: 0

## 2024-02-08 ENCOUNTER — NON-PROVIDER VISIT (OUTPATIENT)
Dept: WOUND CARE | Facility: MEDICAL CENTER | Age: 59
End: 2024-02-08
Attending: INTERNAL MEDICINE
Payer: MEDICARE

## 2024-02-08 PROCEDURE — 99211 OFF/OP EST MAY X REQ PHY/QHP: CPT

## 2024-02-08 NOTE — WOUND TEAM
"Ostomy Evaluation  For 90 Day Certification Period:  2/440052  - 05/08/24     Surgeon: pt unable to recall name of surgeon as it was in 2017 at [facility]  Surgery type and date: colostomy 2017  [Permanent]    Start of Care: 02/08/2024    Supplier: [ [employer] possibly on next visit? Pt has been supplying his own ostomy supplies out of pocket since 2017.  Order date: TBD, next visit next week if pt is happy with appliance provided today    OBJECTIVE: 58 year old pt with metastatic rectal CA and colostomy placement 2017 at [facility]. He presents to ostomy clinic with complaints of bleeding on stoma that began early January of 2024. He states that the bleeding has decreased after cutting the opening of appliance a little larger. He has been purchasing all of his supplies out of pocket through [employer] and states that he didn't know he could get the supplies covered. He states that he now has Medicare and would like assistance in finding the correct pouching system. He has a current wear time of 3-4 days.     STOMA ASSESSMENT:  Colostomy 01/01/17 LUQ (Active)   Wound Image    02/08/24 1600   Stomal Appliance Assessment Other (Comment) 02/08/24 1600   Stoma Assessment Red 02/08/24 1600   Stoma Shape Oval 02/08/24 1600   Stoma Size (in) 1.5 02/08/24 1600   Peristomal Assessment Clean;Dry;Intact 02/08/24 1600   Mucocutaneous Junction Intact 02/08/24 1600   Treatment Cleansed with water/washcloth;Site care 02/08/24 1600   Stomal Appliance 2 1/4\" (57mm) CTF 02/08/24 1600   Output (mL) 20 mL 02/08/24 1600   Output Color Brown 02/08/24 1600   WOUND RN ONLY - Stomal Appliance  2 Piece;Belt, Medium 02/08/24 1600   Appliance (Pouch) # [id number], [id number], [id number], [id number] 02/08/24 1600   Appliance Brand [place] 02/08/24 1600   Appliance Supplier Other (Comments) 02/08/24 1600   Secure Start completed No 02/08/24 1600   Ostomy Care Resources Provided Other (Comments) 02/08/24 1600        Previous Trials and Notes:  None. Pt has been using " flexible flat 1 piece pouching system from [employer].    Patient/Caregiver Education:   1 Can do independently   2 Needs some help   3 Needs a lot of help and additional review   4 No chance to review, needs additional follow-up       EMPTY THE POUCH  Empty pouch when it is 1/3 - ½ full 1   Assemble supplies before emptying: toilet paper, pouch, deodorant  1   Assume a correct position 1   Open pouch 1   Lower opening into the toilet and empty 1   Wipe opening before resealing 1   Add pouch deodorant (if used) 1   Reclamp or reseal the pouch 1     REMOVING THE OLD POUCH  Assemble supplies for pouch change: new pouch, towel, measurement guide, pen, scissors, garbage bag (skin barrier ring, powder, deodorant, and adhesive remover, if needed) 1   Remove the outer adhesive by starting at one corner/edge 1   Place one hand on skin and push down while your other hand lifts the barrier. Use a warm wet cloth or adhesive releaser if needed 1   Place the old pouch in a garbage bag 1   If you are using a clamp, do not throw it away 1     CLEAN AND INSPECT THE SKIN  Check the skin for color, bleeding, and irritation 2   Clean skin around the stoma with warm water 1   Pat the skin dry 1       Crusting if needed    Apply stoma powder to red/wet skin, brush off excess 4, n/a this visit as peristomal  skin intact   Apply skin protectant over powder ONLY to seal in place 4     MEASURE AND CUT OPENING  Cover the stoma with a piece of tissue or gauze while measuring    Measure the stoma accurately with the measurement guide 1   Trace the correct size on the back of the pouch barrier 1   Place your finger into the precut opening and push the pouch away from the barrier in using a one-piece system 1   Cut the traced opening 1   Center the new opening over the stoma making sure it is close to the stoma edge. Adjust as needed.  2   Colostomy - 1/8” clearance between stoma and appliance (width of a nickel on its side)  Ileostomy/Urostomy -  1/16” clearance between stoma and appliance (width of a dime on its side) 2     APPLY NEW POUCHING SYSTEM  Remove the paper backing from the pouch barrier 1   Crust if needed 4   Remove any gauze/tissue placed over stoma 1   Center and apply the pouch skin barrier around the stoma. Starting closest to the stoma, press on the barrier for 30-60 seconds 1   For a two-piece system, apply the pouch to the barrier flange 1   Close the bottom of the pouch 1       PLAN/GOALS:  Return to clinic in one week to assess if pt likes the 2 piece [place] pouching system   2. Find an appliance and system that works for pt  3. Order supplies using Medicare coverage so pt will no longer need to order them through [employer] out of pocket.

## 2024-02-09 NOTE — PATIENT INSTRUCTIONS
Change colostomies every 5-7 days. Change appliance immediately if it is leaking or peristomal skin feels irritated, has itching, or  burning.   To change the appliance, remove previous appliance, cleanse peristomal skin with warm water/washcloth, pat dry, make an ostomy template or use cardboard measuring guide and trace ostomy shape onto back of barrier, cut out barrier, apply a paste ring around barrier opening and apply appliance. Empty pouches when no more than ½ full. Check contents every 2 hours or as needed. Do not leave soap residue on tissue and do not use baby wipes or skin prep wipes.     Should you experience any significant changes in your condition, such as infection (redness, swelling, localized heat, increased pain, fever > 101 F, chills) or have any questions regarding your home care instructions, please contact the wound center at [phone]. If after hours, contact your primary care physician or go to the hospital emergency room.

## 2024-02-15 ENCOUNTER — NON-PROVIDER VISIT (OUTPATIENT)
Dept: WOUND CARE | Facility: MEDICAL CENTER | Age: 59
End: 2024-02-15
Attending: INTERNAL MEDICINE
Payer: MEDICARE

## 2024-02-15 PROCEDURE — 99211 OFF/OP EST MAY X REQ PHY/QHP: CPT

## 2024-02-15 NOTE — WOUND TEAM
"Ostomy Evaluation  For 90 Day Certification Period:  2/[id number]  - 05/15/24     Surgeon: pt unable to recall name of surgeon as it was in 2017 at [facility]  Surgery type and date: colostomy 2017  [Permanent]    Start of Care: 02/08/2024    Supplier: Will order through [employer] when product selection is finalized. Pt has been supplying his own ostomy supplies out of pocket since 2017.    Order date: TBD when product selection is finalized    OBJECTIVE: 2 piece Phoenix 57 mm in place, some blood noted on inside of barrier.    HPI: 58 year old pt with metastatic rectal CA and colostomy placement 2017 at [facility]. He presents to ostomy clinic with complaints of bleeding on stoma that began early January of 2024. He states that the bleeding has decreased after cutting the opening of appliance a little larger. He has been purchasing all of his supplies out of pocket through [employer] and states that he didn't know he could get the supplies covered. He states that he now has Medicare and would like assistance in finding the correct pouching system. He has a current wear time of 3-4 days.     STOMA ASSESSMENT:  Colostomy 01/01/17 LUQ (Active)   Wound Image      Stomal Appliance Assessment Changed    Stoma Assessment Red    Stoma Shape Round    Stoma Size (in) 1.5    Peristomal Assessment Clean;Dry;Intact    Mucocutaneous Junction Intact    Treatment Removed appliance with adhesive remover;Cleansed with water/washcloth;Appliance Changed    Peristomal Protectant Paste Ring    Stomal Appliance Paste Ring, 2\";2 3/4\" (70mm) CTF    Output Color Brown    WOUND RN ONLY - Stomal Appliance  2 Piece;Flat;Paste Ring, Ceraplus, 2\" slim;2 3/4\" (70mm) CTF    Appliance (Pouch) # [id number], [id number], [id number]    Appliance Brand Phoenix    Appliance Supplier Other (Comments)                   Previous Trials and Notes:  57 mm flat used last week, this appears to be too small, blood is still present on inside of barrier. Increased barrier size to 70 " "mm and patient to cut barrier at 1 5/8\", apply diagonally and trim tape border at umbilicus, apply paste ring to back of barrier opening, but not directly next to opening, leave a little space so that stoma is not constricted. Instructed patient to rinse pouch after emptying and apply lubricating deodorant after emptying. Patient was previously using an off brand 1 piece appliance that he purchased off [employer].    Patient/Caregiver Education:   1 Can do independently   2 Needs some help   3 Needs a lot of help and additional review   4 No chance to review, needs additional follow-up       EMPTY THE POUCH  Empty pouch when it is 1/3 - ½ full 1   Assemble supplies before emptying: toilet paper, pouch, deodorant  1   Assume a correct position 1   Open pouch 1   Lower opening into the toilet and empty 1   Wipe opening before resealing 1   Add pouch deodorant (if used) 1   Reclamp or reseal the pouch 1     REMOVING THE OLD POUCH  Assemble supplies for pouch change: new pouch, towel, measurement guide, pen, scissors, garbage bag (skin barrier ring, powder, deodorant, and adhesive remover, if needed) 1   Remove the outer adhesive by starting at one corner/edge 1   Place one hand on skin and push down while your other hand lifts the barrier. Use a warm wet cloth or adhesive releaser if needed 1   Place the old pouch in a garbage bag 1   If you are using a clamp, do not throw it away 1     CLEAN AND INSPECT THE SKIN  Check the skin for color, bleeding, and irritation 1   Clean skin around the stoma with warm water 1   Pat the skin dry 1       Crusting if needed    Apply stoma powder to red/wet skin, brush off excess 1   Apply skin protectant over powder ONLY to seal in place 1     MEASURE AND CUT OPENING  Cover the stoma with a piece of tissue or gauze while measuring    Measure the stoma accurately with the measurement guide 1   Trace the correct size on the back of the pouch barrier 1   Place your finger into the precut " opening and push the pouch away from the barrier in using a one-piece system 1   Cut the traced opening 1   Center the new opening over the stoma making sure it is close to the stoma edge. Adjust as needed.  1   Colostomy - 1/8” clearance between stoma and appliance (width of a nickel on its side)  Ileostomy/Urostomy - 1/16” clearance between stoma and appliance (width of a dime on its side) 1     APPLY NEW POUCHING SYSTEM  Remove the paper backing from the pouch barrier 1   Crust if needed 1   Remove any gauze/tissue placed over stoma 1   Center and apply the pouch skin barrier around the stoma. Starting closest to the stoma, press on the barrier for 30-60 seconds 1   For a two-piece system, apply the pouch to the barrier flange 1   Close the bottom of the pouch 1       PLAN/GOALS:  Return to clinic in one week to assess if pt likes the 70 mm 2 piece Delphine pouching system   2.   Patient to bring in the appliance he was using before at next appointment.

## 2024-02-15 NOTE — PATIENT INSTRUCTIONS
Change colostomies every 5-7 days. Change appliance immediately if it is leaking or peristomal skin feels irritated, has itching, or  burning.   To change the appliance, remove previous appliance, cleanse peristomal skin with warm water/washcloth, pat dry, make an ostomy template or use cardboard measuring guide and trace ostomy shape onto back of barrier, cut out barrier, apply a paste ring around barrier opening and apply appliance. Empty pouches when no more than ½ full. Check contents every 2 hours or as needed. Do not leave soap residue on tissue and do not use baby wipes or skin prep wipes.     Should you experience any significant changes in your condition, such as infection (redness, swelling, localized heat, increased pain, fever > 101 F, chills) or have any questions regarding your home care instructions, please contact the wound center at [phone]. If after hours, contact your primary care physician or go to the hospital emergency room.

## 2024-02-22 ENCOUNTER — APPOINTMENT (OUTPATIENT)
Dept: WOUND CARE | Facility: MEDICAL CENTER | Age: 59
End: 2024-02-22
Attending: INTERNAL MEDICINE
Payer: MEDICARE

## 2024-02-23 ENCOUNTER — NON-PROVIDER VISIT (OUTPATIENT)
Dept: WOUND CARE | Facility: MEDICAL CENTER | Age: 59
End: 2024-02-23
Attending: INTERNAL MEDICINE
Payer: MEDICARE

## 2024-02-23 PROCEDURE — 99211 OFF/OP EST MAY X REQ PHY/QHP: CPT

## 2024-02-23 NOTE — PATIENT INSTRUCTIONS
Change colostomies every 5-7 days. Change appliance immediately if it is leaking or peristomal skin feels irritated, has itching, or  burning.   To change the appliance, remove previous appliance, cleanse peristomal skin with warm water/washcloth, pat dry, make an ostomy template or use cardboard measuring guide and trace ostomy shape onto back of barrier, cut out barrier, apply a paste ring around barrier opening and apply appliance. Empty pouches when no more than ½ full. Check contents every 2 hours or as needed. Do not leave soap residue on tissue and do not use baby wipes or skin prep wipes.     Should you experience any significant changes in your condition, such as infection (redness, swelling, localized heat, increased pain, fever > 101 F, chills) or have any questions regarding your home care instructions, please contact the wound center at [phone]. If after hours, contact your primary care physician or go to the hospital emergency room.

## 2024-02-23 NOTE — WOUND TEAM
"Ostomy Evaluation  For 90 Day Certification Period:  2/[id number]  - 05/23/24     Surgeon: pt unable to recall name of surgeon as it was in 2017 at [facility]  Surgery type and date: colostomy 2017  [Permanent]    Start of Care: 02/08/2024    Supplier: [employer]    Order date: 2/23/2024    OBJECTIVE: 2 piece Tumtum 57 mm in place, scant blood noted on inside of barrier.    HPI: 58 year old pt with metastatic rectal CA and colostomy placement 2017 at [facility]. He presents to ostomy clinic with complaints of bleeding on stoma that began early January of 2024. He states that the bleeding has decreased after cutting the opening of appliance a little larger. He has been purchasing all of his supplies out of pocket through [employer] and states that he didn't know he could get the supplies covered. He states that he now has Medicare and would like assistance in finding the correct pouching system. He has a current wear time of 3-4 days.     STOMA ASSESSMENT:  Colostomy 01/01/17 LUQ (Active)   Wound Image   02/23/24 1300   Stomal Appliance Assessment Changed 02/23/24 1300   Stoma Assessment Red 02/23/24 1300   Stoma Shape Round 02/23/24 1300   Stoma Size (in) 1.5 02/08/24 1600   Peristomal Assessment Clean;Dry;Intact 02/23/24 1300   Mucocutaneous Junction Intact 02/23/24 1300   Treatment Removed appliance with adhesive remover;Cleansed with water/washcloth;Appliance Changed 02/23/24 1300   Peristomal Protectant Paste Ring 02/23/24 1300   Stomal Appliance 2 3/4\" (70mm) CTF;Paste Ring, 2\" 02/23/24 1300   Output (mL) 20 mL 02/08/24 1600   Output Color Brown 02/23/24 1300   WOUND RN ONLY - Stomal Appliance  2 3/4\" (70mm) CTF;2 Piece;Paste Ring, Ceraplus, 2\" slim 02/23/24 1300   Appliance (Pouch) # [id number], [id number], [id number], [id number] 02/23/24 1300   Appliance Brand Tumtum 02/23/24 1300   Appliance Supplier [employer] 02/23/24 1300   Secure Start completed No 02/08/24 1600     Previous Trials and Notes:  70 mm used, patient would like to continue with " larger barrier    Patient/Caregiver Education:   1 Can do independently   2 Needs some help   3 Needs a lot of help and additional review   4 No chance to review, needs additional follow-up       EMPTY THE POUCH  Empty pouch when it is 1/3 - ½ full 1   Assemble supplies before emptying: toilet paper, pouch, deodorant  1   Assume a correct position 1   Open pouch 1   Lower opening into the toilet and empty 1   Wipe opening before resealing 1   Add pouch deodorant (if used) 1   Reclamp or reseal the pouch 1     REMOVING THE OLD POUCH  Assemble supplies for pouch change: new pouch, towel, measurement guide, pen, scissors, garbage bag (skin barrier ring, powder, deodorant, and adhesive remover, if needed) 1   Remove the outer adhesive by starting at one corner/edge 1   Place one hand on skin and push down while your other hand lifts the barrier. Use a warm wet cloth or adhesive releaser if needed 1   Place the old pouch in a garbage bag 1   If you are using a clamp, do not throw it away 1     CLEAN AND INSPECT THE SKIN  Check the skin for color, bleeding, and irritation 1   Clean skin around the stoma with warm water 1   Pat the skin dry 1       Crusting if needed    Apply stoma powder to red/wet skin, brush off excess 1   Apply skin protectant over powder ONLY to seal in place 1     MEASURE AND CUT OPENING  Cover the stoma with a piece of tissue or gauze while measuring    Measure the stoma accurately with the measurement guide 1   Trace the correct size on the back of the pouch barrier 1   Place your finger into the precut opening and push the pouch away from the barrier in using a one-piece system 1   Cut the traced opening 1   Center the new opening over the stoma making sure it is close to the stoma edge. Adjust as needed.  1   Colostomy - 1/8” clearance between stoma and appliance (width of a nickel on its side)  Ileostomy/Urostomy - 1/16” clearance between stoma and appliance (width of a dime on its side) 1      APPLY NEW POUCHING SYSTEM  Remove the paper backing from the pouch barrier 1   Crust if needed 1   Remove any gauze/tissue placed over stoma 1   Center and apply the pouch skin barrier around the stoma. Starting closest to the stoma, press on the barrier for 30-60 seconds 1   For a two-piece system, apply the pouch to the barrier flange 1   Close the bottom of the pouch 1       PLAN/GOALS:  [facility] supply order placed today.  2.   Patient to return in 3 months, patient is currently receiving immunotherapy for rectal cancer.

## 2024-02-27 ENCOUNTER — HOSPITAL ENCOUNTER (OUTPATIENT)
Dept: RADIOLOGY | Facility: MEDICAL CENTER | Age: 59
End: 2024-02-27
Payer: MEDICARE

## 2024-02-29 ENCOUNTER — APPOINTMENT (OUTPATIENT)
Dept: WOUND CARE | Facility: MEDICAL CENTER | Age: 59
End: 2024-02-29
Attending: INTERNAL MEDICINE
Payer: MEDICARE

## 2024-02-29 RX ORDER — EPINEPHRINE 1 MG/ML(1)
0.5 AMPUL (ML) INJECTION PRN
Status: CANCELLED | OUTPATIENT
Start: 2024-02-29

## 2024-02-29 RX ORDER — METHYLPREDNISOLONE SODIUM SUCCINATE 125 MG/2ML
125 INJECTION, POWDER, LYOPHILIZED, FOR SOLUTION INTRAMUSCULAR; INTRAVENOUS PRN
Status: CANCELLED | OUTPATIENT
Start: 2024-02-29

## 2024-02-29 RX ORDER — SODIUM CHLORIDE 9 MG/ML
INJECTION, SOLUTION INTRAVENOUS CONTINUOUS
Status: CANCELLED | OUTPATIENT
Start: 2024-02-29

## 2024-02-29 RX ORDER — DIPHENHYDRAMINE HYDROCHLORIDE 50 MG/ML
50 INJECTION INTRAMUSCULAR; INTRAVENOUS PRN
Status: CANCELLED | OUTPATIENT
Start: 2024-02-29

## 2024-03-07 ENCOUNTER — APPOINTMENT (OUTPATIENT)
Dept: WOUND CARE | Facility: MEDICAL CENTER | Age: 59
End: 2024-03-07
Attending: INTERNAL MEDICINE
Payer: MEDICARE

## 2024-03-15 ENCOUNTER — OUTPATIENT INFUSION SERVICES (OUTPATIENT)
Dept: ONCOLOGY | Facility: MEDICAL CENTER | Age: 59
End: 2024-03-15
Attending: INTERNAL MEDICINE
Payer: MEDICARE

## 2024-03-15 VITALS
SYSTOLIC BLOOD PRESSURE: 93 MMHG | BODY MASS INDEX: 26.39 KG/M2 | HEART RATE: 98 BPM | RESPIRATION RATE: 18 BRPM | HEIGHT: 70 IN | TEMPERATURE: 98 F | DIASTOLIC BLOOD PRESSURE: 63 MMHG | WEIGHT: 184.3 LBS | OXYGEN SATURATION: 100 %

## 2024-03-15 DIAGNOSIS — D64.9 ANEMIA, UNSPECIFIED TYPE: ICD-10-CM

## 2024-03-15 DIAGNOSIS — C20 RECTAL CARCINOMA (HCC): ICD-10-CM

## 2024-03-15 PROCEDURE — A4212 NON CORING NEEDLE OR STYLET: HCPCS

## 2024-03-15 PROCEDURE — 700111 HCHG RX REV CODE 636 W/ 250 OVERRIDE (IP): Performed by: INTERNAL MEDICINE

## 2024-03-15 PROCEDURE — 96365 THER/PROPH/DIAG IV INF INIT: CPT

## 2024-03-15 PROCEDURE — 700105 HCHG RX REV CODE 258: Performed by: INTERNAL MEDICINE

## 2024-03-15 RX ORDER — METHYLPREDNISOLONE SODIUM SUCCINATE 125 MG/2ML
125 INJECTION, POWDER, LYOPHILIZED, FOR SOLUTION INTRAMUSCULAR; INTRAVENOUS PRN
Status: CANCELLED | OUTPATIENT
Start: 2024-03-15

## 2024-03-15 RX ORDER — DIPHENHYDRAMINE HYDROCHLORIDE 50 MG/ML
50 INJECTION INTRAMUSCULAR; INTRAVENOUS PRN
Status: CANCELLED | OUTPATIENT
Start: 2024-03-15

## 2024-03-15 RX ORDER — EPINEPHRINE 1 MG/ML(1)
0.5 AMPUL (ML) INJECTION PRN
Status: CANCELLED | OUTPATIENT
Start: 2024-03-15

## 2024-03-15 RX ORDER — SODIUM CHLORIDE 9 MG/ML
INJECTION, SOLUTION INTRAVENOUS CONTINUOUS
Status: CANCELLED | OUTPATIENT
Start: 2024-03-15

## 2024-03-15 RX ADMIN — SODIUM CHLORIDE 1000 MG: 9 INJECTION, SOLUTION INTRAVENOUS at 14:35

## 2024-03-15 RX ADMIN — HEPARIN 500 UNITS: 100 SYRINGE at 15:51

## 2024-03-15 ASSESSMENT — FIBROSIS 4 INDEX: FIB4 SCORE: 0.64

## 2024-03-15 NOTE — PROGRESS NOTES
Patient came into infusion independently. Orders and vitals reviewed, assessment done. Port accessed in sterile manner with blood return noted. Labs reviewed. Iron dextran infused as ordered with no adverse events. Port flushed, heparin locked and de-accessed. Patient left in stable condition with next appointment confirmed.

## 2024-03-23 NOTE — PROGRESS NOTES
"Pharmacy Chemotherapy calculation:    DX: Metastatic rectal carcinoma with KRAS G12C mutation    Cycle 2 Day 1 (pt switched to PO chemo)  Previous treatment = C1 6/23/23; s/p 25 cycles of FOLFIRI + Bevacizumab, last treatment 5/9/23    Regimen: Adagrasib + Cetuximab    *Dosing Reference*  Cetuximab 400 mg/m² IV over 2 hours on Day 1 of Cycle 1   Cetuximab 500 mg/m² IV over 1-2 hours starting Day 15 of Cycle 1    - 3/23/24: ok to resume with 500 mg/m2 dose per Dr. Aguila   Adagrasib  600 mg PO BID on Days 1-28   28-day cycles until disease progression or unacceptable toxicity  Aravind R, et al. N Engl J Med 2023;388:44-54.    Allergies: Patient has no known allergies.  /65   Pulse (!) 105   Temp 36.7 °C (98 °F) (Temporal)   Resp 18   Ht 1.78 m (5' 10.08\")   Wt 82 kg (180 lb 12.4 oz)   SpO2 100%   BMI 25.88 kg/m²   Body surface area is 2.01 meters squared.    Labs 3/25/24:   Mag = 2.0  Hgb = 7.6* WBC = 14.3k*  *Venice Mayes APRN aware of labs, ok to proceed with treatment today      Cetuximab 500 mg/m2 x 2.01 m2 = 1005 mg   <10% difference, okay to treat with final dose = 1000 mg IV    Kj Winston, PharmD    "

## 2024-03-25 ENCOUNTER — OUTPATIENT INFUSION SERVICES (OUTPATIENT)
Dept: ONCOLOGY | Facility: MEDICAL CENTER | Age: 59
End: 2024-03-25
Attending: INTERNAL MEDICINE
Payer: MEDICARE

## 2024-03-25 VITALS
BODY MASS INDEX: 25.88 KG/M2 | OXYGEN SATURATION: 100 % | HEART RATE: 78 BPM | TEMPERATURE: 98.2 F | DIASTOLIC BLOOD PRESSURE: 55 MMHG | HEIGHT: 70 IN | RESPIRATION RATE: 16 BRPM | WEIGHT: 180.78 LBS | SYSTOLIC BLOOD PRESSURE: 106 MMHG

## 2024-03-25 DIAGNOSIS — C20 RECTAL CARCINOMA (HCC): ICD-10-CM

## 2024-03-25 DIAGNOSIS — D50.8 OTHER IRON DEFICIENCY ANEMIA: ICD-10-CM

## 2024-03-25 LAB
ABO GROUP BLD: NORMAL
ALBUMIN SERPL BCP-MCNC: 3.2 G/DL (ref 3.2–4.9)
ALBUMIN/GLOB SERPL: 0.9 G/DL
ALP SERPL-CCNC: 94 U/L (ref 30–99)
ALT SERPL-CCNC: 13 U/L (ref 2–50)
ANION GAP SERPL CALC-SCNC: 13 MMOL/L (ref 7–16)
AST SERPL-CCNC: 18 U/L (ref 12–45)
BARCODED ABORH UBTYP: 5100
BARCODED PRD CODE UBPRD: NORMAL
BARCODED UNIT NUM UBUNT: NORMAL
BASOPHILS # BLD AUTO: 0.4 % (ref 0–1.8)
BASOPHILS # BLD: 0.06 K/UL (ref 0–0.12)
BILIRUB SERPL-MCNC: 0.2 MG/DL (ref 0.1–1.5)
BLD GP AB SCN SERPL QL: NORMAL
BUN SERPL-MCNC: 19 MG/DL (ref 8–22)
CALCIUM ALBUM COR SERPL-MCNC: 9.2 MG/DL (ref 8.5–10.5)
CALCIUM SERPL-MCNC: 8.6 MG/DL (ref 8.5–10.5)
CHLORIDE SERPL-SCNC: 103 MMOL/L (ref 96–112)
CO2 SERPL-SCNC: 22 MMOL/L (ref 20–33)
COMPONENT R 8504R: NORMAL
CREAT SERPL-MCNC: 1.15 MG/DL (ref 0.5–1.4)
EOSINOPHIL # BLD AUTO: 0.42 K/UL (ref 0–0.51)
EOSINOPHIL NFR BLD: 2.9 % (ref 0–6.9)
ERYTHROCYTE [DISTWIDTH] IN BLOOD BY AUTOMATED COUNT: 53.6 FL (ref 35.9–50)
GFR SERPLBLD CREATININE-BSD FMLA CKD-EPI: 74 ML/MIN/1.73 M 2
GLOBULIN SER CALC-MCNC: 3.7 G/DL (ref 1.9–3.5)
GLUCOSE SERPL-MCNC: 117 MG/DL (ref 65–99)
HCT VFR BLD AUTO: 26 % (ref 42–52)
HGB BLD-MCNC: 7.6 G/DL (ref 14–18)
IMM GRANULOCYTES # BLD AUTO: 0.06 K/UL (ref 0–0.11)
IMM GRANULOCYTES NFR BLD AUTO: 0.4 % (ref 0–0.9)
LYMPHOCYTES # BLD AUTO: 1.15 K/UL (ref 1–4.8)
LYMPHOCYTES NFR BLD: 8 % (ref 22–41)
MAGNESIUM SERPL-MCNC: 2 MG/DL (ref 1.5–2.5)
MCH RBC QN AUTO: 20.4 PG (ref 27–33)
MCHC RBC AUTO-ENTMCNC: 29.2 G/DL (ref 32.3–36.5)
MCV RBC AUTO: 69.9 FL (ref 81.4–97.8)
MONOCYTES # BLD AUTO: 0.99 K/UL (ref 0–0.85)
MONOCYTES NFR BLD AUTO: 6.9 % (ref 0–13.4)
NEUTROPHILS # BLD AUTO: 11.63 K/UL (ref 1.82–7.42)
NEUTROPHILS NFR BLD: 81.4 % (ref 44–72)
NRBC # BLD AUTO: 0 K/UL
NRBC BLD-RTO: 0 /100 WBC (ref 0–0.2)
OUTPT INFUS CBC COMMENT OICOM: ABNORMAL
PLATELET # BLD AUTO: 455 K/UL (ref 164–446)
PMV BLD AUTO: 8.9 FL (ref 9–12.9)
POTASSIUM SERPL-SCNC: 4.3 MMOL/L (ref 3.6–5.5)
PRODUCT TYPE UPROD: NORMAL
PROT SERPL-MCNC: 6.9 G/DL (ref 6–8.2)
RBC # BLD AUTO: 3.72 M/UL (ref 4.7–6.1)
RH BLD: NORMAL
SODIUM SERPL-SCNC: 138 MMOL/L (ref 135–145)
UNIT STATUS USTAT: NORMAL
WBC # BLD AUTO: 14.3 K/UL (ref 4.8–10.8)

## 2024-03-25 PROCEDURE — 83735 ASSAY OF MAGNESIUM: CPT

## 2024-03-25 PROCEDURE — 36430 TRANSFUSION BLD/BLD COMPNT: CPT

## 2024-03-25 PROCEDURE — 86900 BLOOD TYPING SEROLOGIC ABO: CPT

## 2024-03-25 PROCEDURE — A4212 NON CORING NEEDLE OR STYLET: HCPCS

## 2024-03-25 PROCEDURE — 96375 TX/PRO/DX INJ NEW DRUG ADDON: CPT

## 2024-03-25 PROCEDURE — P9016 RBC LEUKOCYTES REDUCED: HCPCS

## 2024-03-25 PROCEDURE — A9270 NON-COVERED ITEM OR SERVICE: HCPCS | Performed by: NURSE PRACTITIONER

## 2024-03-25 PROCEDURE — 86901 BLOOD TYPING SEROLOGIC RH(D): CPT

## 2024-03-25 PROCEDURE — 85025 COMPLETE CBC W/AUTO DIFF WBC: CPT

## 2024-03-25 PROCEDURE — 700102 HCHG RX REV CODE 250 W/ 637 OVERRIDE(OP): Performed by: NURSE PRACTITIONER

## 2024-03-25 PROCEDURE — 86850 RBC ANTIBODY SCREEN: CPT

## 2024-03-25 PROCEDURE — 96413 CHEMO IV INFUSION 1 HR: CPT

## 2024-03-25 PROCEDURE — 306780 HCHG STAT FOR TRANSFUSION PER CASE

## 2024-03-25 PROCEDURE — 80053 COMPREHEN METABOLIC PANEL: CPT

## 2024-03-25 PROCEDURE — 700105 HCHG RX REV CODE 258: Performed by: INTERNAL MEDICINE

## 2024-03-25 PROCEDURE — 700111 HCHG RX REV CODE 636 W/ 250 OVERRIDE (IP): Performed by: INTERNAL MEDICINE

## 2024-03-25 PROCEDURE — 86923 COMPATIBILITY TEST ELECTRIC: CPT

## 2024-03-25 PROCEDURE — 96415 CHEMO IV INFUSION ADDL HR: CPT

## 2024-03-25 RX ORDER — ACETAMINOPHEN 325 MG/1
650 TABLET ORAL PRN
Status: CANCELLED | OUTPATIENT
Start: 2024-03-25

## 2024-03-25 RX ORDER — ACETAMINOPHEN 325 MG/1
650 TABLET ORAL ONCE
Status: CANCELLED | OUTPATIENT
Start: 2024-03-25

## 2024-03-25 RX ORDER — SODIUM CHLORIDE 9 MG/ML
INJECTION, SOLUTION INTRAVENOUS CONTINUOUS
Status: CANCELLED | OUTPATIENT
Start: 2024-03-25

## 2024-03-25 RX ORDER — 0.9 % SODIUM CHLORIDE 0.9 %
3 VIAL (ML) INJECTION PRN
Status: CANCELLED | OUTPATIENT
Start: 2024-03-25

## 2024-03-25 RX ORDER — DIPHENHYDRAMINE HCL 25 MG
25 TABLET ORAL ONCE
OUTPATIENT
Start: 2024-03-25 | End: 2024-03-25

## 2024-03-25 RX ORDER — ACETAMINOPHEN 325 MG/1
650 TABLET ORAL ONCE
OUTPATIENT
Start: 2024-03-25

## 2024-03-25 RX ORDER — DIPHENHYDRAMINE HYDROCHLORIDE 50 MG/ML
25 INJECTION INTRAMUSCULAR; INTRAVENOUS PRN
OUTPATIENT
Start: 2024-03-25

## 2024-03-25 RX ORDER — 0.9 % SODIUM CHLORIDE 0.9 %
10 VIAL (ML) INJECTION PRN
Status: CANCELLED | OUTPATIENT
Start: 2024-03-25

## 2024-03-25 RX ORDER — DIPHENHYDRAMINE HYDROCHLORIDE 50 MG/ML
25 INJECTION INTRAMUSCULAR; INTRAVENOUS PRN
Status: CANCELLED | OUTPATIENT
Start: 2024-03-25

## 2024-03-25 RX ORDER — 0.9 % SODIUM CHLORIDE 0.9 %
3 VIAL (ML) INJECTION PRN
OUTPATIENT
Start: 2024-03-25

## 2024-03-25 RX ORDER — 0.9 % SODIUM CHLORIDE 0.9 %
VIAL (ML) INJECTION PRN
Status: CANCELLED | OUTPATIENT
Start: 2024-03-25

## 2024-03-25 RX ORDER — 0.9 % SODIUM CHLORIDE 0.9 %
10 VIAL (ML) INJECTION PRN
OUTPATIENT
Start: 2024-03-25

## 2024-03-25 RX ORDER — 0.9 % SODIUM CHLORIDE 0.9 %
VIAL (ML) INJECTION PRN
OUTPATIENT
Start: 2024-03-25

## 2024-03-25 RX ORDER — ACETAMINOPHEN 325 MG/1
650 TABLET ORAL PRN
OUTPATIENT
Start: 2024-03-25

## 2024-03-25 RX ORDER — ACETAMINOPHEN 325 MG/1
650 TABLET ORAL ONCE
Status: COMPLETED | OUTPATIENT
Start: 2024-03-25 | End: 2024-03-25

## 2024-03-25 RX ORDER — DIPHENHYDRAMINE HCL 25 MG
25 TABLET ORAL ONCE
Status: DISCONTINUED | OUTPATIENT
Start: 2024-03-25 | End: 2024-03-25 | Stop reason: HOSPADM

## 2024-03-25 RX ORDER — SODIUM CHLORIDE 9 MG/ML
INJECTION, SOLUTION INTRAVENOUS CONTINUOUS
OUTPATIENT
Start: 2024-03-25

## 2024-03-25 RX ORDER — DIPHENHYDRAMINE HCL 25 MG
25 TABLET ORAL ONCE
Status: CANCELLED | OUTPATIENT
Start: 2024-03-25 | End: 2024-03-25

## 2024-03-25 RX ADMIN — DIPHENHYDRAMINE HYDROCHLORIDE 25 MG: 50 INJECTION, SOLUTION INTRAMUSCULAR; INTRAVENOUS at 09:15

## 2024-03-25 RX ADMIN — ACETAMINOPHEN 650 MG: 325 TABLET ORAL at 13:03

## 2024-03-25 RX ADMIN — CETUXIMAB 1000 MG: 2 SOLUTION INTRAVENOUS at 09:48

## 2024-03-25 RX ADMIN — HEPARIN 500 UNITS: 100 SYRINGE at 15:49

## 2024-03-25 ASSESSMENT — FIBROSIS 4 INDEX: FIB4 SCORE: 0.64

## 2024-03-25 NOTE — PROGRESS NOTES
Dre is here for cetuximab (Erbitux) for Rectal carcinoma. Previously treated with Erbitux on 6/23/23 but then switched to oral chemo. Right upper chest port accessed using sterile technique; labs drawn as ordered. Lab results reviewed; Hgb = 7.6 and WBC = 14.3. Venice KOEHLER notified of labs results; orders received to proceed with treatment and transfuse 1 unit of PRBCs. Pre-medication given per MAR. Erbitux given per MAR over 2 hours; in-line filter in place. No adverse reaction noted during 1 hour post-Erbitux monitoring. Pre-medication given per MAR. Blood consents signed. 1 unit of PRBCs transfused; no adverse reaction noted. Heparin instilled per MAR. Port de-accessed; gauze/tape applied to site. Next appointment scheduled. Discharged to self care; no apparent distress noted.

## 2024-03-25 NOTE — PROGRESS NOTES
Chemotherapy Verification - PRIMARY RN      Height = 178 cm  Weight = 82 kg  BSA = 2.01 m^2       Medication: cetuxiimab (Erbitux)  Dose: 500 mg/m^2  Calculated Dose: 1005 mg                             (In mg/m2, AUC, mg/kg)     I confirm this process was performed independently with the BSA and all final chemotherapy dosing calculations congruent.  Any discrepancies of 10% or greater have been addressed with the chemotherapy pharmacist. The resolution of the discrepancy has been documented in the EPIC progress notes.

## 2024-03-25 NOTE — PROGRESS NOTES
"Pharmacy Chemotherapy Verification    DX:  Metastatic Rectal Adenocarcinoma    Protocol: Cetuximab + Adagrasib  Cetuximab 400 mg/m² IV over 2 hours on Day 1 of Cycle 1   Cetuximab 500 mg/m² IV over 1-2 hours starting Day 15 of Cycle 1  3/25/24 Start cycle 2 with 500 mg/m2 dose despite 9 month interruption while pt on oral chemo only per Dr. Aguila.    Adagrasib  600 mg PO BID on Days 1-28   28-day cycles until disease progression or unacceptable toxicity     Aravind SHORT et al. N Engl J Med 2023;388:44-54.    Allergies:Patient has no known allergies.  /65   Pulse (!) 105   Temp 36.7 °C (98 °F) (Temporal)   Resp 18   Ht 1.78 m (5' 10.08\")   Wt 82 kg (180 lb 12.4 oz)   SpO2 100%   BMI 25.88 kg/m²     Body surface area is 2.01 meters squared.     Labs 3/25/24:  ANC~ 11,630    Plt = 455k    Hgb = 7.6 ok per L. Harsh APRN give 1 unit PRBC     SCr = 1.15 mg/dL CrCl ~ 83 mL/min   AST/ALT/AP = WNL TBili = 0.2  Mag = 2  K+ = 4.3    Drug Order   (Drug name, dose, route, IV Fluid & volume, frequency, number of doses) Cycle 2 Day 1 (on hold 9 months while pt on oral chemo only)  Previous treatment: C1 6/23/23; s/p 25 cycles of  FOLFiri + bevacizumab     Medication = Cetuximab (Erbitux)   Base Dose = 500 mg/m²   Calc Dose: Base Dose x 2.01 m² = 1005 mg  Final Dose = 1000 mg  Route = IV  Conc = 2 mg/mL  Fluid & Volume = 500 mL empty bag  Admin Duration = Over  minutes          Rounded to nearest vial size (within 10%) per rounding protocol, okay to treat with final dose     By my signature below, I confirm this process was performed independently with the BSA and all final chemotherapy dosing calculations congruent. I have reviewed the above chemotherapy order and that my calculation of the final dose and BSA (when applicable) corroborate those calculations of the  pharmacist. Discrepancies of 10% or greater in the written dose have been addressed and documented within the EPIC Progress " notes.    Chen Riley, PharmD, BCOP

## 2024-03-25 NOTE — PROGRESS NOTES
Chemotherapy Verification - SECONDARY RN       Height = 178 cm  Weight = 82 kg  BSA = 2.01 m2       Medication: Erbitux  Dose: 500 mg/m2  Calculated Dose: 1005 mg                             (In mg/m2, AUC, mg/kg)     I confirm that this process was performed independently.

## 2024-03-29 ENCOUNTER — PATIENT OUTREACH (OUTPATIENT)
Dept: ONCOLOGY | Facility: MEDICAL CENTER | Age: 59
End: 2024-03-29
Payer: MEDICARE

## 2024-04-04 NOTE — PROGRESS NOTES
"Pharmacy Chemotherapy calculation:    DX: Metastatic rectal carcinoma with KRAS G12C mutation    Cycle 2 Day 15  Previous treatment = C2D1 3/25/24; s/p 25 cycles of FOLFIRI + Bevacizumab, last treatment 5/9/23    Regimen: Adagrasib + Cetuximab    *Dosing Reference*  Cetuximab 400 mg/m² IV over 2 hours on Day 1 of Cycle 1   Cetuximab 500 mg/m² IV over 1-2 hours starting Day 15 of Cycle 1    - 3/23/24: ok to resume with 500 mg/m2 dose per Dr. Aguila   Adagrasib  600 mg PO BID on Days 1-28   28-day cycles until disease progression or unacceptable toxicity  Aravind R, et al. N Engl J Med 2023;388:44-54.    Allergies: Patient has no known allergies.  /62   Pulse 92   Temp 36.9 °C (98.4 °F) (Temporal)   Resp 18   Ht 1.78 m (5' 10.08\")   Wt 82.2 kg (181 lb 3.5 oz)   SpO2 100%   BMI 25.94 kg/m²   Body surface area is 2.02 meters squared.    Labs 4/8/24:   Mag = 1.7 - replaced per protocol    Cetuximab 500 mg/m2 x 2.02 m2 = 1010 mg   <10% difference, okay to treat with final dose = 1000 mg IV    Kj Winston, PharmD    "

## 2024-04-07 NOTE — PROGRESS NOTES
"Pharmacy Chemotherapy Verification    DX:  Metastatic Rectal Adenocarcinoma    Protocol: Cetuximab + Adagrasib  Cetuximab 400 mg/m² IV over 2 hours on Day 1 of Cycle 1   Cetuximab 500 mg/m² IV over 1-2 hours starting Day 15 of Cycle 1  3/25/24 Start cycle 2 with 500 mg/m2 dose despite 9 month interruption while pt on oral chemo only per Dr. Aguila.    Adagrasib  600 mg PO BID on Days 1-28   28-day cycles until disease progression or unacceptable toxicity     Aravind SHORT et al. N Engl J Med 2023;388:44-54.    Allergies:Patient has no known allergies.  /62   Pulse 92   Temp 36.9 °C (98.4 °F) (Temporal)   Resp 18   Ht 1.78 m (5' 10.08\")   Wt 82.2 kg (181 lb 3.5 oz)   SpO2 100%   BMI 25.94 kg/m²     Body surface area is 2.02 meters squared.     Labs 4/8/24: Mag = 1.7        Labs 3/25/24:   ANC~ 11,630    Plt = 455k    Hgb = 7.6 ok per L. Harsh APRN give 1 unit PRBC     SCr = 1.15 mg/dL CrCl ~ 83 mL/min   AST/ALT/AP = WNL TBili = 0.2   K+ = 4.3    Drug Order   (Drug name, dose, route, IV Fluid & volume, frequency, number of doses) Cycle 2 Day 15   Previous treatment: C2D1 on 3/25/24 - on hold 9 months while pt on oral chemo only; s/p 25 cycles of  FOLFiri + bevacizumab     Medication = Cetuximab (Erbitux)   Base Dose = 500 mg/m²   Calc Dose: Base Dose x 2.02 m² = 1010 mg  Final Dose = 1000 mg  Route = IV  Conc = 2 mg/mL  Fluid & Volume = 500 mL empty bag  Admin Duration = Over 60 minutes          Rounded to nearest vial size (within 10%) per rounding protocol, okay to treat with final dose     By my signature below, I confirm this process was performed independently with the BSA and all final chemotherapy dosing calculations congruent. I have reviewed the above chemotherapy order and that my calculation of the final dose and BSA (when applicable) corroborate those calculations of the  pharmacist. Discrepancies of 10% or greater in the written dose have been addressed and documented within the EPIC " Progress notes.    Chen Riley, PharmD, BCOP

## 2024-04-08 ENCOUNTER — OUTPATIENT INFUSION SERVICES (OUTPATIENT)
Dept: ONCOLOGY | Facility: MEDICAL CENTER | Age: 59
End: 2024-04-08
Attending: INTERNAL MEDICINE
Payer: MEDICARE

## 2024-04-08 VITALS
TEMPERATURE: 98.4 F | BODY MASS INDEX: 25.94 KG/M2 | DIASTOLIC BLOOD PRESSURE: 62 MMHG | OXYGEN SATURATION: 100 % | HEART RATE: 92 BPM | RESPIRATION RATE: 18 BRPM | HEIGHT: 70 IN | WEIGHT: 181.22 LBS | SYSTOLIC BLOOD PRESSURE: 112 MMHG

## 2024-04-08 DIAGNOSIS — C20 RECTAL CARCINOMA (HCC): ICD-10-CM

## 2024-04-08 LAB
FERRITIN SERPL-MCNC: 315 NG/ML (ref 22–322)
IRON SATN MFR SERPL: 8 % (ref 15–55)
IRON SERPL-MCNC: 15 UG/DL (ref 50–180)
MAGNESIUM SERPL-MCNC: 1.7 MG/DL (ref 1.5–2.5)
TIBC SERPL-MCNC: 184 UG/DL (ref 250–450)
UIBC SERPL-MCNC: 169 UG/DL (ref 110–370)

## 2024-04-08 PROCEDURE — 700111 HCHG RX REV CODE 636 W/ 250 OVERRIDE (IP): Mod: JZ,JG | Performed by: INTERNAL MEDICINE

## 2024-04-08 PROCEDURE — 96367 TX/PROPH/DG ADDL SEQ IV INF: CPT

## 2024-04-08 PROCEDURE — 700105 HCHG RX REV CODE 258: Performed by: INTERNAL MEDICINE

## 2024-04-08 PROCEDURE — 83735 ASSAY OF MAGNESIUM: CPT

## 2024-04-08 PROCEDURE — 83540 ASSAY OF IRON: CPT

## 2024-04-08 PROCEDURE — 96375 TX/PRO/DX INJ NEW DRUG ADDON: CPT

## 2024-04-08 PROCEDURE — 83550 IRON BINDING TEST: CPT

## 2024-04-08 PROCEDURE — A4212 NON CORING NEEDLE OR STYLET: HCPCS

## 2024-04-08 PROCEDURE — 700111 HCHG RX REV CODE 636 W/ 250 OVERRIDE (IP): Mod: JZ | Performed by: NURSE PRACTITIONER

## 2024-04-08 PROCEDURE — 96413 CHEMO IV INFUSION 1 HR: CPT

## 2024-04-08 PROCEDURE — 82728 ASSAY OF FERRITIN: CPT

## 2024-04-08 RX ORDER — MAGNESIUM SULFATE 1 G/100ML
1 INJECTION INTRAVENOUS ONCE
Status: COMPLETED | OUTPATIENT
Start: 2024-04-08 | End: 2024-04-08

## 2024-04-08 RX ADMIN — CETUXIMAB 1000 MG: 2 SOLUTION INTRAVENOUS at 09:13

## 2024-04-08 RX ADMIN — DIPHENHYDRAMINE HYDROCHLORIDE 25 MG: 50 INJECTION, SOLUTION INTRAMUSCULAR; INTRAVENOUS at 08:49

## 2024-04-08 RX ADMIN — HEPARIN 500 UNITS: 100 SYRINGE at 11:24

## 2024-04-08 RX ADMIN — MAGNESIUM SULFATE 1 G: 1 INJECTION INTRAVENOUS at 10:04

## 2024-04-08 ASSESSMENT — FIBROSIS 4 INDEX: FIB4 SCORE: 0.61

## 2024-04-08 NOTE — PROGRESS NOTES
Dre presents today to IS for Day 15 cycle 1 Cetuximab.   Dre reports tolerating Day 1 infusion well, but did develop rash to face and chest.  Dre reports MD aware, cream prescribed for rash.  POC discussed, Dre agreeable.      Right upper chest port accessed in sterile fashion, + blood return observed.   magnesium level drawn.         Pre-medications administered.  Cetuximab infused as ordered over 1 hour followed by 1 hour observation period.  Magnesium level 1.7, order received per Venice willams to replace electrolytes per protocol.      Dre tolerated all treatments well.   Port flushed with NS, + blood return observed.  Port de-accessed, gauze and tape to site.  Discharged in NAD

## 2024-04-08 NOTE — PROGRESS NOTES
Chemotherapy Verification - PRIMARY RN      Height = 178 cm  Weight = 82.2 kg  BSA = 2.02 m2       Medication: Cetuximab  Dose: 500 mg/m2  Calculated Dose: 1010 mg                             (In mg/m2, AUC, mg/kg)       I confirm this process was performed independently with the BSA and all final chemotherapy dosing calculations congruent.  Any discrepancies of 10% or greater have been addressed with the chemotherapy pharmacist. The resolution of the discrepancy has been documented in the EPIC progress notes.

## 2024-04-18 NOTE — PROGRESS NOTES
"Pharmacy Chemotherapy calculation:    DX: Metastatic rectal carcinoma with KRAS G12C mutation    Cycle 3 Day 1  Previous treatment = C2D15 4/8/24; s/p 25 cycles of FOLFIRI + Bevacizumab, last treatment 5/9/23    Regimen: Adagrasib + Cetuximab    *Dosing Reference*  Cetuximab 400 mg/m² IV over 2 hours on Day 1 of Cycle 1   Cetuximab 500 mg/m² IV over 1-2 hours starting Day 15 of Cycle 1    - 3/23/24: ok to resume with 500 mg/m2 dose per Dr. Aguila   Adagrasib  600 mg PO BID on Days 1-28   28-day cycles until disease progression or unacceptable toxicity  Aravind R, et al. N Engl J Med 2023;388:44-54.    Allergies: Patient has no known allergies.  /62   Pulse 81   Temp 36.6 °C (97.8 °F) (Temporal)   Resp 18   Ht 1.78 m (5' 10.08\")   Wt 82.3 kg (181 lb 7 oz)   SpO2 99%   BMI 25.98 kg/m²   Body surface area is 2.02 meters squared.    Labs 4/22/24:   Mag = 1.8    Cetuximab 500 mg/m2 x 2.02 m2 = 1010 mg   <10% difference, okay to treat with final dose = 1000 mg IV    Kj Winston, PharmD    "

## 2024-04-21 NOTE — PROGRESS NOTES
"Pharmacy Chemotherapy Verification    DX:  Metastatic Rectal Adenocarcinoma    Protocol: Cetuximab + Adagrasib  Cetuximab 400 mg/m² IV over 2 hours on Day 1 of Cycle 1   Cetuximab 500 mg/m² IV over 1-2 hours starting Day 15 of Cycle 1  3/25/24 Start cycle 2 with 500 mg/m2 dose despite 9 month interruption while pt on oral chemo only per Dr. Aguila.    Adagrasib  600 mg PO BID on Days 1-28   28-day cycles until disease progression or unacceptable toxicity     Aravind SHORT, et al. N Engl J Med 2023;388:44-54.    Allergies:Patient has no known allergies.  /62   Pulse 81   Temp 36.6 °C (97.8 °F) (Temporal)   Resp 18   Ht 1.78 m (5' 10.08\")   Wt 82.3 kg (181 lb 7 oz)   SpO2 99%   BMI 25.98 kg/m²     Body surface area is 2.02 meters squared.     No treatment parameters. Mag to be replaced as warranted.    Drug Order   (Drug name, dose, route, IV Fluid & volume, frequency, number of doses) Cycle 3 Day 1  Previous treatment: 4/8/24     Medication = Cetuximab (Erbitux)   Base Dose = 500 mg/m²   Calc Dose: Base Dose x 2.02m² = 1010mg  Final Dose = 1000 mg  Route = IV  Conc = 2 mg/mL  Fluid & Volume = 500 mL empty bag  Admin Duration = Over 60 minutes          Rounded to nearest vial size (within 10%) per rounding protocol, okay to treat with final dose     By my signature below, I confirm this process was performed independently with the BSA and all final chemotherapy dosing calculations congruent. I have reviewed the above chemotherapy order and that my calculation of the final dose and BSA (when applicable) corroborate those calculations of the  pharmacist. Discrepancies of 10% or greater in the written dose have been addressed and documented within the [facility] Progress notes.    Bal Grande, ElleD  "

## 2024-04-22 ENCOUNTER — OUTPATIENT INFUSION SERVICES (OUTPATIENT)
Dept: ONCOLOGY | Facility: MEDICAL CENTER | Age: 59
End: 2024-04-22
Attending: INTERNAL MEDICINE
Payer: MEDICARE

## 2024-04-22 VITALS
OXYGEN SATURATION: 99 % | BODY MASS INDEX: 25.98 KG/M2 | TEMPERATURE: 97.8 F | RESPIRATION RATE: 18 BRPM | HEIGHT: 70 IN | DIASTOLIC BLOOD PRESSURE: 62 MMHG | SYSTOLIC BLOOD PRESSURE: 110 MMHG | HEART RATE: 81 BPM | WEIGHT: 181.44 LBS

## 2024-04-22 DIAGNOSIS — C20 RECTAL CARCINOMA (HCC): ICD-10-CM

## 2024-04-22 LAB
ALBUMIN SERPL BCP-MCNC: 3.1 G/DL (ref 3.2–4.9)
ALBUMIN/GLOB SERPL: 0.9 G/DL
ALP SERPL-CCNC: 81 U/L (ref 30–99)
ALT SERPL-CCNC: 16 U/L (ref 2–50)
ANION GAP SERPL CALC-SCNC: 11 MMOL/L (ref 7–16)
AST SERPL-CCNC: 18 U/L (ref 12–45)
BASOPHILS # BLD AUTO: 0.5 % (ref 0–1.8)
BASOPHILS # BLD: 0.05 K/UL (ref 0–0.12)
BILIRUB SERPL-MCNC: 0.2 MG/DL (ref 0.1–1.5)
BUN SERPL-MCNC: 15 MG/DL (ref 8–22)
CALCIUM ALBUM COR SERPL-MCNC: 9.1 MG/DL (ref 8.5–10.5)
CALCIUM SERPL-MCNC: 8.4 MG/DL (ref 8.5–10.5)
CHLORIDE SERPL-SCNC: 104 MMOL/L (ref 96–112)
CO2 SERPL-SCNC: 23 MMOL/L (ref 20–33)
CREAT SERPL-MCNC: 0.83 MG/DL (ref 0.5–1.4)
EOSINOPHIL # BLD AUTO: 0.41 K/UL (ref 0–0.51)
EOSINOPHIL NFR BLD: 4 % (ref 0–6.9)
ERYTHROCYTE [DISTWIDTH] IN BLOOD BY AUTOMATED COUNT: 57.6 FL (ref 35.9–50)
GFR SERPLBLD CREATININE-BSD FMLA CKD-EPI: 102 ML/MIN/1.73 M 2
GLOBULIN SER CALC-MCNC: 3.5 G/DL (ref 1.9–3.5)
GLUCOSE SERPL-MCNC: 106 MG/DL (ref 65–99)
HCT VFR BLD AUTO: 28.8 % (ref 42–52)
HGB BLD-MCNC: 8.5 G/DL (ref 14–18)
IMM GRANULOCYTES # BLD AUTO: 0.04 K/UL (ref 0–0.11)
IMM GRANULOCYTES NFR BLD AUTO: 0.4 % (ref 0–0.9)
LYMPHOCYTES # BLD AUTO: 1.12 K/UL (ref 1–4.8)
LYMPHOCYTES NFR BLD: 10.8 % (ref 22–41)
MAGNESIUM SERPL-MCNC: 1.8 MG/DL (ref 1.5–2.5)
MCH RBC QN AUTO: 21.2 PG (ref 27–33)
MCHC RBC AUTO-ENTMCNC: 29.5 G/DL (ref 32.3–36.5)
MCV RBC AUTO: 71.8 FL (ref 81.4–97.8)
MONOCYTES # BLD AUTO: 0.65 K/UL (ref 0–0.85)
MONOCYTES NFR BLD AUTO: 6.3 % (ref 0–13.4)
NEUTROPHILS # BLD AUTO: 8.09 K/UL (ref 1.82–7.42)
NEUTROPHILS NFR BLD: 78 % (ref 44–72)
NRBC # BLD AUTO: 0 K/UL
NRBC BLD-RTO: 0 /100 WBC (ref 0–0.2)
OUTPT INFUS CBC COMMENT OICOM: ABNORMAL
PLATELET # BLD AUTO: 367 K/UL (ref 164–446)
PMV BLD AUTO: 9.4 FL (ref 9–12.9)
POTASSIUM SERPL-SCNC: 3.6 MMOL/L (ref 3.6–5.5)
PROT SERPL-MCNC: 6.6 G/DL (ref 6–8.2)
RBC # BLD AUTO: 4.01 M/UL (ref 4.7–6.1)
SODIUM SERPL-SCNC: 138 MMOL/L (ref 135–145)
WBC # BLD AUTO: 10.4 K/UL (ref 4.8–10.8)

## 2024-04-22 PROCEDURE — A4212 NON CORING NEEDLE OR STYLET: HCPCS

## 2024-04-22 PROCEDURE — 85025 COMPLETE CBC W/AUTO DIFF WBC: CPT

## 2024-04-22 PROCEDURE — 96375 TX/PRO/DX INJ NEW DRUG ADDON: CPT

## 2024-04-22 PROCEDURE — 83735 ASSAY OF MAGNESIUM: CPT

## 2024-04-22 PROCEDURE — 700111 HCHG RX REV CODE 636 W/ 250 OVERRIDE (IP): Performed by: INTERNAL MEDICINE

## 2024-04-22 PROCEDURE — 96413 CHEMO IV INFUSION 1 HR: CPT

## 2024-04-22 PROCEDURE — 700105 HCHG RX REV CODE 258: Performed by: INTERNAL MEDICINE

## 2024-04-22 PROCEDURE — 80053 COMPREHEN METABOLIC PANEL: CPT

## 2024-04-22 RX ADMIN — CETUXIMAB 1000 MG: 2 SOLUTION INTRAVENOUS at 12:27

## 2024-04-22 RX ADMIN — DIPHENHYDRAMINE HYDROCHLORIDE 25 MG: 50 INJECTION, SOLUTION INTRAMUSCULAR; INTRAVENOUS at 11:46

## 2024-04-22 RX ADMIN — HEPARIN 500 UNITS: 100 SYRINGE at 14:33

## 2024-04-22 ASSESSMENT — FIBROSIS 4 INDEX: FIB4 SCORE: 0.61

## 2024-04-22 NOTE — PROGRESS NOTES
Chemotherapy Verification - SECONDARY RN       Height = 178 cm  Weight = 82.3 kg  BSA = 2.02 m^2       Medication: cetuximab  Dose: 500 mg/m^2  Calculated Dose: 1010 mg                             (In mg/m2, AUC, mg/kg)         I confirm that this process was performed independently.

## 2024-04-22 NOTE — PROGRESS NOTES
Chemotherapy Verification - PRIMARY RN    C3 D1    Height = 178 cm  Weight = 82.3 kg  BSA = 2.02 m^2       Medication: Cetuximab (Erbitux)  Dose: 500 mg/m^2  Calculated Dose: 1,010 mg                             (In mg/m2, AUC, mg/kg)     I confirm this process was performed independently with the BSA and all final chemotherapy dosing calculations congruent.  Any discrepancies of 10% or greater have been addressed with the chemotherapy pharmacist. The resolution of the discrepancy has been documented in the EPIC progress notes.

## 2024-04-22 NOTE — PROGRESS NOTES
Pt arrives to [facility] for cycle 3 day 1 of Erbitux.  Discussed plan of care with pt.  Pt denies s/sx of infection, signs of bleeding or pain.  RUC port accessed with sterile technique, flushed with NS and brisk blood return observed.  Labs drawn via port.  Benadryl given as pre-medication.  Erbitux infused over 1 hour.  1 hour observation completed without adverse reaction.  Port flushed with NS and heparin locked.  Joaquin needle was removed intact.  Confirmed next appt on 5/06/24 with pt.  Pt dc home to self care.

## 2024-04-29 NOTE — PROGRESS NOTES
"Pharmacy Chemotherapy calculation:    DX: Metastatic rectal carcinoma with KRAS G12C mutation    Cycle 3 Day 15  Previous treatment = C3D1 4/22/24; s/p 25 cycles of FOLFIRI + Bevacizumab, last treatment 5/9/23    Regimen: Adagrasib + Cetuximab    *Dosing Reference*  Cetuximab 400 mg/m² IV over 2 hours on Day 1 of Cycle 1   Cetuximab 500 mg/m² IV over 1-2 hours starting Day 15 of Cycle 1    - 3/23/24: ok to resume with 500 mg/m2 dose per Dr. Aguila   Adagrasib  600 mg PO BID on Days 1-28   28-day cycles until disease progression or unacceptable toxicity  Aravind R, et al. N Engl J Med 2023;388:44-54.    Allergies: Patient has no known allergies.  BP 98/59   Pulse 89   Temp 36.6 °C (97.8 °F) (Temporal)   Resp 16   Ht 1.78 m (5' 10.08\")   Wt 81.1 kg (178 lb 12.7 oz)   SpO2 100%   BMI 25.60 kg/m²   Body surface area is 2 meters squared.    Labs 5/6/24:   Mag = 1.7 - replaced per protocol    Cetuximab 500 mg/m2 x 2 m2 = 1000 mg   <10% difference, okay to treat with final dose = 1000 mg IV    Kj Winston, PharmD    "

## 2024-04-30 RX ORDER — METHYLPREDNISOLONE SODIUM SUCCINATE 125 MG/2ML
125 INJECTION, POWDER, LYOPHILIZED, FOR SOLUTION INTRAMUSCULAR; INTRAVENOUS PRN
Status: CANCELLED | OUTPATIENT
Start: 2024-05-02

## 2024-04-30 RX ORDER — EPINEPHRINE 1 MG/ML(1)
0.5 AMPUL (ML) INJECTION PRN
Status: CANCELLED | OUTPATIENT
Start: 2024-05-02

## 2024-04-30 RX ORDER — DIPHENHYDRAMINE HYDROCHLORIDE 50 MG/ML
50 INJECTION INTRAMUSCULAR; INTRAVENOUS PRN
Status: CANCELLED | OUTPATIENT
Start: 2024-05-02

## 2024-04-30 RX ORDER — SODIUM CHLORIDE 9 MG/ML
INJECTION, SOLUTION INTRAVENOUS CONTINUOUS
Status: CANCELLED | OUTPATIENT
Start: 2024-05-02

## 2024-05-06 ENCOUNTER — OUTPATIENT INFUSION SERVICES (OUTPATIENT)
Dept: ONCOLOGY | Facility: MEDICAL CENTER | Age: 59
End: 2024-05-06
Attending: INTERNAL MEDICINE
Payer: MEDICARE

## 2024-05-06 VITALS
TEMPERATURE: 97.8 F | RESPIRATION RATE: 16 BRPM | DIASTOLIC BLOOD PRESSURE: 59 MMHG | HEIGHT: 70 IN | BODY MASS INDEX: 25.6 KG/M2 | WEIGHT: 178.79 LBS | OXYGEN SATURATION: 100 % | HEART RATE: 89 BPM | SYSTOLIC BLOOD PRESSURE: 98 MMHG

## 2024-05-06 DIAGNOSIS — C20 RECTAL CARCINOMA (HCC): ICD-10-CM

## 2024-05-06 DIAGNOSIS — D50.0 IRON DEFICIENCY ANEMIA DUE TO CHRONIC BLOOD LOSS: ICD-10-CM

## 2024-05-06 LAB — MAGNESIUM SERPL-MCNC: 1.7 MG/DL (ref 1.5–2.5)

## 2024-05-06 RX ORDER — METHYLPREDNISOLONE SODIUM SUCCINATE 125 MG/2ML
125 INJECTION, POWDER, LYOPHILIZED, FOR SOLUTION INTRAMUSCULAR; INTRAVENOUS PRN
Status: CANCELLED | OUTPATIENT
Start: 2024-05-06

## 2024-05-06 RX ORDER — DIPHENHYDRAMINE HYDROCHLORIDE 50 MG/ML
50 INJECTION INTRAMUSCULAR; INTRAVENOUS PRN
Status: CANCELLED | OUTPATIENT
Start: 2024-05-06

## 2024-05-06 RX ORDER — EPINEPHRINE 1 MG/ML(1)
0.5 AMPUL (ML) INJECTION PRN
Status: CANCELLED | OUTPATIENT
Start: 2024-05-06

## 2024-05-06 RX ORDER — SODIUM CHLORIDE 9 MG/ML
INJECTION, SOLUTION INTRAVENOUS CONTINUOUS
Status: CANCELLED | OUTPATIENT
Start: 2024-05-06

## 2024-05-06 RX ORDER — MAGNESIUM SULFATE 1 G/100ML
1 INJECTION INTRAVENOUS ONCE
Status: COMPLETED | OUTPATIENT
Start: 2024-05-06 | End: 2024-05-06

## 2024-05-06 RX ADMIN — DIPHENHYDRAMINE HYDROCHLORIDE 25 MG: 50 INJECTION, SOLUTION INTRAMUSCULAR; INTRAVENOUS at 11:27

## 2024-05-06 RX ADMIN — MAGNESIUM SULFATE 1 G: 1 INJECTION INTRAVENOUS at 14:28

## 2024-05-06 RX ADMIN — HEPARIN 500 UNITS: 100 SYRINGE at 15:29

## 2024-05-06 RX ADMIN — SODIUM CHLORIDE 1000 MG: 9 INJECTION, SOLUTION INTRAVENOUS at 11:46

## 2024-05-06 RX ADMIN — CETUXIMAB 1000 MG: 2 SOLUTION INTRAVENOUS at 13:15

## 2024-05-06 ASSESSMENT — FIBROSIS 4 INDEX: FIB4 SCORE: 0.69

## 2024-05-06 NOTE — PROGRESS NOTES
"Pharmacy Chemotherapy Verification    DX:  Metastatic Rectal Adenocarcinoma    Protocol: Cetuximab + Adagrasib  Cetuximab 400 mg/m² IV over 2 hours on Day 1 of Cycle 1   Cetuximab 500 mg/m² IV over 1-2 hours starting Day 15 of Cycle 1  3/25/24 Start cycle 2 with 500 mg/m2 dose despite 9 month interruption while pt on oral chemo only per Dr. Aguila.    Adagrasib  600 mg PO BID on Days 1-28   28-day cycles until disease progression or unacceptable toxicity     Aravind SHORT, et al. N Engl J Med 2023;388:44-54.    Allergies:Patient has no known allergies.  BP 98/59   Pulse 89   Temp 36.6 °C (97.8 °F) (Temporal)   Resp 16   Ht 1.78 m (5' 10.08\")   Wt 81.1 kg (178 lb 12.7 oz)   SpO2 100%   BMI 25.60 kg/m²     Body surface area is 2 meters squared.     No treatment parameters. Mag to be replaced as warranted.      Drug Order   (Drug name, dose, route, IV Fluid & volume, frequency, number of doses) Cycle 3 Day 15  Previous treatment: C3D1 on 4/22/24     Medication = Cetuximab (Erbitux)   Base Dose = 500 mg/m²   Calc Dose: Base Dose x 2 m² = 1000 mg  Final Dose = 1000 mg  Route = IV  Conc = 2 mg/mL  Fluid & Volume = 500 mL empty bag  Admin Duration = Over 60 minutes          Rounded to nearest vial size (within 10%) per rounding protocol, okay to treat with final dose     By my signature below, I confirm this process was performed independently with the BSA and all final chemotherapy dosing calculations congruent. I have reviewed the above chemotherapy order and that my calculation of the final dose and BSA (when applicable) corroborate those calculations of the  pharmacist. Discrepancies of 10% or greater in the written dose have been addressed and documented within the [facility] Progress notes.    Chen Riley, PharmD, BCOP  "

## 2024-05-06 NOTE — PROGRESS NOTES
Dre is here for cetuximab (Erbitux) and Iron Dextran. Right upper chest port accessed using sterile technique; labs drawn as ordered. Lab results reviewed; Mg = 1.7.  Pre-medication given per MAR. Iron Dextran started at 75 ml/hour, run for 5 minutes, then stopped. Dre then observed for 10 minutes. No adverse reaction noted during 10 minutes observation. Restarted Iron Dextran at 333 ml/hr. Iron Dextran completed.  Erbitux given per MAR; in-line filter in place. No adverse reaction noted during 1 hour post-Erbitux monitoring. 1 gram of Magnesium given per MAR. Heparin instilled per MAR. Port de-accessed; gauze/tape applied to site. Next appointment scheduled. Discharged to self care; no apparent distress noted.

## 2024-05-06 NOTE — PROGRESS NOTES
Chemotherapy Verification - SECONDARY RN       Height = 178 cm  Weight = 81.1 kg  BSA = 2 m2       Medication: Erbitux  Dose: 500 mg/m2  Calculated Dose: 1000 mg                             (In mg/m2, AUC, mg/kg)     I confirm that this process was performed independently.

## 2024-05-06 NOTE — PROGRESS NOTES
Chemotherapy Verification - PRIMARY RN      Height = 178 cm  Weight = 81.1 kg  BSA = 2 m^2       Medication: cetuximab (erbitux)  Dose: 500 mg/m^2  Calculated Dose: 1000 mg                             (In mg/m2, AUC, mg/kg)     I confirm this process was performed independently with the BSA and all final chemotherapy dosing calculations congruent.  Any discrepancies of 10% or greater have been addressed with the chemotherapy pharmacist. The resolution of the discrepancy has been documented in the EPIC progress notes.

## 2024-05-10 ENCOUNTER — NON-PROVIDER VISIT (OUTPATIENT)
Dept: WOUND CARE | Facility: MEDICAL CENTER | Age: 59
End: 2024-05-10
Attending: INTERNAL MEDICINE
Payer: MEDICARE

## 2024-05-10 NOTE — WOUND TEAM
Patient is a no call, no show for today's ostomy follow up appointment. Called number on file and left detailed message to return call if patient desires to be seen. Patient has no future appointments at this time.

## 2024-05-18 NOTE — PROGRESS NOTES
"Pharmacy Chemotherapy Verification    DX:  Metastatic Rectal Adenocarcinoma    Protocol: Cetuximab + Adagrasib  Cetuximab 400 mg/m² IV over 2 hours on Day 1 of Cycle 1   Cetuximab 500 mg/m² IV over 1-2 hours starting Day 15 of Cycle 1  3/25/24 Start cycle 2 with 500 mg/m2 dose despite 9 month interruption while pt on oral chemo only per Dr. Aguila.    Adagrasib  600 mg PO BID on Days 1-28   28-day cycles until disease progression or unacceptable toxicity     Aravind SHORT, et al. N Engl J Med 2023;388:44-54.    Allergies:Patient has no known allergies.  /44   Pulse 69   Resp 18   Ht 1.78 m (5' 10.08\")   Wt 81 kg (178 lb 9.2 oz)   SpO2 100%   BMI 25.56 kg/m²     Body surface area is 2 meters squared.     Labs 5/20/24:  ANC~ 9870 Plt = 395k   Hgb = 8     SCr = 0.74 mg/dL CrCl > 125 mL/min   AST/ALT/AP = WNL TBili = 0.2  Mag = 1.7  K+ = 3.6    Drug Order   (Drug name, dose, route, IV Fluid & volume, frequency, number of doses) Cycle 4 Day 1  Previous treatment: C3D15 on 5/6/24     Medication = Cetuximab (Erbitux)   Base Dose = 500 mg/m²   Calc Dose: Base Dose x 2 m² = 1000 mg  Final Dose = 1000 mg  Route = IV  Conc = 2 mg/mL  Fluid & Volume = 500 mL empty bag  Admin Duration = Over 60 minutes          Rounded to nearest vial size (within 10%) per rounding protocol, okay to treat with final dose     By my signature below, I confirm this process was performed independently with the BSA and all final chemotherapy dosing calculations congruent. I have reviewed the above chemotherapy order and that my calculation of the final dose and BSA (when applicable) corroborate those calculations of the  pharmacist. Discrepancies of 10% or greater in the written dose have been addressed and documented within the [facility] Progress notes.    Chen Riley, PharmD, BCOP  "

## 2024-05-19 NOTE — PROGRESS NOTES
"Pharmacy Chemotherapy calculation:    DX: Metastatic rectal carcinoma with KRAS G12C mutation    Cycle 4 Day 1  Previous treatment = C3D15 5/6/24; s/p 25 cycles of FOLFIRI + Bevacizumab, last treatment 5/9/23    Regimen: Adagrasib + Cetuximab  *Dosing Reference*  Cetuximab 400 mg/m² IV over 2 hours on Day 1 of Cycle 1   Cetuximab 500 mg/m² IV over 1-2 hours starting Day 15 of Cycle 1    - 3/23/24: ok to resume with 500 mg/m2 dose per Dr. Aguila   Adagrasib  600 mg PO BID on Days 1-28   28-day cycles until disease progression or unacceptable toxicity  Aravind R, et al. N Engl J Med 2023;388:44-54.    Allergies: Patient has no known allergies.  /44   Pulse 69   Resp 18   Ht 1.78 m (5' 10.08\")   Wt 81 kg (178 lb 9.2 oz)   SpO2 100%   BMI 25.56 kg/m²   Body surface area is 2 meters squared.    Labs 5/20/24:   Mag = 1.7 - replaced per protocol    Cetuximab 500 mg/m2 x 2 m2 = 1000 mg   <10% difference, okay to treat with final dose = 1000 mg IV    Kj Winston, PharmD    "

## 2024-05-20 ENCOUNTER — OUTPATIENT INFUSION SERVICES (OUTPATIENT)
Dept: ONCOLOGY | Facility: MEDICAL CENTER | Age: 59
End: 2024-05-20
Attending: INTERNAL MEDICINE
Payer: MEDICARE

## 2024-05-20 VITALS
BODY MASS INDEX: 25.56 KG/M2 | RESPIRATION RATE: 18 BRPM | OXYGEN SATURATION: 100 % | HEIGHT: 70 IN | WEIGHT: 178.57 LBS | DIASTOLIC BLOOD PRESSURE: 44 MMHG | HEART RATE: 69 BPM | SYSTOLIC BLOOD PRESSURE: 103 MMHG

## 2024-05-20 DIAGNOSIS — C20 RECTAL CARCINOMA (HCC): ICD-10-CM

## 2024-05-20 LAB
ALBUMIN SERPL BCP-MCNC: 2.9 G/DL (ref 3.2–4.9)
ALBUMIN/GLOB SERPL: 0.9 G/DL
ALP SERPL-CCNC: 80 U/L (ref 30–99)
ALT SERPL-CCNC: 14 U/L (ref 2–50)
ANION GAP SERPL CALC-SCNC: 11 MMOL/L (ref 7–16)
AST SERPL-CCNC: 22 U/L (ref 12–45)
BASOPHILS # BLD AUTO: 0.5 % (ref 0–1.8)
BASOPHILS # BLD: 0.06 K/UL (ref 0–0.12)
BILIRUB SERPL-MCNC: 0.2 MG/DL (ref 0.1–1.5)
BUN SERPL-MCNC: 14 MG/DL (ref 8–22)
CALCIUM ALBUM COR SERPL-MCNC: 9.1 MG/DL (ref 8.5–10.5)
CALCIUM SERPL-MCNC: 8.2 MG/DL (ref 8.5–10.5)
CHLORIDE SERPL-SCNC: 106 MMOL/L (ref 96–112)
CO2 SERPL-SCNC: 22 MMOL/L (ref 20–33)
CREAT SERPL-MCNC: 0.74 MG/DL (ref 0.5–1.4)
EOSINOPHIL # BLD AUTO: 0.42 K/UL (ref 0–0.51)
EOSINOPHIL NFR BLD: 3.4 % (ref 0–6.9)
ERYTHROCYTE [DISTWIDTH] IN BLOOD BY AUTOMATED COUNT: 52.9 FL (ref 35.9–50)
GFR SERPLBLD CREATININE-BSD FMLA CKD-EPI: 106 ML/MIN/1.73 M 2
GLOBULIN SER CALC-MCNC: 3.4 G/DL (ref 1.9–3.5)
GLUCOSE SERPL-MCNC: 92 MG/DL (ref 65–99)
HCT VFR BLD AUTO: 27.3 % (ref 42–52)
HGB BLD-MCNC: 8 G/DL (ref 14–18)
IMM GRANULOCYTES # BLD AUTO: 0.03 K/UL (ref 0–0.11)
IMM GRANULOCYTES NFR BLD AUTO: 0.2 % (ref 0–0.9)
LYMPHOCYTES # BLD AUTO: 1.18 K/UL (ref 1–4.8)
LYMPHOCYTES NFR BLD: 9.7 % (ref 22–41)
MAGNESIUM SERPL-MCNC: 1.7 MG/DL (ref 1.5–2.5)
MCH RBC QN AUTO: 21.3 PG (ref 27–33)
MCHC RBC AUTO-ENTMCNC: 29.3 G/DL (ref 32.3–36.5)
MCV RBC AUTO: 72.8 FL (ref 81.4–97.8)
MONOCYTES # BLD AUTO: 0.64 K/UL (ref 0–0.85)
MONOCYTES NFR BLD AUTO: 5.2 % (ref 0–13.4)
NEUTROPHILS # BLD AUTO: 9.87 K/UL (ref 1.82–7.42)
NEUTROPHILS NFR BLD: 81 % (ref 44–72)
NRBC # BLD AUTO: 0 K/UL
NRBC BLD-RTO: 0 /100 WBC (ref 0–0.2)
OUTPT INFUS CBC COMMENT OICOM: ABNORMAL
PLATELET # BLD AUTO: 395 K/UL (ref 164–446)
PMV BLD AUTO: 8.6 FL (ref 9–12.9)
POTASSIUM SERPL-SCNC: 3.6 MMOL/L (ref 3.6–5.5)
PROT SERPL-MCNC: 6.3 G/DL (ref 6–8.2)
RBC # BLD AUTO: 3.75 M/UL (ref 4.7–6.1)
SODIUM SERPL-SCNC: 139 MMOL/L (ref 135–145)
WBC # BLD AUTO: 12.2 K/UL (ref 4.8–10.8)

## 2024-05-20 RX ORDER — MAGNESIUM SULFATE 1 G/100ML
1 INJECTION INTRAVENOUS ONCE
Status: COMPLETED | OUTPATIENT
Start: 2024-05-20 | End: 2024-05-20

## 2024-05-20 RX ADMIN — CETUXIMAB 1000 MG: 2 SOLUTION INTRAVENOUS at 08:54

## 2024-05-20 RX ADMIN — HEPARIN 500 UNITS: 100 SYRINGE at 10:59

## 2024-05-20 RX ADMIN — MAGNESIUM SULFATE 1 G: 1 INJECTION INTRAVENOUS at 10:01

## 2024-05-20 RX ADMIN — DIPHENHYDRAMINE HYDROCHLORIDE 25 MG: 50 INJECTION, SOLUTION INTRAMUSCULAR; INTRAVENOUS at 08:24

## 2024-05-20 ASSESSMENT — FIBROSIS 4 INDEX: FIB4 SCORE: 0.69

## 2024-05-20 NOTE — PROGRESS NOTES
Chemotherapy Verification - PRIMARY RN      Height = 178cm  Weight = 81kg  BSA = 2.0m^2       Medication: Cetuximab  Dose: 500mg/m^2  Calculated Dose: 1000mg                             (In mg/m2, AUC, mg/kg)         I confirm this process was performed independently with the BSA and all final chemotherapy dosing calculations congruent.  Any discrepancies of 10% or greater have been addressed with the chemotherapy pharmacist. The resolution of the discrepancy has been documented in the EPIC progress notes.

## 2024-05-20 NOTE — PROGRESS NOTES
Chemotherapy Verification - SECONDARY RN       Height = 178 cm  Weight = 81 kg  BSA = 2 m2       Medication: cetuximab  Dose: 500 mg/m2  Calculated Dose: 1000 mg                             (In mg/m2, AUC, mg/kg)     I confirm that this process was performed independently.

## 2024-05-20 NOTE — PROGRESS NOTES
[facility] arrive into [facility] for Day 1/ Cycle 4 of Cetuximab for Colorectal ca. Pt denied having any new or acute complaints today, reports tolerating past treatments well. Patient reports rash from last time, but has much improved this visit. Port accessed in a sterile manner, had + blood return, flushed briskly, labs collected. Patient within parameters for treatment this visit, Mag 1.7, per parameters to give 1 g of Mag. Premedications given, refer to MAR.  Pt given Cetuximab as prescribed, tolerated well, denied having any complaints during or after infusion. Observed for 1 hour and tolerated and 1 g Mag was given, refer to MAR. Port had + blood return after, flushed per [facility] policy, de-accessed, needle intact, insertion site covered with sterile gauze and paper tape. Next appointment scheduled. Patient left [facility] and no signs of distress.

## 2024-06-02 NOTE — PROGRESS NOTES
"Pharmacy Chemotherapy calculation:    DX: Metastatic rectal carcinoma with KRAS G12C mutation    Cycle 4 Day 15  Previous treatment = C4D1 5/20/24; s/p 25 cycles of FOLFIRI + Bevacizumab, last treatment 5/9/23    Regimen: Adagrasib + Cetuximab  *Dosing Reference*  Cetuximab 400 mg/m² IV over 2 hours on Day 1 of Cycle 1   Cetuximab 500 mg/m² IV over 1-2 hours starting Day 15 of Cycle 1    - 3/23/24: ok to resume with 500 mg/m2 dose per Dr. Aguila   Adagrasib  600 mg PO BID on Days 1-28   28-day cycles until disease progression or unacceptable toxicity  Aravind R, et al. N Engl J Med 2023;388:44-54.    Allergies: Patient has no known allergies.  /58   Pulse 86   Temp 36.7 °C (98 °F) (Temporal)   Resp 18   Ht 1.78 m (5' 10.08\")   Wt 80 kg (176 lb 5.9 oz)   SpO2 98%   BMI 25.25 kg/m²   Body surface area is 1.99 meters squared.    Labs 6/3/24:   Mag = 1.8    Cetuximab 500 mg/m2 x 1.99 m2 = 995 mg   <10% difference, okay to treat with final dose = 1000 mg IV    Kj Winston, PharmD    "

## 2024-06-03 ENCOUNTER — OUTPATIENT INFUSION SERVICES (OUTPATIENT)
Dept: ONCOLOGY | Facility: MEDICAL CENTER | Age: 59
End: 2024-06-03
Attending: INTERNAL MEDICINE
Payer: MEDICARE

## 2024-06-03 VITALS
DIASTOLIC BLOOD PRESSURE: 58 MMHG | TEMPERATURE: 98 F | OXYGEN SATURATION: 98 % | WEIGHT: 176.37 LBS | SYSTOLIC BLOOD PRESSURE: 100 MMHG | HEART RATE: 86 BPM | HEIGHT: 70 IN | RESPIRATION RATE: 18 BRPM | BODY MASS INDEX: 25.25 KG/M2

## 2024-06-03 DIAGNOSIS — C20 RECTAL CARCINOMA (HCC): ICD-10-CM

## 2024-06-03 LAB — MAGNESIUM SERPL-MCNC: 1.8 MG/DL (ref 1.5–2.5)

## 2024-06-03 PROCEDURE — 96413 CHEMO IV INFUSION 1 HR: CPT

## 2024-06-03 PROCEDURE — 700111 HCHG RX REV CODE 636 W/ 250 OVERRIDE (IP): Mod: JZ,JG | Performed by: INTERNAL MEDICINE

## 2024-06-03 PROCEDURE — 96375 TX/PRO/DX INJ NEW DRUG ADDON: CPT

## 2024-06-03 PROCEDURE — A4212 NON CORING NEEDLE OR STYLET: HCPCS

## 2024-06-03 PROCEDURE — 700105 HCHG RX REV CODE 258: Performed by: INTERNAL MEDICINE

## 2024-06-03 PROCEDURE — 83735 ASSAY OF MAGNESIUM: CPT

## 2024-06-03 RX ADMIN — HEPARIN 500 UNITS: 100 SYRINGE at 17:08

## 2024-06-03 RX ADMIN — DIPHENHYDRAMINE HYDROCHLORIDE 25 MG: 50 INJECTION, SOLUTION INTRAMUSCULAR; INTRAVENOUS at 14:33

## 2024-06-03 RX ADMIN — CETUXIMAB 1000 MG: 2 SOLUTION INTRAVENOUS at 14:53

## 2024-06-03 ASSESSMENT — FIBROSIS 4 INDEX: FIB4 SCORE: 0.83

## 2024-06-03 NOTE — PROGRESS NOTES
Chemotherapy Verification - PRIMARY RN      Height = 178 cm  Weight = 80 kg  BSA = 1.99 m2       Medication: Cetuximab  Dose: 500 mg/m2  Calculated Dose: 995 mg                             (In mg/m2, AUC, mg/kg)     I confirm this process was performed independently with the BSA and all final chemotherapy dosing calculations congruent.  Any discrepancies of 10% or greater have been addressed with the chemotherapy pharmacist. The resolution of the discrepancy has been documented in the EPIC progress notes.

## 2024-06-03 NOTE — PROGRESS NOTES
Chemotherapy Verification - SECONDARY RN       Height = 178 cm  Weight = 80 kg  BSA = 1.99 m2       Medication: Erbitux  Dose: 500 mg/m2  Calculated Dose: 995 mg                             (In mg/m2, AUC, mg/kg)     I confirm that this process was performed independently.

## 2024-06-03 NOTE — PROGRESS NOTES
"Pharmacy Chemotherapy Verification    DX:  Metastatic Rectal Adenocarcinoma    Protocol: Cetuximab + Adagrasib  Cetuximab 400 mg/m² IV over 2 hours on Day 1 of Cycle 1   Cetuximab 500 mg/m² IV over 1-2 hours starting Day 15 of Cycle 1  3/25/24 Start cycle 2 with 500 mg/m2 dose despite 9 month interruption while pt on oral chemo only per Dr. Aguila.    Adagrasib  600 mg PO BID on Days 1-28   28-day cycles until disease progression or unacceptable toxicity     Aravind SHORT, et al. N Engl J Med 2023;388:44-54.    Allergies:Patient has no known allergies.  /58   Pulse 86   Temp 36.7 °C (98 °F) (Temporal)   Resp 18   Ht 1.78 m (5' 10.08\")   Wt 80 kg (176 lb 5.9 oz)   SpO2 98%   BMI 25.25 kg/m²     Body surface area is 1.99 meters squared.     No treatment parameters for day 15. Mag to be replaced if required.     Drug Order   (Drug name, dose, route, IV Fluid & volume, frequency, number of doses) Cycle 4 Day 15  Previous treatment: C4D1 on 5/20/24     Medication = Cetuximab (Erbitux)   Base Dose = 500 mg/m²   Calc Dose: Base Dose x 1.99 m² = 995 mg  Final Dose = 1000 mg  Route = IV  Conc = 2 mg/mL  Fluid & Volume = 500 mL empty bag  Admin Duration = Over 60 minutes          Rounded to nearest vial size (within 10%) per rounding protocol, okay to treat with final dose     By my signature below, I confirm this process was performed independently with the BSA and all final chemotherapy dosing calculations congruent. I have reviewed the above chemotherapy order and that my calculation of the final dose and BSA (when applicable) corroborate those calculations of the  pharmacist. Discrepancies of 10% or greater in the written dose have been addressed and documented within the [facility] Progress notes.    Bal Grande PharmD  "

## 2024-06-04 RX ORDER — DIPHENHYDRAMINE HCL 25 MG
25 TABLET ORAL ONCE
Status: CANCELLED | OUTPATIENT
Start: 2024-06-04 | End: 2024-06-04

## 2024-06-04 RX ORDER — 0.9 % SODIUM CHLORIDE 0.9 %
3 VIAL (ML) INJECTION PRN
Status: CANCELLED | OUTPATIENT
Start: 2024-06-04

## 2024-06-04 RX ORDER — 0.9 % SODIUM CHLORIDE 0.9 %
10 VIAL (ML) INJECTION PRN
Status: CANCELLED | OUTPATIENT
Start: 2024-06-04

## 2024-06-04 RX ORDER — ACETAMINOPHEN 325 MG/1
650 TABLET ORAL ONCE
Status: CANCELLED | OUTPATIENT
Start: 2024-06-04

## 2024-06-04 RX ORDER — DIPHENHYDRAMINE HYDROCHLORIDE 50 MG/ML
25 INJECTION INTRAMUSCULAR; INTRAVENOUS PRN
Status: CANCELLED | OUTPATIENT
Start: 2024-06-04

## 2024-06-04 RX ORDER — SODIUM CHLORIDE 9 MG/ML
INJECTION, SOLUTION INTRAVENOUS CONTINUOUS
Status: CANCELLED | OUTPATIENT
Start: 2024-06-04

## 2024-06-04 RX ORDER — 0.9 % SODIUM CHLORIDE 0.9 %
VIAL (ML) INJECTION PRN
Status: CANCELLED | OUTPATIENT
Start: 2024-06-04

## 2024-06-04 RX ORDER — ACETAMINOPHEN 325 MG/1
650 TABLET ORAL PRN
Status: CANCELLED | OUTPATIENT
Start: 2024-06-04

## 2024-06-04 NOTE — PROGRESS NOTES
Pt presented to Infusion for D15C4 OP Colorectal Cetuximab. POC discussed and pt verbalized understanding, reports poor appetite, fatigue. Port accessed per [facility] policy, brisk blood return noted, line flushes with ease, and labs drawn. Pt tolerated well. Labs reviewed. Magnesium 1.8; pt does not meet parameters for Magnesium. Pre-medications and Cetuximab administered per MAR. Pt tolerated well. No s/s of adverse reactions or complications noted. Port flushed per [facility] policy, and de-accessed with needle intact; site covered with sterile gauze/paper tape and pt tolerated well. Pt confirmed he has MyChart to see future appts/  emailed. Pt acknowledged understanding when to contact his provider. Pt discharged to self care. Pt in NAD at time of discharge.

## 2024-06-06 ENCOUNTER — HOSPITAL ENCOUNTER (OUTPATIENT)
Dept: RADIOLOGY | Facility: MEDICAL CENTER | Age: 59
End: 2024-06-06
Payer: MEDICARE

## 2024-06-10 ENCOUNTER — OUTPATIENT INFUSION SERVICES (OUTPATIENT)
Dept: ONCOLOGY | Facility: MEDICAL CENTER | Age: 59
End: 2024-06-10
Attending: INTERNAL MEDICINE
Payer: MEDICARE

## 2024-06-10 ENCOUNTER — TELEPHONE (OUTPATIENT)
Dept: ONCOLOGY | Facility: MEDICAL CENTER | Age: 59
End: 2024-06-10
Payer: MEDICARE

## 2024-06-10 VITALS
RESPIRATION RATE: 18 BRPM | HEIGHT: 70 IN | OXYGEN SATURATION: 98 % | WEIGHT: 178.57 LBS | HEART RATE: 78 BPM | DIASTOLIC BLOOD PRESSURE: 69 MMHG | SYSTOLIC BLOOD PRESSURE: 117 MMHG | TEMPERATURE: 98.4 F | BODY MASS INDEX: 25.56 KG/M2

## 2024-06-10 DIAGNOSIS — D50.0 IRON DEFICIENCY ANEMIA DUE TO CHRONIC BLOOD LOSS: ICD-10-CM

## 2024-06-10 PROCEDURE — 700105 HCHG RX REV CODE 258: Performed by: INTERNAL MEDICINE

## 2024-06-10 PROCEDURE — 700111 HCHG RX REV CODE 636 W/ 250 OVERRIDE (IP): Performed by: INTERNAL MEDICINE

## 2024-06-10 PROCEDURE — 96365 THER/PROPH/DIAG IV INF INIT: CPT

## 2024-06-10 PROCEDURE — A4212 NON CORING NEEDLE OR STYLET: HCPCS

## 2024-06-10 RX ORDER — 0.9 % SODIUM CHLORIDE 0.9 %
3 VIAL (ML) INJECTION PRN
Status: CANCELLED | OUTPATIENT
Start: 2024-06-17

## 2024-06-10 RX ORDER — 0.9 % SODIUM CHLORIDE 0.9 %
10 VIAL (ML) INJECTION PRN
Status: CANCELLED | OUTPATIENT
Start: 2024-06-17

## 2024-06-10 RX ORDER — EPINEPHRINE 1 MG/ML(1)
0.5 AMPUL (ML) INJECTION PRN
Status: CANCELLED | OUTPATIENT
Start: 2024-06-17

## 2024-06-10 RX ORDER — 0.9 % SODIUM CHLORIDE 0.9 %
VIAL (ML) INJECTION PRN
Status: CANCELLED | OUTPATIENT
Start: 2024-06-17

## 2024-06-10 RX ORDER — SODIUM CHLORIDE 9 MG/ML
INJECTION, SOLUTION INTRAVENOUS CONTINUOUS
Status: CANCELLED | OUTPATIENT
Start: 2024-06-17

## 2024-06-10 RX ORDER — DIPHENHYDRAMINE HYDROCHLORIDE 50 MG/ML
50 INJECTION INTRAMUSCULAR; INTRAVENOUS PRN
Status: CANCELLED | OUTPATIENT
Start: 2024-06-17

## 2024-06-10 RX ORDER — METHYLPREDNISOLONE SODIUM SUCCINATE 125 MG/2ML
125 INJECTION, POWDER, LYOPHILIZED, FOR SOLUTION INTRAMUSCULAR; INTRAVENOUS PRN
Status: CANCELLED | OUTPATIENT
Start: 2024-06-17

## 2024-06-10 RX ADMIN — HEPARIN 500 UNITS: 100 SYRINGE at 18:15

## 2024-06-10 RX ADMIN — FERUMOXYTOL 510 MG: 510 INJECTION INTRAVENOUS at 17:10

## 2024-06-10 ASSESSMENT — FIBROSIS 4 INDEX: FIB4 SCORE: 0.83

## 2024-06-10 ASSESSMENT — PAIN DESCRIPTION - PAIN TYPE: TYPE: CHRONIC PAIN

## 2024-06-10 NOTE — TELEPHONE ENCOUNTER
Nutrition Services: Telephone Encounter   Dre Eaton is a 58 y.o. male with diagnosis of rectal carcinoma. RD received verbal referral from [facility] RN.     RD called for nutrition check-in and introduction, though pt did not answer. RD able to leave a brief voice message with contact information for callback as desired.     Please contact [phone]     none

## 2024-06-11 NOTE — PROGRESS NOTES
Dre presents to [facility] for 1/2 feraheme infusion.  Dre denies any new or acute changes.  Right chest port accessed in sterile fashion, brisk blood return observed, flushes easily.  Feraheme infused as ordered followed by 30 minute observation period.  Dre tolerated well.  Port flushed with NS, heparin instilled.  Port de-accessed, gauze and tape to site.  Discharged in no apparent distress, next appointment confirmed.

## 2024-06-16 NOTE — PROGRESS NOTES
"Pharmacy Chemotherapy Verification    DX:  Metastatic Rectal Adenocarcinoma    Protocol: Cetuximab + Adagrasib  Cetuximab 400 mg/m² IV over 2 hours on Day 1 of Cycle 1   Cetuximab 500 mg/m² IV over 1-2 hours starting Day 15 of Cycle 1  3/25/24 Start cycle 2 with 500 mg/m2 dose despite 9 month interruption while pt on oral chemo only per Dr. Aguila.    Adagrasib  600 mg PO BID on Days 1-28   28-day cycles until disease progression or unacceptable toxicity     Aravind SHORT, et al. N Engl J Med 2023;388:44-54.    Allergies:Patient has no known allergies.  BP 98/61   Pulse 98   Temp 36.9 °C (98.4 °F) (Temporal)   Resp 18   Ht 1.78 m (5' 10.08\")   Wt 80.1 kg (176 lb 9.4 oz)   SpO2 100%   BMI 25.28 kg/m²     Body surface area is 1.99 meters squared.     Labs 6/17/24:  Mag = 1.8    Drug Order   (Drug name, dose, route, IV Fluid & volume, frequency, number of doses) Cycle 5 Day 1  Previous treatment: C4D15 on 6/3/24     Medication = Cetuximab (Erbitux)   Base Dose = 500 mg/m²   Calc Dose: Base Dose x 1.99 m² = 995 mg  Final Dose = 1000 mg  Route = IV  Conc = 2 mg/mL  Fluid & Volume = 500 mL empty bag  Admin Duration = Over 60 minutes          Rounded to nearest vial size (within 10%) per rounding protocol, okay to treat with final dose     By my signature below, I confirm this process was performed independently with the BSA and all final chemotherapy dosing calculations congruent. I have reviewed the above chemotherapy order and that my calculation of the final dose and BSA (when applicable) corroborate those calculations of the  pharmacist. Discrepancies of 10% or greater in the written dose have been addressed and documented within the [facility] Progress notes.    Kj Winston, PharmD  "

## 2024-06-17 ENCOUNTER — OUTPATIENT INFUSION SERVICES (OUTPATIENT)
Dept: ONCOLOGY | Facility: MEDICAL CENTER | Age: 59
End: 2024-06-17
Attending: STUDENT IN AN ORGANIZED HEALTH CARE EDUCATION/TRAINING PROGRAM
Payer: MEDICARE

## 2024-06-17 ENCOUNTER — DOCUMENTATION (OUTPATIENT)
Dept: ONCOLOGY | Facility: MEDICAL CENTER | Age: 59
End: 2024-06-17
Payer: MEDICARE

## 2024-06-17 VITALS
TEMPERATURE: 98.3 F | OXYGEN SATURATION: 100 % | SYSTOLIC BLOOD PRESSURE: 91 MMHG | BODY MASS INDEX: 25.28 KG/M2 | RESPIRATION RATE: 16 BRPM | DIASTOLIC BLOOD PRESSURE: 51 MMHG | HEIGHT: 70 IN | WEIGHT: 176.59 LBS | HEART RATE: 76 BPM

## 2024-06-17 DIAGNOSIS — D50.0 IRON DEFICIENCY ANEMIA DUE TO CHRONIC BLOOD LOSS: ICD-10-CM

## 2024-06-17 DIAGNOSIS — C20 RECTAL CARCINOMA (HCC): ICD-10-CM

## 2024-06-17 DIAGNOSIS — D50.8 OTHER IRON DEFICIENCY ANEMIA: ICD-10-CM

## 2024-06-17 LAB
ABO GROUP BLD: NORMAL
ALBUMIN SERPL BCP-MCNC: 2.7 G/DL (ref 3.2–4.9)
ALBUMIN/GLOB SERPL: 0.7 G/DL
ALP SERPL-CCNC: 77 U/L (ref 30–99)
ALT SERPL-CCNC: 11 U/L (ref 2–50)
ANION GAP SERPL CALC-SCNC: 11 MMOL/L (ref 7–16)
AST SERPL-CCNC: 16 U/L (ref 12–45)
BARCODED ABORH UBTYP: 5100
BARCODED PRD CODE UBPRD: NORMAL
BARCODED UNIT NUM UBUNT: NORMAL
BASOPHILS # BLD AUTO: 0.4 % (ref 0–1.8)
BASOPHILS # BLD: 0.06 K/UL (ref 0–0.12)
BILIRUB SERPL-MCNC: 0.3 MG/DL (ref 0.1–1.5)
BLD GP AB SCN SERPL QL: NORMAL
BUN SERPL-MCNC: 16 MG/DL (ref 8–22)
CALCIUM ALBUM COR SERPL-MCNC: 9.4 MG/DL (ref 8.5–10.5)
CALCIUM SERPL-MCNC: 8.4 MG/DL (ref 8.5–10.5)
CHLORIDE SERPL-SCNC: 104 MMOL/L (ref 96–112)
CO2 SERPL-SCNC: 23 MMOL/L (ref 20–33)
COMPONENT R 8504R: NORMAL
CREAT SERPL-MCNC: 0.74 MG/DL (ref 0.5–1.4)
EOSINOPHIL # BLD AUTO: 0.07 K/UL (ref 0–0.51)
EOSINOPHIL NFR BLD: 0.5 % (ref 0–6.9)
ERYTHROCYTE [DISTWIDTH] IN BLOOD BY AUTOMATED COUNT: 46.9 FL (ref 35.9–50)
GFR SERPLBLD CREATININE-BSD FMLA CKD-EPI: 106 ML/MIN/1.73 M 2
GLOBULIN SER CALC-MCNC: 3.8 G/DL (ref 1.9–3.5)
GLUCOSE SERPL-MCNC: 92 MG/DL (ref 65–99)
HCT VFR BLD AUTO: 26.5 % (ref 42–52)
HGB BLD-MCNC: 7.8 G/DL (ref 14–18)
IMM GRANULOCYTES # BLD AUTO: 0.06 K/UL (ref 0–0.11)
IMM GRANULOCYTES NFR BLD AUTO: 0.4 % (ref 0–0.9)
LYMPHOCYTES # BLD AUTO: 1.06 K/UL (ref 1–4.8)
LYMPHOCYTES NFR BLD: 7.7 % (ref 22–41)
MAGNESIUM SERPL-MCNC: 1.8 MG/DL (ref 1.5–2.5)
MCH RBC QN AUTO: 20.5 PG (ref 27–33)
MCHC RBC AUTO-ENTMCNC: 29.4 G/DL (ref 32.3–36.5)
MCV RBC AUTO: 69.7 FL (ref 81.4–97.8)
MONOCYTES # BLD AUTO: 0.78 K/UL (ref 0–0.85)
MONOCYTES NFR BLD AUTO: 5.7 % (ref 0–13.4)
NEUTROPHILS # BLD AUTO: 11.67 K/UL (ref 1.82–7.42)
NEUTROPHILS NFR BLD: 85.3 % (ref 44–72)
NRBC # BLD AUTO: 0 K/UL
NRBC BLD-RTO: 0 /100 WBC (ref 0–0.2)
OUTPT INFUS CBC COMMENT OICOM: ABNORMAL
PLATELET # BLD AUTO: 407 K/UL (ref 164–446)
PMV BLD AUTO: 8.9 FL (ref 9–12.9)
POTASSIUM SERPL-SCNC: 3.8 MMOL/L (ref 3.6–5.5)
PRODUCT TYPE UPROD: NORMAL
PROT SERPL-MCNC: 6.5 G/DL (ref 6–8.2)
RBC # BLD AUTO: 3.8 M/UL (ref 4.7–6.1)
RH BLD: NORMAL
SODIUM SERPL-SCNC: 138 MMOL/L (ref 135–145)
UNIT STATUS USTAT: NORMAL
WBC # BLD AUTO: 13.7 K/UL (ref 4.8–10.8)

## 2024-06-17 PROCEDURE — 86850 RBC ANTIBODY SCREEN: CPT

## 2024-06-17 PROCEDURE — 36430 TRANSFUSION BLD/BLD COMPNT: CPT

## 2024-06-17 PROCEDURE — 306780 HCHG STAT FOR TRANSFUSION PER CASE

## 2024-06-17 PROCEDURE — 96413 CHEMO IV INFUSION 1 HR: CPT

## 2024-06-17 PROCEDURE — 86901 BLOOD TYPING SEROLOGIC RH(D): CPT

## 2024-06-17 PROCEDURE — 700111 HCHG RX REV CODE 636 W/ 250 OVERRIDE (IP): Performed by: INTERNAL MEDICINE

## 2024-06-17 PROCEDURE — 80053 COMPREHEN METABOLIC PANEL: CPT

## 2024-06-17 PROCEDURE — 96375 TX/PRO/DX INJ NEW DRUG ADDON: CPT

## 2024-06-17 PROCEDURE — 96367 TX/PROPH/DG ADDL SEQ IV INF: CPT

## 2024-06-17 PROCEDURE — A4212 NON CORING NEEDLE OR STYLET: HCPCS

## 2024-06-17 PROCEDURE — 85025 COMPLETE CBC W/AUTO DIFF WBC: CPT

## 2024-06-17 PROCEDURE — 86923 COMPATIBILITY TEST ELECTRIC: CPT

## 2024-06-17 PROCEDURE — 83735 ASSAY OF MAGNESIUM: CPT

## 2024-06-17 PROCEDURE — 700105 HCHG RX REV CODE 258: Performed by: INTERNAL MEDICINE

## 2024-06-17 PROCEDURE — A9270 NON-COVERED ITEM OR SERVICE: HCPCS | Performed by: INTERNAL MEDICINE

## 2024-06-17 PROCEDURE — 700102 HCHG RX REV CODE 250 W/ 637 OVERRIDE(OP): Performed by: INTERNAL MEDICINE

## 2024-06-17 PROCEDURE — P9016 RBC LEUKOCYTES REDUCED: HCPCS

## 2024-06-17 PROCEDURE — 86900 BLOOD TYPING SEROLOGIC ABO: CPT

## 2024-06-17 RX ORDER — ACETAMINOPHEN 325 MG/1
650 TABLET ORAL PRN
OUTPATIENT
Start: 2024-06-17

## 2024-06-17 RX ORDER — 0.9 % SODIUM CHLORIDE 0.9 %
VIAL (ML) INJECTION PRN
OUTPATIENT
Start: 2024-06-17

## 2024-06-17 RX ORDER — 0.9 % SODIUM CHLORIDE 0.9 %
3 VIAL (ML) INJECTION PRN
OUTPATIENT
Start: 2024-06-17

## 2024-06-17 RX ORDER — SODIUM CHLORIDE 9 MG/ML
INJECTION, SOLUTION INTRAVENOUS CONTINUOUS
OUTPATIENT
Start: 2024-06-17

## 2024-06-17 RX ORDER — 0.9 % SODIUM CHLORIDE 0.9 %
10 VIAL (ML) INJECTION PRN
OUTPATIENT
Start: 2024-06-17

## 2024-06-17 RX ORDER — DIPHENHYDRAMINE HYDROCHLORIDE 50 MG/ML
25 INJECTION INTRAMUSCULAR; INTRAVENOUS PRN
OUTPATIENT
Start: 2024-06-17

## 2024-06-17 RX ORDER — METHYLPREDNISOLONE SODIUM SUCCINATE 125 MG/2ML
125 INJECTION, POWDER, LYOPHILIZED, FOR SOLUTION INTRAMUSCULAR; INTRAVENOUS PRN
OUTPATIENT
Start: 2024-06-17

## 2024-06-17 RX ORDER — DIPHENHYDRAMINE HCL 25 MG
25 TABLET ORAL ONCE
Status: DISCONTINUED | OUTPATIENT
Start: 2024-06-17 | End: 2024-06-17

## 2024-06-17 RX ORDER — ACETAMINOPHEN 325 MG/1
650 TABLET ORAL ONCE
OUTPATIENT
Start: 2024-06-17

## 2024-06-17 RX ORDER — DIPHENHYDRAMINE HCL 25 MG
25 TABLET ORAL ONCE
OUTPATIENT
Start: 2024-06-17 | End: 2024-06-17

## 2024-06-17 RX ORDER — DIPHENHYDRAMINE HYDROCHLORIDE 50 MG/ML
50 INJECTION INTRAMUSCULAR; INTRAVENOUS PRN
OUTPATIENT
Start: 2024-06-17

## 2024-06-17 RX ORDER — ACETAMINOPHEN 325 MG/1
650 TABLET ORAL ONCE
Status: COMPLETED | OUTPATIENT
Start: 2024-06-17 | End: 2024-06-17

## 2024-06-17 RX ORDER — EPINEPHRINE 1 MG/ML(1)
0.5 AMPUL (ML) INJECTION PRN
OUTPATIENT
Start: 2024-06-17

## 2024-06-17 RX ORDER — SODIUM CHLORIDE 9 MG/ML
INJECTION, SOLUTION INTRAVENOUS CONTINUOUS
Status: DISCONTINUED | OUTPATIENT
Start: 2024-06-17 | End: 2024-06-17 | Stop reason: HOSPADM

## 2024-06-17 RX ADMIN — CETUXIMAB 1000 MG: 2 SOLUTION INTRAVENOUS at 15:24

## 2024-06-17 RX ADMIN — ACETAMINOPHEN 650 MG: 325 TABLET ORAL at 16:54

## 2024-06-17 RX ADMIN — FERUMOXYTOL 510 MG: 510 INJECTION INTRAVENOUS at 14:05

## 2024-06-17 RX ADMIN — DIPHENHYDRAMINE HYDROCHLORIDE 25 MG: 50 INJECTION, SOLUTION INTRAMUSCULAR; INTRAVENOUS at 15:04

## 2024-06-17 RX ADMIN — HEPARIN 500 UNITS: 100 SYRINGE at 19:34

## 2024-06-17 ASSESSMENT — FIBROSIS 4 INDEX
FIB4 SCORE: 0.83
FIB4 SCORE: 0.66

## 2024-06-17 ASSESSMENT — PAIN DESCRIPTION - PAIN TYPE: TYPE: CHRONIC PAIN

## 2024-06-17 NOTE — PROGRESS NOTES
Chemotherapy Verification - SECONDARY RN       Height = 178 cm  Weight = 80.1 kg  BSA = 1.99 m2       Medication: Erbitux  Dose: 500 mg/m2  Calculated Dose: 995 mg                             (In mg/m2, AUC, mg/kg)     I confirm that this process was performed independently.

## 2024-06-17 NOTE — PROGRESS NOTES
"Nutrition Services: RD Consultation  Dre Eaton is a 58 y.o. male with diagnosis of rectal cancer.     RD met with pt to assess current intake, appetite, and nutritional status. Pt presents to appointment with self. Pt reports eating well with 3 meals  daily. Pt does not typically snack. Pt started a protein smoothie last week, could not recall brand. Pt aware of weight trend. RD and pt discussed ways to stabilize weight during treatment. Pt reports he's been able to gain weight during treatment before by drinking milkshakes and eating burgers. Pt trying to take a healthier route to weight stabilization this time. RD encouraged adding snacks and/or 2 protein shakes/d. Reviewed high protein food list. Encouraged nutrient dense snacking. Pt did not express any further nutrition-related questions or concerns at this time.     Dietary intake pattern:    B- Sausage, eggs  L- Salad w/ Protein  D- Tacos, pizza, pasta, etc.     Past Medical History:   Diagnosis Date    Cancer (HCC)     Hemorrhoid     Malignant neoplasm of rectum (HCC)        Past Surgical History:   Procedure Laterality Date    COLONOSCOPY WITH BIOPSY  10/14/2013    Performed by Julio Cesar Sosa M.D. at ENDOSCOPY [facility]    OTHER      Colostomy about 6-7 years ago.     Biochemical/ Anthropometric Assessment:  Treatment Regimen: OP Colorectal Cetuximab   Pertinent Labs: Ca 8.4  Pertinent Meds: Vit B12, Vit D, Vit C, potassium chloride  Wt Readings from Last 1 Encounters:   06/17/24 80.1 kg (176 lb 9.4 oz)      Ht Readings from Last 1 Encounters:   06/17/24 1.78 m (5' 10.08\")      BMI Readings from Last 1 Encounters:   06/17/24 25.28 kg/m²   BMI Classification: Overweight.     Weight History:  Wt Readings from Last 6 Encounters:   06/17/24 80.1 kg (176 lb 9.4 oz)   06/10/24 81 kg (178 lb 9.2 oz)   06/03/24 80 kg (176 lb 5.9 oz)   05/20/24 81 kg (178 lb 9.2 oz)   05/06/24 81.1 kg (178 lb 12.7 oz)   04/22/24 82.3 kg (181 lb 7 oz)     Weight " Change/Malnutrition Risk: Pt with a -2.76% wt loss in ~2 months, wt loss does not meet ASPEN malnutrition guidelines at this time.     Interventions:  Introduced self and discussed role of dietitian throughout treatment process   Provided and discussed handout regarding general nutrition guidelines as well as nutritional recommendations for patient's with colorectal cancer, including tips/tricks should side effects of tx occur.   Encouraged balanced diet with plant-based focus. Advised reducing or eliminating red/ processed meats as well as foods high in saturated fats, sodium, and added sugars as able. Reviewed sources. Verbalized importance of nutrition and maintaining LBM throughout treatment.    Increase meal and snack frequency to 4-6 x/day.   Focus on high protein foods, fruits and vegetables with all meals/snacks - handout provided.  Continue boost plus or comparable protein supplement.    Pt reports understanding and was receptive to information provided.   RD provided contact information. Encouraged pt to reach out as questions/concerns arise.   RD available PRN  [phone]

## 2024-06-17 NOTE — PROGRESS NOTES
"Pharmacy Chemotherapy calculation:    DX: Metastatic rectal carcinoma with KRAS G12C mutation    Cycle 5 Day 1  Previous treatment = C4D15 6/3/24    Regimen: Adagrasib + Cetuximab  *Dosing Reference*  Cetuximab 400 mg/m² IV over 2 hours on Day 1 of Cycle 1   Cetuximab 500 mg/m² IV over 1-2 hours starting Day 15 of Cycle 1    - 3/23/24: ok to resume with 500 mg/m2 dose per Dr. Aguila   Adagrasib  600 mg PO BID on Days 1-28   28-day cycles until disease progression or unacceptable toxicity  Aravind SHORT et al. N Engl J Med 2023;388:44-54.    Allergies: Patient has no known allergies.  BP 98/61   Pulse 98   Temp 36.9 °C (98.4 °F) (Temporal)   Resp 18   Ht 1.78 m (5' 10.08\")   Wt 80.1 kg (176 lb 9.4 oz)   SpO2 100%   BMI 25.28 kg/m²   Body surface area is 1.99 meters squared.    No treatment parameters. Mag to be replaced if needed.     Cetuximab 500 mg/m2 x 1.99 m2 = 995 mg   <10% difference, okay to treat with final dose = 1000 mg IV    Bal Grande, PharmD    "

## 2024-06-17 NOTE — PROGRESS NOTES
Chemotherapy Verification - PRIMARY RN      Height = 1.78m  Weight = 80.1kg  BSA = 1.99m2       Medication: cetuximab  Dose: 500mg/m2  Calculated Dose: 995 mg                             (In mg/m2, AUC, mg/kg)           I confirm this process was performed independently with the BSA and all final chemotherapy dosing calculations congruent.  Any discrepancies of 10% or greater have been addressed with the chemotherapy pharmacist. The resolution of the discrepancy has been documented in the EPIC progress notes.

## 2024-06-18 NOTE — PROGRESS NOTES
Dre presents to infusion for cycle 5/ day 1 of cetuximab for rectal carcinoma as well as dose 2/2 of Feraheme.     Patient reports feeling fatigued today, otherwise tolerating treatment.     Port accessed using sterile technique, flushed with NS with positive blood return and labs drawn off of port.     Feraheme infused over 30 minutes followed by 30 minute observation period, patient tolerated well.     Labs reviewed by RN, within parameters for treatment today. Hgb: 7.8, PRN blood transfusion plan in place for 1U PRBC if <8.      Pre-treatment meds given as ordered.     Cetuximab given over 1 hour followed by 1 hour observation period, patient tolerated well.    1U PRBC transfused over 2 hours, patient tolerated well with no adverse effects.     Port flushed post infusion with positive blood return, heparinized and d/rosalina with tip intact, site covered with band aid. Patient educated on s/s of transfusion reaction to seek medical care for, patient verbalized understanding.   Patient left in stable condition, knows when to return for next appt.

## 2024-06-20 ENCOUNTER — PATIENT OUTREACH (OUTPATIENT)
Dept: ONCOLOGY | Facility: MEDICAL CENTER | Age: 59
End: 2024-06-20
Payer: MEDICARE

## 2024-06-20 ENCOUNTER — PATIENT MESSAGE (OUTPATIENT)
Dept: ONCOLOGY | Facility: MEDICAL CENTER | Age: 59
End: 2024-06-20
Payer: MEDICARE

## 2024-07-01 ENCOUNTER — OUTPATIENT INFUSION SERVICES (OUTPATIENT)
Dept: ONCOLOGY | Facility: MEDICAL CENTER | Age: 59
End: 2024-07-01
Attending: INTERNAL MEDICINE
Payer: MEDICARE

## 2024-07-01 VITALS
WEIGHT: 171.3 LBS | OXYGEN SATURATION: 92 % | HEIGHT: 70 IN | HEART RATE: 66 BPM | BODY MASS INDEX: 24.52 KG/M2 | RESPIRATION RATE: 18 BRPM | TEMPERATURE: 98 F | SYSTOLIC BLOOD PRESSURE: 86 MMHG | DIASTOLIC BLOOD PRESSURE: 66 MMHG

## 2024-07-01 DIAGNOSIS — C20 RECTAL CARCINOMA (HCC): ICD-10-CM

## 2024-07-01 LAB — MAGNESIUM SERPL-MCNC: 1.8 MG/DL (ref 1.5–2.5)

## 2024-07-01 PROCEDURE — 83735 ASSAY OF MAGNESIUM: CPT

## 2024-07-01 PROCEDURE — 96375 TX/PRO/DX INJ NEW DRUG ADDON: CPT

## 2024-07-01 PROCEDURE — A4212 NON CORING NEEDLE OR STYLET: HCPCS

## 2024-07-01 PROCEDURE — 700111 HCHG RX REV CODE 636 W/ 250 OVERRIDE (IP): Mod: JZ | Performed by: INTERNAL MEDICINE

## 2024-07-01 PROCEDURE — 700105 HCHG RX REV CODE 258: Performed by: INTERNAL MEDICINE

## 2024-07-01 PROCEDURE — 96413 CHEMO IV INFUSION 1 HR: CPT

## 2024-07-01 RX ADMIN — HEPARIN 500 UNITS: 100 SYRINGE at 12:49

## 2024-07-01 RX ADMIN — CETUXIMAB 1000 MG: 2 SOLUTION INTRAVENOUS at 10:46

## 2024-07-01 RX ADMIN — DIPHENHYDRAMINE HYDROCHLORIDE 25 MG: 50 INJECTION, SOLUTION INTRAMUSCULAR; INTRAVENOUS at 10:21

## 2024-07-01 ASSESSMENT — FIBROSIS 4 INDEX: FIB4 SCORE: 0.66

## 2024-07-10 ENCOUNTER — HOSPITAL ENCOUNTER (OUTPATIENT)
Facility: MEDICAL CENTER | Age: 59
End: 2024-07-10
Attending: INTERNAL MEDICINE
Payer: MEDICARE

## 2024-07-10 LAB
ALBUMIN SERPL BCP-MCNC: 3 G/DL (ref 3.2–4.9)
ALBUMIN/GLOB SERPL: 0.8 G/DL
ALP SERPL-CCNC: 98 U/L (ref 30–99)
ALT SERPL-CCNC: 16 U/L (ref 2–50)
ANION GAP SERPL CALC-SCNC: 9 MMOL/L (ref 7–16)
AST SERPL-CCNC: 6 U/L (ref 12–45)
BILIRUB SERPL-MCNC: 0.2 MG/DL (ref 0.1–1.5)
BUN SERPL-MCNC: 19 MG/DL (ref 8–22)
CALCIUM ALBUM COR SERPL-MCNC: 9.1 MG/DL (ref 8.5–10.5)
CALCIUM SERPL-MCNC: 8.3 MG/DL (ref 8.5–10.5)
CHLORIDE SERPL-SCNC: 103 MMOL/L (ref 96–112)
CO2 SERPL-SCNC: 26 MMOL/L (ref 20–33)
CREAT SERPL-MCNC: 0.96 MG/DL (ref 0.5–1.4)
GFR SERPLBLD CREATININE-BSD FMLA CKD-EPI: 92 ML/MIN/1.73 M 2
GLOBULIN SER CALC-MCNC: 3.6 G/DL (ref 1.9–3.5)
GLUCOSE SERPL-MCNC: 115 MG/DL (ref 65–99)
IRON SATN MFR SERPL: 10 % (ref 15–55)
IRON SERPL-MCNC: 19 UG/DL (ref 50–180)
LDH SERPL L TO P-CCNC: 118 U/L (ref 107–266)
POTASSIUM SERPL-SCNC: 4.7 MMOL/L (ref 3.6–5.5)
PROT SERPL-MCNC: 6.6 G/DL (ref 6–8.2)
SODIUM SERPL-SCNC: 138 MMOL/L (ref 135–145)
TIBC SERPL-MCNC: 195 UG/DL (ref 250–450)
UIBC SERPL-MCNC: 176 UG/DL (ref 110–370)

## 2024-07-10 PROCEDURE — 82728 ASSAY OF FERRITIN: CPT

## 2024-07-10 PROCEDURE — 82607 VITAMIN B-12: CPT

## 2024-07-10 PROCEDURE — 83615 LACTATE (LD) (LDH) ENZYME: CPT

## 2024-07-10 PROCEDURE — 83540 ASSAY OF IRON: CPT

## 2024-07-10 PROCEDURE — 80053 COMPREHEN METABOLIC PANEL: CPT

## 2024-07-10 PROCEDURE — 83550 IRON BINDING TEST: CPT

## 2024-07-11 LAB
FERRITIN SERPL-MCNC: 842 NG/ML (ref 22–322)
VIT B12 SERPL-MCNC: 597 PG/ML (ref 211–911)

## 2024-07-15 ENCOUNTER — OUTPATIENT INFUSION SERVICES (OUTPATIENT)
Dept: ONCOLOGY | Facility: MEDICAL CENTER | Age: 59
End: 2024-07-15
Attending: INTERNAL MEDICINE
Payer: MEDICARE

## 2024-07-15 VITALS
BODY MASS INDEX: 24.78 KG/M2 | HEART RATE: 88 BPM | HEIGHT: 70 IN | DIASTOLIC BLOOD PRESSURE: 70 MMHG | TEMPERATURE: 97 F | RESPIRATION RATE: 18 BRPM | OXYGEN SATURATION: 98 % | WEIGHT: 173.06 LBS | SYSTOLIC BLOOD PRESSURE: 108 MMHG

## 2024-07-15 DIAGNOSIS — C20 RECTAL CARCINOMA (HCC): ICD-10-CM

## 2024-07-15 LAB
ALBUMIN SERPL BCP-MCNC: 2.9 G/DL (ref 3.2–4.9)
ALBUMIN/GLOB SERPL: 0.7 G/DL
ALP SERPL-CCNC: 83 U/L (ref 30–99)
ALT SERPL-CCNC: 13 U/L (ref 2–50)
ANION GAP SERPL CALC-SCNC: 13 MMOL/L (ref 7–16)
APPEARANCE UR: ABNORMAL
AST SERPL-CCNC: 16 U/L (ref 12–45)
BASOPHILS # BLD AUTO: 0.6 % (ref 0–1.8)
BASOPHILS # BLD: 0.07 K/UL (ref 0–0.12)
BILIRUB SERPL-MCNC: 0.3 MG/DL (ref 0.1–1.5)
BUN SERPL-MCNC: 19 MG/DL (ref 8–22)
CALCIUM ALBUM COR SERPL-MCNC: 9.3 MG/DL (ref 8.5–10.5)
CALCIUM SERPL-MCNC: 8.4 MG/DL (ref 8.5–10.5)
CHLORIDE SERPL-SCNC: 104 MMOL/L (ref 96–112)
CO2 SERPL-SCNC: 22 MMOL/L (ref 20–33)
COLOR UR AUTO: ABNORMAL
CREAT SERPL-MCNC: 0.84 MG/DL (ref 0.5–1.4)
EOSINOPHIL # BLD AUTO: 0.28 K/UL (ref 0–0.51)
EOSINOPHIL NFR BLD: 2.3 % (ref 0–6.9)
ERYTHROCYTE [DISTWIDTH] IN BLOOD BY AUTOMATED COUNT: 53.4 FL (ref 35.9–50)
GFR SERPLBLD CREATININE-BSD FMLA CKD-EPI: 102 ML/MIN/1.73 M 2
GLOBULIN SER CALC-MCNC: 4.1 G/DL (ref 1.9–3.5)
GLUCOSE SERPL-MCNC: 89 MG/DL (ref 65–99)
GLUCOSE UR QL STRIP.AUTO: NEGATIVE MG/DL
HCT VFR BLD AUTO: 29.9 % (ref 42–52)
HGB BLD-MCNC: 8.7 G/DL (ref 14–18)
IMM GRANULOCYTES # BLD AUTO: 0.07 K/UL (ref 0–0.11)
IMM GRANULOCYTES NFR BLD AUTO: 0.6 % (ref 0–0.9)
KETONES UR QL STRIP.AUTO: NEGATIVE MG/DL
LEUKOCYTE ESTERASE UR QL STRIP.AUTO: ABNORMAL
LYMPHOCYTES # BLD AUTO: 1.1 K/UL (ref 1–4.8)
LYMPHOCYTES NFR BLD: 9.2 % (ref 22–41)
MCH RBC QN AUTO: 20.7 PG (ref 27–33)
MCHC RBC AUTO-ENTMCNC: 29.1 G/DL (ref 32.3–36.5)
MCV RBC AUTO: 71.2 FL (ref 81.4–97.8)
MONOCYTES # BLD AUTO: 0.68 K/UL (ref 0–0.85)
MONOCYTES NFR BLD AUTO: 5.7 % (ref 0–13.4)
NEUTROPHILS # BLD AUTO: 9.81 K/UL (ref 1.82–7.42)
NEUTROPHILS NFR BLD: 81.6 % (ref 44–72)
NITRITE UR QL STRIP.AUTO: POSITIVE
NRBC # BLD AUTO: 0 K/UL
NRBC BLD-RTO: 0 /100 WBC (ref 0–0.2)
OUTPT INFUS CBC COMMENT OICOM: ABNORMAL
PH UR STRIP.AUTO: 8.5 [PH] (ref 5–8)
PLATELET # BLD AUTO: 439 K/UL (ref 164–446)
PMV BLD AUTO: 9.1 FL (ref 9–12.9)
POTASSIUM SERPL-SCNC: 3.8 MMOL/L (ref 3.6–5.5)
PROT SERPL-MCNC: 7 G/DL (ref 6–8.2)
PROT UR QL STRIP: 100 MG/DL
RBC # BLD AUTO: 4.2 M/UL (ref 4.7–6.1)
RBC UR QL AUTO: ABNORMAL
SODIUM SERPL-SCNC: 139 MMOL/L (ref 135–145)
SP GR UR STRIP.AUTO: 1.01 (ref 1–1.03)
WBC # BLD AUTO: 12 K/UL (ref 4.8–10.8)

## 2024-07-15 PROCEDURE — 700105 HCHG RX REV CODE 258: Performed by: INTERNAL MEDICINE

## 2024-07-15 PROCEDURE — 81002 URINALYSIS NONAUTO W/O SCOPE: CPT

## 2024-07-15 PROCEDURE — 96413 CHEMO IV INFUSION 1 HR: CPT

## 2024-07-15 PROCEDURE — 96415 CHEMO IV INFUSION ADDL HR: CPT

## 2024-07-15 PROCEDURE — 80053 COMPREHEN METABOLIC PANEL: CPT

## 2024-07-15 PROCEDURE — 85025 COMPLETE CBC W/AUTO DIFF WBC: CPT

## 2024-07-15 PROCEDURE — 700111 HCHG RX REV CODE 636 W/ 250 OVERRIDE (IP): Performed by: INTERNAL MEDICINE

## 2024-07-15 PROCEDURE — A4212 NON CORING NEEDLE OR STYLET: HCPCS

## 2024-07-15 RX ADMIN — HEPARIN 500 UNITS: 100 SYRINGE at 14:00

## 2024-07-15 RX ADMIN — SODIUM CHLORIDE 400 MG: 9 INJECTION, SOLUTION INTRAVENOUS at 12:20

## 2024-07-15 ASSESSMENT — FIBROSIS 4 INDEX: FIB4 SCORE: 0.21

## 2024-07-16 ENCOUNTER — PATIENT OUTREACH (OUTPATIENT)
Dept: ONCOLOGY | Facility: MEDICAL CENTER | Age: 59
End: 2024-07-16
Payer: MEDICARE

## 2024-07-29 ENCOUNTER — APPOINTMENT (OUTPATIENT)
Dept: ONCOLOGY | Facility: MEDICAL CENTER | Age: 59
End: 2024-07-29
Attending: INTERNAL MEDICINE
Payer: MEDICARE

## 2024-09-05 ENCOUNTER — HOSPITAL ENCOUNTER (OUTPATIENT)
Facility: MEDICAL CENTER | Age: 59
End: 2024-09-05
Attending: PHYSICIAN ASSISTANT
Payer: MEDICARE

## 2024-09-05 PROCEDURE — 87086 URINE CULTURE/COLONY COUNT: CPT

## 2024-09-06 LAB
BACTERIA UR CULT: NORMAL
SIGNIFICANT IND 70042: NORMAL
SITE SITE: NORMAL
SOURCE SOURCE: NORMAL

## 2024-09-12 RX ORDER — SODIUM CHLORIDE 9 MG/ML
INJECTION, SOLUTION INTRAVENOUS CONTINUOUS
Status: CANCELLED | OUTPATIENT
Start: 2024-09-12

## 2024-09-12 RX ORDER — ACETAMINOPHEN 325 MG/1
650 TABLET ORAL PRN
Status: CANCELLED | OUTPATIENT
Start: 2024-09-12

## 2024-09-12 RX ORDER — 0.9 % SODIUM CHLORIDE 0.9 %
10 VIAL (ML) INJECTION PRN
Status: CANCELLED | OUTPATIENT
Start: 2024-09-12

## 2024-09-12 RX ORDER — 0.9 % SODIUM CHLORIDE 0.9 %
3 VIAL (ML) INJECTION PRN
Status: CANCELLED | OUTPATIENT
Start: 2024-09-12

## 2024-09-12 RX ORDER — 0.9 % SODIUM CHLORIDE 0.9 %
VIAL (ML) INJECTION PRN
Status: CANCELLED | OUTPATIENT
Start: 2024-09-12

## 2024-09-12 RX ORDER — DIPHENHYDRAMINE HYDROCHLORIDE 50 MG/ML
25 INJECTION INTRAMUSCULAR; INTRAVENOUS PRN
Status: CANCELLED | OUTPATIENT
Start: 2024-09-12

## 2024-09-12 RX ORDER — DIPHENHYDRAMINE HCL 25 MG
25 TABLET ORAL ONCE
Status: CANCELLED | OUTPATIENT
Start: 2024-09-12 | End: 2024-09-12

## 2024-09-12 RX ORDER — ACETAMINOPHEN 325 MG/1
650 TABLET ORAL ONCE
Status: CANCELLED | OUTPATIENT
Start: 2024-09-12

## 2024-09-14 ENCOUNTER — OUTPATIENT INFUSION SERVICES (OUTPATIENT)
Dept: ONCOLOGY | Facility: MEDICAL CENTER | Age: 59
End: 2024-09-14
Attending: INTERNAL MEDICINE
Payer: MEDICARE

## 2024-09-14 VITALS
DIASTOLIC BLOOD PRESSURE: 54 MMHG | TEMPERATURE: 97.1 F | BODY MASS INDEX: 24.59 KG/M2 | OXYGEN SATURATION: 100 % | WEIGHT: 166.01 LBS | HEIGHT: 69 IN | HEART RATE: 86 BPM | RESPIRATION RATE: 16 BRPM | SYSTOLIC BLOOD PRESSURE: 95 MMHG

## 2024-09-14 DIAGNOSIS — D50.8 OTHER IRON DEFICIENCY ANEMIA: ICD-10-CM

## 2024-09-14 LAB
ABO GROUP BLD: NORMAL
BARCODED ABORH UBTYP: 5100
BARCODED PRD CODE UBPRD: NORMAL
BARCODED UNIT NUM UBUNT: NORMAL
BASOPHILS # BLD AUTO: 0.3 % (ref 0–1.8)
BASOPHILS # BLD: 0.02 K/UL (ref 0–0.12)
BLD GP AB SCN SERPL QL: NORMAL
COMPONENT R 8504R: NORMAL
EOSINOPHIL # BLD AUTO: 0.06 K/UL (ref 0–0.51)
EOSINOPHIL NFR BLD: 0.8 % (ref 0–6.9)
ERYTHROCYTE [DISTWIDTH] IN BLOOD BY AUTOMATED COUNT: 54 FL (ref 35.9–50)
HCT VFR BLD AUTO: 25.4 % (ref 42–52)
HGB BLD-MCNC: 7.6 G/DL (ref 14–18)
IMM GRANULOCYTES # BLD AUTO: 0.04 K/UL (ref 0–0.11)
IMM GRANULOCYTES NFR BLD AUTO: 0.5 % (ref 0–0.9)
LYMPHOCYTES # BLD AUTO: 0.66 K/UL (ref 1–4.8)
LYMPHOCYTES NFR BLD: 8.8 % (ref 22–41)
MCH RBC QN AUTO: 22.7 PG (ref 27–33)
MCHC RBC AUTO-ENTMCNC: 29.9 G/DL (ref 32.3–36.5)
MCV RBC AUTO: 75.8 FL (ref 81.4–97.8)
MONOCYTES # BLD AUTO: 0.64 K/UL (ref 0–0.85)
MONOCYTES NFR BLD AUTO: 8.6 % (ref 0–13.4)
NEUTROPHILS # BLD AUTO: 6.04 K/UL (ref 1.82–7.42)
NEUTROPHILS NFR BLD: 81 % (ref 44–72)
NRBC # BLD AUTO: 0 K/UL
NRBC BLD-RTO: 0 /100 WBC (ref 0–0.2)
OUTPT INFUS CBC COMMENT OICOM: ABNORMAL
PLATELET # BLD AUTO: 459 K/UL (ref 164–446)
PMV BLD AUTO: 9 FL (ref 9–12.9)
PRODUCT TYPE UPROD: NORMAL
RBC # BLD AUTO: 3.35 M/UL (ref 4.7–6.1)
RH BLD: NORMAL
UNIT STATUS USTAT: NORMAL
WBC # BLD AUTO: 7.5 K/UL (ref 4.8–10.8)

## 2024-09-14 PROCEDURE — 86900 BLOOD TYPING SEROLOGIC ABO: CPT

## 2024-09-14 PROCEDURE — 306780 HCHG STAT FOR TRANSFUSION PER CASE

## 2024-09-14 PROCEDURE — A9270 NON-COVERED ITEM OR SERVICE: HCPCS | Performed by: INTERNAL MEDICINE

## 2024-09-14 PROCEDURE — 86923 COMPATIBILITY TEST ELECTRIC: CPT

## 2024-09-14 PROCEDURE — 700111 HCHG RX REV CODE 636 W/ 250 OVERRIDE (IP): Mod: JZ | Performed by: INTERNAL MEDICINE

## 2024-09-14 PROCEDURE — 36430 TRANSFUSION BLD/BLD COMPNT: CPT

## 2024-09-14 PROCEDURE — 700102 HCHG RX REV CODE 250 W/ 637 OVERRIDE(OP): Performed by: INTERNAL MEDICINE

## 2024-09-14 PROCEDURE — A4212 NON CORING NEEDLE OR STYLET: HCPCS

## 2024-09-14 PROCEDURE — 85025 COMPLETE CBC W/AUTO DIFF WBC: CPT

## 2024-09-14 PROCEDURE — 86901 BLOOD TYPING SEROLOGIC RH(D): CPT

## 2024-09-14 PROCEDURE — 700105 HCHG RX REV CODE 258: Performed by: INTERNAL MEDICINE

## 2024-09-14 PROCEDURE — P9016 RBC LEUKOCYTES REDUCED: HCPCS

## 2024-09-14 PROCEDURE — 96374 THER/PROPH/DIAG INJ IV PUSH: CPT

## 2024-09-14 PROCEDURE — 86850 RBC ANTIBODY SCREEN: CPT

## 2024-09-14 RX ORDER — DIPHENHYDRAMINE HYDROCHLORIDE 50 MG/ML
25 INJECTION INTRAMUSCULAR; INTRAVENOUS PRN
Status: CANCELLED | OUTPATIENT
Start: 2024-09-19

## 2024-09-14 RX ORDER — SODIUM CHLORIDE 9 MG/ML
INJECTION, SOLUTION INTRAVENOUS CONTINUOUS
Status: DISCONTINUED | OUTPATIENT
Start: 2024-09-14 | End: 2024-09-14 | Stop reason: HOSPADM

## 2024-09-14 RX ORDER — DIPHENHYDRAMINE HCL 25 MG
25 TABLET ORAL ONCE
Status: COMPLETED | OUTPATIENT
Start: 2024-09-14 | End: 2024-09-14

## 2024-09-14 RX ORDER — 0.9 % SODIUM CHLORIDE 0.9 %
3 VIAL (ML) INJECTION PRN
Status: CANCELLED | OUTPATIENT
Start: 2024-09-19

## 2024-09-14 RX ORDER — 0.9 % SODIUM CHLORIDE 0.9 %
10 VIAL (ML) INJECTION PRN
Status: CANCELLED | OUTPATIENT
Start: 2024-09-19

## 2024-09-14 RX ORDER — ACETAMINOPHEN 325 MG/1
650 TABLET ORAL PRN
Status: CANCELLED | OUTPATIENT
Start: 2024-09-19

## 2024-09-14 RX ORDER — DIPHENHYDRAMINE HCL 25 MG
25 TABLET ORAL ONCE
Status: CANCELLED | OUTPATIENT
Start: 2024-09-19 | End: 2024-09-19

## 2024-09-14 RX ORDER — SODIUM CHLORIDE 9 MG/ML
INJECTION, SOLUTION INTRAVENOUS CONTINUOUS
Status: CANCELLED | OUTPATIENT
Start: 2024-09-19

## 2024-09-14 RX ORDER — ACETAMINOPHEN 325 MG/1
650 TABLET ORAL ONCE
Status: COMPLETED | OUTPATIENT
Start: 2024-09-14 | End: 2024-09-14

## 2024-09-14 RX ORDER — ACETAMINOPHEN 325 MG/1
650 TABLET ORAL ONCE
Status: CANCELLED | OUTPATIENT
Start: 2024-09-19

## 2024-09-14 RX ORDER — 0.9 % SODIUM CHLORIDE 0.9 %
VIAL (ML) INJECTION PRN
Status: CANCELLED | OUTPATIENT
Start: 2024-09-19

## 2024-09-14 RX ADMIN — HEPARIN 500 UNITS: 100 SYRINGE at 15:07

## 2024-09-14 RX ADMIN — HYDROCORTISONE SODIUM SUCCINATE 100 MG: 100 INJECTION, POWDER, FOR SOLUTION INTRAMUSCULAR; INTRAVENOUS at 12:37

## 2024-09-14 RX ADMIN — ACETAMINOPHEN 650 MG: 325 TABLET ORAL at 12:37

## 2024-09-14 RX ADMIN — SODIUM CHLORIDE: 9 INJECTION, SOLUTION INTRAVENOUS at 12:41

## 2024-09-14 RX ADMIN — DIPHENHYDRAMINE HYDROCHLORIDE 25 MG: 25 TABLET ORAL at 12:37

## 2024-09-14 ASSESSMENT — FIBROSIS 4 INDEX: FIB4 SCORE: 0.58

## 2024-09-14 ASSESSMENT — PAIN DESCRIPTION - PAIN TYPE
TYPE: CHRONIC PAIN
TYPE: CHRONIC PAIN

## 2024-09-14 NOTE — PROGRESS NOTES
Dre arrives ambulatory for blood products. Port accessed in sterile fashion following [facility] policy. CBC and COD drawn. Line flushed using SASH technique with 20ml NS. Hgb 7.6 and Plt count 459. Pt meets parameters for 1 uprbc's. Consents sign. Premedications administered. 1 unit PRBCs transfused without any adverse reaction. Line flushed and checked again for + blood return. Heparin locked and deaccessed port. Pt left on foot in NAD with another appointment next week.

## 2024-09-21 ENCOUNTER — OUTPATIENT INFUSION SERVICES (OUTPATIENT)
Dept: ONCOLOGY | Facility: MEDICAL CENTER | Age: 59
End: 2024-09-21
Attending: NURSE PRACTITIONER
Payer: MEDICARE

## 2024-09-21 VITALS
HEIGHT: 69 IN | BODY MASS INDEX: 24.1 KG/M2 | DIASTOLIC BLOOD PRESSURE: 66 MMHG | HEART RATE: 113 BPM | WEIGHT: 162.7 LBS | TEMPERATURE: 97.5 F | RESPIRATION RATE: 16 BRPM | SYSTOLIC BLOOD PRESSURE: 106 MMHG | OXYGEN SATURATION: 99 %

## 2024-09-21 DIAGNOSIS — D50.8 OTHER IRON DEFICIENCY ANEMIA: ICD-10-CM

## 2024-09-21 LAB
BASOPHILS # BLD AUTO: 0.2 % (ref 0–1.8)
BASOPHILS # BLD: 0.03 K/UL (ref 0–0.12)
EOSINOPHIL # BLD AUTO: 0.08 K/UL (ref 0–0.51)
EOSINOPHIL NFR BLD: 0.6 % (ref 0–6.9)
ERYTHROCYTE [DISTWIDTH] IN BLOOD BY AUTOMATED COUNT: 54 FL (ref 35.9–50)
HCT VFR BLD AUTO: 31.9 % (ref 42–52)
HGB BLD-MCNC: 9.4 G/DL (ref 14–18)
IMM GRANULOCYTES # BLD AUTO: 0.1 K/UL (ref 0–0.11)
IMM GRANULOCYTES NFR BLD AUTO: 0.7 % (ref 0–0.9)
LYMPHOCYTES # BLD AUTO: 1.21 K/UL (ref 1–4.8)
LYMPHOCYTES NFR BLD: 8.8 % (ref 22–41)
MCH RBC QN AUTO: 22.5 PG (ref 27–33)
MCHC RBC AUTO-ENTMCNC: 29.5 G/DL (ref 32.3–36.5)
MCV RBC AUTO: 76.3 FL (ref 81.4–97.8)
MONOCYTES # BLD AUTO: 0.44 K/UL (ref 0–0.85)
MONOCYTES NFR BLD AUTO: 3.2 % (ref 0–13.4)
NEUTROPHILS # BLD AUTO: 11.88 K/UL (ref 1.82–7.42)
NEUTROPHILS NFR BLD: 86.5 % (ref 44–72)
NRBC # BLD AUTO: 0 K/UL
NRBC BLD-RTO: 0 /100 WBC (ref 0–0.2)
OUTPT INFUS CBC COMMENT OICOM: ABNORMAL
PLATELET # BLD AUTO: 592 K/UL (ref 164–446)
PMV BLD AUTO: 8.6 FL (ref 9–12.9)
RBC # BLD AUTO: 4.18 M/UL (ref 4.7–6.1)
WBC # BLD AUTO: 13.7 K/UL (ref 4.8–10.8)

## 2024-09-21 PROCEDURE — A4212 NON CORING NEEDLE OR STYLET: HCPCS

## 2024-09-21 PROCEDURE — 85025 COMPLETE CBC W/AUTO DIFF WBC: CPT

## 2024-09-21 PROCEDURE — 700111 HCHG RX REV CODE 636 W/ 250 OVERRIDE (IP): Performed by: INTERNAL MEDICINE

## 2024-09-21 PROCEDURE — 36592 COLLECT BLOOD FROM PICC: CPT

## 2024-09-21 RX ORDER — ACETAMINOPHEN 325 MG/1
650 TABLET ORAL PRN
OUTPATIENT
Start: 2024-09-28

## 2024-09-21 RX ORDER — DIPHENHYDRAMINE HYDROCHLORIDE 50 MG/ML
25 INJECTION INTRAMUSCULAR; INTRAVENOUS PRN
OUTPATIENT
Start: 2024-09-28

## 2024-09-21 RX ORDER — 0.9 % SODIUM CHLORIDE 0.9 %
VIAL (ML) INJECTION PRN
OUTPATIENT
Start: 2024-09-28

## 2024-09-21 RX ORDER — DIPHENHYDRAMINE HCL 25 MG
25 TABLET ORAL ONCE
OUTPATIENT
Start: 2024-09-28 | End: 2024-09-28

## 2024-09-21 RX ORDER — 0.9 % SODIUM CHLORIDE 0.9 %
3 VIAL (ML) INJECTION PRN
OUTPATIENT
Start: 2024-09-28

## 2024-09-21 RX ORDER — SODIUM CHLORIDE 9 MG/ML
INJECTION, SOLUTION INTRAVENOUS CONTINUOUS
OUTPATIENT
Start: 2024-09-28

## 2024-09-21 RX ORDER — 0.9 % SODIUM CHLORIDE 0.9 %
10 VIAL (ML) INJECTION PRN
OUTPATIENT
Start: 2024-09-28

## 2024-09-21 RX ORDER — ACETAMINOPHEN 325 MG/1
650 TABLET ORAL ONCE
OUTPATIENT
Start: 2024-09-28

## 2024-09-21 RX ADMIN — HEPARIN 500 UNITS: 100 SYRINGE at 11:26

## 2024-09-21 ASSESSMENT — FIBROSIS 4 INDEX: FIB4 SCORE: 0.55

## 2024-09-21 NOTE — PROGRESS NOTES
Dre presents to infusion for weekly CBC/possible blood transfusion. Patient reports feeling tired with increased fatigue. He states he thinks its due to his oral chemo medication.     Port accessed using sterile technique, flushed with NS with positive blood return and labs drawn off of port.    Labs reviewed by RN, Hgb: 9.4, plt: 592 does not meet MD parameters for transfusion today. Patient updated on labs.    Port flushed post infusion with positive blood return, heparinized and removed with tip intact, covered with gauze and tape.    Patient left in stable condition, knows when to return for next appointment.

## 2024-10-02 ENCOUNTER — OUTPATIENT INFUSION SERVICES (OUTPATIENT)
Dept: ONCOLOGY | Facility: MEDICAL CENTER | Age: 59
End: 2024-10-02
Attending: NURSE PRACTITIONER
Payer: MEDICARE

## 2024-10-02 VITALS
RESPIRATION RATE: 18 BRPM | HEIGHT: 69 IN | OXYGEN SATURATION: 99 % | TEMPERATURE: 98.1 F | SYSTOLIC BLOOD PRESSURE: 99 MMHG | DIASTOLIC BLOOD PRESSURE: 57 MMHG | BODY MASS INDEX: 24.16 KG/M2 | HEART RATE: 89 BPM | WEIGHT: 163.14 LBS

## 2024-10-02 DIAGNOSIS — D50.8 OTHER IRON DEFICIENCY ANEMIA: ICD-10-CM

## 2024-10-02 DIAGNOSIS — D50.0 IRON DEFICIENCY ANEMIA DUE TO CHRONIC BLOOD LOSS: ICD-10-CM

## 2024-10-02 LAB
ABO GROUP BLD: NORMAL
BARCODED ABORH UBTYP: 5100
BARCODED ABORH UBTYP: 5100
BARCODED PRD CODE UBPRD: NORMAL
BARCODED PRD CODE UBPRD: NORMAL
BARCODED UNIT NUM UBUNT: NORMAL
BARCODED UNIT NUM UBUNT: NORMAL
BASOPHILS # BLD AUTO: 0.4 % (ref 0–1.8)
BASOPHILS # BLD: 0.01 K/UL (ref 0–0.12)
BLD GP AB SCN SERPL QL: NORMAL
COMPONENT R 8504R: NORMAL
COMPONENT R 8504R: NORMAL
EOSINOPHIL # BLD AUTO: 0.03 K/UL (ref 0–0.51)
EOSINOPHIL NFR BLD: 1.2 % (ref 0–6.9)
ERYTHROCYTE [DISTWIDTH] IN BLOOD BY AUTOMATED COUNT: 51.9 FL (ref 35.9–50)
FERRITIN SERPL-MCNC: 1172 NG/ML (ref 22–322)
HCT VFR BLD AUTO: 23.3 % (ref 42–52)
HGB BLD-MCNC: 6.9 G/DL (ref 14–18)
IMM GRANULOCYTES # BLD AUTO: 0.01 K/UL (ref 0–0.11)
IMM GRANULOCYTES NFR BLD AUTO: 0.4 % (ref 0–0.9)
IRON SATN MFR SERPL: 7 % (ref 15–55)
IRON SERPL-MCNC: 10 UG/DL (ref 50–180)
LYMPHOCYTES # BLD AUTO: 0.45 K/UL (ref 1–4.8)
LYMPHOCYTES NFR BLD: 18.7 % (ref 22–41)
MCH RBC QN AUTO: 22.7 PG (ref 27–33)
MCHC RBC AUTO-ENTMCNC: 29.6 G/DL (ref 32.3–36.5)
MCV RBC AUTO: 76.6 FL (ref 81.4–97.8)
MONOCYTES # BLD AUTO: 0.22 K/UL (ref 0–0.85)
MONOCYTES NFR BLD AUTO: 9.1 % (ref 0–13.4)
NEUTROPHILS # BLD AUTO: 1.69 K/UL (ref 1.82–7.42)
NEUTROPHILS NFR BLD: 70.2 % (ref 44–72)
NRBC # BLD AUTO: 0 K/UL
NRBC BLD-RTO: 0 /100 WBC (ref 0–0.2)
OUTPT INFUS CBC COMMENT OICOM: ABNORMAL
PLATELET # BLD AUTO: 335 K/UL (ref 164–446)
PMV BLD AUTO: 8.9 FL (ref 9–12.9)
PRODUCT TYPE UPROD: NORMAL
PRODUCT TYPE UPROD: NORMAL
RBC # BLD AUTO: 3.04 M/UL (ref 4.7–6.1)
RH BLD: NORMAL
TIBC SERPL-MCNC: 141 UG/DL (ref 250–450)
UIBC SERPL-MCNC: 131 UG/DL (ref 110–370)
UNIT STATUS USTAT: NORMAL
UNIT STATUS USTAT: NORMAL
WBC # BLD AUTO: 2.4 K/UL (ref 4.8–10.8)

## 2024-10-02 PROCEDURE — 82728 ASSAY OF FERRITIN: CPT

## 2024-10-02 PROCEDURE — 700102 HCHG RX REV CODE 250 W/ 637 OVERRIDE(OP): Performed by: INTERNAL MEDICINE

## 2024-10-02 PROCEDURE — 86923 COMPATIBILITY TEST ELECTRIC: CPT | Mod: 91

## 2024-10-02 PROCEDURE — 83540 ASSAY OF IRON: CPT

## 2024-10-02 PROCEDURE — 83550 IRON BINDING TEST: CPT

## 2024-10-02 PROCEDURE — A9270 NON-COVERED ITEM OR SERVICE: HCPCS | Performed by: INTERNAL MEDICINE

## 2024-10-02 PROCEDURE — 306780 HCHG STAT FOR TRANSFUSION PER CASE

## 2024-10-02 PROCEDURE — 86900 BLOOD TYPING SEROLOGIC ABO: CPT

## 2024-10-02 PROCEDURE — A4212 NON CORING NEEDLE OR STYLET: HCPCS

## 2024-10-02 PROCEDURE — 36430 TRANSFUSION BLD/BLD COMPNT: CPT

## 2024-10-02 PROCEDURE — P9016 RBC LEUKOCYTES REDUCED: HCPCS | Mod: 91

## 2024-10-02 PROCEDURE — 96365 THER/PROPH/DIAG IV INF INIT: CPT

## 2024-10-02 PROCEDURE — 700105 HCHG RX REV CODE 258: Performed by: INTERNAL MEDICINE

## 2024-10-02 PROCEDURE — 700111 HCHG RX REV CODE 636 W/ 250 OVERRIDE (IP): Performed by: INTERNAL MEDICINE

## 2024-10-02 PROCEDURE — 85025 COMPLETE CBC W/AUTO DIFF WBC: CPT

## 2024-10-02 PROCEDURE — 86901 BLOOD TYPING SEROLOGIC RH(D): CPT

## 2024-10-02 PROCEDURE — 86850 RBC ANTIBODY SCREEN: CPT

## 2024-10-02 RX ORDER — METHYLPREDNISOLONE SODIUM SUCCINATE 125 MG/2ML
125 INJECTION, POWDER, LYOPHILIZED, FOR SOLUTION INTRAMUSCULAR; INTRAVENOUS PRN
OUTPATIENT
Start: 2024-10-02

## 2024-10-02 RX ORDER — DIPHENHYDRAMINE HCL 25 MG
25 TABLET ORAL ONCE
Status: CANCELLED | OUTPATIENT
Start: 2024-10-05 | End: 2024-10-05

## 2024-10-02 RX ORDER — 0.9 % SODIUM CHLORIDE 0.9 %
10 VIAL (ML) INJECTION PRN
OUTPATIENT
Start: 2024-10-05

## 2024-10-02 RX ORDER — 0.9 % SODIUM CHLORIDE 0.9 %
VIAL (ML) INJECTION PRN
OUTPATIENT
Start: 2024-10-05

## 2024-10-02 RX ORDER — SODIUM CHLORIDE 9 MG/ML
INJECTION, SOLUTION INTRAVENOUS CONTINUOUS
OUTPATIENT
Start: 2024-10-05

## 2024-10-02 RX ORDER — DIPHENHYDRAMINE HYDROCHLORIDE 50 MG/ML
50 INJECTION INTRAMUSCULAR; INTRAVENOUS PRN
OUTPATIENT
Start: 2024-10-02

## 2024-10-02 RX ORDER — DIPHENHYDRAMINE HCL 25 MG
25 TABLET ORAL ONCE
Status: COMPLETED | OUTPATIENT
Start: 2024-10-02 | End: 2024-10-02

## 2024-10-02 RX ORDER — SODIUM CHLORIDE 9 MG/ML
INJECTION, SOLUTION INTRAVENOUS CONTINUOUS
OUTPATIENT
Start: 2024-10-02

## 2024-10-02 RX ORDER — EPINEPHRINE 1 MG/ML(1)
0.5 AMPUL (ML) INJECTION PRN
OUTPATIENT
Start: 2024-10-02

## 2024-10-02 RX ORDER — 0.9 % SODIUM CHLORIDE 0.9 %
3 VIAL (ML) INJECTION PRN
OUTPATIENT
Start: 2024-10-05

## 2024-10-02 RX ORDER — ACETAMINOPHEN 325 MG/1
650 TABLET ORAL PRN
OUTPATIENT
Start: 2024-10-05

## 2024-10-02 RX ORDER — ACETAMINOPHEN 325 MG/1
650 TABLET ORAL ONCE
Status: CANCELLED | OUTPATIENT
Start: 2024-10-05

## 2024-10-02 RX ORDER — ACETAMINOPHEN 325 MG/1
650 TABLET ORAL ONCE
Status: COMPLETED | OUTPATIENT
Start: 2024-10-02 | End: 2024-10-02

## 2024-10-02 RX ORDER — DIPHENHYDRAMINE HYDROCHLORIDE 50 MG/ML
25 INJECTION INTRAMUSCULAR; INTRAVENOUS PRN
OUTPATIENT
Start: 2024-10-05

## 2024-10-02 RX ADMIN — DIPHENHYDRAMINE HYDROCHLORIDE 25 MG: 25 TABLET ORAL at 13:06

## 2024-10-02 RX ADMIN — HEPARIN 500 UNITS: 100 SYRINGE at 16:44

## 2024-10-02 RX ADMIN — ACETAMINOPHEN 650 MG: 325 TABLET ORAL at 13:06

## 2024-10-02 RX ADMIN — SODIUM CHLORIDE 1000 MG: 9 INJECTION, SOLUTION INTRAVENOUS at 11:37

## 2024-10-02 ASSESSMENT — PAIN DESCRIPTION - PAIN TYPE: TYPE: CHRONIC PAIN

## 2024-10-02 ASSESSMENT — FIBROSIS 4 INDEX: FIB4 SCORE: 0.43

## 2024-10-17 ENCOUNTER — HOME HEALTH ADMISSION (OUTPATIENT)
Dept: HOME HEALTH SERVICES | Facility: HOME HEALTHCARE | Age: 59
End: 2024-10-17
Payer: MEDICARE

## 2024-11-02 NOTE — Clinical Note
noted   ----- Message -----  From: Meme Salamanca R.N.  Sent: 11/4/2024   3:17 AM PST  To: Carmela Conte R.N.  Subject: FW:                                                  ----- Message -----  From: Jailene Handy R.N.  Sent: 11/2/2024   5:56 PM PST  To: Meme Salamanca R.N.      Received a call from [facility].  Patient apparently called them and stated that the catheter placed yesterday was leaking.  1417-called patient-no answer  1422-called patient again and left message

## 2024-11-03 NOTE — CASE COMMUNICATION
Received a call from [facility].  Patient apparently called them and stated that the catheter placed yesterday was leaking.  1417-called patient-no answer  1422-called patient again and left message

## 2024-11-04 NOTE — CASE COMMUNICATION
----- Message -----  From: Jailene Handy R.N.  Sent: 11/2/2024   5:56 PM PST  To: Meme Salamanca R.N.      Received a call from [facility].  Patient apparently called them and stated that the catheter placed yesterday was leaking.  1417-called patient-no answer  1422-called patient again and left message

## 2024-11-04 NOTE — CASE COMMUNICATION
Primary dx/Skilled need:rectal cancer, suprapubic catheter  SN frequency:1w1.2w4  Zip code: [address]  Disciplines ordered: [facility]  Insurance & authorization: [facility]  Certification period:11/1-12/30  Special considerations: anticipate rapid transition to hospice

## 2024-11-04 NOTE — Clinical Note
noted   ----- Message -----  From: Meme Salamanca R.N.  Sent: 11/4/2024   1:05 AM PST  To: Carolin King R.N.; Carmela Conte R.N.; *      Primary dx/Skilled need:rectal cancer, suprapubic catheter  SN frequency:1w1.2w4  Zip code: [id number]  Disciplines ordered: [facility]  Insurance & authorization: [facility]  Certification period:11/1-12/30  Special considerations: anticipate rapid transition to hospice

## 2024-11-05 NOTE — Clinical Note
Discharge patient from Home Health services due to   discharged to [facility].    Pt only had one HH visit, the SOC evaluation, therefore [place] discharge is not indicated

## 2024-11-05 NOTE — Clinical Note
noted   ----- Message -----  From: Carolin King R.N.  Sent: 11/5/2024   3:28 PM PST  To: Meme Salamanca R.N.; Carmela Conte R.N.; *      Discharge patient from Home Health services due to   discharged to [facility] hosp[facility] agency.    Pt only had one HH visit, the SOC evaluation, therefore [place] discharge is not indicated

## 2024-11-06 NOTE — CASE COMMUNICATION
noted  ----- Message -----  From: Carolin King R.N.  Sent: 11/5/2024   3:28 PM PST  To: Meme Salamanca R.N.; Carmela Conte R.N.; *      Discharge patient from Home Health services due to   discharged to [facility] agency.    Pt only had one HH visit, the SOC evaluation, therefore [place] discharge is not indicated

## 2024-11-12 PROCEDURE — 665999 HH PPS REVENUE DEBIT

## 2024-11-12 PROCEDURE — 665998 HH PPS REVENUE CREDIT

## 2024-11-13 ENCOUNTER — HOME CARE VISIT (OUTPATIENT)
Dept: HOME HEALTH SERVICES | Facility: HOME HEALTHCARE | Age: 59
End: 2024-11-13
Payer: MEDICARE

## 2024-11-13 PROCEDURE — 665999 HH PPS REVENUE DEBIT

## 2024-11-13 PROCEDURE — 665998 HH PPS REVENUE CREDIT

## 2024-11-13 NOTE — CASE COMMUNICATION
Quality Review Completed for SOC OASIS by ZOYA Ojeda RN on 11/13/2024:     Edits completed by ZOYA Ojeda RN:     1.  and  diagnosis coding updated per chart review  2. Completed HB status with OASIS data  3.  changed to 11/1/24 for LSOC ordered  4.  changed to 5 and  changed to 5 per [id number] did not transfer or mobilize  5. [id number]  goals changed to match performance as patient is not expected to improve  6.  0 goals changed to match performance as patient is not expected to improve

## 2024-11-14 PROCEDURE — 665999 HH PPS REVENUE DEBIT

## 2024-11-14 PROCEDURE — 665998 HH PPS REVENUE CREDIT

## 2024-11-14 NOTE — CASE COMMUNICATION
I agree with changes  ----- Message -----  From: María Elena Ojeda R.N.  Sent: 11/13/2024  12:40 PM PST  To: Meme Salamanca R.N.      Quality Review Completed for SOC OASIS by ZOYA Ojeda RN on 11/13/2024:     Edits completed by ZOYA Ojeda RN:     1.  and  diagnosis coding updated per chart review  2. Completed HB status with OASIS data  3.  changed to 11/1/24 for LSOC ordered  4.  changed to 5 and  changed to 5 per  [id number] did not transfer or mobilize  5. [id number]  goals changed to match performance as patient is not expected to improve  6. [id number] goals changed to match performance as patient is not expected to improve

## 2024-11-15 ENCOUNTER — HOME CARE VISIT (OUTPATIENT)
Dept: HOME HEALTH SERVICES | Facility: HOME HEALTHCARE | Age: 59
End: 2024-11-15
Payer: MEDICARE

## 2024-11-15 PROCEDURE — 665999 HH PPS REVENUE DEBIT

## 2024-11-15 PROCEDURE — 665998 HH PPS REVENUE CREDIT

## 2024-11-16 PROCEDURE — 665999 HH PPS REVENUE DEBIT

## 2024-11-16 PROCEDURE — 665998 HH PPS REVENUE CREDIT

## 2024-11-17 PROCEDURE — 665999 HH PPS REVENUE DEBIT

## 2024-11-17 PROCEDURE — 665998 HH PPS REVENUE CREDIT

## 2024-11-18 PROCEDURE — 665998 HH PPS REVENUE CREDIT

## 2024-11-18 PROCEDURE — 665999 HH PPS REVENUE DEBIT

## 2024-11-19 PROCEDURE — 665998 HH PPS REVENUE CREDIT

## 2024-11-19 PROCEDURE — 665999 HH PPS REVENUE DEBIT

## 2024-11-20 PROCEDURE — 665998 HH PPS REVENUE CREDIT

## 2024-11-20 PROCEDURE — 665999 HH PPS REVENUE DEBIT

## 2024-11-21 PROCEDURE — 665999 HH PPS REVENUE DEBIT

## 2024-11-21 PROCEDURE — 665998 HH PPS REVENUE CREDIT

## 2024-11-22 PROCEDURE — 665998 HH PPS REVENUE CREDIT

## 2024-11-22 PROCEDURE — 665999 HH PPS REVENUE DEBIT

## 2024-11-23 PROCEDURE — 665998 HH PPS REVENUE CREDIT

## 2024-11-23 PROCEDURE — 665999 HH PPS REVENUE DEBIT

## 2024-11-24 PROCEDURE — 665999 HH PPS REVENUE DEBIT

## 2024-11-24 PROCEDURE — 665998 HH PPS REVENUE CREDIT

## 2024-11-25 PROCEDURE — 665998 HH PPS REVENUE CREDIT

## 2024-11-25 PROCEDURE — 665999 HH PPS REVENUE DEBIT

## 2024-11-26 PROCEDURE — 665998 HH PPS REVENUE CREDIT

## 2024-11-26 PROCEDURE — 665999 HH PPS REVENUE DEBIT

## 2024-11-27 PROCEDURE — 665999 HH PPS REVENUE DEBIT

## 2024-11-27 PROCEDURE — 665998 HH PPS REVENUE CREDIT

## 2024-11-28 PROCEDURE — 665998 HH PPS REVENUE CREDIT

## 2024-11-28 PROCEDURE — 665999 HH PPS REVENUE DEBIT

## 2024-11-29 PROCEDURE — 665998 HH PPS REVENUE CREDIT

## 2024-11-29 PROCEDURE — 665999 HH PPS REVENUE DEBIT

## 2025-04-08 NOTE — PROGRESS NOTES
"Pharmacy Chemotherapy Verification  Patient Name: Dre Eaton   Dx: Metastatic rectal adenocarcinoa   Regimen: Capecitabine + Bevacizumab       Bevacizumab 7.5 mg/kg IV on Day 1  Capecitabine 850 - 1250 mg/m2 PO twice daily on Days 1-14   21 day cycle until disease progression or unacceptable toxicity   NCCN Guidelines for Rectal Cancer v2.2020  Herman EVANS, et al. Lancet Oncol. 2013;14(11):7694-2597  Van Emiliano E, et al. Briseida Oncol. 2009;20(11):1842-7    Allergies: Patient has no known allergies.     /82   Pulse 69   Temp 36.7 °C (98 °F) (Temporal)   Resp 18   Ht 1.74 m (5' 8.5\")   Wt 87.5 kg (192 lb 14.4 oz)   SpO2 100%   BMI 28.90 kg/m²  Body surface area is 2.06 meters squared.    Labs 7/27/21  Urine protein = Negative  BP = OK    Drug Order   (Drug name, dose, route, IV Fluid & volume, frequency, number of doses) Cycle 16  Previous treatment: 7/9/21   Medication = Bevacizumab-bvzr (Zirabev)  Base Dose= 7.5 mg/kg  Calc Dose: Base Dose x 87.5 kg = 656.25 mg  Final Dose = 600 mg  Route = IV  Fluid & Volume =   mL  Admin Duration = Over 30 minutes   Rounded to vial size (within 10%) per dose rounding protocol.       Okay to treat with final dose       " Quality 226: Preventive Care And Screening: Tobacco Use: Screening And Cessation Intervention: Patient screened for tobacco use and is an ex/non-smoker Quality 130: Documentation Of Current Medications In The Medical Record: Current Medications Documented Detail Level: Detailed

## 2025-05-05 NOTE — PROGRESS NOTES
"Pharmacy Chemotherapy calculation:    DX: colorectal cancer    Cycle 2  Previous treatment: 9/15/20  -[facility]Bevacizumab [facility] Sept/2019- March/2020  - Xrt + capecitabine - 4/2020    Regimen: Capecitabine + bevacizumab  bevacizumab 7.5mg/kg IV on day 1  Capecitabine 850-1250mg/m2 PO BID on days 1-14- home prescription   Q21 days until progression / toxicity  NCCN Guidelines for Colon Cancer V.2.2020  Van Ayaz E, et al - Briseida Oncol 2009 Nov;20(11):1842-7.   Tra EVANS et al - Lancet Oncol 2013 Oct;14(11):6601-2165.        /77   Pulse (!) 58   Temp 36.2 °C (97.2 °F) (Temporal)   Resp 18   Ht 1.75 m (5' 8.9\")   Wt 82.5 kg (181 lb 14.1 oz)   SpO2 98%   BMI 26.94 kg/m²   Body surface area is 2 meters squared.     Labs:   BP okay  urine protein meets parameters     bevacizumab 7.5mg/kg x 82.5 kg = 618.75 mg    Rounded to vial size(within 10%) per dose rounding protocol.    ok to treat with final dose = 600 mg IV            " abscess